# Patient Record
Sex: MALE | Race: WHITE | Employment: OTHER | URBAN - METROPOLITAN AREA
[De-identification: names, ages, dates, MRNs, and addresses within clinical notes are randomized per-mention and may not be internally consistent; named-entity substitution may affect disease eponyms.]

---

## 2017-01-03 ENCOUNTER — OFFICE VISIT (OUTPATIENT)
Dept: FAMILY MEDICINE CLINIC | Age: 37
End: 2017-01-03

## 2017-01-03 VITALS — RESPIRATION RATE: 12 BRPM | HEART RATE: 130 BPM | SYSTOLIC BLOOD PRESSURE: 130 MMHG | DIASTOLIC BLOOD PRESSURE: 80 MMHG

## 2017-01-03 DIAGNOSIS — S92.351A CLOSED DISPLACED FRACTURE OF FIFTH METATARSAL BONE OF RIGHT FOOT, INITIAL ENCOUNTER: Primary | ICD-10-CM

## 2017-01-03 PROCEDURE — 99213 OFFICE O/P EST LOW 20 MIN: CPT | Performed by: NURSE PRACTITIONER

## 2017-01-03 ASSESSMENT — ENCOUNTER SYMPTOMS
SHORTNESS OF BREATH: 0
DIARRHEA: 0
NAUSEA: 0
COUGH: 0
VOMITING: 0

## 2017-01-04 ENCOUNTER — TELEPHONE (OUTPATIENT)
Dept: ORTHOPEDIC SURGERY | Age: 37
End: 2017-01-04

## 2017-01-05 RX ORDER — AMITRIPTYLINE HYDROCHLORIDE 25 MG/1
TABLET, FILM COATED ORAL
Qty: 28 TABLET | Refills: 0 | Status: SHIPPED | OUTPATIENT
Start: 2017-01-05 | End: 2017-01-11 | Stop reason: SDUPTHER

## 2017-01-06 ENCOUNTER — OFFICE VISIT (OUTPATIENT)
Dept: ORTHOPEDIC SURGERY | Age: 37
End: 2017-01-06

## 2017-01-06 VITALS
BODY MASS INDEX: 39.17 KG/M2 | WEIGHT: 315 LBS | DIASTOLIC BLOOD PRESSURE: 94 MMHG | SYSTOLIC BLOOD PRESSURE: 139 MMHG | RESPIRATION RATE: 16 BRPM | HEIGHT: 75 IN | HEART RATE: 107 BPM

## 2017-01-06 DIAGNOSIS — S92.354A CLOSED NONDISPLACED FRACTURE OF FIFTH METATARSAL BONE OF RIGHT FOOT, INITIAL ENCOUNTER: Primary | ICD-10-CM

## 2017-01-06 PROCEDURE — 99203 OFFICE O/P NEW LOW 30 MIN: CPT | Performed by: ORTHOPAEDIC SURGERY

## 2017-01-06 PROCEDURE — 28470 CLTX METATARSAL FX WO MNP EA: CPT | Performed by: ORTHOPAEDIC SURGERY

## 2017-01-06 PROCEDURE — L4360 PNEUMAT WALKING BOOT PRE CST: HCPCS | Performed by: ORTHOPAEDIC SURGERY

## 2017-01-09 PROBLEM — S92.354A CLOSED NONDISPLACED FRACTURE OF FIFTH METATARSAL BONE OF RIGHT FOOT: Status: ACTIVE | Noted: 2017-01-09

## 2017-01-10 ENCOUNTER — TELEPHONE (OUTPATIENT)
Dept: FAMILY MEDICINE CLINIC | Age: 37
End: 2017-01-10

## 2017-01-10 RX ORDER — DULOXETIN HYDROCHLORIDE 30 MG/1
CAPSULE, DELAYED RELEASE ORAL
Qty: 60 CAPSULE | Refills: 0 | OUTPATIENT
Start: 2017-01-10

## 2017-01-11 ENCOUNTER — OFFICE VISIT (OUTPATIENT)
Dept: PAIN MANAGEMENT | Age: 37
End: 2017-01-11

## 2017-01-11 VITALS
DIASTOLIC BLOOD PRESSURE: 101 MMHG | WEIGHT: 315 LBS | BODY MASS INDEX: 41.32 KG/M2 | HEART RATE: 123 BPM | SYSTOLIC BLOOD PRESSURE: 127 MMHG

## 2017-01-11 DIAGNOSIS — S99.921A RIGHT FOOT INJURY, INITIAL ENCOUNTER: ICD-10-CM

## 2017-01-11 DIAGNOSIS — M96.1 FAILED BACK SURGICAL SYNDROME: ICD-10-CM

## 2017-01-11 DIAGNOSIS — M54.9 OTHER CHRONIC BACK PAIN: ICD-10-CM

## 2017-01-11 DIAGNOSIS — G89.29 OTHER CHRONIC BACK PAIN: ICD-10-CM

## 2017-01-11 DIAGNOSIS — G89.4 CHRONIC PAIN SYNDROME: Primary | ICD-10-CM

## 2017-01-11 PROCEDURE — 99213 OFFICE O/P EST LOW 20 MIN: CPT | Performed by: NURSE PRACTITIONER

## 2017-01-11 RX ORDER — DULOXETIN HYDROCHLORIDE 30 MG/1
30 CAPSULE, DELAYED RELEASE ORAL 2 TIMES DAILY
Qty: 60 CAPSULE | Refills: 1 | Status: SHIPPED | OUTPATIENT
Start: 2017-01-11 | End: 2017-02-08 | Stop reason: SDUPTHER

## 2017-01-11 RX ORDER — AMITRIPTYLINE HYDROCHLORIDE 25 MG/1
TABLET, FILM COATED ORAL
Qty: 28 TABLET | Refills: 1 | Status: SHIPPED | OUTPATIENT
Start: 2017-01-11 | End: 2017-02-08 | Stop reason: SDUPTHER

## 2017-01-11 RX ORDER — GABAPENTIN 300 MG/1
300 CAPSULE ORAL 3 TIMES DAILY
Qty: 90 CAPSULE | Refills: 1 | Status: SHIPPED | OUTPATIENT
Start: 2017-01-11 | End: 2017-02-08 | Stop reason: SDUPTHER

## 2017-01-11 RX ORDER — HYDROCODONE BITARTRATE AND ACETAMINOPHEN 5; 325 MG/1; MG/1
1 TABLET ORAL EVERY 8 HOURS PRN
Qty: 84 TABLET | Refills: 0 | Status: SHIPPED | OUTPATIENT
Start: 2017-01-11 | End: 2017-02-08 | Stop reason: SDUPTHER

## 2017-01-22 DIAGNOSIS — J45.20 MILD INTERMITTENT ASTHMA WITHOUT COMPLICATION: ICD-10-CM

## 2017-01-23 RX ORDER — DILTIAZEM HYDROCHLORIDE 60 MG/1
TABLET, FILM COATED ORAL
Qty: 1 INHALER | Refills: 2 | Status: SHIPPED | OUTPATIENT
Start: 2017-01-23 | End: 2017-04-11 | Stop reason: SDUPTHER

## 2017-02-08 ENCOUNTER — OFFICE VISIT (OUTPATIENT)
Dept: PAIN MANAGEMENT | Age: 37
End: 2017-02-08

## 2017-02-08 VITALS
WEIGHT: 315 LBS | SYSTOLIC BLOOD PRESSURE: 132 MMHG | BODY MASS INDEX: 42.57 KG/M2 | HEART RATE: 115 BPM | DIASTOLIC BLOOD PRESSURE: 97 MMHG

## 2017-02-08 DIAGNOSIS — G89.29 OTHER CHRONIC BACK PAIN: ICD-10-CM

## 2017-02-08 DIAGNOSIS — M96.1 FAILED BACK SURGICAL SYNDROME: ICD-10-CM

## 2017-02-08 DIAGNOSIS — G89.4 CHRONIC PAIN SYNDROME: Primary | ICD-10-CM

## 2017-02-08 DIAGNOSIS — F32.9 SINGLE CURRENT EPISODE OF MAJOR DEPRESSIVE DISORDER, UNSPECIFIED DEPRESSION EPISODE SEVERITY: ICD-10-CM

## 2017-02-08 DIAGNOSIS — S92.354S CLOSED NONDISPLACED FRACTURE OF FIFTH METATARSAL BONE OF RIGHT FOOT, SEQUELA: ICD-10-CM

## 2017-02-08 DIAGNOSIS — M54.9 OTHER CHRONIC BACK PAIN: ICD-10-CM

## 2017-02-08 DIAGNOSIS — M48.00 CENTRAL SPINAL STENOSIS: ICD-10-CM

## 2017-02-08 PROCEDURE — 99213 OFFICE O/P EST LOW 20 MIN: CPT | Performed by: NURSE PRACTITIONER

## 2017-02-08 RX ORDER — HYDROCODONE BITARTRATE AND ACETAMINOPHEN 5; 325 MG/1; MG/1
1 TABLET ORAL EVERY 8 HOURS PRN
Qty: 84 TABLET | Refills: 0 | Status: SHIPPED | OUTPATIENT
Start: 2017-02-08 | End: 2017-03-08 | Stop reason: SDUPTHER

## 2017-02-08 RX ORDER — AMITRIPTYLINE HYDROCHLORIDE 25 MG/1
TABLET, FILM COATED ORAL
Qty: 28 TABLET | Refills: 1 | Status: SHIPPED | OUTPATIENT
Start: 2017-02-08 | End: 2017-03-08 | Stop reason: SDUPTHER

## 2017-02-08 RX ORDER — GABAPENTIN 300 MG/1
300 CAPSULE ORAL 3 TIMES DAILY
Qty: 90 CAPSULE | Refills: 1 | Status: SHIPPED | OUTPATIENT
Start: 2017-02-08 | End: 2017-03-08 | Stop reason: SDUPTHER

## 2017-02-08 RX ORDER — DULOXETIN HYDROCHLORIDE 30 MG/1
30 CAPSULE, DELAYED RELEASE ORAL 2 TIMES DAILY
Qty: 60 CAPSULE | Refills: 1 | Status: SHIPPED | OUTPATIENT
Start: 2017-02-08 | End: 2017-03-08 | Stop reason: SDUPTHER

## 2017-02-09 ENCOUNTER — TELEPHONE (OUTPATIENT)
Dept: FAMILY MEDICINE CLINIC | Age: 37
End: 2017-02-09

## 2017-02-10 ENCOUNTER — TELEPHONE (OUTPATIENT)
Dept: FAMILY MEDICINE CLINIC | Age: 37
End: 2017-02-10

## 2017-02-13 RX ORDER — ALBUTEROL SULFATE 90 UG/1
2 AEROSOL, METERED RESPIRATORY (INHALATION) EVERY 6 HOURS PRN
Qty: 1 INHALER | Refills: 1 | Status: SHIPPED | OUTPATIENT
Start: 2017-02-13 | End: 2017-04-03 | Stop reason: SDUPTHER

## 2017-02-17 ENCOUNTER — OFFICE VISIT (OUTPATIENT)
Dept: ORTHOPEDIC SURGERY | Age: 37
End: 2017-02-17

## 2017-02-17 VITALS
DIASTOLIC BLOOD PRESSURE: 87 MMHG | BODY MASS INDEX: 37.3 KG/M2 | WEIGHT: 300 LBS | HEIGHT: 75 IN | SYSTOLIC BLOOD PRESSURE: 139 MMHG | HEART RATE: 107 BPM

## 2017-02-17 DIAGNOSIS — S92.354D CLOSED NONDISPLACED FRACTURE OF FIFTH METATARSAL BONE OF RIGHT FOOT WITH ROUTINE HEALING, SUBSEQUENT ENCOUNTER: Primary | ICD-10-CM

## 2017-02-17 PROCEDURE — 99024 POSTOP FOLLOW-UP VISIT: CPT | Performed by: NURSE PRACTITIONER

## 2017-02-17 PROCEDURE — 73630 X-RAY EXAM OF FOOT: CPT | Performed by: NURSE PRACTITIONER

## 2017-03-03 ENCOUNTER — OFFICE VISIT (OUTPATIENT)
Dept: FAMILY MEDICINE CLINIC | Age: 37
End: 2017-03-03

## 2017-03-03 VITALS
BODY MASS INDEX: 44.37 KG/M2 | WEIGHT: 315 LBS | DIASTOLIC BLOOD PRESSURE: 70 MMHG | HEART RATE: 105 BPM | SYSTOLIC BLOOD PRESSURE: 110 MMHG | RESPIRATION RATE: 12 BRPM

## 2017-03-03 DIAGNOSIS — E11.40 TYPE 2 DIABETES MELLITUS WITH DIABETIC NEUROPATHY, WITHOUT LONG-TERM CURRENT USE OF INSULIN (HCC): ICD-10-CM

## 2017-03-03 DIAGNOSIS — K21.9 GASTROESOPHAGEAL REFLUX DISEASE, ESOPHAGITIS PRESENCE NOT SPECIFIED: ICD-10-CM

## 2017-03-03 DIAGNOSIS — I10 ESSENTIAL HYPERTENSION: ICD-10-CM

## 2017-03-03 DIAGNOSIS — E66.01 MORBID OBESITY WITH BMI OF 40.0-44.9, ADULT (HCC): ICD-10-CM

## 2017-03-03 DIAGNOSIS — E78.2 MIXED HYPERLIPIDEMIA: ICD-10-CM

## 2017-03-03 PROCEDURE — 99214 OFFICE O/P EST MOD 30 MIN: CPT | Performed by: NURSE PRACTITIONER

## 2017-03-08 ENCOUNTER — OFFICE VISIT (OUTPATIENT)
Dept: PAIN MANAGEMENT | Age: 37
End: 2017-03-08

## 2017-03-08 VITALS
WEIGHT: 315 LBS | SYSTOLIC BLOOD PRESSURE: 142 MMHG | DIASTOLIC BLOOD PRESSURE: 95 MMHG | BODY MASS INDEX: 44.37 KG/M2 | HEART RATE: 116 BPM

## 2017-03-08 DIAGNOSIS — G89.29 OTHER CHRONIC BACK PAIN: ICD-10-CM

## 2017-03-08 DIAGNOSIS — M96.1 FAILED BACK SURGICAL SYNDROME: ICD-10-CM

## 2017-03-08 DIAGNOSIS — M54.9 OTHER CHRONIC BACK PAIN: ICD-10-CM

## 2017-03-08 DIAGNOSIS — B35.4 TINEA CORPORIS: ICD-10-CM

## 2017-03-08 DIAGNOSIS — E13.40 NEUROPATHY DUE TO SECONDARY DIABETES MELLITUS (HCC): ICD-10-CM

## 2017-03-08 DIAGNOSIS — F32.9 SINGLE CURRENT EPISODE OF MAJOR DEPRESSIVE DISORDER, UNSPECIFIED DEPRESSION EPISODE SEVERITY: ICD-10-CM

## 2017-03-08 DIAGNOSIS — E66.01 MORBID OBESITY WITH BMI OF 40.0-44.9, ADULT (HCC): ICD-10-CM

## 2017-03-08 DIAGNOSIS — F51.01 PRIMARY INSOMNIA: ICD-10-CM

## 2017-03-08 DIAGNOSIS — G89.4 CHRONIC PAIN SYNDROME: Primary | ICD-10-CM

## 2017-03-08 DIAGNOSIS — M48.00 CENTRAL SPINAL STENOSIS: ICD-10-CM

## 2017-03-08 PROCEDURE — 99213 OFFICE O/P EST LOW 20 MIN: CPT | Performed by: NURSE PRACTITIONER

## 2017-03-08 RX ORDER — TERBINAFINE HCL 100 %
1 POWDER (GRAM) MISCELLANEOUS 2 TIMES DAILY
Qty: 1 BOTTLE | Refills: 0 | Status: SHIPPED | OUTPATIENT
Start: 2017-03-08 | End: 2017-08-16

## 2017-03-08 RX ORDER — AMITRIPTYLINE HYDROCHLORIDE 25 MG/1
TABLET, FILM COATED ORAL
Qty: 28 TABLET | Refills: 1 | Status: SHIPPED | OUTPATIENT
Start: 2017-03-08 | End: 2017-04-05 | Stop reason: SDUPTHER

## 2017-03-08 RX ORDER — HYDROCODONE BITARTRATE AND ACETAMINOPHEN 5; 325 MG/1; MG/1
1 TABLET ORAL EVERY 8 HOURS PRN
Qty: 84 TABLET | Refills: 0 | Status: SHIPPED | OUTPATIENT
Start: 2017-03-08 | End: 2017-04-05 | Stop reason: SDUPTHER

## 2017-03-08 RX ORDER — DULOXETIN HYDROCHLORIDE 30 MG/1
30 CAPSULE, DELAYED RELEASE ORAL 2 TIMES DAILY
Qty: 60 CAPSULE | Refills: 1 | Status: SHIPPED | OUTPATIENT
Start: 2017-03-08 | End: 2017-04-05 | Stop reason: SDUPTHER

## 2017-03-08 RX ORDER — GABAPENTIN 300 MG/1
300 CAPSULE ORAL 3 TIMES DAILY
Qty: 90 CAPSULE | Refills: 1 | Status: SHIPPED | OUTPATIENT
Start: 2017-03-08 | End: 2017-04-05 | Stop reason: SDUPTHER

## 2017-04-03 RX ORDER — ALBUTEROL SULFATE 90 UG/1
2 AEROSOL, METERED RESPIRATORY (INHALATION) EVERY 6 HOURS PRN
Qty: 1 INHALER | Refills: 1 | Status: SHIPPED | OUTPATIENT
Start: 2017-04-03 | End: 2017-07-06 | Stop reason: SDUPTHER

## 2017-04-05 ENCOUNTER — OFFICE VISIT (OUTPATIENT)
Dept: PAIN MANAGEMENT | Age: 37
End: 2017-04-05

## 2017-04-05 ENCOUNTER — TELEPHONE (OUTPATIENT)
Dept: PAIN MANAGEMENT | Age: 37
End: 2017-04-05

## 2017-04-05 VITALS
BODY MASS INDEX: 44.37 KG/M2 | WEIGHT: 315 LBS | DIASTOLIC BLOOD PRESSURE: 101 MMHG | HEART RATE: 118 BPM | SYSTOLIC BLOOD PRESSURE: 147 MMHG

## 2017-04-05 DIAGNOSIS — M54.9 OTHER CHRONIC BACK PAIN: ICD-10-CM

## 2017-04-05 DIAGNOSIS — M96.1 FAILED BACK SURGICAL SYNDROME: ICD-10-CM

## 2017-04-05 DIAGNOSIS — G89.29 OTHER CHRONIC BACK PAIN: ICD-10-CM

## 2017-04-05 DIAGNOSIS — E66.01 MORBID OBESITY WITH BMI OF 40.0-44.9, ADULT (HCC): Primary | ICD-10-CM

## 2017-04-05 DIAGNOSIS — E13.40 NEUROPATHY DUE TO SECONDARY DIABETES MELLITUS (HCC): ICD-10-CM

## 2017-04-05 DIAGNOSIS — G89.4 CHRONIC PAIN SYNDROME: ICD-10-CM

## 2017-04-05 DIAGNOSIS — M48.00 CENTRAL SPINAL STENOSIS: ICD-10-CM

## 2017-04-05 DIAGNOSIS — F51.01 PRIMARY INSOMNIA: ICD-10-CM

## 2017-04-05 DIAGNOSIS — F32.9 SINGLE CURRENT EPISODE OF MAJOR DEPRESSIVE DISORDER, UNSPECIFIED DEPRESSION EPISODE SEVERITY: ICD-10-CM

## 2017-04-05 PROCEDURE — 99213 OFFICE O/P EST LOW 20 MIN: CPT | Performed by: NURSE PRACTITIONER

## 2017-04-05 RX ORDER — AMITRIPTYLINE HYDROCHLORIDE 25 MG/1
TABLET, FILM COATED ORAL
Qty: 28 TABLET | Refills: 1 | Status: SHIPPED | OUTPATIENT
Start: 2017-04-05 | End: 2017-05-26 | Stop reason: SDUPTHER

## 2017-04-05 RX ORDER — HYDROCODONE BITARTRATE AND ACETAMINOPHEN 5; 325 MG/1; MG/1
1 TABLET ORAL EVERY 8 HOURS PRN
Qty: 84 TABLET | Refills: 0 | Status: SHIPPED | OUTPATIENT
Start: 2017-04-05 | End: 2017-05-03 | Stop reason: SDUPTHER

## 2017-04-05 RX ORDER — DULOXETIN HYDROCHLORIDE 30 MG/1
30 CAPSULE, DELAYED RELEASE ORAL 2 TIMES DAILY
Qty: 60 CAPSULE | Refills: 1 | Status: SHIPPED | OUTPATIENT
Start: 2017-04-05 | End: 2017-05-26 | Stop reason: SDUPTHER

## 2017-04-05 RX ORDER — GABAPENTIN 300 MG/1
300 CAPSULE ORAL 3 TIMES DAILY
Qty: 90 CAPSULE | Refills: 1 | Status: SHIPPED | OUTPATIENT
Start: 2017-04-05 | End: 2017-05-26 | Stop reason: SDUPTHER

## 2017-04-11 ENCOUNTER — TELEPHONE (OUTPATIENT)
Dept: FAMILY MEDICINE CLINIC | Age: 37
End: 2017-04-11

## 2017-04-11 ENCOUNTER — HOSPITAL ENCOUNTER (OUTPATIENT)
Dept: OTHER | Age: 37
Discharge: HOME | End: 2017-04-30
Attending: NURSE PRACTITIONER | Admitting: NURSE PRACTITIONER

## 2017-04-11 DIAGNOSIS — J45.20 MILD INTERMITTENT ASTHMA WITHOUT COMPLICATION: ICD-10-CM

## 2017-04-11 RX ORDER — BUDESONIDE AND FORMOTEROL FUMARATE DIHYDRATE 80; 4.5 UG/1; UG/1
AEROSOL RESPIRATORY (INHALATION)
Qty: 3 INHALER | Refills: 0 | Status: SHIPPED | OUTPATIENT
Start: 2017-04-11 | End: 2017-07-27 | Stop reason: SDUPTHER

## 2017-04-13 ENCOUNTER — HOSPITAL ENCOUNTER (OUTPATIENT)
Dept: PHYSICAL THERAPY | Age: 37
Discharge: HOME | End: 2017-04-14
Admitting: NURSE PRACTITIONER

## 2017-04-18 ENCOUNTER — HOSPITAL ENCOUNTER (OUTPATIENT)
Dept: PHYSICAL THERAPY | Age: 37
Discharge: HOME | End: 2017-04-19
Admitting: NURSE PRACTITIONER

## 2017-04-20 ENCOUNTER — OFFICE VISIT (OUTPATIENT)
Dept: FAMILY MEDICINE CLINIC | Age: 37
End: 2017-04-20

## 2017-04-20 ENCOUNTER — TELEPHONE (OUTPATIENT)
Dept: FAMILY MEDICINE CLINIC | Age: 37
End: 2017-04-20

## 2017-04-20 ENCOUNTER — HOSPITAL ENCOUNTER (OUTPATIENT)
Dept: VASCULAR LAB | Age: 37
Discharge: HOME | End: 2017-04-20
Attending: NURSE PRACTITIONER | Admitting: NURSE PRACTITIONER

## 2017-04-20 VITALS
SYSTOLIC BLOOD PRESSURE: 138 MMHG | RESPIRATION RATE: 12 BRPM | WEIGHT: 315 LBS | BODY MASS INDEX: 46.62 KG/M2 | HEART RATE: 115 BPM | DIASTOLIC BLOOD PRESSURE: 100 MMHG | OXYGEN SATURATION: 98 %

## 2017-04-20 DIAGNOSIS — R60.0 LOCALIZED EDEMA: ICD-10-CM

## 2017-04-20 DIAGNOSIS — I10 ESSENTIAL HYPERTENSION: ICD-10-CM

## 2017-04-20 DIAGNOSIS — R60.0 BILATERAL LEG EDEMA: ICD-10-CM

## 2017-04-20 DIAGNOSIS — R60.0 BILATERAL LEG EDEMA: Primary | ICD-10-CM

## 2017-04-20 LAB
A/G RATIO: 1.7 (ref 1.1–2.2)
ALBUMIN SERPL-MCNC: 5 G/DL (ref 3.4–5)
ALP BLD-CCNC: 85 U/L (ref 40–129)
ALT SERPL-CCNC: 73 U/L (ref 10–40)
ANION GAP SERPL CALCULATED.3IONS-SCNC: 19 MMOL/L (ref 3–16)
AST SERPL-CCNC: 39 U/L (ref 15–37)
BILIRUB SERPL-MCNC: 0.7 MG/DL (ref 0–1)
BUN BLDV-MCNC: 13 MG/DL (ref 7–20)
CALCIUM SERPL-MCNC: 9.6 MG/DL (ref 8.3–10.6)
CHLORIDE BLD-SCNC: 92 MMOL/L (ref 99–110)
CO2: 25 MMOL/L (ref 21–32)
CREAT SERPL-MCNC: 0.9 MG/DL (ref 0.9–1.3)
GFR AFRICAN AMERICAN: >60
GFR NON-AFRICAN AMERICAN: >60
GLOBULIN: 2.9 G/DL
GLUCOSE BLD-MCNC: 193 MG/DL (ref 70–99)
POTASSIUM SERPL-SCNC: 4.4 MMOL/L (ref 3.5–5.1)
PRO-BNP: <5 PG/ML (ref 0–124)
SODIUM BLD-SCNC: 136 MMOL/L (ref 136–145)
TOTAL PROTEIN: 7.9 G/DL (ref 6.4–8.2)
TSH REFLEX: 1.1 UIU/ML (ref 0.27–4.2)

## 2017-04-20 PROCEDURE — 99214 OFFICE O/P EST MOD 30 MIN: CPT | Performed by: NURSE PRACTITIONER

## 2017-04-20 PROCEDURE — 93000 ELECTROCARDIOGRAM COMPLETE: CPT | Performed by: NURSE PRACTITIONER

## 2017-04-20 RX ORDER — METOPROLOL SUCCINATE 25 MG/1
25 TABLET, EXTENDED RELEASE ORAL DAILY
Qty: 30 TABLET | Refills: 0 | Status: SHIPPED | OUTPATIENT
Start: 2017-04-20 | End: 2017-05-24 | Stop reason: SDUPTHER

## 2017-04-20 ASSESSMENT — ENCOUNTER SYMPTOMS
NAUSEA: 0
SHORTNESS OF BREATH: 0
DIARRHEA: 0
VOMITING: 0
COUGH: 0

## 2017-04-21 ENCOUNTER — TELEPHONE (OUTPATIENT)
Dept: FAMILY MEDICINE CLINIC | Age: 37
End: 2017-04-21

## 2017-04-21 ENCOUNTER — OFFICE VISIT (OUTPATIENT)
Dept: ORTHOPEDIC SURGERY | Age: 37
End: 2017-04-21

## 2017-04-21 VITALS
WEIGHT: 315 LBS | BODY MASS INDEX: 39.17 KG/M2 | RESPIRATION RATE: 16 BRPM | SYSTOLIC BLOOD PRESSURE: 150 MMHG | HEIGHT: 75 IN | DIASTOLIC BLOOD PRESSURE: 99 MMHG

## 2017-04-21 DIAGNOSIS — M25.471 RIGHT ANKLE SWELLING: ICD-10-CM

## 2017-04-21 DIAGNOSIS — S92.354A CLOSED NONDISPLACED FRACTURE OF FIFTH METATARSAL BONE OF RIGHT FOOT, INITIAL ENCOUNTER: Primary | ICD-10-CM

## 2017-04-21 DIAGNOSIS — M25.571 ACUTE RIGHT ANKLE PAIN: ICD-10-CM

## 2017-04-21 PROBLEM — R74.8 ELEVATED LIVER ENZYMES: Status: ACTIVE | Noted: 2017-04-21

## 2017-04-21 PROCEDURE — 99214 OFFICE O/P EST MOD 30 MIN: CPT | Performed by: ORTHOPAEDIC SURGERY

## 2017-04-21 PROCEDURE — 73630 X-RAY EXAM OF FOOT: CPT | Performed by: ORTHOPAEDIC SURGERY

## 2017-04-21 PROCEDURE — 73610 X-RAY EXAM OF ANKLE: CPT | Performed by: ORTHOPAEDIC SURGERY

## 2017-04-22 DIAGNOSIS — E11.9 TYPE 2 DIABETES MELLITUS WITHOUT COMPLICATION, WITHOUT LONG-TERM CURRENT USE OF INSULIN (HCC): ICD-10-CM

## 2017-04-22 DIAGNOSIS — I10 ESSENTIAL HYPERTENSION: ICD-10-CM

## 2017-04-24 ENCOUNTER — TELEPHONE (OUTPATIENT)
Dept: FAMILY MEDICINE CLINIC | Age: 37
End: 2017-04-24

## 2017-04-24 DIAGNOSIS — I10 ESSENTIAL HYPERTENSION: ICD-10-CM

## 2017-04-24 DIAGNOSIS — E11.40 TYPE 2 DIABETES MELLITUS WITH DIABETIC NEUROPATHY, WITHOUT LONG-TERM CURRENT USE OF INSULIN (HCC): ICD-10-CM

## 2017-04-24 DIAGNOSIS — E78.2 MIXED HYPERLIPIDEMIA: ICD-10-CM

## 2017-04-24 LAB
A/G RATIO: 1.7 (ref 1.1–2.2)
ALBUMIN SERPL-MCNC: 4.7 G/DL (ref 3.4–5)
ALP BLD-CCNC: 81 U/L (ref 40–129)
ALT SERPL-CCNC: 68 U/L (ref 10–40)
ANION GAP SERPL CALCULATED.3IONS-SCNC: 17 MMOL/L (ref 3–16)
AST SERPL-CCNC: 39 U/L (ref 15–37)
BILIRUB SERPL-MCNC: 0.4 MG/DL (ref 0–1)
BUN BLDV-MCNC: 16 MG/DL (ref 7–20)
CALCIUM SERPL-MCNC: 9.3 MG/DL (ref 8.3–10.6)
CHLORIDE BLD-SCNC: 96 MMOL/L (ref 99–110)
CHOLESTEROL, TOTAL: 194 MG/DL (ref 0–199)
CO2: 24 MMOL/L (ref 21–32)
CREAT SERPL-MCNC: 0.9 MG/DL (ref 0.9–1.3)
GFR AFRICAN AMERICAN: >60
GFR NON-AFRICAN AMERICAN: >60
GLOBULIN: 2.7 G/DL
GLUCOSE BLD-MCNC: 237 MG/DL (ref 70–99)
HDLC SERPL-MCNC: 39 MG/DL (ref 40–60)
LDL CHOLESTEROL CALCULATED: 102 MG/DL
POTASSIUM SERPL-SCNC: 4.4 MMOL/L (ref 3.5–5.1)
SODIUM BLD-SCNC: 137 MMOL/L (ref 136–145)
TOTAL PROTEIN: 7.4 G/DL (ref 6.4–8.2)
TRIGL SERPL-MCNC: 264 MG/DL (ref 0–150)
VLDLC SERPL CALC-MCNC: 53 MG/DL

## 2017-04-24 RX ORDER — METFORMIN HYDROCHLORIDE 500 MG/1
TABLET, EXTENDED RELEASE ORAL
Qty: 60 TABLET | Refills: 0 | Status: SHIPPED | OUTPATIENT
Start: 2017-04-24 | End: 2017-05-30 | Stop reason: SDUPTHER

## 2017-04-24 RX ORDER — LISINOPRIL 10 MG/1
TABLET ORAL
Qty: 30 TABLET | Refills: 0 | Status: SHIPPED | OUTPATIENT
Start: 2017-04-24 | End: 2017-05-30 | Stop reason: SDUPTHER

## 2017-04-25 ENCOUNTER — HOSPITAL ENCOUNTER (OUTPATIENT)
Dept: PHYSICAL THERAPY | Age: 37
Discharge: HOME | End: 2017-04-26
Admitting: NURSE PRACTITIONER

## 2017-04-25 DIAGNOSIS — E11.40 TYPE 2 DIABETES MELLITUS WITH DIABETIC NEUROPATHY, WITHOUT LONG-TERM CURRENT USE OF INSULIN (HCC): Primary | ICD-10-CM

## 2017-04-25 LAB
ESTIMATED AVERAGE GLUCOSE: 171.4 MG/DL
HBA1C MFR BLD: 7.6 %

## 2017-04-27 ENCOUNTER — HOSPITAL ENCOUNTER (OUTPATIENT)
Dept: PHYSICAL THERAPY | Age: 37
Discharge: HOME | End: 2017-04-28
Admitting: NURSE PRACTITIONER

## 2017-04-28 ENCOUNTER — OFFICE VISIT (OUTPATIENT)
Dept: FAMILY MEDICINE CLINIC | Age: 37
End: 2017-04-28

## 2017-04-28 VITALS
DIASTOLIC BLOOD PRESSURE: 90 MMHG | HEART RATE: 104 BPM | WEIGHT: 315 LBS | BODY MASS INDEX: 47.37 KG/M2 | SYSTOLIC BLOOD PRESSURE: 130 MMHG | RESPIRATION RATE: 12 BRPM

## 2017-04-28 DIAGNOSIS — E11.40 TYPE 2 DIABETES MELLITUS WITH DIABETIC NEUROPATHY, WITHOUT LONG-TERM CURRENT USE OF INSULIN (HCC): Primary | ICD-10-CM

## 2017-04-28 DIAGNOSIS — R74.8 ELEVATED LIVER ENZYMES: ICD-10-CM

## 2017-04-28 DIAGNOSIS — I10 ESSENTIAL HYPERTENSION: ICD-10-CM

## 2017-04-28 LAB
ALBUMIN SERPL-MCNC: 4.6 G/DL (ref 3.4–5)
ALP BLD-CCNC: 83 U/L (ref 40–129)
ALT SERPL-CCNC: 71 U/L (ref 10–40)
AST SERPL-CCNC: 33 U/L (ref 15–37)
BILIRUB SERPL-MCNC: 0.4 MG/DL (ref 0–1)
BILIRUBIN DIRECT: <0.2 MG/DL (ref 0–0.3)
BILIRUBIN, INDIRECT: ABNORMAL MG/DL (ref 0–1)
HBV SURFACE AB TITR SER: <3.5 MUL/ML
HEPATITIS B SURFACE ANTIGEN INTERPRETATION: NORMAL
HEPATITIS C ANTIBODY INTERPRETATION: NORMAL
TOTAL PROTEIN: 7.2 G/DL (ref 6.4–8.2)

## 2017-04-28 PROCEDURE — 99214 OFFICE O/P EST MOD 30 MIN: CPT | Performed by: NURSE PRACTITIONER

## 2017-04-28 RX ORDER — ASPIRIN 81 MG/1
81 TABLET ORAL DAILY
COMMUNITY
End: 2017-08-16

## 2017-04-28 RX ORDER — BLOOD-GLUCOSE METER
1 KIT MISCELLANEOUS DAILY
Qty: 1 KIT | Refills: 0 | Status: SHIPPED | OUTPATIENT
Start: 2017-04-28 | End: 2017-06-06 | Stop reason: SDUPTHER

## 2017-04-28 ASSESSMENT — ENCOUNTER SYMPTOMS
VOMITING: 0
CONSTIPATION: 0
NAUSEA: 0
DIARRHEA: 0
SHORTNESS OF BREATH: 0
CHEST TIGHTNESS: 0

## 2017-05-02 ENCOUNTER — HOSPITAL ENCOUNTER (OUTPATIENT)
Dept: PHYSICAL THERAPY | Age: 37
Discharge: HOME | End: 2017-05-03
Admitting: NURSE PRACTITIONER

## 2017-05-03 ENCOUNTER — TELEPHONE (OUTPATIENT)
Dept: FAMILY MEDICINE CLINIC | Age: 37
End: 2017-05-03

## 2017-05-03 ENCOUNTER — OFFICE VISIT (OUTPATIENT)
Dept: PAIN MANAGEMENT | Age: 37
End: 2017-05-03

## 2017-05-03 VITALS
SYSTOLIC BLOOD PRESSURE: 122 MMHG | DIASTOLIC BLOOD PRESSURE: 85 MMHG | BODY MASS INDEX: 46.87 KG/M2 | WEIGHT: 315 LBS | HEART RATE: 127 BPM

## 2017-05-03 DIAGNOSIS — M48.00 CENTRAL SPINAL STENOSIS: ICD-10-CM

## 2017-05-03 DIAGNOSIS — M96.1 FAILED BACK SURGICAL SYNDROME: ICD-10-CM

## 2017-05-03 DIAGNOSIS — M54.9 OTHER CHRONIC BACK PAIN: ICD-10-CM

## 2017-05-03 DIAGNOSIS — F32.9 SINGLE CURRENT EPISODE OF MAJOR DEPRESSIVE DISORDER, UNSPECIFIED DEPRESSION EPISODE SEVERITY: ICD-10-CM

## 2017-05-03 DIAGNOSIS — F51.01 PRIMARY INSOMNIA: ICD-10-CM

## 2017-05-03 DIAGNOSIS — E78.2 MIXED HYPERLIPIDEMIA: ICD-10-CM

## 2017-05-03 DIAGNOSIS — G89.4 CHRONIC PAIN SYNDROME: Primary | ICD-10-CM

## 2017-05-03 DIAGNOSIS — E66.01 MORBID OBESITY WITH BMI OF 40.0-44.9, ADULT (HCC): ICD-10-CM

## 2017-05-03 DIAGNOSIS — E13.40 NEUROPATHY DUE TO SECONDARY DIABETES MELLITUS (HCC): ICD-10-CM

## 2017-05-03 DIAGNOSIS — G89.29 OTHER CHRONIC BACK PAIN: ICD-10-CM

## 2017-05-03 PROCEDURE — 99213 OFFICE O/P EST LOW 20 MIN: CPT | Performed by: NURSE PRACTITIONER

## 2017-05-03 RX ORDER — ATORVASTATIN CALCIUM 20 MG/1
TABLET, FILM COATED ORAL
Qty: 90 TABLET | Refills: 0 | Status: SHIPPED | OUTPATIENT
Start: 2017-05-03 | End: 2017-09-07 | Stop reason: SDUPTHER

## 2017-05-03 RX ORDER — HYDROCODONE BITARTRATE AND ACETAMINOPHEN 5; 325 MG/1; MG/1
1 TABLET ORAL EVERY 8 HOURS PRN
Qty: 84 TABLET | Refills: 0 | Status: SHIPPED | OUTPATIENT
Start: 2017-05-03 | End: 2017-05-26 | Stop reason: SDUPTHER

## 2017-05-05 DIAGNOSIS — J45.20 MILD INTERMITTENT ASTHMA WITHOUT COMPLICATION: ICD-10-CM

## 2017-05-05 RX ORDER — MONTELUKAST SODIUM 10 MG/1
TABLET ORAL
Qty: 30 TABLET | Refills: 3 | Status: SHIPPED | OUTPATIENT
Start: 2017-05-05 | End: 2017-12-01 | Stop reason: SDUPTHER

## 2017-05-09 ENCOUNTER — HOSPITAL ENCOUNTER (OUTPATIENT)
Dept: PHYSICAL THERAPY | Age: 37
Discharge: HOME | End: 2017-05-10
Admitting: NURSE PRACTITIONER

## 2017-05-11 ENCOUNTER — HOSPITAL ENCOUNTER (OUTPATIENT)
Dept: PHYSICAL THERAPY | Age: 37
Discharge: HOME | End: 2017-05-12
Admitting: NURSE PRACTITIONER

## 2017-05-16 ENCOUNTER — HOSPITAL ENCOUNTER (OUTPATIENT)
Dept: PHYSICAL THERAPY | Age: 37
Discharge: HOME | End: 2017-05-17
Admitting: NURSE PRACTITIONER

## 2017-05-18 ENCOUNTER — HOSPITAL ENCOUNTER (OUTPATIENT)
Dept: PHYSICAL THERAPY | Age: 37
Discharge: HOME | End: 2017-05-19
Admitting: NURSE PRACTITIONER

## 2017-05-23 ENCOUNTER — HOSPITAL ENCOUNTER (OUTPATIENT)
Dept: PHYSICAL THERAPY | Age: 37
Discharge: HOME | End: 2017-05-24
Admitting: NURSE PRACTITIONER

## 2017-05-24 DIAGNOSIS — I10 ESSENTIAL HYPERTENSION: ICD-10-CM

## 2017-05-25 ENCOUNTER — HOSPITAL ENCOUNTER (OUTPATIENT)
Dept: PHYSICAL THERAPY | Age: 37
Discharge: HOME | End: 2017-05-26
Admitting: NURSE PRACTITIONER

## 2017-05-25 DIAGNOSIS — K21.9 GASTROESOPHAGEAL REFLUX DISEASE, ESOPHAGITIS PRESENCE NOT SPECIFIED: ICD-10-CM

## 2017-05-25 RX ORDER — METOPROLOL SUCCINATE 25 MG/1
25 TABLET, EXTENDED RELEASE ORAL DAILY
Qty: 30 TABLET | Refills: 0 | Status: SHIPPED | OUTPATIENT
Start: 2017-05-25 | End: 2017-07-10 | Stop reason: SDUPTHER

## 2017-05-25 RX ORDER — OMEPRAZOLE 20 MG/1
CAPSULE, DELAYED RELEASE ORAL
Qty: 30 CAPSULE | Refills: 0 | Status: SHIPPED | OUTPATIENT
Start: 2017-05-25 | End: 2017-05-30 | Stop reason: SDUPTHER

## 2017-05-26 ENCOUNTER — OFFICE VISIT (OUTPATIENT)
Dept: PAIN MANAGEMENT | Age: 37
End: 2017-05-26

## 2017-05-26 VITALS
DIASTOLIC BLOOD PRESSURE: 68 MMHG | SYSTOLIC BLOOD PRESSURE: 102 MMHG | HEIGHT: 75 IN | WEIGHT: 315 LBS | BODY MASS INDEX: 39.17 KG/M2

## 2017-05-26 DIAGNOSIS — G89.29 OTHER CHRONIC BACK PAIN: ICD-10-CM

## 2017-05-26 DIAGNOSIS — M48.00 CENTRAL SPINAL STENOSIS: ICD-10-CM

## 2017-05-26 DIAGNOSIS — E66.01 MORBID OBESITY WITH BMI OF 40.0-44.9, ADULT (HCC): ICD-10-CM

## 2017-05-26 DIAGNOSIS — M96.1 FAILED BACK SURGICAL SYNDROME: ICD-10-CM

## 2017-05-26 DIAGNOSIS — K21.9 GASTROESOPHAGEAL REFLUX DISEASE, ESOPHAGITIS PRESENCE NOT SPECIFIED: ICD-10-CM

## 2017-05-26 DIAGNOSIS — E13.40 NEUROPATHY DUE TO SECONDARY DIABETES MELLITUS (HCC): ICD-10-CM

## 2017-05-26 DIAGNOSIS — F32.9 SINGLE CURRENT EPISODE OF MAJOR DEPRESSIVE DISORDER, UNSPECIFIED DEPRESSION EPISODE SEVERITY: ICD-10-CM

## 2017-05-26 DIAGNOSIS — M54.9 OTHER CHRONIC BACK PAIN: ICD-10-CM

## 2017-05-26 DIAGNOSIS — F51.01 PRIMARY INSOMNIA: ICD-10-CM

## 2017-05-26 DIAGNOSIS — G89.4 CHRONIC PAIN SYNDROME: Primary | ICD-10-CM

## 2017-05-26 PROCEDURE — 99213 OFFICE O/P EST LOW 20 MIN: CPT | Performed by: NURSE PRACTITIONER

## 2017-05-26 RX ORDER — DULOXETIN HYDROCHLORIDE 30 MG/1
30 CAPSULE, DELAYED RELEASE ORAL 2 TIMES DAILY
Qty: 60 CAPSULE | Refills: 1 | Status: SHIPPED | OUTPATIENT
Start: 2017-05-26 | End: 2017-08-02 | Stop reason: SDUPTHER

## 2017-05-26 RX ORDER — AMITRIPTYLINE HYDROCHLORIDE 25 MG/1
TABLET, FILM COATED ORAL
Qty: 28 TABLET | Refills: 1 | Status: SHIPPED | OUTPATIENT
Start: 2017-05-26 | End: 2017-08-02 | Stop reason: SDUPTHER

## 2017-05-26 RX ORDER — GABAPENTIN 300 MG/1
300 CAPSULE ORAL 3 TIMES DAILY
Qty: 90 CAPSULE | Refills: 1 | Status: SHIPPED | OUTPATIENT
Start: 2017-05-26 | End: 2017-08-02 | Stop reason: SDUPTHER

## 2017-05-26 RX ORDER — HYDROCODONE BITARTRATE AND ACETAMINOPHEN 5; 325 MG/1; MG/1
1 TABLET ORAL EVERY 8 HOURS PRN
Qty: 105 TABLET | Refills: 0 | Status: SHIPPED | OUTPATIENT
Start: 2017-05-26 | End: 2017-06-28 | Stop reason: SDUPTHER

## 2017-05-30 ENCOUNTER — HOSPITAL ENCOUNTER (OUTPATIENT)
Dept: PHYSICAL THERAPY | Age: 37
Discharge: HOME | End: 2017-05-31
Admitting: NURSE PRACTITIONER

## 2017-05-30 DIAGNOSIS — E11.9 TYPE 2 DIABETES MELLITUS WITHOUT COMPLICATION, WITHOUT LONG-TERM CURRENT USE OF INSULIN (HCC): ICD-10-CM

## 2017-05-30 DIAGNOSIS — I10 ESSENTIAL HYPERTENSION: ICD-10-CM

## 2017-05-30 DIAGNOSIS — K21.9 GASTROESOPHAGEAL REFLUX DISEASE, ESOPHAGITIS PRESENCE NOT SPECIFIED: ICD-10-CM

## 2017-05-30 RX ORDER — METFORMIN HYDROCHLORIDE 500 MG/1
TABLET, EXTENDED RELEASE ORAL
Qty: 60 TABLET | Refills: 0 | Status: SHIPPED | OUTPATIENT
Start: 2017-05-30 | End: 2017-07-20 | Stop reason: SDUPTHER

## 2017-05-30 RX ORDER — OMEPRAZOLE 20 MG/1
CAPSULE, DELAYED RELEASE ORAL
Qty: 30 CAPSULE | Refills: 0 | Status: SHIPPED | OUTPATIENT
Start: 2017-05-30 | End: 2017-09-02 | Stop reason: SDUPTHER

## 2017-05-30 RX ORDER — LISINOPRIL 10 MG/1
TABLET ORAL
Qty: 30 TABLET | Refills: 0 | Status: SHIPPED | OUTPATIENT
Start: 2017-05-30 | End: 2017-08-01 | Stop reason: SDUPTHER

## 2017-06-01 ENCOUNTER — HOSPITAL ENCOUNTER (OUTPATIENT)
Dept: PHYSICAL THERAPY | Age: 37
Discharge: HOME | End: 2017-06-02
Admitting: NURSE PRACTITIONER

## 2017-06-05 ENCOUNTER — OFFICE VISIT (OUTPATIENT)
Dept: FAMILY MEDICINE CLINIC | Age: 37
End: 2017-06-05

## 2017-06-05 VITALS
DIASTOLIC BLOOD PRESSURE: 86 MMHG | BODY MASS INDEX: 47 KG/M2 | HEART RATE: 118 BPM | WEIGHT: 315 LBS | RESPIRATION RATE: 14 BRPM | OXYGEN SATURATION: 98 % | SYSTOLIC BLOOD PRESSURE: 130 MMHG

## 2017-06-05 DIAGNOSIS — E11.40 TYPE 2 DIABETES MELLITUS WITH DIABETIC NEUROPATHY, WITHOUT LONG-TERM CURRENT USE OF INSULIN (HCC): ICD-10-CM

## 2017-06-05 DIAGNOSIS — E66.01 MORBID OBESITY WITH BMI OF 40.0-44.9, ADULT (HCC): ICD-10-CM

## 2017-06-05 DIAGNOSIS — I10 ESSENTIAL HYPERTENSION: ICD-10-CM

## 2017-06-05 DIAGNOSIS — R74.8 ELEVATED LIVER ENZYMES: ICD-10-CM

## 2017-06-05 DIAGNOSIS — E78.2 MIXED HYPERLIPIDEMIA: ICD-10-CM

## 2017-06-05 LAB
A/G RATIO: 1.5 (ref 1.1–2.2)
ALBUMIN SERPL-MCNC: 4.8 G/DL (ref 3.4–5)
ALP BLD-CCNC: 85 U/L (ref 40–129)
ALT SERPL-CCNC: 91 U/L (ref 10–40)
ANION GAP SERPL CALCULATED.3IONS-SCNC: 25 MMOL/L (ref 3–16)
AST SERPL-CCNC: 49 U/L (ref 15–37)
BILIRUB SERPL-MCNC: 0.4 MG/DL (ref 0–1)
BUN BLDV-MCNC: 14 MG/DL (ref 7–20)
CALCIUM SERPL-MCNC: 8.9 MG/DL (ref 8.3–10.6)
CHLORIDE BLD-SCNC: 95 MMOL/L (ref 99–110)
CO2: 20 MMOL/L (ref 21–32)
CREAT SERPL-MCNC: 1 MG/DL (ref 0.9–1.3)
ESTIMATED AVERAGE GLUCOSE: 177.2 MG/DL
GFR AFRICAN AMERICAN: >60
GFR NON-AFRICAN AMERICAN: >60
GLOBULIN: 3.1 G/DL
GLUCOSE BLD-MCNC: 183 MG/DL (ref 70–99)
HBA1C MFR BLD: 7.8 %
POTASSIUM SERPL-SCNC: 4.7 MMOL/L (ref 3.5–5.1)
SODIUM BLD-SCNC: 140 MMOL/L (ref 136–145)
TOTAL PROTEIN: 7.9 G/DL (ref 6.4–8.2)

## 2017-06-05 PROCEDURE — 99214 OFFICE O/P EST MOD 30 MIN: CPT | Performed by: NURSE PRACTITIONER

## 2017-06-05 ASSESSMENT — PATIENT HEALTH QUESTIONNAIRE - PHQ9
SUM OF ALL RESPONSES TO PHQ QUESTIONS 1-9: 0
1. LITTLE INTEREST OR PLEASURE IN DOING THINGS: 0
SUM OF ALL RESPONSES TO PHQ9 QUESTIONS 1 & 2: 0
2. FEELING DOWN, DEPRESSED OR HOPELESS: 0

## 2017-06-06 ENCOUNTER — HOSPITAL ENCOUNTER (OUTPATIENT)
Dept: PHYSICAL THERAPY | Age: 37
Discharge: HOME | End: 2017-06-07
Admitting: NURSE PRACTITIONER

## 2017-06-06 RX ORDER — LANCETS 30 GAUGE
1 EACH MISCELLANEOUS DAILY
Qty: 100 EACH | Refills: 2 | Status: SHIPPED | OUTPATIENT
Start: 2017-06-06 | End: 2018-01-15 | Stop reason: SDUPTHER

## 2017-06-06 RX ORDER — LANCETS 30 GAUGE
1 EACH MISCELLANEOUS DAILY
COMMUNITY
End: 2017-06-06 | Stop reason: SDUPTHER

## 2017-06-07 ENCOUNTER — TELEPHONE (OUTPATIENT)
Dept: BARIATRICS/WEIGHT MGMT | Age: 37
End: 2017-06-07

## 2017-06-08 ENCOUNTER — HOSPITAL ENCOUNTER (OUTPATIENT)
Dept: PHYSICAL THERAPY | Age: 37
Discharge: HOME | End: 2017-06-09
Admitting: NURSE PRACTITIONER

## 2017-06-12 DIAGNOSIS — E11.40 TYPE 2 DIABETES MELLITUS WITH DIABETIC NEUROPATHY, WITHOUT LONG-TERM CURRENT USE OF INSULIN (HCC): ICD-10-CM

## 2017-06-13 ENCOUNTER — HOSPITAL ENCOUNTER (OUTPATIENT)
Dept: PHYSICAL THERAPY | Age: 37
Discharge: HOME | End: 2017-06-14
Admitting: NURSE PRACTITIONER

## 2017-06-14 ENCOUNTER — TELEPHONE (OUTPATIENT)
Dept: FAMILY MEDICINE CLINIC | Age: 37
End: 2017-06-14

## 2017-06-15 ENCOUNTER — HOSPITAL ENCOUNTER (OUTPATIENT)
Dept: PHYSICAL THERAPY | Age: 37
Discharge: HOME | End: 2017-06-16
Admitting: NURSE PRACTITIONER

## 2017-06-16 ENCOUNTER — TELEPHONE (OUTPATIENT)
Dept: FAMILY MEDICINE CLINIC | Age: 37
End: 2017-06-16

## 2017-06-20 ENCOUNTER — HOSPITAL ENCOUNTER (OUTPATIENT)
Dept: PHYSICAL THERAPY | Age: 37
Discharge: HOME | End: 2017-06-21
Admitting: NURSE PRACTITIONER

## 2017-06-22 ENCOUNTER — HOSPITAL ENCOUNTER (OUTPATIENT)
Dept: PHYSICAL THERAPY | Age: 37
Discharge: HOME | End: 2017-06-23
Admitting: NURSE PRACTITIONER

## 2017-06-27 ENCOUNTER — HOSPITAL ENCOUNTER (OUTPATIENT)
Dept: PHYSICAL THERAPY | Age: 37
Discharge: HOME | End: 2017-06-28
Admitting: NURSE PRACTITIONER

## 2017-06-28 ENCOUNTER — OFFICE VISIT (OUTPATIENT)
Dept: PAIN MANAGEMENT | Age: 37
End: 2017-06-28

## 2017-06-28 VITALS
HEART RATE: 137 BPM | DIASTOLIC BLOOD PRESSURE: 84 MMHG | BODY MASS INDEX: 47.05 KG/M2 | WEIGHT: 315 LBS | SYSTOLIC BLOOD PRESSURE: 133 MMHG

## 2017-06-28 DIAGNOSIS — F32.9 SINGLE CURRENT EPISODE OF MAJOR DEPRESSIVE DISORDER, UNSPECIFIED DEPRESSION EPISODE SEVERITY: ICD-10-CM

## 2017-06-28 DIAGNOSIS — G89.4 CHRONIC PAIN SYNDROME: Primary | ICD-10-CM

## 2017-06-28 DIAGNOSIS — E66.01 MORBID OBESITY WITH BMI OF 40.0-44.9, ADULT (HCC): ICD-10-CM

## 2017-06-28 DIAGNOSIS — E13.40 NEUROPATHY DUE TO SECONDARY DIABETES MELLITUS (HCC): ICD-10-CM

## 2017-06-28 DIAGNOSIS — F51.01 PRIMARY INSOMNIA: ICD-10-CM

## 2017-06-28 DIAGNOSIS — K21.9 GASTROESOPHAGEAL REFLUX DISEASE, ESOPHAGITIS PRESENCE NOT SPECIFIED: ICD-10-CM

## 2017-06-28 DIAGNOSIS — M96.1 FAILED BACK SURGICAL SYNDROME: ICD-10-CM

## 2017-06-28 DIAGNOSIS — M48.00 CENTRAL SPINAL STENOSIS: ICD-10-CM

## 2017-06-28 PROCEDURE — 99213 OFFICE O/P EST LOW 20 MIN: CPT | Performed by: NURSE PRACTITIONER

## 2017-06-28 RX ORDER — HYDROCODONE BITARTRATE AND ACETAMINOPHEN 5; 325 MG/1; MG/1
1 TABLET ORAL EVERY 8 HOURS PRN
Qty: 105 TABLET | Refills: 0 | Status: SHIPPED | OUTPATIENT
Start: 2017-06-28 | End: 2017-08-02 | Stop reason: SDUPTHER

## 2017-06-29 ENCOUNTER — HOSPITAL ENCOUNTER (OUTPATIENT)
Dept: PHYSICAL THERAPY | Age: 37
Discharge: HOME | End: 2017-06-30
Admitting: NURSE PRACTITIONER

## 2017-07-06 ENCOUNTER — HOSPITAL ENCOUNTER (OUTPATIENT)
Dept: PHYSICAL THERAPY | Age: 37
Discharge: HOME | End: 2017-07-07
Admitting: NURSE PRACTITIONER

## 2017-07-06 RX ORDER — ALBUTEROL SULFATE 90 UG/1
2 AEROSOL, METERED RESPIRATORY (INHALATION) EVERY 6 HOURS PRN
Qty: 1 INHALER | Refills: 1 | Status: SHIPPED | OUTPATIENT
Start: 2017-07-06 | End: 2017-09-08 | Stop reason: SDUPTHER

## 2017-07-07 ENCOUNTER — HOSPITAL ENCOUNTER (OUTPATIENT)
Dept: ULTRASOUND IMAGING | Age: 37
Discharge: HOME | End: 2017-07-07
Attending: NURSE PRACTITIONER | Admitting: NURSE PRACTITIONER

## 2017-07-07 DIAGNOSIS — R74.8 ABNORMAL LEVELS OF OTHER SERUM ENZYMES: ICD-10-CM

## 2017-07-10 DIAGNOSIS — I10 ESSENTIAL HYPERTENSION: ICD-10-CM

## 2017-07-10 RX ORDER — METOPROLOL SUCCINATE 25 MG/1
25 TABLET, EXTENDED RELEASE ORAL DAILY
Qty: 90 TABLET | Refills: 0 | Status: SHIPPED | OUTPATIENT
Start: 2017-07-10 | End: 2017-07-12 | Stop reason: SDUPTHER

## 2017-07-11 ENCOUNTER — HOSPITAL ENCOUNTER (OUTPATIENT)
Dept: PHYSICAL THERAPY | Age: 37
Discharge: HOME | End: 2017-07-12
Admitting: NURSE PRACTITIONER

## 2017-07-12 DIAGNOSIS — I10 ESSENTIAL HYPERTENSION: ICD-10-CM

## 2017-07-12 RX ORDER — METOPROLOL SUCCINATE 25 MG/1
25 TABLET, EXTENDED RELEASE ORAL DAILY
Qty: 90 TABLET | Refills: 0 | Status: SHIPPED | OUTPATIENT
Start: 2017-07-12 | End: 2018-01-09 | Stop reason: SDUPTHER

## 2017-07-13 ENCOUNTER — HOSPITAL ENCOUNTER (OUTPATIENT)
Dept: PHYSICAL THERAPY | Age: 37
Discharge: HOME | End: 2017-07-14
Admitting: NURSE PRACTITIONER

## 2017-07-18 ENCOUNTER — HOSPITAL ENCOUNTER (OUTPATIENT)
Dept: PHYSICAL THERAPY | Age: 37
Discharge: HOME | End: 2017-07-19
Admitting: NURSE PRACTITIONER

## 2017-07-20 DIAGNOSIS — E11.9 TYPE 2 DIABETES MELLITUS WITHOUT COMPLICATION, WITHOUT LONG-TERM CURRENT USE OF INSULIN (HCC): ICD-10-CM

## 2017-07-20 RX ORDER — METFORMIN HYDROCHLORIDE 500 MG/1
TABLET, EXTENDED RELEASE ORAL
Qty: 60 TABLET | Refills: 2 | Status: SHIPPED | OUTPATIENT
Start: 2017-07-20 | End: 2017-11-01 | Stop reason: SDUPTHER

## 2017-07-25 ENCOUNTER — HOSPITAL ENCOUNTER (OUTPATIENT)
Dept: PHYSICAL THERAPY | Age: 37
Discharge: HOME | End: 2017-07-26
Admitting: NURSE PRACTITIONER

## 2017-07-27 DIAGNOSIS — J45.20 MILD INTERMITTENT ASTHMA WITHOUT COMPLICATION: ICD-10-CM

## 2017-07-27 RX ORDER — DILTIAZEM HYDROCHLORIDE 60 MG/1
TABLET, FILM COATED ORAL
Qty: 3 INHALER | Refills: 0 | Status: SHIPPED | OUTPATIENT
Start: 2017-07-27 | End: 2017-10-13 | Stop reason: SDUPTHER

## 2017-08-01 DIAGNOSIS — I10 ESSENTIAL HYPERTENSION: ICD-10-CM

## 2017-08-01 RX ORDER — LISINOPRIL 10 MG/1
TABLET ORAL
Qty: 90 TABLET | Refills: 0 | Status: SHIPPED | OUTPATIENT
Start: 2017-08-01 | End: 2017-10-27 | Stop reason: SDUPTHER

## 2017-08-02 ENCOUNTER — OFFICE VISIT (OUTPATIENT)
Dept: PAIN MANAGEMENT | Age: 37
End: 2017-08-02

## 2017-08-02 ENCOUNTER — TELEPHONE (OUTPATIENT)
Dept: BARIATRICS/WEIGHT MGMT | Age: 37
End: 2017-08-02

## 2017-08-02 VITALS
BODY MASS INDEX: 45.6 KG/M2 | DIASTOLIC BLOOD PRESSURE: 80 MMHG | WEIGHT: 315 LBS | SYSTOLIC BLOOD PRESSURE: 171 MMHG | HEART RATE: 111 BPM

## 2017-08-02 DIAGNOSIS — K21.9 GASTROESOPHAGEAL REFLUX DISEASE, ESOPHAGITIS PRESENCE NOT SPECIFIED: ICD-10-CM

## 2017-08-02 DIAGNOSIS — F51.01 PRIMARY INSOMNIA: ICD-10-CM

## 2017-08-02 DIAGNOSIS — E11.40 TYPE 2 DIABETES MELLITUS WITH DIABETIC NEUROPATHY, WITHOUT LONG-TERM CURRENT USE OF INSULIN (HCC): ICD-10-CM

## 2017-08-02 DIAGNOSIS — F32.9 SINGLE CURRENT EPISODE OF MAJOR DEPRESSIVE DISORDER, UNSPECIFIED DEPRESSION EPISODE SEVERITY: ICD-10-CM

## 2017-08-02 DIAGNOSIS — M48.00 CENTRAL SPINAL STENOSIS: ICD-10-CM

## 2017-08-02 DIAGNOSIS — L89.303 DECUBITUS ULCER OF BUTTOCK, STAGE 3, UNSPECIFIED LATERALITY: ICD-10-CM

## 2017-08-02 DIAGNOSIS — M96.1 FAILED BACK SURGICAL SYNDROME: ICD-10-CM

## 2017-08-02 DIAGNOSIS — E66.01 MORBID OBESITY WITH BMI OF 40.0-44.9, ADULT (HCC): ICD-10-CM

## 2017-08-02 DIAGNOSIS — E13.40 NEUROPATHY DUE TO SECONDARY DIABETES MELLITUS (HCC): ICD-10-CM

## 2017-08-02 DIAGNOSIS — G89.4 CHRONIC PAIN SYNDROME: Primary | ICD-10-CM

## 2017-08-02 PROCEDURE — 99213 OFFICE O/P EST LOW 20 MIN: CPT | Performed by: NURSE PRACTITIONER

## 2017-08-02 RX ORDER — AMITRIPTYLINE HYDROCHLORIDE 25 MG/1
TABLET, FILM COATED ORAL
Qty: 28 TABLET | Refills: 1 | Status: SHIPPED | OUTPATIENT
Start: 2017-08-02 | End: 2017-10-04 | Stop reason: SDUPTHER

## 2017-08-02 RX ORDER — DIAPER,BRIEF,INFANT-TODD,DISP
EACH MISCELLANEOUS
Qty: 30 G | Refills: 1 | Status: SHIPPED | OUTPATIENT
Start: 2017-08-02 | End: 2017-08-12

## 2017-08-02 RX ORDER — GABAPENTIN 300 MG/1
300 CAPSULE ORAL 3 TIMES DAILY
Qty: 90 CAPSULE | Refills: 1 | Status: SHIPPED | OUTPATIENT
Start: 2017-08-02 | End: 2017-10-04 | Stop reason: SDUPTHER

## 2017-08-02 RX ORDER — HYDROCODONE BITARTRATE AND ACETAMINOPHEN 5; 325 MG/1; MG/1
1 TABLET ORAL EVERY 8 HOURS PRN
Qty: 30 TABLET | Refills: 0 | Status: SHIPPED | OUTPATIENT
Start: 2017-08-02 | End: 2017-08-15 | Stop reason: SDUPTHER

## 2017-08-02 RX ORDER — DULOXETIN HYDROCHLORIDE 30 MG/1
30 CAPSULE, DELAYED RELEASE ORAL 2 TIMES DAILY
Qty: 60 CAPSULE | Refills: 1 | Status: SHIPPED | OUTPATIENT
Start: 2017-08-02 | End: 2017-10-04 | Stop reason: SDUPTHER

## 2017-08-14 ENCOUNTER — TELEPHONE (OUTPATIENT)
Dept: PAIN MANAGEMENT | Age: 37
End: 2017-08-14

## 2017-08-14 DIAGNOSIS — G89.4 CHRONIC PAIN SYNDROME: ICD-10-CM

## 2017-08-14 DIAGNOSIS — M48.00 CENTRAL SPINAL STENOSIS: ICD-10-CM

## 2017-08-14 DIAGNOSIS — E13.40 NEUROPATHY DUE TO SECONDARY DIABETES MELLITUS (HCC): ICD-10-CM

## 2017-08-14 DIAGNOSIS — M96.1 FAILED BACK SURGICAL SYNDROME: ICD-10-CM

## 2017-08-15 ENCOUNTER — TELEPHONE (OUTPATIENT)
Dept: PAIN MANAGEMENT | Age: 37
End: 2017-08-15

## 2017-08-15 RX ORDER — HYDROCODONE BITARTRATE AND ACETAMINOPHEN 5; 325 MG/1; MG/1
1 TABLET ORAL EVERY 8 HOURS PRN
Qty: 78 TABLET | Refills: 0 | Status: SHIPPED | OUTPATIENT
Start: 2017-08-15 | End: 2017-08-16

## 2017-08-16 ENCOUNTER — OFFICE VISIT (OUTPATIENT)
Dept: BARIATRICS/WEIGHT MGMT | Age: 37
End: 2017-08-16

## 2017-08-16 VITALS — BODY MASS INDEX: 39.17 KG/M2 | WEIGHT: 315 LBS | HEIGHT: 75 IN

## 2017-08-16 DIAGNOSIS — E11.69 DIABETES MELLITUS TYPE 2 IN OBESE (HCC): ICD-10-CM

## 2017-08-16 DIAGNOSIS — E66.9 DIABETES MELLITUS TYPE 2 IN OBESE (HCC): ICD-10-CM

## 2017-08-16 DIAGNOSIS — I10 ESSENTIAL HYPERTENSION: ICD-10-CM

## 2017-08-16 DIAGNOSIS — K21.9 GASTROESOPHAGEAL REFLUX DISEASE, ESOPHAGITIS PRESENCE NOT SPECIFIED: ICD-10-CM

## 2017-08-16 DIAGNOSIS — E78.2 MIXED HYPERLIPIDEMIA: ICD-10-CM

## 2017-08-16 DIAGNOSIS — E66.01 MORBID OBESITY WITH BMI OF 45.0-49.9, ADULT (HCC): Primary | ICD-10-CM

## 2017-08-16 PROCEDURE — 99204 OFFICE O/P NEW MOD 45 MIN: CPT | Performed by: SURGERY

## 2017-08-16 RX ORDER — HYDROCODONE BITARTRATE AND ACETAMINOPHEN 5; 325 MG/1; MG/1
1 TABLET ORAL 3 TIMES DAILY
COMMUNITY
End: 2017-09-06 | Stop reason: SDUPTHER

## 2017-08-21 ENCOUNTER — OFFICE VISIT (OUTPATIENT)
Dept: SLEEP MEDICINE | Age: 37
End: 2017-08-21

## 2017-08-21 VITALS
WEIGHT: 315 LBS | SYSTOLIC BLOOD PRESSURE: 146 MMHG | DIASTOLIC BLOOD PRESSURE: 90 MMHG | BODY MASS INDEX: 39.17 KG/M2 | HEART RATE: 104 BPM | HEIGHT: 75 IN | RESPIRATION RATE: 20 BRPM | OXYGEN SATURATION: 97 %

## 2017-08-21 DIAGNOSIS — G47.33 OSA (OBSTRUCTIVE SLEEP APNEA): Primary | ICD-10-CM

## 2017-08-21 PROCEDURE — 99204 OFFICE O/P NEW MOD 45 MIN: CPT | Performed by: PSYCHIATRY & NEUROLOGY

## 2017-08-21 ASSESSMENT — ENCOUNTER SYMPTOMS
GASTROINTESTINAL NEGATIVE: 1
ALLERGIC/IMMUNOLOGIC NEGATIVE: 1
WHEEZING: 1
BACK PAIN: 1
EYES NEGATIVE: 1

## 2017-08-21 ASSESSMENT — SLEEP AND FATIGUE QUESTIONNAIRES
HOW LIKELY ARE YOU TO NOD OFF OR FALL ASLEEP WHILE SITTING QUIETLY AFTER LUNCH WITHOUT ALCOHOL: 1
HOW LIKELY ARE YOU TO NOD OFF OR FALL ASLEEP WHEN YOU ARE A PASSENGER IN A CAR FOR AN HOUR WITHOUT A BREAK: 1
HOW LIKELY ARE YOU TO NOD OFF OR FALL ASLEEP WHILE WATCHING TV: 1
HOW LIKELY ARE YOU TO NOD OFF OR FALL ASLEEP IN A CAR, WHILE STOPPED FOR A FEW MINUTES IN TRAFFIC: 0
NECK CIRCUMFERENCE (INCHES): 20.25
HOW LIKELY ARE YOU TO NOD OFF OR FALL ASLEEP WHILE SITTING AND READING: 1
HOW LIKELY ARE YOU TO NOD OFF OR FALL ASLEEP WHILE SITTING INACTIVE IN A PUBLIC PLACE: 0
ESS TOTAL SCORE: 5
HOW LIKELY ARE YOU TO NOD OFF OR FALL ASLEEP WHILE LYING DOWN TO REST IN THE AFTERNOON WHEN CIRCUMSTANCES PERMIT: 1
HOW LIKELY ARE YOU TO NOD OFF OR FALL ASLEEP WHILE SITTING AND TALKING TO SOMEONE: 0

## 2017-09-02 DIAGNOSIS — K21.9 GASTROESOPHAGEAL REFLUX DISEASE, ESOPHAGITIS PRESENCE NOT SPECIFIED: ICD-10-CM

## 2017-09-05 ENCOUNTER — OFFICE VISIT (OUTPATIENT)
Dept: FAMILY MEDICINE CLINIC | Age: 37
End: 2017-09-05

## 2017-09-05 VITALS
SYSTOLIC BLOOD PRESSURE: 124 MMHG | RESPIRATION RATE: 12 BRPM | BODY MASS INDEX: 46.25 KG/M2 | DIASTOLIC BLOOD PRESSURE: 80 MMHG | WEIGHT: 315 LBS | HEART RATE: 107 BPM

## 2017-09-05 DIAGNOSIS — R74.8 ELEVATED LIVER ENZYMES: ICD-10-CM

## 2017-09-05 DIAGNOSIS — E11.40 TYPE 2 DIABETES MELLITUS WITH DIABETIC NEUROPATHY, WITHOUT LONG-TERM CURRENT USE OF INSULIN (HCC): ICD-10-CM

## 2017-09-05 DIAGNOSIS — I10 ESSENTIAL HYPERTENSION: ICD-10-CM

## 2017-09-05 DIAGNOSIS — E11.40 TYPE 2 DIABETES MELLITUS WITH DIABETIC NEUROPATHY, WITHOUT LONG-TERM CURRENT USE OF INSULIN (HCC): Primary | ICD-10-CM

## 2017-09-05 DIAGNOSIS — E66.01 MORBID OBESITY WITH BMI OF 40.0-44.9, ADULT (HCC): ICD-10-CM

## 2017-09-05 DIAGNOSIS — E78.2 MIXED HYPERLIPIDEMIA: ICD-10-CM

## 2017-09-05 LAB
A/G RATIO: 1.5 (ref 1.1–2.2)
ALBUMIN SERPL-MCNC: 4.3 G/DL (ref 3.4–5)
ALP BLD-CCNC: 64 U/L (ref 40–129)
ALT SERPL-CCNC: 83 U/L (ref 10–40)
ANION GAP SERPL CALCULATED.3IONS-SCNC: 18 MMOL/L (ref 3–16)
AST SERPL-CCNC: 35 U/L (ref 15–37)
BILIRUB SERPL-MCNC: 0.3 MG/DL (ref 0–1)
BUN BLDV-MCNC: 10 MG/DL (ref 7–20)
CALCIUM SERPL-MCNC: 9.4 MG/DL (ref 8.3–10.6)
CHLORIDE BLD-SCNC: 96 MMOL/L (ref 99–110)
CHOLESTEROL, TOTAL: 213 MG/DL (ref 0–199)
CO2: 25 MMOL/L (ref 21–32)
CREAT SERPL-MCNC: 0.9 MG/DL (ref 0.9–1.3)
GFR AFRICAN AMERICAN: >60
GFR NON-AFRICAN AMERICAN: >60
GLOBULIN: 2.9 G/DL
GLUCOSE BLD-MCNC: 176 MG/DL (ref 70–99)
HDLC SERPL-MCNC: 32 MG/DL (ref 40–60)
LDL CHOLESTEROL CALCULATED: ABNORMAL MG/DL
LDL CHOLESTEROL DIRECT: 140 MG/DL
POTASSIUM SERPL-SCNC: 4.5 MMOL/L (ref 3.5–5.1)
SODIUM BLD-SCNC: 139 MMOL/L (ref 136–145)
TOTAL PROTEIN: 7.2 G/DL (ref 6.4–8.2)
TRIGL SERPL-MCNC: 309 MG/DL (ref 0–150)
VLDLC SERPL CALC-MCNC: ABNORMAL MG/DL

## 2017-09-05 PROCEDURE — 99214 OFFICE O/P EST MOD 30 MIN: CPT | Performed by: NURSE PRACTITIONER

## 2017-09-05 RX ORDER — OMEPRAZOLE 20 MG/1
CAPSULE, DELAYED RELEASE ORAL
Qty: 30 CAPSULE | Refills: 0 | Status: SHIPPED | OUTPATIENT
Start: 2017-09-05 | End: 2017-10-16 | Stop reason: SDUPTHER

## 2017-09-06 ENCOUNTER — OFFICE VISIT (OUTPATIENT)
Dept: PAIN MANAGEMENT | Age: 37
End: 2017-09-06

## 2017-09-06 VITALS
WEIGHT: 315 LBS | SYSTOLIC BLOOD PRESSURE: 125 MMHG | BODY MASS INDEX: 46 KG/M2 | DIASTOLIC BLOOD PRESSURE: 90 MMHG | HEART RATE: 111 BPM

## 2017-09-06 DIAGNOSIS — M48.00 CENTRAL SPINAL STENOSIS: ICD-10-CM

## 2017-09-06 DIAGNOSIS — E13.40 NEUROPATHY DUE TO SECONDARY DIABETES MELLITUS (HCC): ICD-10-CM

## 2017-09-06 DIAGNOSIS — M96.1 FAILED BACK SURGICAL SYNDROME: ICD-10-CM

## 2017-09-06 DIAGNOSIS — M54.9 CHRONIC BACK PAIN, UNSPECIFIED BACK LOCATION, UNSPECIFIED BACK PAIN LATERALITY: ICD-10-CM

## 2017-09-06 DIAGNOSIS — G89.4 CHRONIC PAIN SYNDROME: Primary | ICD-10-CM

## 2017-09-06 DIAGNOSIS — R19.7 DIARRHEA, UNSPECIFIED TYPE: ICD-10-CM

## 2017-09-06 DIAGNOSIS — F51.01 PRIMARY INSOMNIA: ICD-10-CM

## 2017-09-06 DIAGNOSIS — M51.36 DDD (DEGENERATIVE DISC DISEASE), LUMBAR: ICD-10-CM

## 2017-09-06 DIAGNOSIS — F32.9 SINGLE CURRENT EPISODE OF MAJOR DEPRESSIVE DISORDER, UNSPECIFIED DEPRESSION EPISODE SEVERITY: ICD-10-CM

## 2017-09-06 DIAGNOSIS — G89.29 CHRONIC BACK PAIN, UNSPECIFIED BACK LOCATION, UNSPECIFIED BACK PAIN LATERALITY: ICD-10-CM

## 2017-09-06 DIAGNOSIS — E66.01 MORBID OBESITY WITH BMI OF 40.0-44.9, ADULT (HCC): ICD-10-CM

## 2017-09-06 LAB
ESTIMATED AVERAGE GLUCOSE: 174.3 MG/DL
HBA1C MFR BLD: 7.7 %

## 2017-09-06 PROCEDURE — 99213 OFFICE O/P EST LOW 20 MIN: CPT | Performed by: NURSE PRACTITIONER

## 2017-09-06 RX ORDER — HYDROCODONE BITARTRATE AND ACETAMINOPHEN 5; 325 MG/1; MG/1
1 TABLET ORAL 3 TIMES DAILY
Qty: 84 TABLET | Refills: 0 | Status: SHIPPED | OUTPATIENT
Start: 2017-09-06 | End: 2017-10-04 | Stop reason: SDUPTHER

## 2017-09-07 DIAGNOSIS — R74.8 ELEVATED LIVER ENZYMES: Primary | ICD-10-CM

## 2017-09-07 DIAGNOSIS — E78.2 MIXED HYPERLIPIDEMIA: ICD-10-CM

## 2017-09-07 DIAGNOSIS — E11.40 TYPE 2 DIABETES MELLITUS WITH DIABETIC NEUROPATHY, WITHOUT LONG-TERM CURRENT USE OF INSULIN (HCC): ICD-10-CM

## 2017-09-07 DIAGNOSIS — K76.0 FATTY LIVER: ICD-10-CM

## 2017-09-07 RX ORDER — ATORVASTATIN CALCIUM 40 MG/1
TABLET, FILM COATED ORAL
Qty: 90 TABLET | Refills: 0 | Status: SHIPPED | OUTPATIENT
Start: 2017-09-07 | End: 2017-12-03 | Stop reason: SDUPTHER

## 2017-09-26 ENCOUNTER — HOSPITAL ENCOUNTER (OUTPATIENT)
Dept: OTHER | Age: 37
Discharge: HOME | End: 2017-09-28
Attending: PSYCHIATRY & NEUROLOGY | Admitting: PSYCHIATRY & NEUROLOGY

## 2017-09-26 DIAGNOSIS — R74.8 ABNORMAL LEVELS OF OTHER SERUM ENZYMES: ICD-10-CM

## 2017-09-26 DIAGNOSIS — G47.33 OSA (OBSTRUCTIVE SLEEP APNEA): ICD-10-CM

## 2017-09-28 RX ORDER — CANAGLIFLOZIN 100 MG/1
TABLET, FILM COATED ORAL
Qty: 30 TABLET | Refills: 0 | Status: SHIPPED | OUTPATIENT
Start: 2017-09-28 | End: 2017-11-01 | Stop reason: SDUPTHER

## 2017-10-02 DIAGNOSIS — E11.40 TYPE 2 DIABETES MELLITUS WITH DIABETIC NEUROPATHY, WITHOUT LONG-TERM CURRENT USE OF INSULIN (HCC): ICD-10-CM

## 2017-10-02 RX ORDER — LIRAGLUTIDE 6 MG/ML
INJECTION SUBCUTANEOUS
Qty: 9 PEN | Refills: 0 | Status: SHIPPED | OUTPATIENT
Start: 2017-10-02 | End: 2017-11-01 | Stop reason: SDUPTHER

## 2017-10-04 ENCOUNTER — OFFICE VISIT (OUTPATIENT)
Dept: PAIN MANAGEMENT | Age: 37
End: 2017-10-04

## 2017-10-04 ENCOUNTER — TELEPHONE (OUTPATIENT)
Dept: PULMONOLOGY | Age: 37
End: 2017-10-04

## 2017-10-04 VITALS
HEART RATE: 108 BPM | DIASTOLIC BLOOD PRESSURE: 83 MMHG | WEIGHT: 315 LBS | SYSTOLIC BLOOD PRESSURE: 120 MMHG | BODY MASS INDEX: 44.62 KG/M2

## 2017-10-04 DIAGNOSIS — E13.40 NEUROPATHY DUE TO SECONDARY DIABETES MELLITUS (HCC): ICD-10-CM

## 2017-10-04 DIAGNOSIS — E66.01 MORBID OBESITY WITH BMI OF 40.0-44.9, ADULT (HCC): ICD-10-CM

## 2017-10-04 DIAGNOSIS — M54.9 CHRONIC BACK PAIN, UNSPECIFIED BACK LOCATION, UNSPECIFIED BACK PAIN LATERALITY: ICD-10-CM

## 2017-10-04 DIAGNOSIS — F51.01 PRIMARY INSOMNIA: ICD-10-CM

## 2017-10-04 DIAGNOSIS — F32.9 SINGLE CURRENT EPISODE OF MAJOR DEPRESSIVE DISORDER, UNSPECIFIED DEPRESSION EPISODE SEVERITY: ICD-10-CM

## 2017-10-04 DIAGNOSIS — M48.00 CENTRAL SPINAL STENOSIS: ICD-10-CM

## 2017-10-04 DIAGNOSIS — K21.9 GASTROESOPHAGEAL REFLUX DISEASE, ESOPHAGITIS PRESENCE NOT SPECIFIED: ICD-10-CM

## 2017-10-04 DIAGNOSIS — G89.4 CHRONIC PAIN SYNDROME: Primary | ICD-10-CM

## 2017-10-04 DIAGNOSIS — M96.1 FAILED BACK SURGICAL SYNDROME: ICD-10-CM

## 2017-10-04 DIAGNOSIS — G89.29 CHRONIC BACK PAIN, UNSPECIFIED BACK LOCATION, UNSPECIFIED BACK PAIN LATERALITY: ICD-10-CM

## 2017-10-04 PROCEDURE — 99213 OFFICE O/P EST LOW 20 MIN: CPT | Performed by: NURSE PRACTITIONER

## 2017-10-04 RX ORDER — AMITRIPTYLINE HYDROCHLORIDE 25 MG/1
TABLET, FILM COATED ORAL
Qty: 28 TABLET | Refills: 1 | Status: SHIPPED | OUTPATIENT
Start: 2017-10-04 | End: 2017-11-01 | Stop reason: SDUPTHER

## 2017-10-04 RX ORDER — DULOXETIN HYDROCHLORIDE 30 MG/1
30 CAPSULE, DELAYED RELEASE ORAL 2 TIMES DAILY
Qty: 60 CAPSULE | Refills: 1 | Status: SHIPPED | OUTPATIENT
Start: 2017-10-04 | End: 2017-11-01 | Stop reason: SDUPTHER

## 2017-10-04 RX ORDER — GABAPENTIN 300 MG/1
300 CAPSULE ORAL 3 TIMES DAILY
Qty: 90 CAPSULE | Refills: 1 | Status: SHIPPED | OUTPATIENT
Start: 2017-10-04 | End: 2017-11-01 | Stop reason: SDUPTHER

## 2017-10-04 RX ORDER — HYDROCODONE BITARTRATE AND ACETAMINOPHEN 5; 325 MG/1; MG/1
1 TABLET ORAL 3 TIMES DAILY
Qty: 84 TABLET | Refills: 0 | Status: SHIPPED | OUTPATIENT
Start: 2017-10-04 | End: 2017-11-01 | Stop reason: SDUPTHER

## 2017-10-04 NOTE — MR AVS SNAPSHOT
using your back muscles. As your press up, do not let your hips or pelvis come off the floor. 3. Hold for 15 to 30 seconds, then relax. 4. Repeat 2 to 4 times. Relax and rest    1. Lie on your back with a rolled towel under your neck and a pillow under your knees. Extend your arms comfortably to your sides. 2. Relax and breathe normally. 3. Remain in this position for about 10 minutes. 4. If you can, do this 2 or 3 times each day. Follow-up care is a key part of your treatment and safety. Be sure to make and go to all appointments, and call your doctor if you are having problems. It's also a good idea to know your test results and keep a list of the medicines you take. Where can you learn more? Go to [email or web address] org and sign in to your [employer] account. Enter F317 in the [employer] box to learn more about \"Back Stretches: Exercises. \"     If you do not have an account, please click on the \"Sign Up Now\" link. Current as of: March 21, 2017  Content Version: 11.3  © 6203-8843 [employer]. Care instructions adapted under license by [facility] ([facility]). If you have questions about a medical condition or this instruction, always ask your healthcare professional. Elizabeth Henry any warranty or liability for your use of this information.               Where to Get Your Medications      These medications were sent to [address] [place] [address] [address], [facility] [address]  - P [phone] - F [phone]  [facility] , [facility] [address]     Phone:  [phone]     amitriptyline 25 MG tablet    DULoxetine 30 MG extended release capsule    gabapentin 300 MG capsule         You can get these medications from any pharmacy     Bring a paper prescription for each of these medications     HYDROcodone-acetaminophen 5-325 MG per tablet         Your Current Medications Are              HYDROcodone-acetaminophen (NORCO) 5-325 MG per tablet Take 1 tablet by mouth 3 times daily .     gabapentin (NEURONTIN) 300 MG capsule Take 1 capsule by mouth 3 times daily    DULoxetine (CYMBALTA) 30 MG extended release capsule Take 1 capsule by mouth 2 times daily    amitriptyline (ELAVIL) 25 MG tablet TAKE ONE TABLET BY MOUTH NIGHTLY    VICTOZA 18 MG/3ML SOPN SC injection DIAL AND INJECT SUBCUTANEOUSLY 1.8MG DAILY FOR DIABETES    INVOKANA 100 MG TABS tablet TAKE ONE TABLET BY MOUTH EVERY MORNING BEFORE BREAKFAST FOR DIABETES    VENTOLIN  (90 Base) MCG/ACT inhaler INHALE TWO PUFFS BY MOUTH EVERY 6 HOURS AS NEEDED FOR WHEEZING OR SHORTNESS OF BREATH    atorvastatin (LIPITOR) 40 MG tablet TAKE ONE TABLET BY MOUTH DAILY FOR CHOLESTEROL    Insulin Degludec (TRESIBA FLEXTOUCH) 100 UNIT/ML SOPN Inject 10 Units into the skin every evening For diabetes    omeprazole (PRILOSEC) 20 MG delayed release capsule TAKE ONE CAPSULE BY MOUTH DAILY FOR STOMACH    lisinopril (PRINIVIL;ZESTRIL) 10 MG tablet TAKE ONE TABLET BY MOUTH DAILY FOR HIGH BLOOD PRESSURE    SYMBICORT 80-4.5 MCG/ACT AERO INHALE TWO PUFFS BY MOUTH TWICE A DAY    metFORMIN (GLUCOPHAGE-XR) 500 MG extended release tablet TAKE ONE TABLET BY MOUTH TWICE A DAY FOR DIABETES    metoprolol succinate (TOPROL XL) 25 MG extended release tablet Take 1 tablet by mouth daily For blood pressure and heart rate    glucose blood VI test strips (TRUE METRIX BLOOD GLUCOSE TEST) strip 1 each by In Vitro route daily    Lancets MISC 1 each by Does not apply route daily TRUEMETRIX    montelukast (SINGULAIR) 10 MG tablet TAKE ONE TABLET BY MOUTH DAILY FOR BREATHING    Insulin Pen Needle 31G X 5 MM MISC 1 each by Does not apply route daily      Allergies              Penicillins Swelling    Swelling of tongue and throat    Fentanyl     Had hives at patch site and nausea    Food Swelling    Swelling of throat from Mushrooms         Additional Information        Basic Information     Date Of Birth Sex Race Ethnicity Preferred Language

## 2017-10-04 NOTE — TELEPHONE ENCOUNTER
Sleep study shows moderate sleep apnea with 21 events per hour and episodes of oxygen desaturation to 82%.   Dr Prieto Pratt titration    Left message to return call

## 2017-10-04 NOTE — PROGRESS NOTES
injection DIAL AND INJECT SUBCUTANEOUSLY 1.8MG DAILY FOR DIABETES 9 Pen 0    INVOKANA 100 MG TABS tablet TAKE ONE TABLET BY MOUTH EVERY MORNING BEFORE BREAKFAST FOR DIABETES 30 tablet 0    VENTOLIN  (90 Base) MCG/ACT inhaler INHALE TWO PUFFS BY MOUTH EVERY 6 HOURS AS NEEDED FOR WHEEZING OR SHORTNESS OF BREATH 1 Inhaler 0    atorvastatin (LIPITOR) 40 MG tablet TAKE ONE TABLET BY MOUTH DAILY FOR CHOLESTEROL 90 tablet 0    Insulin Degludec (TRESIBA FLEXTOUCH) 100 UNIT/ML SOPN Inject 10 Units into the skin every evening For diabetes 5 Pen 2    omeprazole (PRILOSEC) 20 MG delayed release capsule TAKE ONE CAPSULE BY MOUTH DAILY FOR STOMACH 30 capsule 0    lisinopril (PRINIVIL;ZESTRIL) 10 MG tablet TAKE ONE TABLET BY MOUTH DAILY FOR HIGH BLOOD PRESSURE 90 tablet 0    SYMBICORT 80-4.5 MCG/ACT AERO INHALE TWO PUFFS BY MOUTH TWICE A DAY 3 Inhaler 0    metFORMIN (GLUCOPHAGE-XR) 500 MG extended release tablet TAKE ONE TABLET BY MOUTH TWICE A DAY FOR DIABETES 60 tablet 2    metoprolol succinate (TOPROL XL) 25 MG extended release tablet Take 1 tablet by mouth daily For blood pressure and heart rate 90 tablet 0    glucose blood VI test strips (TRUE METRIX BLOOD GLUCOSE TEST) strip 1 each by In Vitro route daily 100 each 2    Lancets MISC 1 each by Does not apply route daily TRUEMETRIX 100 each 2    montelukast (SINGULAIR) 10 MG tablet TAKE ONE TABLET BY MOUTH DAILY FOR BREATHING 30 tablet 3    Insulin Pen Needle 31G X 5 MM MISC 1 each by Does not apply route daily 100 each 3    HYDROcodone-acetaminophen (NORCO) 5-325 MG per tablet Take 1 tablet by mouth 3 times daily .  84 tablet 0    gabapentin (NEURONTIN) 300 MG capsule Take 1 capsule by mouth 3 times daily 90 capsule 1    DULoxetine (CYMBALTA) 30 MG extended release capsule Take 1 capsule by mouth 2 times daily 60 capsule 1    amitriptyline (ELAVIL) 25 MG tablet TAKE ONE TABLET BY MOUTH NIGHTLY 28 tablet 1     No facility-administered medications prior to nutritional  consult increased physical activity as tolerated  -CBT techniques- relaxation therapies such as biofeedback, mindfulness based stress reduction, imagery, cognitive restructuring, problem solving discussed with patient  -Last UDS passed  -Return in about 4 weeks (around 11/1/2017). -[place] was obtained and reviewed  for the last one year and no indicators of drug misuse  were found. Any other controlled substance prescriptions  seen on the record have been accounted for, I am aware of the patient receiving these medications. Aster Quintanilla [place] record will be rechecked as part of office protocol. Current Outpatient Prescriptions   Medication Sig Dispense Refill    HYDROcodone-acetaminophen (NORCO) 5-325 MG per tablet Take 1 tablet by mouth 3 times daily .  84 tablet 0    gabapentin (NEURONTIN) 300 MG capsule Take 1 capsule by mouth 3 times daily 90 capsule 1    DULoxetine (CYMBALTA) 30 MG extended release capsule Take 1 capsule by mouth 2 times daily 60 capsule 1    amitriptyline (ELAVIL) 25 MG tablet TAKE ONE TABLET BY MOUTH NIGHTLY 28 tablet 1    VICTOZA 18 MG/3ML SOPN SC injection DIAL AND INJECT SUBCUTANEOUSLY 1.8MG DAILY FOR DIABETES 9 Pen 0    INVOKANA 100 MG TABS tablet TAKE ONE TABLET BY MOUTH EVERY MORNING BEFORE BREAKFAST FOR DIABETES 30 tablet 0    VENTOLIN  (90 Base) MCG/ACT inhaler INHALE TWO PUFFS BY MOUTH EVERY 6 HOURS AS NEEDED FOR WHEEZING OR SHORTNESS OF BREATH 1 Inhaler 0    atorvastatin (LIPITOR) 40 MG tablet TAKE ONE TABLET BY MOUTH DAILY FOR CHOLESTEROL 90 tablet 0    Insulin Degludec (TRESIBA FLEXTOUCH) 100 UNIT/ML SOPN Inject 10 Units into the skin every evening For diabetes 5 Pen 2    omeprazole (PRILOSEC) 20 MG delayed release capsule TAKE ONE CAPSULE BY MOUTH DAILY FOR STOMACH 30 capsule 0    lisinopril (PRINIVIL;ZESTRIL) 10 MG tablet TAKE ONE TABLET BY MOUTH DAILY FOR HIGH BLOOD PRESSURE 90 tablet 0    SYMBICORT 80-4.5 MCG/ACT AERO INHALE TWO PUFFS BY MOUTH TWICE A DAY 3 Inhaler 0    metFORMIN (GLUCOPHAGE-XR) 500 MG extended release tablet TAKE ONE TABLET BY MOUTH TWICE A DAY FOR DIABETES 60 tablet 2    metoprolol succinate (TOPROL XL) 25 MG extended release tablet Take 1 tablet by mouth daily For blood pressure and heart rate 90 tablet 0    glucose blood VI test strips (TRUE METRIX BLOOD GLUCOSE TEST) strip 1 each by In Vitro route daily 100 each 2    Lancets MISC 1 each by Does not apply route daily TRUEMETRIX 100 each 2    montelukast (SINGULAIR) 10 MG tablet TAKE ONE TABLET BY MOUTH DAILY FOR BREATHING 30 tablet 3    Insulin Pen Needle 31G X 5 MM MISC 1 each by Does not apply route daily 100 each 3     No current facility-administered medications for this visit. I will continue his current medication regimen  which is part of the above treatment schedule. It has been helping with Mr. Yocasta Alcazar chronic  medical problems which for this visit include: The primary encounter diagnosis was Chronic pain syndrome. 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 Risks and benefits of the medications and other alternative treatments  including no treatment were discussed with the patient. The common side effects of these medications were also explained to the patient. Informed verbal consent was obtained.    Goals of current treatment regimen include improvement in pain, restoration of functioning- with focus on improvement in physical performance, general activity, work or disability,emotional distress, health care utilization and  decreased medication consumption. Will continue to monitor progress towards achieving/maintaining therapeutic goals with special emphasis on  1. Improvement in perceived interfernce  of pain with ADL's. Ability to do home exercises independently. Ability to do household chores indoor and/or outdoor work and social and leisure activities. Improve psychosocial and physical functioning. - he is showing progression towards this treatment goal with the current regimen. He was advised against drinking alcohol with the narcotic pain medicines, advised against driving or handling machinery while adjusting the dose of medicines or if having cognitive  issues related to the current medications. Risk of overdose and death, if medicines not taken as prescribed, were also discussed. If the patient develops new symptoms or if the symptoms worsen, the patient should call the office. While transcribing every attempt was made to maintain the accuracy of the note in terms of it's contents,there may have been some errors made inadvertently. Thank you for allowing me to participate in the care of this patient. Ricky Benitez CNP.     Cc: Paul Gonzalez CNP

## 2017-10-06 NOTE — TELEPHONE ENCOUNTER
Pt has an appt on 12/12 for his DM follow up  Do you want to see him sooner  (there is another refill request for this pt with the same message)

## 2017-10-16 ENCOUNTER — OFFICE VISIT (OUTPATIENT)
Dept: FAMILY MEDICINE CLINIC | Age: 37
End: 2017-10-16

## 2017-10-16 VITALS
HEART RATE: 108 BPM | BODY MASS INDEX: 47.25 KG/M2 | DIASTOLIC BLOOD PRESSURE: 88 MMHG | SYSTOLIC BLOOD PRESSURE: 130 MMHG | OXYGEN SATURATION: 95 % | WEIGHT: 315 LBS | RESPIRATION RATE: 22 BRPM

## 2017-10-16 DIAGNOSIS — Z79.4 TYPE 2 DIABETES MELLITUS WITH DIABETIC NEUROPATHY, WITH LONG-TERM CURRENT USE OF INSULIN (HCC): Primary | ICD-10-CM

## 2017-10-16 DIAGNOSIS — E66.01 MORBID OBESITY WITH BMI OF 40.0-44.9, ADULT (HCC): ICD-10-CM

## 2017-10-16 DIAGNOSIS — E11.40 TYPE 2 DIABETES MELLITUS WITH DIABETIC NEUROPATHY, WITH LONG-TERM CURRENT USE OF INSULIN (HCC): Primary | ICD-10-CM

## 2017-10-16 DIAGNOSIS — E78.2 MIXED HYPERLIPIDEMIA: ICD-10-CM

## 2017-10-16 DIAGNOSIS — I10 ESSENTIAL HYPERTENSION: ICD-10-CM

## 2017-10-16 DIAGNOSIS — K21.9 GASTROESOPHAGEAL REFLUX DISEASE, ESOPHAGITIS PRESENCE NOT SPECIFIED: ICD-10-CM

## 2017-10-16 LAB — HBA1C MFR BLD: 8.8 %

## 2017-10-16 PROCEDURE — 83036 HEMOGLOBIN GLYCOSYLATED A1C: CPT | Performed by: NURSE PRACTITIONER

## 2017-10-16 PROCEDURE — 99214 OFFICE O/P EST MOD 30 MIN: CPT | Performed by: NURSE PRACTITIONER

## 2017-10-16 RX ORDER — OMEPRAZOLE 20 MG/1
CAPSULE, DELAYED RELEASE ORAL
Qty: 30 CAPSULE | Refills: 3 | Status: SHIPPED | OUTPATIENT
Start: 2017-10-16 | End: 2018-03-28 | Stop reason: SDUPTHER

## 2017-10-16 NOTE — PROGRESS NOTES
strips (TRUE METRIX BLOOD GLUCOSE TEST) strip 1 each by In Vitro route daily Yes Chevy Gross CNP   Lancets MISC 1 each by Does not apply route daily TRUEMETRIX Yes Chevy Gross CNP   montelukast (SINGULAIR) 10 MG tablet TAKE ONE TABLET BY MOUTH DAILY FOR BREATHING Yes Shelia Cespedes MD   Insulin Pen Needle 31G X 5 MM MISC 1 each by Does not apply route daily Yes Chevy Gross CNP        Objective:   PHYSICAL EXAM  /88 (Site: Right Arm, Position: Sitting, Cuff Size: Large Adult)   Pulse 108   Resp 22   Wt (!) 378 lb (171.5 kg)   SpO2 95%   BMI 47.25 kg/m²   Blood pressure is Good. BP Readings from Last 5 Encounters:   10/16/17 130/88   10/04/17 120/83   09/06/17 (!) 125/90   09/05/17 124/80   08/21/17 (!) 146/90     Weight is increased . Wt Readings from Last 5 Encounters:   10/16/17 (!) 378 lb (171.5 kg)   10/04/17 (!) 357 lb (161.9 kg)   09/06/17 (!) 368 lb (166.9 kg)   09/05/17 (!) 370 lb (167.8 kg)   08/21/17 (!) 367 lb (166.5 kg)      GENERAL: well-developed, well-nourished, alert, no distress, calm, assistive device: cane    EYES:  negative findings: lids and lashes normal  LUNGS:  Breathing unlabored  · clear to auscultation bilaterally and good air movement  CARDIOVASC: regular rate and rhythm, S1, S2 normal, no murmur, click, rub or gallop  · Apical impulse normal  LEGS:  Lower extremity edema: none  · Pedal pulses palpable    Assessment and Plan:     1. Type 2 diabetes mellitus with diabetic neuropathy, with long-term current use of insulin ([facility])  Hemoglobin A1C- 8.8%. Worsening diabetes control. Metformin  mg BID  victoza 1.8 mg SQ daily  Currently on tresiba 10 units qhs. Advised to increase tresiba dose by 2 units every 2 days till fasting BS is less than 120. Patient is to call if he needs help adjusting his insulin.    Low carb diet, aerobic exercise, and weight loss discussed and needed  On gabapentin 300 mg TID for neuropathy from pain specialist.

## 2017-10-26 ENCOUNTER — OFFICE VISIT (OUTPATIENT)
Dept: BARIATRICS/WEIGHT MGMT | Age: 37
End: 2017-10-26

## 2017-10-26 VITALS
WEIGHT: 315 LBS | BODY MASS INDEX: 39.17 KG/M2 | HEART RATE: 106 BPM | SYSTOLIC BLOOD PRESSURE: 126 MMHG | HEIGHT: 75 IN | DIASTOLIC BLOOD PRESSURE: 84 MMHG

## 2017-10-26 DIAGNOSIS — E78.2 MIXED HYPERLIPIDEMIA: ICD-10-CM

## 2017-10-26 DIAGNOSIS — E66.01 MORBID OBESITY WITH BMI OF 40.0-44.9, ADULT (HCC): Primary | ICD-10-CM

## 2017-10-26 DIAGNOSIS — E11.40 TYPE 2 DIABETES MELLITUS WITH DIABETIC NEUROPATHY, WITH LONG-TERM CURRENT USE OF INSULIN (HCC): ICD-10-CM

## 2017-10-26 DIAGNOSIS — K21.9 GASTROESOPHAGEAL REFLUX DISEASE, ESOPHAGITIS PRESENCE NOT SPECIFIED: ICD-10-CM

## 2017-10-26 DIAGNOSIS — Z79.4 TYPE 2 DIABETES MELLITUS WITH DIABETIC NEUROPATHY, WITH LONG-TERM CURRENT USE OF INSULIN (HCC): ICD-10-CM

## 2017-10-26 DIAGNOSIS — I10 ESSENTIAL HYPERTENSION: ICD-10-CM

## 2017-10-26 PROCEDURE — G8484 FLU IMMUNIZE NO ADMIN: HCPCS | Performed by: SURGERY

## 2017-10-26 PROCEDURE — 99213 OFFICE O/P EST LOW 20 MIN: CPT | Performed by: SURGERY

## 2017-10-26 PROCEDURE — G8427 DOCREV CUR MEDS BY ELIG CLIN: HCPCS | Performed by: SURGERY

## 2017-10-26 PROCEDURE — 3045F PR MOST RECENT HEMOGLOBIN A1C LEVEL 7.0-9.0%: CPT | Performed by: SURGERY

## 2017-10-26 PROCEDURE — G8417 CALC BMI ABV UP PARAM F/U: HCPCS | Performed by: SURGERY

## 2017-10-26 PROCEDURE — 1036F TOBACCO NON-USER: CPT | Performed by: SURGERY

## 2017-10-26 NOTE — PROGRESS NOTES
(1.905 m)       Body mass index is 45.52 kg/m². Current Outpatient Prescriptions:     omeprazole (PRILOSEC) 20 MG delayed release capsule, TAKE ONE CAPSULE BY MOUTH DAILY FOR STOMACH, Disp: 30 capsule, Rfl: 3    SYMBICORT 80-4.5 MCG/ACT AERO, INHALE TWO PUFFS BY MOUTH TWICE A DAY, Disp: 1 Inhaler, Rfl: 0    HYDROcodone-acetaminophen (NORCO) 5-325 MG per tablet, Take 1 tablet by mouth 3 times daily . , Disp: 84 tablet, Rfl: 0    gabapentin (NEURONTIN) 300 MG capsule, Take 1 capsule by mouth 3 times daily, Disp: 90 capsule, Rfl: 1    DULoxetine (CYMBALTA) 30 MG extended release capsule, Take 1 capsule by mouth 2 times daily, Disp: 60 capsule, Rfl: 1    amitriptyline (ELAVIL) 25 MG tablet, TAKE ONE TABLET BY MOUTH NIGHTLY, Disp: 28 tablet, Rfl: 1    VICTOZA 18 MG/3ML SOPN SC injection, DIAL AND INJECT SUBCUTANEOUSLY 1.8MG DAILY FOR DIABETES, Disp: 9 Pen, Rfl: 0    INVOKANA 100 MG TABS tablet, TAKE ONE TABLET BY MOUTH EVERY MORNING BEFORE BREAKFAST FOR DIABETES, Disp: 30 tablet, Rfl: 0    VENTOLIN  (90 Base) MCG/ACT inhaler, INHALE TWO PUFFS BY MOUTH EVERY 6 HOURS AS NEEDED FOR WHEEZING OR SHORTNESS OF BREATH, Disp: 1 Inhaler, Rfl: 0    atorvastatin (LIPITOR) 40 MG tablet, TAKE ONE TABLET BY MOUTH DAILY FOR CHOLESTEROL, Disp: 90 tablet, Rfl: 0    Insulin Degludec (TRESIBA FLEXTOUCH) 100 UNIT/ML SOPN, Inject 10 Units into the skin every evening For diabetes, Disp: 5 Pen, Rfl: 2    lisinopril (PRINIVIL;ZESTRIL) 10 MG tablet, TAKE ONE TABLET BY MOUTH DAILY FOR HIGH BLOOD PRESSURE, Disp: 90 tablet, Rfl: 0    metFORMIN (GLUCOPHAGE-XR) 500 MG extended release tablet, TAKE ONE TABLET BY MOUTH TWICE A DAY FOR DIABETES, Disp: 60 tablet, Rfl: 2    metoprolol succinate (TOPROL XL) 25 MG extended release tablet, Take 1 tablet by mouth daily For blood pressure and heart rate, Disp: 90 tablet, Rfl: 0    glucose blood VI test strips (TRUE METRIX BLOOD GLUCOSE TEST) strip, 1 each by In Vitro route daily, Disp: alert and oriented to person, place, and time. Patient has normal strength. GCS eye subscore is 4. GCS verbal subscore is 5. GCS motor subscore is 6. Skin: Skin is warm and dry. No abrasion and no rash noted. Patient is not diaphoretic. No cyanosis or erythema. Psychiatric: Patient has a normal mood and affect. Speech is normal and behavior is normal. Cognition and memory are normal.       A/P    Elly Lawler is 40 y.o. male, Body mass index is 45.52 kg/m². pre surgery, has lost 4# since last visit. The patient underwent dietary counseling with registered dietician. I have reviewed, discussed and agree with the dietary plan. Patient is trying hard to keep good dietary and behavior modifications. Patient is monitoring portion sizes, food choices and liquid calories. Patient is trying to exercise regularly as much as possible. 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 I encouraged the patient to continue exercise and keeping healthy eating habits. Discussed pre-op labs and work up till now. Also counseled the patient extensively on Surgery.      15 minutes spent counseling the patient, answering questions and addressing concerns as well reviewing labs and/or other studies as well coordinating care. More than 50% was face to face time counseling the patient. RTC in 4 weeks  Obtain rest of pre-op work up / clearances  Diet and Exercise   Needs to work on tighter blood glucose control  Needs to give up caffeine and sweet,sugary drinks     Patient advised that its their responsibility to follow up for studies and/or labs ordered today.      Sonja Alatorre, DO

## 2017-10-26 NOTE — PROGRESS NOTES
Gwendolyn Mcmahon lost 4.2 lbs over 2 months. Eating 3x/day more often. Pt states finances can be an issue with healthy foods. Reviewed that pt will need to purchase products package- shake mix, bottle and MVIs during pre-op teaching. Breakfast: 2 eggs and 2 slices whole wheat bread    Snack: None    Lunch: lunch meat sandwich and chips    Snack: None    Dinner: turkey and gravy dinner/meat loaf with mashed potatoes, corn and peas    Snack: None    Is pt consuming smaller portions? Pt is working to reduce portions. Is pt consuming at least 64 oz of fluids per day? yes , water and sweet tea    Is pt consuming carbonated, caffeinated, or sugary beverages? yes , brisk tea. 3-4 bottles daily. Gold peak unsweetened. Has pt sampled Unjury and/or Nectar protein? No but pt tolerates artificial sweeteners well, has used protein powder in the past. Does not have money to get grab n' gos today. Exercise: Limited but PT off and on, some walking when weather is nice. Plan/Recommendations:    Add in 1 snack daily  Swap chips for piece of fruit or side salad  Reduce intake of sweetened/caffineated tea      Handouts: protein bars and shakes, 9 inch plate, pre-surg eating guide, budget friendly foods    Porter Jesus

## 2017-10-27 DIAGNOSIS — I10 ESSENTIAL HYPERTENSION: ICD-10-CM

## 2017-10-27 RX ORDER — LISINOPRIL 10 MG/1
TABLET ORAL
Qty: 90 TABLET | Refills: 0 | Status: SHIPPED | OUTPATIENT
Start: 2017-10-27 | End: 2018-01-26 | Stop reason: SDUPTHER

## 2017-11-01 ENCOUNTER — OFFICE VISIT (OUTPATIENT)
Dept: PAIN MANAGEMENT | Age: 37
End: 2017-11-01

## 2017-11-01 VITALS — DIASTOLIC BLOOD PRESSURE: 79 MMHG | SYSTOLIC BLOOD PRESSURE: 113 MMHG | WEIGHT: 315 LBS | BODY MASS INDEX: 45.5 KG/M2

## 2017-11-01 DIAGNOSIS — G89.4 CHRONIC PAIN SYNDROME: Primary | ICD-10-CM

## 2017-11-01 DIAGNOSIS — E11.9 TYPE 2 DIABETES MELLITUS WITHOUT COMPLICATION, WITHOUT LONG-TERM CURRENT USE OF INSULIN (HCC): ICD-10-CM

## 2017-11-01 DIAGNOSIS — E13.40 NEUROPATHY DUE TO SECONDARY DIABETES MELLITUS (HCC): ICD-10-CM

## 2017-11-01 DIAGNOSIS — M54.9 CHRONIC BACK PAIN, UNSPECIFIED BACK LOCATION, UNSPECIFIED BACK PAIN LATERALITY: ICD-10-CM

## 2017-11-01 DIAGNOSIS — M48.00 CENTRAL SPINAL STENOSIS: ICD-10-CM

## 2017-11-01 DIAGNOSIS — E66.01 MORBID OBESITY WITH BMI OF 40.0-44.9, ADULT (HCC): ICD-10-CM

## 2017-11-01 DIAGNOSIS — E11.40 TYPE 2 DIABETES MELLITUS WITH DIABETIC NEUROPATHY, WITHOUT LONG-TERM CURRENT USE OF INSULIN (HCC): ICD-10-CM

## 2017-11-01 DIAGNOSIS — G89.29 CHRONIC BACK PAIN, UNSPECIFIED BACK LOCATION, UNSPECIFIED BACK PAIN LATERALITY: ICD-10-CM

## 2017-11-01 DIAGNOSIS — F32.9 SINGLE CURRENT EPISODE OF MAJOR DEPRESSIVE DISORDER, UNSPECIFIED DEPRESSION EPISODE SEVERITY: ICD-10-CM

## 2017-11-01 DIAGNOSIS — F51.01 PRIMARY INSOMNIA: ICD-10-CM

## 2017-11-01 DIAGNOSIS — M96.1 FAILED BACK SURGICAL SYNDROME: ICD-10-CM

## 2017-11-01 PROCEDURE — G8484 FLU IMMUNIZE NO ADMIN: HCPCS | Performed by: NURSE PRACTITIONER

## 2017-11-01 PROCEDURE — 1036F TOBACCO NON-USER: CPT | Performed by: NURSE PRACTITIONER

## 2017-11-01 PROCEDURE — G8427 DOCREV CUR MEDS BY ELIG CLIN: HCPCS | Performed by: NURSE PRACTITIONER

## 2017-11-01 PROCEDURE — 99213 OFFICE O/P EST LOW 20 MIN: CPT | Performed by: NURSE PRACTITIONER

## 2017-11-01 PROCEDURE — G8417 CALC BMI ABV UP PARAM F/U: HCPCS | Performed by: NURSE PRACTITIONER

## 2017-11-01 PROCEDURE — 3045F PR MOST RECENT HEMOGLOBIN A1C LEVEL 7.0-9.0%: CPT | Performed by: NURSE PRACTITIONER

## 2017-11-01 RX ORDER — GABAPENTIN 300 MG/1
300 CAPSULE ORAL 3 TIMES DAILY
Qty: 90 CAPSULE | Refills: 1 | Status: SHIPPED | OUTPATIENT
Start: 2017-11-01 | End: 2017-12-27 | Stop reason: SDUPTHER

## 2017-11-01 RX ORDER — DULOXETIN HYDROCHLORIDE 30 MG/1
30 CAPSULE, DELAYED RELEASE ORAL 2 TIMES DAILY
Qty: 60 CAPSULE | Refills: 1 | Status: SHIPPED | OUTPATIENT
Start: 2017-11-01 | End: 2017-12-27 | Stop reason: SDUPTHER

## 2017-11-01 RX ORDER — LIRAGLUTIDE 6 MG/ML
INJECTION SUBCUTANEOUS
Qty: 3 PEN | Refills: 3 | Status: SHIPPED | OUTPATIENT
Start: 2017-11-01 | End: 2018-03-05 | Stop reason: SDUPTHER

## 2017-11-01 RX ORDER — CANAGLIFLOZIN 100 MG/1
TABLET, FILM COATED ORAL
Qty: 30 TABLET | Refills: 3 | Status: SHIPPED | OUTPATIENT
Start: 2017-11-01 | End: 2017-12-01 | Stop reason: SDUPTHER

## 2017-11-01 RX ORDER — AMITRIPTYLINE HYDROCHLORIDE 25 MG/1
TABLET, FILM COATED ORAL
Qty: 28 TABLET | Refills: 1 | Status: SHIPPED | OUTPATIENT
Start: 2017-11-01 | End: 2017-12-27 | Stop reason: SDUPTHER

## 2017-11-01 RX ORDER — METFORMIN HYDROCHLORIDE 500 MG/1
TABLET, EXTENDED RELEASE ORAL
Qty: 60 TABLET | Refills: 3 | Status: SHIPPED | OUTPATIENT
Start: 2017-11-01 | End: 2018-03-05 | Stop reason: SDUPTHER

## 2017-11-01 RX ORDER — HYDROCODONE BITARTRATE AND ACETAMINOPHEN 5; 325 MG/1; MG/1
1 TABLET ORAL 3 TIMES DAILY
Qty: 84 TABLET | Refills: 0 | Status: SHIPPED | OUTPATIENT
Start: 2017-11-01 | End: 2017-11-29 | Stop reason: SDUPTHER

## 2017-11-01 NOTE — PATIENT INSTRUCTIONS
[email or web address] and sign in to your [email or web address] account. Enter L915 in the [employer] box to learn more about \"Back Stretches: Exercises. \"     If you do not have an account, please click on the \"Sign Up Now\" link. Current as of: March 21, 2017  Content Version: 11.3  © 7425-9772 [employer]. Care instructions adapted under license by [facility] ([facility]). If you have questions about a medical condition or this instruction, always ask your healthcare professional. [address] any warranty or liability for your use of this information.

## 2017-11-01 NOTE — PROGRESS NOTES
Liliana Bassett  1980  [id number]      HISTORY OF PRESENT ILLNESS:  Mr. Skye Gray is a 40 y.o. male returns for a follow up visit for pain management  He has a diagnosis of   1. Chronic pain syndrome    2. Central spinal stenosis    3. Failed back surgical syndrome    4. Chronic back pain, unspecified back location, unspecified back pain laterality    5. 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 ([facility]    6. Single current episode of major depressive disorder, unspecified depression episode severity    7. Primary insomnia    8. Morbid obesity with BMI of 40.0-44.9, adult ([facility]    . He complains of pain in the mid-lower back, hands, knees, and feet. He rates the pain 8/10 and describes it as aching, burning with standing, bending, sitting, walking, increased physical activities, and lifting. Current treatment regimen has helped relieve about 20% of the pain. He denies any side effects from the current pain regimen. Patient reports that since the last follow up visit the physical functioning is worse, family/social relationships are unchanged, mood is unchanged sleep patterns are worse, and that the overall functioning is worse. Patient denies misusing/abusing his narcotic pain medications or using any illegal drugs. She says that pain is manageable on current pain therapy. He reports having had sleep studies done, and is scheduled for a second study this year in preparation for weight loss surgery. He has so far lost 10 pounds due to home exercises. His mood is stable and hopeful for weight loss surgery. Sleep is fair with an average of 5-6 hours. Denies having issues with constipation. Tolerating activities/exercises with moderate tenderness to the lower back. ALLERGIES: Patients list of allergies were reviewed     MEDICATIONS: Mr. Skye Gray list of medications were reviewed. His current medications are   Outpatient Medications Prior to Visit   Medication Sig Dispense Refill    metFORMIN (GLUCOPHAGE-XR) 500 MG extended release tablet TAKE ONE TABLET BY MOUTH TWICE A DAY FOR DIABETES 60 tablet 3    INVOKANA 100 MG TABS tablet TAKE ONE TABLET BY MOUTH EVERY MORNING BEFORE BREAKFAST FOR DIABETES 30 tablet 3    VICTOZA 18 MG/3ML SOPN SC injection DIAL AND INJECT SUBCUTANEOUSLY 1.8MG DAILY 3 Pen 3    lisinopril (PRINIVIL;ZESTRIL) 10 MG tablet TAKE ONE TABLET BY MOUTH DAILY FOR HIGH BLOOD PRESSURE 90 tablet 0    omeprazole (PRILOSEC) 20 MG delayed release capsule TAKE ONE CAPSULE BY MOUTH DAILY FOR STOMACH 30 capsule 3    SYMBICORT 80-4.5 MCG/ACT AERO INHALE TWO PUFFS BY MOUTH TWICE A DAY 1 Inhaler 0    VENTOLIN  (90 Base) MCG/ACT inhaler INHALE TWO PUFFS BY MOUTH EVERY 6 HOURS AS NEEDED FOR WHEEZING OR SHORTNESS OF BREATH 1 Inhaler 0    atorvastatin (LIPITOR) 40 MG tablet TAKE ONE TABLET BY MOUTH DAILY FOR CHOLESTEROL 90 tablet 0    Insulin Degludec (TRESIBA FLEXTOUCH) 100 UNIT/ML SOPN Inject 10 Units into the skin every evening For diabetes 5 Pen 2    metoprolol succinate (TOPROL XL) 25 MG extended release tablet Take 1 tablet by mouth daily For blood pressure and heart rate 90 tablet 0    glucose blood VI test strips (TRUE METRIX BLOOD GLUCOSE TEST) strip 1 each by In Vitro route daily 100 each 2    Lancets MISC 1 each by Does not apply route daily TRUEMETRIX 100 each 2    montelukast (SINGULAIR) 10 MG tablet TAKE ONE TABLET BY MOUTH DAILY FOR BREATHING 30 tablet 3    Insulin Pen Needle 31G X 5 MM MISC 1 each by Does not apply route daily 100 each 3    HYDROcodone-acetaminophen (NORCO) 5-325 MG per tablet Take 1 tablet by mouth 3 times daily .  84 tablet 0    gabapentin (NEURONTIN) 300 MG capsule Take 1 capsule by mouth 3 times daily 90 capsule 1    DULoxetine (CYMBALTA) 30 MG extended release capsule Take 1 capsule by mouth 2 times daily 60 capsule 1    amitriptyline (ELAVIL) 25 MG tablet TAKE ONE TABLET BY MOUTH NIGHTLY 28 tablet 1     No facility-administered medications prior cognitive restructuring, problem solving discussed with patient  -Last UDS consistent  -Return in about 4 weeks (around 11/29/2017). Current Outpatient Prescriptions   Medication Sig Dispense Refill    metFORMIN (GLUCOPHAGE-XR) 500 MG extended release tablet TAKE ONE TABLET BY MOUTH TWICE A DAY FOR DIABETES 60 tablet 3    INVOKANA 100 MG TABS tablet TAKE ONE TABLET BY MOUTH EVERY MORNING BEFORE BREAKFAST FOR DIABETES 30 tablet 3    VICTOZA 18 MG/3ML SOPN SC injection DIAL AND INJECT SUBCUTANEOUSLY 1.8MG DAILY 3 Pen 3    HYDROcodone-acetaminophen (NORCO) 5-325 MG per tablet Take 1 tablet by mouth 3 times daily .  84 tablet 0    DULoxetine (CYMBALTA) 30 MG extended release capsule Take 1 capsule by mouth 2 times daily 60 capsule 1    gabapentin (NEURONTIN) 300 MG capsule Take 1 capsule by mouth 3 times daily 90 capsule 1    amitriptyline (ELAVIL) 25 MG tablet TAKE ONE TABLET BY MOUTH NIGHTLY 28 tablet 1    lisinopril (PRINIVIL;ZESTRIL) 10 MG tablet TAKE ONE TABLET BY MOUTH DAILY FOR HIGH BLOOD PRESSURE 90 tablet 0    omeprazole (PRILOSEC) 20 MG delayed release capsule TAKE ONE CAPSULE BY MOUTH DAILY FOR STOMACH 30 capsule 3    SYMBICORT 80-4.5 MCG/ACT AERO INHALE TWO PUFFS BY MOUTH TWICE A DAY 1 Inhaler 0    VENTOLIN  (90 Base) MCG/ACT inhaler INHALE TWO PUFFS BY MOUTH EVERY 6 HOURS AS NEEDED FOR WHEEZING OR SHORTNESS OF BREATH 1 Inhaler 0    atorvastatin (LIPITOR) 40 MG tablet TAKE ONE TABLET BY MOUTH DAILY FOR CHOLESTEROL 90 tablet 0    Insulin Degludec (TRESIBA FLEXTOUCH) 100 UNIT/ML SOPN Inject 10 Units into the skin every evening For diabetes 5 Pen 2    metoprolol succinate (TOPROL XL) 25 MG extended release tablet Take 1 tablet by mouth daily For blood pressure and heart rate 90 tablet 0    glucose blood VI test strips (TRUE METRIX BLOOD GLUCOSE TEST) strip 1 each by In Vitro route daily 100 each 2    Lancets MISC 1 each by Does not apply route daily TRUEMETRIX 100 each 2   

## 2017-11-13 ENCOUNTER — HOSPITAL ENCOUNTER (OUTPATIENT)
Dept: OTHER | Age: 37
Discharge: HOME | End: 2017-11-15
Attending: PSYCHIATRY & NEUROLOGY | Admitting: PSYCHIATRY & NEUROLOGY

## 2017-11-13 DIAGNOSIS — G47.33 OSA (OBSTRUCTIVE SLEEP APNEA): ICD-10-CM

## 2017-11-13 DIAGNOSIS — R74.8 ABNORMAL LEVELS OF OTHER SERUM ENZYMES: ICD-10-CM

## 2017-11-14 PROCEDURE — 95811 POLYSOM 6/>YRS CPAP 4/> PARM: CPT | Performed by: PSYCHIATRY & NEUROLOGY

## 2017-11-16 ENCOUNTER — TELEPHONE (OUTPATIENT)
Dept: PULMONOLOGY | Age: 37
End: 2017-11-16

## 2017-11-17 NOTE — TELEPHONE ENCOUNTER
Called again spoke with the patient's mother.    I just need to know what DME he wants his order sent to   F/U needs to be scheduled

## 2017-11-21 DIAGNOSIS — J45.20 MILD INTERMITTENT ASTHMA WITHOUT COMPLICATION: ICD-10-CM

## 2017-11-21 RX ORDER — DILTIAZEM HYDROCHLORIDE 60 MG/1
TABLET, FILM COATED ORAL
Qty: 1 INHALER | Refills: 2 | Status: SHIPPED | OUTPATIENT
Start: 2017-11-21 | End: 2018-01-15 | Stop reason: SDUPTHER

## 2017-11-22 ENCOUNTER — OFFICE VISIT (OUTPATIENT)
Dept: BARIATRICS/WEIGHT MGMT | Age: 37
End: 2017-11-22

## 2017-11-22 VITALS
DIASTOLIC BLOOD PRESSURE: 71 MMHG | WEIGHT: 315 LBS | BODY MASS INDEX: 39.17 KG/M2 | HEIGHT: 75 IN | SYSTOLIC BLOOD PRESSURE: 124 MMHG | HEART RATE: 109 BPM

## 2017-11-22 DIAGNOSIS — E66.01 MORBID OBESITY WITH BMI OF 40.0-44.9, ADULT (HCC): Primary | ICD-10-CM

## 2017-11-22 DIAGNOSIS — E78.2 MIXED HYPERLIPIDEMIA: ICD-10-CM

## 2017-11-22 DIAGNOSIS — Z79.4 TYPE 2 DIABETES MELLITUS WITH DIABETIC NEUROPATHY, WITH LONG-TERM CURRENT USE OF INSULIN (HCC): ICD-10-CM

## 2017-11-22 DIAGNOSIS — I10 ESSENTIAL HYPERTENSION: ICD-10-CM

## 2017-11-22 DIAGNOSIS — E11.40 TYPE 2 DIABETES MELLITUS WITH DIABETIC NEUROPATHY, WITH LONG-TERM CURRENT USE OF INSULIN (HCC): ICD-10-CM

## 2017-11-22 DIAGNOSIS — K21.9 GASTROESOPHAGEAL REFLUX DISEASE, ESOPHAGITIS PRESENCE NOT SPECIFIED: ICD-10-CM

## 2017-11-22 PROCEDURE — G8484 FLU IMMUNIZE NO ADMIN: HCPCS | Performed by: SURGERY

## 2017-11-22 PROCEDURE — 1036F TOBACCO NON-USER: CPT | Performed by: SURGERY

## 2017-11-22 PROCEDURE — G8427 DOCREV CUR MEDS BY ELIG CLIN: HCPCS | Performed by: SURGERY

## 2017-11-22 PROCEDURE — 3045F PR MOST RECENT HEMOGLOBIN A1C LEVEL 7.0-9.0%: CPT | Performed by: SURGERY

## 2017-11-22 PROCEDURE — 99214 OFFICE O/P EST MOD 30 MIN: CPT | Performed by: SURGERY

## 2017-11-22 PROCEDURE — G8417 CALC BMI ABV UP PARAM F/U: HCPCS | Performed by: SURGERY

## 2017-11-22 NOTE — PROGRESS NOTES
mass index is 45.75 kg/m². Current Outpatient Prescriptions:     SYMBICORT 80-4.5 MCG/ACT AERO, INHALE TWO PUFFS BY MOUTH TWICE A DAY, Disp: 1 Inhaler, Rfl: 2    VENTOLIN  (90 Base) MCG/ACT inhaler, INHALE TWO PUFFS BY MOUTH EVERY 6 HOURS AS NEEDED FOR WHEEZING OR SHORTNESS OF BREATH, Disp: 1 Inhaler, Rfl: 1    metFORMIN (GLUCOPHAGE-XR) 500 MG extended release tablet, TAKE ONE TABLET BY MOUTH TWICE A DAY FOR DIABETES, Disp: 60 tablet, Rfl: 3    INVOKANA 100 MG TABS tablet, TAKE ONE TABLET BY MOUTH EVERY MORNING BEFORE BREAKFAST FOR DIABETES, Disp: 30 tablet, Rfl: 3    VICTOZA 18 MG/3ML SOPN SC injection, DIAL AND INJECT SUBCUTANEOUSLY 1.8MG DAILY, Disp: 3 Pen, Rfl: 3    HYDROcodone-acetaminophen (NORCO) 5-325 MG per tablet, Take 1 tablet by mouth 3 times daily . , Disp: 84 tablet, Rfl: 0    DULoxetine (CYMBALTA) 30 MG extended release capsule, Take 1 capsule by mouth 2 times daily, Disp: 60 capsule, Rfl: 1    gabapentin (NEURONTIN) 300 MG capsule, Take 1 capsule by mouth 3 times daily, Disp: 90 capsule, Rfl: 1    amitriptyline (ELAVIL) 25 MG tablet, TAKE ONE TABLET BY MOUTH NIGHTLY, Disp: 28 tablet, Rfl: 1    lisinopril (PRINIVIL;ZESTRIL) 10 MG tablet, TAKE ONE TABLET BY MOUTH DAILY FOR HIGH BLOOD PRESSURE, Disp: 90 tablet, Rfl: 0    omeprazole (PRILOSEC) 20 MG delayed release capsule, TAKE ONE CAPSULE BY MOUTH DAILY FOR STOMACH, Disp: 30 capsule, Rfl: 3    atorvastatin (LIPITOR) 40 MG tablet, TAKE ONE TABLET BY MOUTH DAILY FOR CHOLESTEROL, Disp: 90 tablet, Rfl: 0    Insulin Degludec (TRESIBA FLEXTOUCH) 100 UNIT/ML SOPN, Inject 10 Units into the skin every evening For diabetes, Disp: 5 Pen, Rfl: 2    metoprolol succinate (TOPROL XL) 25 MG extended release tablet, Take 1 tablet by mouth daily For blood pressure and heart rate, Disp: 90 tablet, Rfl: 0    glucose blood VI test strips (TRUE METRIX BLOOD GLUCOSE TEST) strip, 1 each by In Vitro route daily, Disp: 100 each, Rfl: 2    Lancets MISC, 1 each by Does not apply route daily TRUEMETRIX, Disp: 100 each, Rfl: 2    montelukast (SINGULAIR) 10 MG tablet, TAKE ONE TABLET BY MOUTH DAILY FOR BREATHING, Disp: 30 tablet, Rfl: 3    Insulin Pen Needle 31G X 5 MM MISC, 1 each by Does not apply route daily, Disp: 100 each, Rfl: 3      Review of Systems - History obtained from the patient  General ROS: negative  Psychological ROS: negative  Ophthalmic ROS: negative  Neurological ROS: negative  ENT ROS: negative  Allergy and Immunology ROS: negative  Hematological and Lymphatic ROS: negative  Endocrine ROS: negative  Respiratory ROS: negative  Cardiovascular ROS: negative  Gastrointestinal ROS:negative  Genito-Urinary ROS: negative  Musculoskeletal ROS: negative   Skin ROS: negative    Physical Exam   Vitals Reviewed   Constitutional: Patient is oriented to person, place, and time. Patient appears well-developed and well-nourished. Patient is active and cooperative. Non-toxic appearance. No distress. HENT:   Head: Normocephalic and atraumatic. Head is without abrasion and without laceration. Hair is normal.   Right Ear: External ear normal. No lacerations. No drainage, swelling . Left Ear: External ear normal. No lacerations. No drainage, swelling. Nose: Nose normal. No nose lacerations or nasal deformity. Eyes: Conjunctivae, EOM and lids are normal. Right eye exhibits no discharge. No foreign body present in the right eye. Left eye exhibits no discharge. No foreign body present in the left eye. No scleral icterus. Neck: Trachea normal and normal range of motion. No JVD present. Pulmonary/Chest: Effort normal. No accessory muscle usage or stridor. No apnea. No respiratory distress. Cardiovascular: Normal rate and no JVD. Abdominal: Normal appearance. Patient exhibits no distension. Abdomen is soft, obese, non tender. Musculoskeletal: Normal range of motion. Patient exhibits no edema.    Neurological: Patient is alert and oriented to person, concerns as well reviewing labs and/or other studies as well coordinating care. More than 50% was face to face time counseling the patient.           Patient is still skipping meals and having sugary drinks/teas  Long discussion about making changes otherwise he may not be a good surgical candidate    Will f/u in January to see progress    Gale Garcia, DO

## 2017-11-29 ENCOUNTER — OFFICE VISIT (OUTPATIENT)
Dept: PAIN MANAGEMENT | Age: 37
End: 2017-11-29

## 2017-11-29 VITALS
BODY MASS INDEX: 45.75 KG/M2 | HEART RATE: 116 BPM | OXYGEN SATURATION: 96 % | SYSTOLIC BLOOD PRESSURE: 139 MMHG | WEIGHT: 315 LBS | DIASTOLIC BLOOD PRESSURE: 87 MMHG

## 2017-11-29 DIAGNOSIS — M48.00 CENTRAL SPINAL STENOSIS: ICD-10-CM

## 2017-11-29 DIAGNOSIS — F51.01 PRIMARY INSOMNIA: ICD-10-CM

## 2017-11-29 DIAGNOSIS — G89.29 CHRONIC BACK PAIN, UNSPECIFIED BACK LOCATION, UNSPECIFIED BACK PAIN LATERALITY: ICD-10-CM

## 2017-11-29 DIAGNOSIS — E66.01 MORBID OBESITY WITH BMI OF 40.0-44.9, ADULT (HCC): ICD-10-CM

## 2017-11-29 DIAGNOSIS — F32.9 SINGLE CURRENT EPISODE OF MAJOR DEPRESSIVE DISORDER, UNSPECIFIED DEPRESSION EPISODE SEVERITY: ICD-10-CM

## 2017-11-29 DIAGNOSIS — E13.40 NEUROPATHY DUE TO SECONDARY DIABETES MELLITUS (HCC): ICD-10-CM

## 2017-11-29 DIAGNOSIS — G89.4 CHRONIC PAIN SYNDROME: Primary | ICD-10-CM

## 2017-11-29 DIAGNOSIS — S14.109S INJURY OF CERVICAL SPINAL CORD, SEQUELA (HCC): ICD-10-CM

## 2017-11-29 DIAGNOSIS — M96.1 FAILED BACK SURGICAL SYNDROME: ICD-10-CM

## 2017-11-29 DIAGNOSIS — M54.9 CHRONIC BACK PAIN, UNSPECIFIED BACK LOCATION, UNSPECIFIED BACK PAIN LATERALITY: ICD-10-CM

## 2017-11-29 PROCEDURE — G8484 FLU IMMUNIZE NO ADMIN: HCPCS | Performed by: NURSE PRACTITIONER

## 2017-11-29 PROCEDURE — G8417 CALC BMI ABV UP PARAM F/U: HCPCS | Performed by: NURSE PRACTITIONER

## 2017-11-29 PROCEDURE — G8427 DOCREV CUR MEDS BY ELIG CLIN: HCPCS | Performed by: NURSE PRACTITIONER

## 2017-11-29 PROCEDURE — 1036F TOBACCO NON-USER: CPT | Performed by: NURSE PRACTITIONER

## 2017-11-29 PROCEDURE — 3046F HEMOGLOBIN A1C LEVEL >9.0%: CPT | Performed by: NURSE PRACTITIONER

## 2017-11-29 PROCEDURE — 99213 OFFICE O/P EST LOW 20 MIN: CPT | Performed by: NURSE PRACTITIONER

## 2017-11-29 RX ORDER — HYDROCODONE BITARTRATE AND ACETAMINOPHEN 5; 325 MG/1; MG/1
1 TABLET ORAL 3 TIMES DAILY
Qty: 84 TABLET | Refills: 0 | Status: SHIPPED | OUTPATIENT
Start: 2017-11-29 | End: 2017-12-27 | Stop reason: SDUPTHER

## 2017-11-29 NOTE — PATIENT INSTRUCTIONS
Patient Education        Back Stretches: Exercises  Your Care Instructions  Here are some examples of exercises for stretching your back. Start each exercise slowly. Ease off the exercise if you start to have pain. Your doctor or physical therapist will tell you when you can start these exercises and which ones will work best for you. How to do the exercises  Overhead stretch    1. Stand comfortably with your feet shoulder-width apart. 2. Looking straight ahead, raise both arms over your head and reach toward the ceiling. Do not allow your head to tilt back. 3. Hold for 15 to 30 seconds, then lower your arms to your sides. 4. Repeat 2 to 4 times. Side stretch    1. Stand comfortably with your feet shoulder-width apart. 2. Raise one arm over your head, and then lean to the other side. 3. Slide your hand down your leg as you let the weight of your arm gently stretch your side muscles. Hold for 15 to 30 seconds. 4. Repeat 2 to 4 times on each side. Press-up    1. Lie on your stomach, supporting your body with your forearms. 2. Press your elbows down into the floor to raise your upper back. As you do this, relax your stomach muscles and allow your back to arch without using your back muscles. As your press up, do not let your hips or pelvis come off the floor. 3. Hold for 15 to 30 seconds, then relax. 4. Repeat 2 to 4 times. Relax and rest    1. Lie on your back with a rolled towel under your neck and a pillow under your knees. Extend your arms comfortably to your sides. 2. Relax and breathe normally. 3. Remain in this position for about 10 minutes. 4. If you can, do this 2 or 3 times each day. Follow-up care is a key part of your treatment and safety. Be sure to make and go to all appointments, and call your doctor if you are having problems. It's also a good idea to know your test results and keep a list of the medicines you take. Where can you learn more?   Go to [email or web address] and sign in to your [employer] account. Enter V343 in the Doist box to learn more about \"Back Stretches: Exercises. \"     If you do not have an account, please click on the \"Sign Up Now\" link. Current as of: March 21, 2017  Content Version: 11.3  © 9897-5619 [employer]. Care instructions adapted under license by [facility] ([facility]). If you have questions about a medical condition or this instruction, always ask your healthcare professional. [address] any warranty or liability for your use of this information.

## 2017-11-30 NOTE — PROGRESS NOTES
activities. Improve psychosocial and physical functioning. - he is showing progression towards this treatment goal with the current regimen. He was advised against drinking alcohol with the narcotic pain medicines, advised against driving or handling machinery while adjusting the dose of medicines or if having cognitive  issues related to the current medications. Risk of overdose and death, if medicines not taken as prescribed, were also discussed. If the patient develops new symptoms or if the symptoms worsen, the patient should call the office. While transcribing every attempt was made to maintain the accuracy of the note in terms of it's contents,there may have been some errors made inadvertently. Thank you for allowing me to participate in the care of this patient. Camila Robles CNP.     Cc: Kaylyn Tovar CNP

## 2017-12-01 ENCOUNTER — OFFICE VISIT (OUTPATIENT)
Dept: FAMILY MEDICINE CLINIC | Age: 37
End: 2017-12-01

## 2017-12-01 VITALS
SYSTOLIC BLOOD PRESSURE: 132 MMHG | DIASTOLIC BLOOD PRESSURE: 80 MMHG | RESPIRATION RATE: 12 BRPM | WEIGHT: 315 LBS | HEART RATE: 120 BPM | BODY MASS INDEX: 46.75 KG/M2

## 2017-12-01 DIAGNOSIS — E66.01 MORBID OBESITY WITH BMI OF 45.0-49.9, ADULT (HCC): ICD-10-CM

## 2017-12-01 DIAGNOSIS — J45.20 MILD INTERMITTENT ASTHMA WITHOUT COMPLICATION: ICD-10-CM

## 2017-12-01 DIAGNOSIS — L89.309 DECUBITUS ULCER OF ISCHIAL AREA, UNSPECIFIED LATERALITY, UNSPECIFIED ULCER STAGE: ICD-10-CM

## 2017-12-01 DIAGNOSIS — I10 ESSENTIAL HYPERTENSION: ICD-10-CM

## 2017-12-01 DIAGNOSIS — E11.40 TYPE 2 DIABETES MELLITUS WITH DIABETIC NEUROPATHY, WITH LONG-TERM CURRENT USE OF INSULIN (HCC): Primary | ICD-10-CM

## 2017-12-01 DIAGNOSIS — E78.2 MIXED HYPERLIPIDEMIA: ICD-10-CM

## 2017-12-01 DIAGNOSIS — Z79.4 TYPE 2 DIABETES MELLITUS WITH DIABETIC NEUROPATHY, WITH LONG-TERM CURRENT USE OF INSULIN (HCC): Primary | ICD-10-CM

## 2017-12-01 LAB — HBA1C MFR BLD: 9.6 %

## 2017-12-01 PROCEDURE — 99214 OFFICE O/P EST MOD 30 MIN: CPT | Performed by: NURSE PRACTITIONER

## 2017-12-01 PROCEDURE — G8484 FLU IMMUNIZE NO ADMIN: HCPCS | Performed by: NURSE PRACTITIONER

## 2017-12-01 PROCEDURE — G8427 DOCREV CUR MEDS BY ELIG CLIN: HCPCS | Performed by: NURSE PRACTITIONER

## 2017-12-01 PROCEDURE — G8417 CALC BMI ABV UP PARAM F/U: HCPCS | Performed by: NURSE PRACTITIONER

## 2017-12-01 PROCEDURE — 1036F TOBACCO NON-USER: CPT | Performed by: NURSE PRACTITIONER

## 2017-12-01 PROCEDURE — 83036 HEMOGLOBIN GLYCOSYLATED A1C: CPT | Performed by: NURSE PRACTITIONER

## 2017-12-01 PROCEDURE — 3046F HEMOGLOBIN A1C LEVEL >9.0%: CPT | Performed by: NURSE PRACTITIONER

## 2017-12-01 RX ORDER — MONTELUKAST SODIUM 10 MG/1
TABLET ORAL
Qty: 30 TABLET | Refills: 3 | Status: SHIPPED | OUTPATIENT
Start: 2017-12-01 | End: 2018-04-27 | Stop reason: SDUPTHER

## 2017-12-01 NOTE — PROGRESS NOTES
Diabetes Mellitus Follow-up  Chief Complaint   Patient presents with    Check-Up     Type 2 DM      Subjective:     Darvin Kocher is an 40 y.o. male who presents for follow up of diabetes, HTN and hyperlipidemia. Patient was started on tresiba after last visit. He did not bring in his BS log. Reports that he is checking his fasting BS daily. He increased his tresiba to 12 units, but has not increased since. Dr. Brendan García reported that he is not a surgical weight loss candidate till his DM is controlled. He is not working on diet or aerobic exercise. Has been gaining weight. Drinking tea with sugar. Has pressure ulcer on buttocks. Has been there a couple of week. Has been putting neosporin on area and dressing. Reports that he does not have feeling in that area. Has been trying to change positions. · Patient checks sugars 1  time daily. Average: 189. Range: 160-198. · Patent follows diabetic diet? Sometimes  · Exercise: none  · Taking medicines daily as directed? Yes  · Is the patient reporting any side effects of antihypertensive medications? No  · Any shortness of breath? Yes-- not worse than baseline  · Any chest pain? No  · Any leg swelling? Yes-- if wearing compression stockings no edema  · Any leg numbness or tingling? Yes-- denies increase from baseline   · Any vision changes? No  · Patient checks feet daily? Yes  · Tobacco history updated:  reports that he has never smoked. He has never used smokeless tobacco.  · Blood pressure taken correctly and will be repeated if > 130/80  · See Health maintenance list below for last retinal exam and microalbumin.   · See med list below for diabetes, blood pressure or OTC meds and whether taking aspirin  ·   Patient Active Problem List   Diagnosis    Asthma    Hypertension    GERD (gastroesophageal reflux disease)    Tinea cruris    Chronic back pain    Type 2 diabetes mellitus with diabetic neuropathy, above    Medical History:  Patient's medications, allergies, past medical, surgical, social and family histories were reviewed and updated as appropriate. Social History     Social History    Marital status:      Spouse name: Tasneem Toledo Number of children: 3    Years of education: N/A     Occupational History    disabled      Social History Main Topics    Smoking status: Never Smoker    Smokeless tobacco: Never Used    Alcohol use No    Drug use: No    Sexual activity: Yes     Partners: Female     Other Topics Concern    None     Social History Narrative    Caffeine:        SODA - 0          TEA - 0        COFFEE - 0         Prior to Visit Medications    Medication Sig Taking? Authorizing Provider   HYDROcodone-acetaminophen (NORCO) 5-325 MG per tablet Take 1 tablet by mouth 3 times daily .  Yes Cain Moncada NP   SYMBICORT 80-4.5 MCG/ACT AERO INHALE TWO PUFFS BY MOUTH TWICE A DAY Yes Thiago Saunders CNP   VENTOLIN  (90 Base) MCG/ACT inhaler INHALE TWO PUFFS BY MOUTH EVERY 6 HOURS AS NEEDED FOR WHEEZING OR SHORTNESS OF BREATH Yes Thiago Saunders CNP   metFORMIN (GLUCOPHAGE-XR) 500 MG extended release tablet TAKE ONE TABLET BY MOUTH TWICE A DAY FOR DIABETES Yes Maribel Marin MD   INVOKANA 100 MG TABS tablet TAKE ONE TABLET BY MOUTH EVERY MORNING BEFORE BREAKFAST FOR DIABETES Yes Maribel Marin MD   VICTOZA 18 MG/3ML SOPN SC injection DIAL AND INJECT SUBCUTANEOUSLY 1.8MG DAILY Yes Maribel Marin MD   DULoxetine (CYMBALTA) 30 MG extended release capsule Take 1 capsule by mouth 2 times daily Yes Cain Moncada NP   gabapentin (NEURONTIN) 300 MG capsule Take 1 capsule by mouth 3 times daily Yes Cain Moncada NP   amitriptyline (ELAVIL) 25 MG tablet TAKE ONE TABLET BY MOUTH NIGHTLY Yes Cain Moncada NP   lisinopril (PRINIVIL;ZESTRIL) 10 MG tablet TAKE ONE TABLET BY MOUTH DAILY FOR HIGH BLOOD PRESSURE Yes Thiago Saunders CNP   omeprazole (PRILOSEC) 20 buttocks. Assessment and Plan:     1. Type 2 diabetes mellitus with diabetic neuropathy, with long-term current use of insulin ([facility])  Hemoglobin A1C- 9.6%. Worsening diabetes control due to non compliance with diet, aerobic exercise, weight loss, and medications. Metformin  mg BID  victoza 1.8 mg SQ daily  invokana 100 mg daily   Currently on tresiba 12 units qhs. Advised to increase tresiba dose by 2 units every 2 days till fasting BS is less than 120. Patient is to call if he needs help adjusting his insulin. Low carb diet, aerobic exercise, and weight loss discussed and needed  On gabapentin 300 mg TID for neuropathy from pain specialist.    2. Decubitus ulcer Refer to [facility] for evaluation. 3. Morbid obesity with BMI of 45.0-49.9, adult [facility])  Seen Dr. Trice Craig and is planning on doing gastric sleeve in the future. Need to get DM controlled before surgery. Diet, aerobic exercise, and weight loss discussed and needed    4. Mixed hyperlipidemia  On atorvastatin 40 mg daily   Low fat/cholesterol diet and weight loss discussed and needed. 5. Essential hypertension  Controlled on lisinopril 10 mg daily and metoprolol succinate 25 mg daily. Low salt diet, aerobic exercise, and weight loss discussed and needed. 6. Asthma  Stable. Continue current medications.       FOLLOW UP: 6 weeks

## 2017-12-03 DIAGNOSIS — E78.2 MIXED HYPERLIPIDEMIA: ICD-10-CM

## 2017-12-04 RX ORDER — ATORVASTATIN CALCIUM 40 MG/1
TABLET, FILM COATED ORAL
Qty: 90 TABLET | Refills: 0 | Status: SHIPPED | OUTPATIENT
Start: 2017-12-04 | End: 2018-03-01 | Stop reason: SDUPTHER

## 2017-12-27 ENCOUNTER — OFFICE VISIT (OUTPATIENT)
Dept: PAIN MANAGEMENT | Age: 37
End: 2017-12-27

## 2017-12-27 VITALS
SYSTOLIC BLOOD PRESSURE: 127 MMHG | WEIGHT: 315 LBS | BODY MASS INDEX: 46 KG/M2 | DIASTOLIC BLOOD PRESSURE: 84 MMHG | HEART RATE: 104 BPM | OXYGEN SATURATION: 96 %

## 2017-12-27 DIAGNOSIS — E66.01 MORBID OBESITY WITH BMI OF 45.0-49.9, ADULT (HCC): ICD-10-CM

## 2017-12-27 DIAGNOSIS — F51.01 PRIMARY INSOMNIA: ICD-10-CM

## 2017-12-27 DIAGNOSIS — M48.00 CENTRAL SPINAL STENOSIS: ICD-10-CM

## 2017-12-27 DIAGNOSIS — G89.4 CHRONIC PAIN SYNDROME: Primary | ICD-10-CM

## 2017-12-27 DIAGNOSIS — G89.29 CHRONIC BACK PAIN, UNSPECIFIED BACK LOCATION, UNSPECIFIED BACK PAIN LATERALITY: ICD-10-CM

## 2017-12-27 DIAGNOSIS — S14.109S INJURY OF CERVICAL SPINAL CORD, SEQUELA (HCC): ICD-10-CM

## 2017-12-27 DIAGNOSIS — E13.40 NEUROPATHY DUE TO SECONDARY DIABETES MELLITUS (HCC): ICD-10-CM

## 2017-12-27 DIAGNOSIS — L89.302 DECUBITUS ULCER OF BUTTOCK, STAGE 2, UNSPECIFIED LATERALITY: ICD-10-CM

## 2017-12-27 DIAGNOSIS — F32.9 SINGLE CURRENT EPISODE OF MAJOR DEPRESSIVE DISORDER, UNSPECIFIED DEPRESSION EPISODE SEVERITY: ICD-10-CM

## 2017-12-27 DIAGNOSIS — M54.9 CHRONIC BACK PAIN, UNSPECIFIED BACK LOCATION, UNSPECIFIED BACK PAIN LATERALITY: ICD-10-CM

## 2017-12-27 DIAGNOSIS — M96.1 FAILED BACK SURGICAL SYNDROME: ICD-10-CM

## 2017-12-27 PROCEDURE — G8484 FLU IMMUNIZE NO ADMIN: HCPCS | Performed by: NURSE PRACTITIONER

## 2017-12-27 PROCEDURE — 3046F HEMOGLOBIN A1C LEVEL >9.0%: CPT | Performed by: NURSE PRACTITIONER

## 2017-12-27 PROCEDURE — G8427 DOCREV CUR MEDS BY ELIG CLIN: HCPCS | Performed by: NURSE PRACTITIONER

## 2017-12-27 PROCEDURE — 1036F TOBACCO NON-USER: CPT | Performed by: NURSE PRACTITIONER

## 2017-12-27 PROCEDURE — 99213 OFFICE O/P EST LOW 20 MIN: CPT | Performed by: NURSE PRACTITIONER

## 2017-12-27 PROCEDURE — G8417 CALC BMI ABV UP PARAM F/U: HCPCS | Performed by: NURSE PRACTITIONER

## 2017-12-27 RX ORDER — AMITRIPTYLINE HYDROCHLORIDE 25 MG/1
TABLET, FILM COATED ORAL
Qty: 28 TABLET | Refills: 1 | Status: SHIPPED | OUTPATIENT
Start: 2017-12-27 | End: 2018-01-24 | Stop reason: SDUPTHER

## 2017-12-27 RX ORDER — GABAPENTIN 300 MG/1
300 CAPSULE ORAL 3 TIMES DAILY
Qty: 90 CAPSULE | Refills: 1 | Status: SHIPPED | OUTPATIENT
Start: 2017-12-27 | End: 2018-01-24 | Stop reason: SDUPTHER

## 2017-12-27 RX ORDER — DULOXETIN HYDROCHLORIDE 30 MG/1
30 CAPSULE, DELAYED RELEASE ORAL 2 TIMES DAILY
Qty: 60 CAPSULE | Refills: 1 | Status: SHIPPED | OUTPATIENT
Start: 2017-12-27 | End: 2018-01-24 | Stop reason: SDUPTHER

## 2017-12-27 RX ORDER — HYDROCODONE BITARTRATE AND ACETAMINOPHEN 5; 325 MG/1; MG/1
1 TABLET ORAL 3 TIMES DAILY
Qty: 84 TABLET | Refills: 0 | Status: SHIPPED | OUTPATIENT
Start: 2017-12-27 | End: 2018-01-24 | Stop reason: SDUPTHER

## 2017-12-27 NOTE — PROGRESS NOTES
[facility]  1980  [id number]      HISTORY OF PRESENT ILLNESS: Mr. Lori Barrera is a 40 y.o. male returns for a follow up visit for pain management  He has a diagnosis of   1. Chronic pain syndrome    2. Central spinal stenosis    3. Failed back surgical syndrome    4. Injury of cervical spinal cord, sequela ([facility]    5. Chronic back pain, unspecified back location, unspecified back pain laterality    6. Single current episode of major depressive disorder, unspecified depression episode severity    7. 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 Primary insomnia    9. Morbid obesity with BMI of 45.0-49.9, adult ([facility]    10. Decubitus ulcer of buttock, stage 2, unspecified laterality    . As per Information Obtained from the PADT (Patient Assessment and Documentation Tool)    He complains of pain in the lower back, buttocks. He rates the pain 5/10 and describes it as aching, burning with increased physical activities. Current treatment regimen has helped relieve about 50% of the pain. He denies any side effects from the current pain regimen. Patient reports that since the last follow up visit the physical functioning is unchanged, family/social relationships are unchanged, mood is unchanged sleep patterns are unchanged, and that the overall functioning is unchanged. Patient denies misusing/abusing his narcotic pain medications or using any illegal drugs. Upon obtaining medical history from Mr. Lori Barrera he states that pain has been manageable on current pain therapy. He has been doing well with managing his diet on his quest to lose weight. Reports having an ulcer to his Buttocks that seemed to be healing, but has recently started worsening. His wife has been cleaning and dressing with gauze and tape. His PCP recommended that he consider following up with wound care. His mood is otherwise stable without anxiety. Sleep is fair with an average of 6 hours. Denies having issues of constipation. HYDROcodone-acetaminophen (NORCO) 5-325 MG per tablet Take 1 tablet by mouth 3 times daily . 84 tablet 0    DULoxetine (CYMBALTA) 30 MG extended release capsule Take 1 capsule by mouth 2 times daily 60 capsule 1    gabapentin (NEURONTIN) 300 MG capsule Take 1 capsule by mouth 3 times daily 90 capsule 1    amitriptyline (ELAVIL) 25 MG tablet TAKE ONE TABLET BY MOUTH NIGHTLY 28 tablet 1     No facility-administered medications prior to visit. SOCIAL/FAMILY/PAST MEDICAL HISTORY: Mr. Dioni Galeana, family and past medical history was reviewed. REVIEW OF SYSTEMS:    Respiratory: Negative for apnea, chest tightness and shortness of breath or change in baseline breathing. Gastrointestinal: Negative for nausea, vomiting, abdominal pain, diarrhea, constipation, blood in stool and abdominal distention. PHYSICAL EXAM:   Nursing note and vitals reviewed. /84   Pulse 104   Wt (!) 368 lb (166.9 kg)   SpO2 96%   BMI 46.00 kg/m²   Constitutional: He appears well-developed and well-nourished. No acute distress. Skin: Skin is warm and dry, good turgor. No rash noted. He is not diaphoretic. Ulcers noted to the both sides of the buttocks, with malodorous serosanguineous drainage. Gauze and tape intact  Cardiovascular: Normal rate, regular rhythm, normal heart sounds, and does not have murmur. Pulmonary/Chest: Effort normal. No respiratory distress. He does not have wheezes in the lung fields. He has no rales. Neurological/Psychiatric:He is alert and oriented to person, place, and time. Coordination is  normal. His mood isAppropriate and affect is Flat/blunted . IMPRESSION:   1. Chronic pain syndrome    2. Central spinal stenosis    3. Failed back surgical syndrome    4. Injury of cervical spinal cord, sequela ([facility]    5. Chronic back pain, unspecified back location, unspecified back pain laterality    6.  Single current episode of major depressive disorder, unspecified depression episode severity    7. 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 Primary insomnia    9. Morbid obesity with BMI of 45.0-49.9, adult ([facility]    10. Decubitus ulcer of buttock, stage 2, unspecified laterality        PLAN:  Informed verbal consent was obtained  -Continue with Norco, Cymbalta, Neurontin, Elavil  -Opted to continue with home exercises, he reports walking at least 15 minutes daily, and performs back stretches  -Information provided to the wound clinic, recommended that patient call the office was concerns or questions  -Continue to follow-up with the weight loss clinic  -Education provided on wound care while at home.  -He was advised weight reduction, diet changes- 800-1200 omid diet, diet diary, exercising, nutritional  consult increased physical activity as tolerated  -CBT techniques- relaxation therapies such as biofeedback, mindfulness based stress reduction, imagery, cognitive restructuring, problem solving discussed with patient  -Last UDS consistent  -Return in about 4 weeks (around 1/24/2018). Current Outpatient Prescriptions   Medication Sig Dispense Refill    HYDROcodone-acetaminophen (NORCO) 5-325 MG per tablet Take 1 tablet by mouth 3 times daily .  84 tablet 0    DULoxetine (CYMBALTA) 30 MG extended release capsule Take 1 capsule by mouth 2 times daily 60 capsule 1    gabapentin (NEURONTIN) 300 MG capsule Take 1 capsule by mouth 3 times daily 90 capsule 1    amitriptyline (ELAVIL) 25 MG tablet TAKE ONE TABLET BY MOUTH NIGHTLY 28 tablet 1    atorvastatin (LIPITOR) 40 MG tablet TAKE ONE TABLET BY MOUTH DAILY FOR CHOLESTEROL 90 tablet 0    canagliflozin (INVOKANA) 100 MG TABS tablet TAKE ONE TABLET BY MOUTH EVERY MORNING BEFORE BREAKFAST FOR DIABETES 30 tablet 3    montelukast (SINGULAIR) 10 MG tablet TAKE ONE TABLET BY MOUTH DAILY FOR BREATHING 30 tablet 3    SYMBICORT 80-4.5 MCG/ACT AERO INHALE TWO PUFFS BY MOUTH TWICE A DAY 1 Inhaler 2    VENTOLIN  (90 Base) MCG/ACT unspecified laterality were also pertinent to this visit. Risks and benefits of the medications and other alternative treatments  including no treatment were discussed with the patient. The common side effects of these medications were also explained to the patient. Informed verbal consent was obtained. Goals of current treatment regimen include improvement in pain, restoration of functioning- with focus on improvement in physical performance, general activity, work or disability,emotional distress, health care utilization and  decreased medication consumption. Will continue to monitor progress towards achieving/maintaining therapeutic goals with special emphasis on  1. Improvement in perceived interfernce  of pain with ADL's. Ability to do home exercises independently. Ability to do household chores indoor and/or outdoor work and social and leisure activities. Improve psychosocial and physical functioning. - he is showing progression towards this treatment goal with the current regimen. He was advised against drinking alcohol with the narcotic pain medicines, advised against driving or handling machinery while adjusting the dose of medicines or if having cognitive  issues related to the current medications. Risk of overdose and death, if medicines not taken as prescribed, were also discussed. If the patient develops new symptoms or if the symptoms worsen, the patient should call the office. While transcribing every attempt was made to maintain the accuracy of the note in terms of it's contents,there may have been some errors made inadvertently. Thank you for allowing me to participate in the care of this patient. Nixon Louis CNP.     Cc: Madison Aragon CNP

## 2017-12-27 NOTE — PATIENT INSTRUCTIONS
[email or web address] and sign in to your [employer] account. Enter R639 in the Script[employer] box to learn more about \"Back Stretches: Exercises. \"     If you do not have an account, please click on the \"Sign Up Now\" link. Current as of: March 21, 2017  Content Version: 11.4  © 1423-9858 Healthwise, [employer]. Care instructions adapted under license by [facility] ([facility]). If you have questions about a medical condition or this instruction, always ask your healthcare professional. [address] any warranty or liability for your use of this information.

## 2017-12-28 ENCOUNTER — TELEPHONE (OUTPATIENT)
Dept: FAMILY MEDICINE CLINIC | Age: 37
End: 2017-12-28

## 2017-12-28 DIAGNOSIS — E11.40 TYPE 2 DIABETES MELLITUS WITH DIABETIC NEUROPATHY, WITHOUT LONG-TERM CURRENT USE OF INSULIN (HCC): ICD-10-CM

## 2018-01-09 ENCOUNTER — OFFICE VISIT (OUTPATIENT)
Dept: CARDIOLOGY CLINIC | Age: 38
End: 2018-01-09

## 2018-01-09 VITALS
OXYGEN SATURATION: 96 % | HEIGHT: 75 IN | SYSTOLIC BLOOD PRESSURE: 126 MMHG | BODY MASS INDEX: 39.17 KG/M2 | WEIGHT: 315 LBS | HEART RATE: 107 BPM | DIASTOLIC BLOOD PRESSURE: 80 MMHG

## 2018-01-09 DIAGNOSIS — I10 ESSENTIAL HYPERTENSION: ICD-10-CM

## 2018-01-09 DIAGNOSIS — Z01.810 PREOP CARDIOVASCULAR EXAM: Primary | ICD-10-CM

## 2018-01-09 DIAGNOSIS — E11.65 TYPE 2 DIABETES MELLITUS WITH HYPERGLYCEMIA, WITH LONG-TERM CURRENT USE OF INSULIN (HCC): ICD-10-CM

## 2018-01-09 DIAGNOSIS — Z79.4 TYPE 2 DIABETES MELLITUS WITH HYPERGLYCEMIA, WITH LONG-TERM CURRENT USE OF INSULIN (HCC): ICD-10-CM

## 2018-01-09 DIAGNOSIS — E66.01 MORBID OBESITY WITH BMI OF 45.0-49.9, ADULT (HCC): ICD-10-CM

## 2018-01-09 PROCEDURE — 3046F HEMOGLOBIN A1C LEVEL >9.0%: CPT | Performed by: INTERNAL MEDICINE

## 2018-01-09 PROCEDURE — G8417 CALC BMI ABV UP PARAM F/U: HCPCS | Performed by: INTERNAL MEDICINE

## 2018-01-09 PROCEDURE — 93000 ELECTROCARDIOGRAM COMPLETE: CPT | Performed by: INTERNAL MEDICINE

## 2018-01-09 PROCEDURE — G8484 FLU IMMUNIZE NO ADMIN: HCPCS | Performed by: INTERNAL MEDICINE

## 2018-01-09 PROCEDURE — G8427 DOCREV CUR MEDS BY ELIG CLIN: HCPCS | Performed by: INTERNAL MEDICINE

## 2018-01-09 PROCEDURE — 1036F TOBACCO NON-USER: CPT | Performed by: INTERNAL MEDICINE

## 2018-01-09 PROCEDURE — 99204 OFFICE O/P NEW MOD 45 MIN: CPT | Performed by: INTERNAL MEDICINE

## 2018-01-09 RX ORDER — METOPROLOL SUCCINATE 25 MG/1
TABLET, EXTENDED RELEASE ORAL
Qty: 90 TABLET | Refills: 0 | Status: SHIPPED | OUTPATIENT
Start: 2018-01-09 | End: 2018-04-06 | Stop reason: SDUPTHER

## 2018-01-09 NOTE — PROGRESS NOTES
[address]      Cardiology Consult    Jayant Sharda  1980 January 9, 2018    Referring Physician: Austin Gayle CNP  Reason for Referral: Pre-operative cardiac risk assessment    CC: \"bariatric surgery\"    HPI:  The patient is 40 y.o. male with a past medical history significant for morbid obesity, HTN, DM II and JULITA presents for preoperative cardiac risk assessment for bariatric surgery. He has no specific cardiac complaints today. He feels his functional status is limited from orthopaedic issues related to prior trauma requiring prolonged hospitalization at [facility]. Patient denies exertional chest pain/pressure, dyspnea at rest, LAWRENCE, PND, orthopnea, palpitations, lightheadedness, weight changes, changes in LE edema, and syncope.       Past Medical History:   Diagnosis Date    Asthma     Chronic back pain     Depression     GERD (gastroesophageal reflux disease)     Hyperlipidemia     Hypertension     Neuropathy (HCC)     Obesity     Osteoarthritis     Peripheral vascular disease (HCC)     Restless legs syndrome     Sleep apnea     Type 2 diabetes mellitus without complication (HCC)     Type II or unspecified type diabetes mellitus without mention of complication, not stated as uncontrolled     Urinary incontinence      Past Surgical History:   Procedure Laterality Date    BACK SURGERY  2013    four surgeries; rods placed    FRACTURE SURGERY Right     arm- two    FRACTURE SURGERY Right     hand- three    HAND SURGERY  1999    KNEE ARTHROSCOPY Right     PELVIC FRACTURE SURGERY      SPINE SURGERY  2011    SPINE SURGERY  2010    UVULECTOMY       Family History   Problem Relation Age of Onset    Cancer Mother      stomach    Diabetes Mother     Asthma Mother     Diabetes Brother     Cancer Paternal Uncle      testicle    Cancer Paternal Grandmother      breast    Cancer Paternal Cousin      gum     Social History   Substance Use Topics    Smoking status: allowing me to participate in the care of your patient. Please do not hesitate to contact me if you have any questions. Sincerely,  Rod Mejía.  Jordan Quintero, [address] [place] [address], [address] [place] [address] Ari Herrera [facility]  Ph: [phone]  Fax: [phone]

## 2018-01-15 ENCOUNTER — OFFICE VISIT (OUTPATIENT)
Dept: FAMILY MEDICINE CLINIC | Age: 38
End: 2018-01-15

## 2018-01-15 VITALS
HEART RATE: 90 BPM | BODY MASS INDEX: 46.37 KG/M2 | DIASTOLIC BLOOD PRESSURE: 88 MMHG | WEIGHT: 315 LBS | SYSTOLIC BLOOD PRESSURE: 120 MMHG | TEMPERATURE: 98 F | RESPIRATION RATE: 12 BRPM | OXYGEN SATURATION: 95 %

## 2018-01-15 DIAGNOSIS — L89.159 DECUBITUS ULCER OF SACRAL REGION, UNSPECIFIED ULCER STAGE: ICD-10-CM

## 2018-01-15 DIAGNOSIS — Z79.4 TYPE 2 DIABETES MELLITUS WITH DIABETIC NEUROPATHY, WITH LONG-TERM CURRENT USE OF INSULIN (HCC): Primary | ICD-10-CM

## 2018-01-15 DIAGNOSIS — J01.90 ACUTE RHINOSINUSITIS: ICD-10-CM

## 2018-01-15 DIAGNOSIS — I10 ESSENTIAL HYPERTENSION: ICD-10-CM

## 2018-01-15 DIAGNOSIS — J45.20 MILD INTERMITTENT ASTHMA WITHOUT COMPLICATION: ICD-10-CM

## 2018-01-15 DIAGNOSIS — E78.2 MIXED HYPERLIPIDEMIA: ICD-10-CM

## 2018-01-15 DIAGNOSIS — E66.01 MORBID OBESITY WITH BMI OF 45.0-49.9, ADULT (HCC): ICD-10-CM

## 2018-01-15 DIAGNOSIS — E11.40 TYPE 2 DIABETES MELLITUS WITH DIABETIC NEUROPATHY, WITH LONG-TERM CURRENT USE OF INSULIN (HCC): Primary | ICD-10-CM

## 2018-01-15 LAB — HBA1C MFR BLD: 8.8 %

## 2018-01-15 PROCEDURE — 99214 OFFICE O/P EST MOD 30 MIN: CPT | Performed by: NURSE PRACTITIONER

## 2018-01-15 PROCEDURE — 83036 HEMOGLOBIN GLYCOSYLATED A1C: CPT | Performed by: NURSE PRACTITIONER

## 2018-01-15 PROCEDURE — G8484 FLU IMMUNIZE NO ADMIN: HCPCS | Performed by: NURSE PRACTITIONER

## 2018-01-15 PROCEDURE — 3045F PR MOST RECENT HEMOGLOBIN A1C LEVEL 7.0-9.0%: CPT | Performed by: NURSE PRACTITIONER

## 2018-01-15 PROCEDURE — G8417 CALC BMI ABV UP PARAM F/U: HCPCS | Performed by: NURSE PRACTITIONER

## 2018-01-15 PROCEDURE — G8427 DOCREV CUR MEDS BY ELIG CLIN: HCPCS | Performed by: NURSE PRACTITIONER

## 2018-01-15 PROCEDURE — 1036F TOBACCO NON-USER: CPT | Performed by: NURSE PRACTITIONER

## 2018-01-15 RX ORDER — FLUTICASONE PROPIONATE 50 MCG
2 SPRAY, SUSPENSION (ML) NASAL DAILY
Qty: 1 BOTTLE | Refills: 0 | Status: SHIPPED | OUTPATIENT
Start: 2018-01-15 | End: 2018-05-21 | Stop reason: ALTCHOICE

## 2018-01-15 RX ORDER — BUDESONIDE AND FORMOTEROL FUMARATE DIHYDRATE 80; 4.5 UG/1; UG/1
AEROSOL RESPIRATORY (INHALATION)
Qty: 1 INHALER | Refills: 2 | Status: SHIPPED | OUTPATIENT
Start: 2018-01-15 | End: 2018-04-09 | Stop reason: ALTCHOICE

## 2018-01-15 RX ORDER — LANCETS 30 GAUGE
1 EACH MISCELLANEOUS DAILY
Qty: 100 EACH | Refills: 2 | Status: SHIPPED
Start: 2018-01-15 | End: 2020-05-07 | Stop reason: SDUPTHER

## 2018-01-15 RX ORDER — SULFAMETHOXAZOLE AND TRIMETHOPRIM 800; 160 MG/1; MG/1
1 TABLET ORAL 2 TIMES DAILY
Qty: 20 TABLET | Refills: 0 | Status: SHIPPED | OUTPATIENT
Start: 2018-01-15 | End: 2018-01-25

## 2018-01-15 NOTE — PROGRESS NOTES
139/87     Weight is decreased. Wt Readings from Last 5 Encounters:   01/15/18 (!) 371 lb (168.3 kg)   01/09/18 (!) 374 lb (169.6 kg)   12/27/17 (!) 368 lb (166.9 kg)   12/01/17 (!) 374 lb (169.6 kg)   11/29/17 (!) 366 lb (166 kg)      GENERAL: well-developed, well-nourished, alert, no distress, calm, assistive device: cane    EYES:  negative findings: lids and lashes normal  LUNGS:  Breathing unlabored  · clear to auscultation bilaterally and good air movement  CARDIOVASC: regular rate and rhythm, S1, S2 normal, no murmur, click, rub or gallop  · Apical impulse normal  LEGS:  Lower extremity edema: none  · Pedal pulses palpable  Superficial ulcers noted jersey buttocks. Assessment and Plan:     1. Type 2 diabetes mellitus with diabetic neuropathy, with long-term current use of insulin ([facility])  Hemoglobin A1C- 8.8%. Some improvement in diabetes control, but still uncontrolled. Goal HgA1c is less than 7.0%. Has history of non compliance with diet, aerobic exercise, weight loss, and medications. Metformin  mg BID  victoza 1.8 mg SQ daily  invokana 100 mg daily   Currently on tresiba 14 units qhs. Advised again to increase tresiba dose by 2 units every 2 days till fasting BS is less than 120. Patient is to call if he needs help adjusting his insulin. Patient is to bring BS log in at visits. Low carb diet, aerobic exercise, and weight loss discussed and needed  On gabapentin 300 mg TID for neuropathy from pain specialist.    2. Decubitus ulcer Refer to [facility] for evaluation. Patient has not made his own appt. Appt made for him on 1/18/18 and he reports that he will go. 3. Morbid obesity with BMI of 45.0-49.9, adult [facility])  Seen Dr. Ghada Hilliard and is planning on doing gastric sleeve in the future. Need to get DM controlled before surgery. Diet, aerobic exercise, and weight loss discussed and needed    4.  Mixed hyperlipidemia  On atorvastatin 40 mg daily   Low fat/cholesterol diet and weight

## 2018-01-18 ENCOUNTER — HOSPITAL ENCOUNTER (OUTPATIENT)
Dept: WOUND CARE | Age: 38
Discharge: HOME | End: 2018-01-18
Attending: SURGERY | Admitting: SURGERY

## 2018-01-18 VITALS
DIASTOLIC BLOOD PRESSURE: 91 MMHG | TEMPERATURE: 97.3 F | HEIGHT: 73 IN | WEIGHT: 315 LBS | BODY MASS INDEX: 41.75 KG/M2 | RESPIRATION RATE: 18 BRPM | HEART RATE: 130 BPM | SYSTOLIC BLOOD PRESSURE: 147 MMHG

## 2018-01-18 DIAGNOSIS — L89.303 DECUBITUS ULCER OF BUTTOCK, STAGE 3, UNSPECIFIED LATERALITY: ICD-10-CM

## 2018-01-18 DIAGNOSIS — R74.8 ABNORMAL LEVELS OF OTHER SERUM ENZYMES: ICD-10-CM

## 2018-01-18 PROBLEM — L89.309 PRESSURE ULCER OF BUTTOCK: Status: ACTIVE | Noted: 2018-01-18

## 2018-01-18 PROCEDURE — 11042 DBRDMT SUBQ TIS 1ST 20SQCM/<: CPT | Performed by: SURGERY

## 2018-01-18 PROCEDURE — 99203 OFFICE O/P NEW LOW 30 MIN: CPT | Performed by: SURGERY

## 2018-01-18 RX ORDER — LIDOCAINE HYDROCHLORIDE 40 MG/ML
SOLUTION TOPICAL ONCE
Status: DISCONTINUED | OUTPATIENT
Start: 2018-01-18 | End: 2018-01-19 | Stop reason: HOSPADM

## 2018-01-18 ASSESSMENT — PAIN DESCRIPTION - PROGRESSION: CLINICAL_PROGRESSION: NOT CHANGED

## 2018-01-18 ASSESSMENT — PAIN DESCRIPTION - ORIENTATION: ORIENTATION: LEFT;RIGHT

## 2018-01-18 ASSESSMENT — PAIN DESCRIPTION - ONSET: ONSET: ON-GOING

## 2018-01-18 ASSESSMENT — PAIN SCALES - GENERAL: PAINLEVEL_OUTOF10: 7

## 2018-01-18 ASSESSMENT — PAIN DESCRIPTION - PAIN TYPE: TYPE: ACUTE PAIN

## 2018-01-18 ASSESSMENT — PAIN DESCRIPTION - LOCATION: LOCATION: BUTTOCKS

## 2018-01-18 ASSESSMENT — PAIN DESCRIPTION - DESCRIPTORS: DESCRIPTORS: ACHING;BURNING;DISCOMFORT

## 2018-01-18 ASSESSMENT — PAIN DESCRIPTION - FREQUENCY: FREQUENCY: INTERMITTENT

## 2018-01-18 NOTE — CONSULTS
[address]   Progress Note and Procedure Note      [address] [facility] RECORD NUMBER:  [id number]  AGE: 40 y.o. GENDER: male  : 1980  EPISODE DATE:  2018    Subjective:     Chief Complaint   Patient presents with    Wound Check     Initial Visit on Left and Right Buttock Wound-(11/15/17) Opened 2-3 weeks ago -Treating with antifungal cream         HISTORY of PRESENT ILLNESS HPI     Sterling Estrada is a 40 y.o. male who presents today for wound/ulcer evaluation.    History of Wound Context: BILAT BUTTOCKS WOUNDS  Wound/Ulcer Pain Timing/Severity: mild  Quality of pain: dull  Severity:  0 / 10   Modifying Factors: None  Associated Signs/Symptoms: edema, erythema and drainage    Ulcer Identification:  Ulcer Type: pressure  Contributing Factors: chronic pressure, shear force and obesity    Wound: N/A        PAST MEDICAL HISTORY        Diagnosis Date    Asthma     Chronic back pain     Depression     GERD (gastroesophageal reflux disease)     Hyperlipidemia     Hypertension     Neuropathy (HCC)     Obesity     Osteoarthritis     Peripheral vascular disease (HCC)     Restless legs syndrome     Sleep apnea     Type 2 diabetes mellitus without complication (HCC)     Type II or unspecified type diabetes mellitus without mention of complication, not stated as uncontrolled     Urinary incontinence        PAST SURGICAL HISTORY    Past Surgical History:   Procedure Laterality Date    BACK SURGERY      four surgeries; rods placed    FRACTURE SURGERY Right     arm- two    FRACTURE SURGERY Right     hand- three    HAND SURGERY      KNEE ARTHROSCOPY Right     PELVIC FRACTURE SURGERY      SPINE SURGERY  2011    SPINE SURGERY  2010    UVULECTOMY         FAMILY HISTORY    Family History   Problem Relation Age of Onset    Cancer Mother      stomach    Diabetes Mother     Asthma Mother     Diabetes Brother     Cancer Paternal Uncle      testicle    Cancer and S2, no murmurs, rubs, clicks, or gallops, distal pulses intact  Rt DP 0 Pt +1,  Lt +2 DP and PT no carotid bruits  Abdomen: soft, non-tender, non-distended, normal bowel sounds, no masses or organomegaly  Extremities: no cyanosis, clubbing or edema  Musculoskeletal: normal range of motion, no joint swelling, deformity or tenderness  Neurologic: reflexes normal and symmetric, no cranial nerve deficit, gait, coordination and speech normal      Assessment:      Patient Active Problem List   Diagnosis Code    Asthma J45.909    Hypertension I10    GERD (gastroesophageal reflux disease) K21.9    Tinea cruris B35.6    Chronic back pain M54.9, G89.29    Type 2 diabetes mellitus with diabetic neuropathy, with long-term current use of insulin ([facility]) E11.40, Z79.4    Mixed hyperlipidemia E78.2    Failed back surgical syndrome M96.1    Spinal cord injury, C1-C7 ([facility]) S14.109A    Chronic pain syndrome G89.4    Central spinal stenosis M48.00    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 ([facility]) E13.40    Major depressive disorder F32.9    Insomnia G47.00    Diabetic eye exam ([facility]- 12/16 wnl CEI E11.9, Z01.00    Closed nondisplaced fracture of fifth metatarsal bone of right foot S92.354A    Morbid obesity with BMI of 45.0-49.9, adult ([facility]) E66.01, Z68.42    Elevated liver enzymes R74.8    JULITA (obstructive sleep apnea) G47.33    Pressure ulcer of buttock L89.309        Procedure Note  Indications:  Based on my examination of this patient's wound(s)/ulcer(s) today, debridement is required to promote healing and evaluate the wound base. Performed by: Thad Calles MD    Consent obtained:  Yes    Time out taken:  Yes    Pain Control: Anesthetic  Anesthetic: 4% Lidocaine Liquid Topical (5 ml)       Debridement:Excisional Debridement    Using curette the wound(s)/ulcer(s) was/were sharply debrided down through and including the removal of epidermis, dermis and subcutaneous tissue.         Devitalized US. IT IS MOST IMPORTANT TO KEEP THE WOUND COVERED AT ALL TIMES. Physician Signature:_______________________    Date: ___________ Time:  ____________       [] Dr Dimas Sharma    [x] Dr Jess Dallas   [] Bradford Davis CNP     [] Dr Savage Cancer  [] Dr Unique Mares   [] Dr Anil Ortega  [] Dr Nadine Greer       [] Dr Maximilian Cornejo   [] Dori Baldwin NP    [] Dr. Madison Burgos      The information contained in the After Visit Summary has been reviewed with me, the patient and/or responsible adult, by my health care provider(s). I had the opportunity to ask questions regarding this information. I have elected to receive;       Patient Signature:_______________________    Date: ___________ Time:  ____________    [] Patient unable to sign Discharge Instructions given to ECF/Transportation/POA      [x]  After Visit Summary  []  Comprehensive Discharge Instruction                Electronically signed by Nery Ortiz MD on 1/18/2018 at 4:53 PM

## 2018-01-24 ENCOUNTER — OFFICE VISIT (OUTPATIENT)
Dept: PAIN MANAGEMENT | Age: 38
End: 2018-01-24

## 2018-01-24 VITALS
DIASTOLIC BLOOD PRESSURE: 89 MMHG | HEART RATE: 115 BPM | WEIGHT: 315 LBS | OXYGEN SATURATION: 94 % | BODY MASS INDEX: 48.95 KG/M2 | SYSTOLIC BLOOD PRESSURE: 127 MMHG

## 2018-01-24 DIAGNOSIS — M48.00 CENTRAL SPINAL STENOSIS: ICD-10-CM

## 2018-01-24 DIAGNOSIS — E66.01 MORBID OBESITY WITH BMI OF 45.0-49.9, ADULT (HCC): ICD-10-CM

## 2018-01-24 DIAGNOSIS — L89.303 DECUBITUS ULCER OF BUTTOCK, STAGE 3, UNSPECIFIED LATERALITY: ICD-10-CM

## 2018-01-24 DIAGNOSIS — S14.109S INJURY OF CERVICAL SPINAL CORD, SEQUELA (HCC): ICD-10-CM

## 2018-01-24 DIAGNOSIS — F32.9 SINGLE CURRENT EPISODE OF MAJOR DEPRESSIVE DISORDER, UNSPECIFIED DEPRESSION EPISODE SEVERITY: ICD-10-CM

## 2018-01-24 DIAGNOSIS — M54.9 CHRONIC BACK PAIN, UNSPECIFIED BACK LOCATION, UNSPECIFIED BACK PAIN LATERALITY: ICD-10-CM

## 2018-01-24 DIAGNOSIS — E13.40 NEUROPATHY DUE TO SECONDARY DIABETES MELLITUS (HCC): ICD-10-CM

## 2018-01-24 DIAGNOSIS — G89.29 CHRONIC BACK PAIN, UNSPECIFIED BACK LOCATION, UNSPECIFIED BACK PAIN LATERALITY: ICD-10-CM

## 2018-01-24 DIAGNOSIS — M96.1 FAILED BACK SURGICAL SYNDROME: ICD-10-CM

## 2018-01-24 DIAGNOSIS — F51.01 PRIMARY INSOMNIA: ICD-10-CM

## 2018-01-24 DIAGNOSIS — G89.4 CHRONIC PAIN SYNDROME: Primary | ICD-10-CM

## 2018-01-24 PROCEDURE — 1036F TOBACCO NON-USER: CPT | Performed by: NURSE PRACTITIONER

## 2018-01-24 PROCEDURE — 3045F PR MOST RECENT HEMOGLOBIN A1C LEVEL 7.0-9.0%: CPT | Performed by: NURSE PRACTITIONER

## 2018-01-24 PROCEDURE — 99213 OFFICE O/P EST LOW 20 MIN: CPT | Performed by: NURSE PRACTITIONER

## 2018-01-24 PROCEDURE — G8417 CALC BMI ABV UP PARAM F/U: HCPCS | Performed by: NURSE PRACTITIONER

## 2018-01-24 PROCEDURE — G8484 FLU IMMUNIZE NO ADMIN: HCPCS | Performed by: NURSE PRACTITIONER

## 2018-01-24 PROCEDURE — G8427 DOCREV CUR MEDS BY ELIG CLIN: HCPCS | Performed by: NURSE PRACTITIONER

## 2018-01-24 RX ORDER — DULOXETIN HYDROCHLORIDE 30 MG/1
30 CAPSULE, DELAYED RELEASE ORAL 2 TIMES DAILY
Qty: 60 CAPSULE | Refills: 1 | Status: SHIPPED | OUTPATIENT
Start: 2018-01-24 | End: 2018-03-21 | Stop reason: SDUPTHER

## 2018-01-24 RX ORDER — AMITRIPTYLINE HYDROCHLORIDE 25 MG/1
TABLET, FILM COATED ORAL
Qty: 28 TABLET | Refills: 1 | Status: SHIPPED | OUTPATIENT
Start: 2018-01-24 | End: 2018-03-21 | Stop reason: SDUPTHER

## 2018-01-24 RX ORDER — GABAPENTIN 300 MG/1
300 CAPSULE ORAL 3 TIMES DAILY
Qty: 90 CAPSULE | Refills: 1 | Status: SHIPPED | OUTPATIENT
Start: 2018-01-24 | End: 2018-03-21 | Stop reason: SDUPTHER

## 2018-01-24 RX ORDER — HYDROCODONE BITARTRATE AND ACETAMINOPHEN 5; 325 MG/1; MG/1
1 TABLET ORAL 3 TIMES DAILY
Qty: 84 TABLET | Refills: 0 | Status: SHIPPED | OUTPATIENT
Start: 2018-01-24 | End: 2018-02-21 | Stop reason: SDUPTHER

## 2018-01-24 NOTE — PROGRESS NOTES
Sania Corbett  1980  [id number]      HISTORY OF PRESENT ILLNESS: Mr. Berta Singh is a 40 y.o. male returns for a follow up visit for pain management  He has a diagnosis of   1. Chronic pain syndrome    2. Central spinal stenosis    3. Failed back surgical syndrome    4. Chronic back pain, unspecified back location, unspecified back pain laterality    5. Injury of cervical spinal cord, sequela ([facility]    6. 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 Single current episode of major depressive disorder, unspecified depression episode severity    8. Primary insomnia    9. Morbid obesity with BMI of 45.0-49.9, adult ([facility]    10. Decubitus ulcer of buttock, stage 3, unspecified laterality ([facility]    . As per Information Obtained from the PADT (Patient Assessment and Documentation Tool)    He complains of pain in the lower back, legs. He rates the pain 7/10 and describes it as aching, burning with increased physical activities, standing, bending, walking, lifting, standing. Current treatment regimen has helped relieve about 30% of the pain. He denies any side effects from the current pain regimen. Patient reports that since the last follow up visit the physical functioning is unchanged, family/social relationships are unchanged, mood is unchanged sleep patterns are unchanged, and that the overall functioning is worse. Patient denies misusing/abusing his narcotic pain medications or using any illegal drugs. Upon obtaining medical history from Mr. Berta Singh states that pain is manageable on current pain therapy. He continues to follow-up with [facility] for ulcer to the buttocks. He also follows up with weight loss clinic tomorrow for evaluation of weight loss surgery. Admits to weight loss of about 10 pounds since his last got evaluated. Mood is otherwise stable without anxiety. Sleep is fair with an average of 5-6 hours. Denies having issues of constipation.  Tolerating activities/house chores with Injury of cervical spinal cord, sequela ([facility]    6. 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 Single current episode of major depressive disorder, unspecified depression episode severity    8. Primary insomnia    9. Morbid obesity with BMI of 45.0-49.9, adult ([facility]    10. Decubitus ulcer of buttock, stage 3, unspecified laterality ([facility]        PLAN:  Informed verbal consent was obtained  -Continue with Cymbalta, Neurontin, Elavil, Norco  -Opted for home exercises, we will consider aquatic therapy once ulcer to the Buttocks is healed   -Continue to follow-up with weight loss clinic  -He was advised weight reduction, diet changes- 800-1200 omid diet, diet diary, exercising, nutritional  consult increased physical activity as tolerated  -CBT techniques- relaxation therapies such as biofeedback, mindfulness based stress reduction, imagery, cognitive restructuring, problem solving discussed with patient  -Last UDS consistent  -Return in about 4 weeks (around 2/21/2018). Current Outpatient Prescriptions   Medication Sig Dispense Refill    DULoxetine (CYMBALTA) 30 MG extended release capsule Take 1 capsule by mouth 2 times daily 60 capsule 1    gabapentin (NEURONTIN) 300 MG capsule Take 1 capsule by mouth 3 times daily for 30 days. 90 capsule 1    amitriptyline (ELAVIL) 25 MG tablet TAKE ONE TABLET BY MOUTH NIGHTLY 28 tablet 1    HYDROcodone-acetaminophen (NORCO) 5-325 MG per tablet Take 1 tablet by mouth 3 times daily for 28 days.  84 tablet 0    budesonide-formoterol (SYMBICORT) 80-4.5 MCG/ACT AERO INHALE TWO PUFFS BY MOUTH TWICE A DAY 1 Inhaler 2    Lancets MISC 1 each by Does not apply route daily TRUEMETRIX 100 each 2    sulfamethoxazole-trimethoprim (BACTRIM DS) 800-160 MG per tablet Take 1 tablet by mouth 2 times daily for 10 days 20 tablet 0    fluticasone (FLONASE) 50 MCG/ACT nasal spray 2 sprays by Nasal route daily 1 Bottle 0    metoprolol succinate (TOPROL XL) 25 MG extended release 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 ([facility] Single current episode of major depressive disorder, unspecified depression episode severity, Primary insomnia, Morbid obesity with BMI of 45.0-49.9, adult ([facility] and Decubitus ulcer of buttock, stage 3, unspecified laterality ([facility] were also pertinent to this visit. Risks and benefits of the medications and other alternative treatments  including no treatment were discussed with the patient. The common side effects of these medications were also explained to the patient. Informed verbal consent was obtained. Goals of current treatment regimen include improvement in pain, restoration of functioning- with focus on improvement in physical performance, general activity, work or disability,emotional distress, health care utilization and  decreased medication consumption. Will continue to monitor progress towards achieving/maintaining therapeutic goals with special emphasis on  1. Improvement in perceived interfernce  of pain with ADL's. Ability to do home exercises independently. Ability to do household chores indoor and/or outdoor work and social and leisure activities. Improve psychosocial and physical functioning. - he is showing progression towards this treatment goal with the current regimen. He was advised against drinking alcohol with the narcotic pain medicines, advised against driving or handling machinery while adjusting the dose of medicines or if having cognitive  issues related to the current medications. Risk of overdose and death, if medicines not taken as prescribed, were also discussed. If the patient develops new symptoms or if the symptoms worsen, the patient should call the office. While transcribing every attempt was made to maintain the accuracy of the note in terms of it's contents,there may have been some errors made inadvertently. Thank you for allowing me to participate in the care of this patient. Taylor Sandoval CNP.     Cc:

## 2018-01-24 NOTE — PATIENT INSTRUCTIONS
[email or web address] and sign in to your [employer] account. Enter V118 in the [employer] box to learn more about \"Back Stretches: Exercises. \"     If you do not have an account, please click on the \"Sign Up Now\" link. Current as of: March 21, 2017  Content Version: 11.5  © 2855-3575 Healthwise, [employer]. Care instructions adapted under license by [facility] ([facility]). If you have questions about a medical condition or this instruction, always ask your healthcare professional. [address] any warranty or liability for your use of this information.

## 2018-01-25 ENCOUNTER — OFFICE VISIT (OUTPATIENT)
Dept: BARIATRICS/WEIGHT MGMT | Age: 38
End: 2018-01-25

## 2018-01-25 VITALS
SYSTOLIC BLOOD PRESSURE: 133 MMHG | DIASTOLIC BLOOD PRESSURE: 95 MMHG | HEIGHT: 75 IN | BODY MASS INDEX: 39.17 KG/M2 | WEIGHT: 315 LBS | HEART RATE: 121 BPM

## 2018-01-25 DIAGNOSIS — I10 ESSENTIAL HYPERTENSION: ICD-10-CM

## 2018-01-25 DIAGNOSIS — E11.40 TYPE 2 DIABETES MELLITUS WITH DIABETIC NEUROPATHY, WITH LONG-TERM CURRENT USE OF INSULIN (HCC): ICD-10-CM

## 2018-01-25 DIAGNOSIS — Z79.4 TYPE 2 DIABETES MELLITUS WITH DIABETIC NEUROPATHY, WITH LONG-TERM CURRENT USE OF INSULIN (HCC): ICD-10-CM

## 2018-01-25 DIAGNOSIS — E66.01 MORBID OBESITY WITH BMI OF 45.0-49.9, ADULT (HCC): Primary | ICD-10-CM

## 2018-01-25 PROCEDURE — G8417 CALC BMI ABV UP PARAM F/U: HCPCS | Performed by: SURGERY

## 2018-01-25 PROCEDURE — 99213 OFFICE O/P EST LOW 20 MIN: CPT | Performed by: SURGERY

## 2018-01-25 PROCEDURE — 1036F TOBACCO NON-USER: CPT | Performed by: SURGERY

## 2018-01-25 PROCEDURE — G8484 FLU IMMUNIZE NO ADMIN: HCPCS | Performed by: SURGERY

## 2018-01-25 PROCEDURE — G8427 DOCREV CUR MEDS BY ELIG CLIN: HCPCS | Performed by: SURGERY

## 2018-01-25 PROCEDURE — 3045F PR MOST RECENT HEMOGLOBIN A1C LEVEL 7.0-9.0%: CPT | Performed by: SURGERY

## 2018-01-25 NOTE — PROGRESS NOTES
Jorge Alberto Monaco gained 2.6 lbs over past 2 months. Breakfast: egg whites with PB toast     Snack: [place] PB bars OR carrots with ranch dressing    Lunch: grilled chicken with salad or frozen veggies     Snack: [place] PB bars OR carrots with ranch dressing     Dinner: Potroast OR meatloaf, or spaghetti OR healthy choice chicken pot pie     Snack: [place] PB bars OR carrots with ranch dressing    Fluids: Drinking powerade zero, no longer drinking tea or soda, drinking skim milk and water      Is pt consuming smaller portions? yes    Is pt consuming at least 64 oz of fluids per day? yes    Is pt consuming carbonated, caffeinated, or sugary beverages? no    Has pt sampled Unjury and/or Nectar protein? Yes and tolerated fine     Exercise: Limited with walking, uses his walking stick.  He is interested in starting exercise (swimming) at local     Plan/Recommendations: switch protein drink alternative, include protein with all snacks     Handouts: protein bar and shake purchasing guide, frozen meals handout    Vanessa King
diabetes mellitus ([facility]    Major depressive disorder    Insomnia    Diabetic eye exam ([facility] [facility]- 12/16 wnl CEI    Closed nondisplaced fracture of fifth metatarsal bone of right foot    Morbid obesity with BMI of 45.0-49.9, adult (HCC)    Elevated liver enzymes    JULITA (obstructive sleep apnea)    Pressure ulcer of buttock    Abnormal levels of other serum enzymes    and importance of weight loss to alleviate those co morbid conditions. I encouraged the patient to continue exercise and keeping healthy eating habits. Discussed pre-op labs and work up till now. Also counseled the patient extensively on Surgery. 15 minutes spent counseling the patient, answering questions and addressing concerns as well reviewing labs and/or other studies as well coordinating care. More than 50% was face to face time counseling the patient. Obtain rest of pre-op work up / clearances  Diet and Exercise      Patient advised that its their responsibility to follow up for studies and/or labs ordered today. Patient has open sacral wound being managed by Dr. Waynard Alpers  A1C is still above 8. Needs to be below 7.0 to surgical candidate.   Also seems to have bedbugs-needs treatment  F/U in 2 months    Michael Smith DO

## 2018-01-26 DIAGNOSIS — I10 ESSENTIAL HYPERTENSION: ICD-10-CM

## 2018-01-26 RX ORDER — LISINOPRIL 10 MG/1
TABLET ORAL
Qty: 90 TABLET | Refills: 0 | Status: SHIPPED | OUTPATIENT
Start: 2018-01-26 | End: 2018-03-01 | Stop reason: SDUPTHER

## 2018-02-05 ENCOUNTER — OFFICE VISIT (OUTPATIENT)
Dept: SLEEP MEDICINE | Age: 38
End: 2018-02-05

## 2018-02-05 VITALS
DIASTOLIC BLOOD PRESSURE: 70 MMHG | HEART RATE: 111 BPM | BODY MASS INDEX: 39.17 KG/M2 | SYSTOLIC BLOOD PRESSURE: 110 MMHG | TEMPERATURE: 98 F | RESPIRATION RATE: 20 BRPM | WEIGHT: 315 LBS | OXYGEN SATURATION: 95 % | HEIGHT: 75 IN

## 2018-02-05 DIAGNOSIS — Z99.89 OSA ON CPAP: Primary | ICD-10-CM

## 2018-02-05 DIAGNOSIS — G47.33 OSA ON CPAP: Primary | ICD-10-CM

## 2018-02-05 PROCEDURE — G8417 CALC BMI ABV UP PARAM F/U: HCPCS | Performed by: PSYCHIATRY & NEUROLOGY

## 2018-02-05 PROCEDURE — 1036F TOBACCO NON-USER: CPT | Performed by: PSYCHIATRY & NEUROLOGY

## 2018-02-05 PROCEDURE — G8427 DOCREV CUR MEDS BY ELIG CLIN: HCPCS | Performed by: PSYCHIATRY & NEUROLOGY

## 2018-02-05 PROCEDURE — G8484 FLU IMMUNIZE NO ADMIN: HCPCS | Performed by: PSYCHIATRY & NEUROLOGY

## 2018-02-05 PROCEDURE — 99213 OFFICE O/P EST LOW 20 MIN: CPT | Performed by: PSYCHIATRY & NEUROLOGY

## 2018-02-05 ASSESSMENT — SLEEP AND FATIGUE QUESTIONNAIRES
HOW LIKELY ARE YOU TO NOD OFF OR FALL ASLEEP WHILE SITTING AND TALKING TO SOMEONE: 0
HOW LIKELY ARE YOU TO NOD OFF OR FALL ASLEEP WHILE LYING DOWN TO REST IN THE AFTERNOON WHEN CIRCUMSTANCES PERMIT: 2
HOW LIKELY ARE YOU TO NOD OFF OR FALL ASLEEP WHILE WATCHING TV: 2
HOW LIKELY ARE YOU TO NOD OFF OR FALL ASLEEP IN A CAR, WHILE STOPPED FOR A FEW MINUTES IN TRAFFIC: 0
HOW LIKELY ARE YOU TO NOD OFF OR FALL ASLEEP WHILE SITTING INACTIVE IN A PUBLIC PLACE: 0
HOW LIKELY ARE YOU TO NOD OFF OR FALL ASLEEP WHILE SITTING AND READING: 0
HOW LIKELY ARE YOU TO NOD OFF OR FALL ASLEEP WHEN YOU ARE A PASSENGER IN A CAR FOR AN HOUR WITHOUT A BREAK: 1
HOW LIKELY ARE YOU TO NOD OFF OR FALL ASLEEP WHILE SITTING QUIETLY AFTER LUNCH WITHOUT ALCOHOL: 2
ESS TOTAL SCORE: 7

## 2018-02-05 NOTE — PROGRESS NOTES
Female     Other Topics Concern    Not on file     Social History Narrative    Caffeine:        SODA - 0          TEA - 0        COFFEE - 0           Prior to Admission medications    Medication Sig Start Date End Date Taking? Authorizing Provider   lisinopril (PRINIVIL;ZESTRIL) 10 MG tablet TAKE ONE TABLET BY MOUTH DAILY FOR HIGH BLOOD PRESSURE 1/26/18  Yes Bj Diggs CNP   DULoxetine (CYMBALTA) 30 MG extended release capsule Take 1 capsule by mouth 2 times daily 1/24/18  Yes Jessica Clancy NP   gabapentin (NEURONTIN) 300 MG capsule Take 1 capsule by mouth 3 times daily for 30 days. 1/24/18 2/23/18 Yes Jessica Clancy NP   amitriptyline (ELAVIL) 25 MG tablet TAKE ONE TABLET BY MOUTH NIGHTLY 1/24/18  Yes Jessica Clancy NP   HYDROcodone-acetaminophen (NORCO) 5-325 MG per tablet Take 1 tablet by mouth 3 times daily for 28 days.  1/24/18 2/21/18 Yes Jessica Clancy NP   budesonide-formoterol (SYMBICORT) 80-4.5 MCG/ACT AERO INHALE TWO PUFFS BY MOUTH TWICE A DAY 1/15/18  Yes Bj Diggs CNP   Lancets MISC 1 each by Does not apply route daily TRUEMETRIX 1/15/18  Yes jB Diggs CNP   fluticasone (FLONASE) 50 MCG/ACT nasal spray 2 sprays by Nasal route daily 1/15/18  Yes Bj Diggs CNP   metoprolol succinate (TOPROL XL) 25 MG extended release tablet TAKE ONE TABLET BY MOUTH DAILY FOR FOR BLOOD PRESSURE AND HEART RATE 1/9/18  Yes Bj Diggs CNP   VENTOLIN  (90 Base) MCG/ACT inhaler INHALE TWO PUFFS BY MOUTH EVERY 6 HOURS AS NEEDED FOR WHEEZING OR FOR SHORTNESS OF BREATH 1/9/18  Yes Bj Diggs CNP   Insulin Pen Needle 31G X 5 MM MISC 1 each by Does not apply route 2 times daily 12/28/17  Yes Bj Diggs CNP   atorvastatin (LIPITOR) 40 MG tablet TAKE ONE TABLET BY MOUTH DAILY FOR CHOLESTEROL 12/4/17  Yes Bj Diggs CNP   canagliflozin (INVOKANA) 100 MG TABS tablet TAKE ONE TABLET BY MOUTH EVERY MORNING BEFORE BREAKFAST FOR DIABETES 12/1/17 Asthma     Chronic back pain     Depression     GERD (gastroesophageal reflux disease)     Hyperlipidemia     Hypertension     Neuropathy (HCC)     Obesity     Osteoarthritis     Peripheral vascular disease (HCC)     Restless legs syndrome     Sleep apnea     Type 2 diabetes mellitus without complication (HCC)     Type II or unspecified type diabetes mellitus without mention of complication, not stated as uncontrolled     Urinary incontinence        Past Surgical History:   Procedure Laterality Date    BACK SURGERY  2013    four surgeries; rods placed    FRACTURE SURGERY Right     arm- two    FRACTURE SURGERY Right     hand- three    HAND SURGERY  1999    KNEE ARTHROSCOPY Right     PELVIC FRACTURE SURGERY      SPINE SURGERY  2011    SPINE SURGERY  2010    UVULECTOMY         Family History   Problem Relation Age of Onset    Cancer Mother      stomach    Diabetes Mother     Asthma Mother     Diabetes Brother     Cancer Paternal Uncle      testicle    Cancer Paternal Grandmother      breast    Cancer Paternal Cousin      gum       Review of Systems   All other systems reviewed and are negative. otherwise, no changes from last visit    Objective:     Vitals:  Weight BMI Neck circumference    Wt Readings from Last 3 Encounters:   02/05/18 (!) 371 lb 9.6 oz (168.6 kg)   01/25/18 (!) 368 lb 9.6 oz (167.2 kg)   01/24/18 (!) 371 lb (168.3 kg)    Body mass index is 46.45 kg/m². BP HR SaO2   BP Readings from Last 3 Encounters:   02/05/18 110/70   01/25/18 (!) 133/95   01/24/18 127/89    Pulse Readings from Last 3 Encounters:   02/05/18 111   01/25/18 121   01/24/18 115    SpO2 Readings from Last 3 Encounters:   02/05/18 95%   01/24/18 94%   01/15/18 95%      The mandibular molar Class :   [x]1 []2 []3      Mallampati I Airway Classification:   []1 []2 []3 [x]4      Physical Exam   Constitutional: No distress. HENT:   Nose: Nose normal.   Eyes: EOM are normal.   Neck: Neck supple. Cardiovascular: Normal rate, normal heart sounds and intact distal pulses. Pulmonary/Chest: Effort normal and breath sounds normal. No respiratory distress. He has no wheezes. He has no rales. Musculoskeletal: He exhibits edema (Trace LE). Neurological: He is alert. Skin: Skin is warm. Psychiatric: He has a normal mood and affect. Nursing note and vitals reviewed. Assessment:   Moderate Obstructive Sleep Apnea/Hypopnea Syndrome under good control on PAP at 9 cmwp. 1. JULITA on CPAP       Plan: Will continue the PAP at 9 cmwp, I encouraged the patient to use the PAP on nightly basis. I educated the Patient about the PAP machine including how to change the heated humidifier. I will order PAP supplies, mask, filters. ... No orders of the defined types were placed in this encounter. Return in about 1 year (around 2/5/2019) for Reveiwing CPAP usage and compliance report and tro.     Carrie Herring MD  Medical Director - [facility]

## 2018-02-20 ENCOUNTER — OFFICE VISIT (OUTPATIENT)
Dept: FAMILY MEDICINE CLINIC | Age: 38
End: 2018-02-20

## 2018-02-20 VITALS
TEMPERATURE: 99 F | SYSTOLIC BLOOD PRESSURE: 130 MMHG | RESPIRATION RATE: 16 BRPM | HEART RATE: 120 BPM | OXYGEN SATURATION: 96 % | WEIGHT: 315 LBS | BODY MASS INDEX: 45.87 KG/M2 | DIASTOLIC BLOOD PRESSURE: 80 MMHG

## 2018-02-20 DIAGNOSIS — J40 BRONCHITIS: ICD-10-CM

## 2018-02-20 DIAGNOSIS — J45.41 MODERATE PERSISTENT ASTHMA WITH EXACERBATION: ICD-10-CM

## 2018-02-20 PROCEDURE — G8484 FLU IMMUNIZE NO ADMIN: HCPCS | Performed by: NURSE PRACTITIONER

## 2018-02-20 PROCEDURE — G8427 DOCREV CUR MEDS BY ELIG CLIN: HCPCS | Performed by: NURSE PRACTITIONER

## 2018-02-20 PROCEDURE — G8417 CALC BMI ABV UP PARAM F/U: HCPCS | Performed by: NURSE PRACTITIONER

## 2018-02-20 PROCEDURE — 1036F TOBACCO NON-USER: CPT | Performed by: NURSE PRACTITIONER

## 2018-02-20 PROCEDURE — 99213 OFFICE O/P EST LOW 20 MIN: CPT | Performed by: NURSE PRACTITIONER

## 2018-02-20 RX ORDER — SULFAMETHOXAZOLE AND TRIMETHOPRIM 800; 160 MG/1; MG/1
1 TABLET ORAL 2 TIMES DAILY
Qty: 20 TABLET | Refills: 0 | Status: SHIPPED | OUTPATIENT
Start: 2018-02-20 | End: 2018-03-02

## 2018-02-20 ASSESSMENT — ENCOUNTER SYMPTOMS
DIARRHEA: 0
WHEEZING: 1
VOMITING: 0
RHINORRHEA: 1
COUGH: 1
SHORTNESS OF BREATH: 1
SORE THROAT: 1
NAUSEA: 0

## 2018-02-20 NOTE — PROGRESS NOTES
2/20/2018    This is a 40 y.o. male   Chief Complaint   Patient presents with    Asthma     x1 day, tightness in chest, coughing up brown, green, blood pleghm, burning in chest    .    Cough   This is a new problem. The current episode started yesterday. The problem has been gradually worsening. The cough is productive of purulent sputum. Associated symptoms include postnasal drip, rhinorrhea, a sore throat, shortness of breath and wheezing. Pertinent negatives include no chest pain, fever or headaches. The symptoms are aggravated by lying down. Treatments tried: dayquil, albuterol inhaler  The treatment provided mild relief. His past medical history is significant for asthma. Brown sputum production.         Patient Active Problem List   Diagnosis    Asthma    Hypertension    GERD (gastroesophageal reflux disease)    Tinea cruris    Chronic back pain    Type 2 diabetes mellitus with diabetic neuropathy, with long-term current use of insulin ([facility])    Mixed hyperlipidemia    Failed back surgical syndrome    Spinal cord injury, C1-C7 ([facility]    Chronic pain syndrome    Central spinal stenosis    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 ([facility]    Major depressive disorder    Insomnia    Diabetic eye exam ([facility]- 12/16 wnl CEI    Closed nondisplaced fracture of fifth metatarsal bone of right foot    Morbid obesity with BMI of 45.0-49.9, adult ([facility])    Elevated liver enzymes    JULITA (obstructive sleep apnea)    Pressure ulcer of buttock    Abnormal levels of other serum enzymes       Current Outpatient Prescriptions   Medication Sig Dispense Refill    VENTOLIN  (90 Base) MCG/ACT inhaler INHALE TWO PUFFS BY MOUTH EVERY 6 HOURS AS NEEDED FOR WHEEZING OR FOR SHORTNESS OF BREATH 1 Inhaler 1    lisinopril (PRINIVIL;ZESTRIL) 10 MG tablet TAKE ONE TABLET BY MOUTH DAILY FOR HIGH BLOOD PRESSURE 90 tablet 0    DULoxetine (CYMBALTA) 30 MG extended release capsule Take 1 capsule by mouth 2 times rhythm and normal heart sounds. Exam reveals no gallop and no friction rub. No murmur heard. Pulmonary/Chest: Effort normal. No respiratory distress. He has wheezes in the right middle field, the right lower field, the left middle field and the left lower field. He has no rhonchi. Musculoskeletal: He exhibits no edema. Lymphadenopathy:        Head (right side): No submandibular adenopathy present. Head (left side): No submandibular adenopathy present. Right cervical: No superficial cervical adenopathy present. Left cervical: No superficial cervical adenopathy present. Neurological: He is alert and oriented to person, place, and time. Skin: Skin is warm and dry. Psychiatric: He has a normal mood and affect. His behavior is normal. Judgment and thought content normal.   Nursing note and vitals reviewed. Assessment and Plan  Sukhdev Vargas was seen today for asthma. Diagnoses and all orders for this visit:    Bronchitis  -     sulfamethoxazole-trimethoprim (BACTRIM DS) 800-160 MG per tablet; Take 1 tablet by mouth 2 times daily for 10 days  Should take an over-the-counter probiotic daily while on the antibiotic to help prevent antibiotic associated diarrhea. Increase fluids   Rest    Moderate persistent asthma with exacerbation  Continue albuterol inhaler 2 puffs every 4-6 hours PRN. Due to history of uncontrolled DM would rather not treat with prednisone. If breathing does not improve, patient is to let me know and then will reevaluate. Patient is to let me know if symptoms worsen or fail to improve. Return in about 6 days (around 2/26/2018), or if symptoms worsen or fail to improve, for diabetes.

## 2018-02-21 ENCOUNTER — OFFICE VISIT (OUTPATIENT)
Dept: PAIN MANAGEMENT | Age: 38
End: 2018-02-21

## 2018-02-21 VITALS
OXYGEN SATURATION: 95 % | SYSTOLIC BLOOD PRESSURE: 128 MMHG | HEART RATE: 129 BPM | DIASTOLIC BLOOD PRESSURE: 80 MMHG | WEIGHT: 315 LBS | BODY MASS INDEX: 46.5 KG/M2

## 2018-02-21 DIAGNOSIS — E66.01 MORBID OBESITY WITH BMI OF 45.0-49.9, ADULT (HCC): ICD-10-CM

## 2018-02-21 DIAGNOSIS — G89.4 CHRONIC PAIN SYNDROME: Primary | ICD-10-CM

## 2018-02-21 DIAGNOSIS — M96.1 FAILED BACK SURGICAL SYNDROME: ICD-10-CM

## 2018-02-21 DIAGNOSIS — S14.109S INJURY OF CERVICAL SPINAL CORD, SEQUELA (HCC): ICD-10-CM

## 2018-02-21 DIAGNOSIS — F32.9 SINGLE CURRENT EPISODE OF MAJOR DEPRESSIVE DISORDER, UNSPECIFIED DEPRESSION EPISODE SEVERITY: ICD-10-CM

## 2018-02-21 DIAGNOSIS — G89.29 CHRONIC BACK PAIN, UNSPECIFIED BACK LOCATION, UNSPECIFIED BACK PAIN LATERALITY: ICD-10-CM

## 2018-02-21 DIAGNOSIS — M54.9 CHRONIC BACK PAIN, UNSPECIFIED BACK LOCATION, UNSPECIFIED BACK PAIN LATERALITY: ICD-10-CM

## 2018-02-21 DIAGNOSIS — F51.01 PRIMARY INSOMNIA: ICD-10-CM

## 2018-02-21 DIAGNOSIS — E13.40 NEUROPATHY DUE TO SECONDARY DIABETES MELLITUS (HCC): ICD-10-CM

## 2018-02-21 DIAGNOSIS — M48.00 CENTRAL SPINAL STENOSIS: ICD-10-CM

## 2018-02-21 PROCEDURE — 3045F PR MOST RECENT HEMOGLOBIN A1C LEVEL 7.0-9.0%: CPT | Performed by: NURSE PRACTITIONER

## 2018-02-21 PROCEDURE — G8427 DOCREV CUR MEDS BY ELIG CLIN: HCPCS | Performed by: NURSE PRACTITIONER

## 2018-02-21 PROCEDURE — G8417 CALC BMI ABV UP PARAM F/U: HCPCS | Performed by: NURSE PRACTITIONER

## 2018-02-21 PROCEDURE — G8484 FLU IMMUNIZE NO ADMIN: HCPCS | Performed by: NURSE PRACTITIONER

## 2018-02-21 PROCEDURE — 1036F TOBACCO NON-USER: CPT | Performed by: NURSE PRACTITIONER

## 2018-02-21 PROCEDURE — 99213 OFFICE O/P EST LOW 20 MIN: CPT | Performed by: NURSE PRACTITIONER

## 2018-02-21 RX ORDER — HYDROCODONE BITARTRATE AND ACETAMINOPHEN 5; 325 MG/1; MG/1
1 TABLET ORAL EVERY 6 HOURS PRN
Qty: 112 TABLET | Refills: 0 | Status: SHIPPED | OUTPATIENT
Start: 2018-02-21 | End: 2018-03-21 | Stop reason: SDUPTHER

## 2018-02-21 NOTE — PROGRESS NOTES
to the lower back. Denies having issues of constipation. ALLERGIES: Patients list of allergies were reviewed     MEDICATIONS: Mr. Ibeth Hendricks list of medications were reviewed. His current medications are   Outpatient Medications Prior to Visit   Medication Sig Dispense Refill    sulfamethoxazole-trimethoprim (BACTRIM DS) 800-160 MG per tablet Take 1 tablet by mouth 2 times daily for 10 days 20 tablet 0    VENTOLIN  (90 Base) MCG/ACT inhaler INHALE TWO PUFFS BY MOUTH EVERY 6 HOURS AS NEEDED FOR WHEEZING OR FOR SHORTNESS OF BREATH 1 Inhaler 1    lisinopril (PRINIVIL;ZESTRIL) 10 MG tablet TAKE ONE TABLET BY MOUTH DAILY FOR HIGH BLOOD PRESSURE 90 tablet 0    DULoxetine (CYMBALTA) 30 MG extended release capsule Take 1 capsule by mouth 2 times daily 60 capsule 1    gabapentin (NEURONTIN) 300 MG capsule Take 1 capsule by mouth 3 times daily for 30 days.  90 capsule 1    amitriptyline (ELAVIL) 25 MG tablet TAKE ONE TABLET BY MOUTH NIGHTLY 28 tablet 1    budesonide-formoterol (SYMBICORT) 80-4.5 MCG/ACT AERO INHALE TWO PUFFS BY MOUTH TWICE A DAY 1 Inhaler 2    Lancets MISC 1 each by Does not apply route daily TRUEMETRIX 100 each 2    fluticasone (FLONASE) 50 MCG/ACT nasal spray 2 sprays by Nasal route daily 1 Bottle 0    metoprolol succinate (TOPROL XL) 25 MG extended release tablet TAKE ONE TABLET BY MOUTH DAILY FOR FOR BLOOD PRESSURE AND HEART RATE 90 tablet 0    Insulin Pen Needle 31G X 5 MM MISC 1 each by Does not apply route 2 times daily 100 each 3    atorvastatin (LIPITOR) 40 MG tablet TAKE ONE TABLET BY MOUTH DAILY FOR CHOLESTEROL 90 tablet 0    canagliflozin (INVOKANA) 100 MG TABS tablet TAKE ONE TABLET BY MOUTH EVERY MORNING BEFORE BREAKFAST FOR DIABETES 30 tablet 3    montelukast (SINGULAIR) 10 MG tablet TAKE ONE TABLET BY MOUTH DAILY FOR BREATHING 30 tablet 3    metFORMIN (GLUCOPHAGE-XR) 500 MG extended release tablet TAKE ONE TABLET BY MOUTH TWICE A DAY FOR DIABETES 60 tablet 3    VICTOZA 18 The primary encounter diagnosis was Chronic pain syndrome. Diagnoses of Central spinal stenosis, Failed back surgical syndrome, Injury of cervical spinal cord, sequela (HCC), Chronic back pain, unspecified back location, unspecified back pain laterality, 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 ([address] Single current episode of major depressive disorder, unspecified depression episode severity, Primary insomnia, and Morbid obesity with BMI of 45.0-49.9, adult [facility]) were also pertinent to this visit. Risks and benefits of the medications and other alternative treatments  including no treatment were discussed with the patient. The common side effects of these medications were also explained to the patient. Informed verbal consent was obtained. Goals of current treatment regimen include improvement in pain, restoration of functioning- with focus on improvement in physical performance, general activity, work or disability,emotional distress, health care utilization and  decreased medication consumption. Will continue to monitor progress towards achieving/maintaining therapeutic goals with special emphasis on  1. Improvement in perceived interfernce  of pain with ADL's. Ability to do home exercises independently. Ability to do household chores indoor and/or outdoor work and social and leisure activities. Improve psychosocial and physical functioning. - he is not showing any significant progress/or showing regression  towards this goal and reassessment and adjustment of goals/treatment have been made. He was advised against drinking alcohol with the narcotic pain medicines, advised against driving or handling machinery while adjusting the dose of medicines or if having cognitive  issues related to the current medications. Risk of overdose and death, if medicines not taken as prescribed, were also discussed. If the patient develops new symptoms or if the symptoms worsen, the patient should call the office. While transcribing every attempt was made to maintain the accuracy of the note in terms of it's contents,there may have been some errors made inadvertently. Thank you for allowing me to participate in the care of this patient. Jay Bumpers CNP.     Cc: Jaany Metcalf CNP

## 2018-02-26 ENCOUNTER — OFFICE VISIT (OUTPATIENT)
Dept: FAMILY MEDICINE CLINIC | Age: 38
End: 2018-02-26

## 2018-02-26 VITALS
RESPIRATION RATE: 12 BRPM | SYSTOLIC BLOOD PRESSURE: 136 MMHG | BODY MASS INDEX: 45.37 KG/M2 | DIASTOLIC BLOOD PRESSURE: 80 MMHG | HEART RATE: 115 BPM | WEIGHT: 315 LBS

## 2018-02-26 DIAGNOSIS — Z79.4 TYPE 2 DIABETES MELLITUS WITH DIABETIC NEUROPATHY, WITH LONG-TERM CURRENT USE OF INSULIN (HCC): Primary | ICD-10-CM

## 2018-02-26 DIAGNOSIS — E66.01 MORBID OBESITY WITH BMI OF 45.0-49.9, ADULT (HCC): ICD-10-CM

## 2018-02-26 DIAGNOSIS — Z79.4 TYPE 2 DIABETES MELLITUS WITH DIABETIC NEUROPATHY, WITH LONG-TERM CURRENT USE OF INSULIN (HCC): ICD-10-CM

## 2018-02-26 DIAGNOSIS — E11.40 TYPE 2 DIABETES MELLITUS WITH DIABETIC NEUROPATHY, WITH LONG-TERM CURRENT USE OF INSULIN (HCC): ICD-10-CM

## 2018-02-26 DIAGNOSIS — E11.40 TYPE 2 DIABETES MELLITUS WITH DIABETIC NEUROPATHY, WITH LONG-TERM CURRENT USE OF INSULIN (HCC): Primary | ICD-10-CM

## 2018-02-26 DIAGNOSIS — J45.20 MILD INTERMITTENT ASTHMA WITHOUT COMPLICATION: ICD-10-CM

## 2018-02-26 DIAGNOSIS — E78.2 MIXED HYPERLIPIDEMIA: ICD-10-CM

## 2018-02-26 DIAGNOSIS — I10 ESSENTIAL HYPERTENSION: ICD-10-CM

## 2018-02-26 LAB
A/G RATIO: 1.4 (ref 1.1–2.2)
ALBUMIN SERPL-MCNC: 4.7 G/DL (ref 3.4–5)
ALP BLD-CCNC: 148 U/L (ref 40–129)
ALT SERPL-CCNC: 91 U/L (ref 10–40)
ANION GAP SERPL CALCULATED.3IONS-SCNC: 21 MMOL/L (ref 3–16)
AST SERPL-CCNC: 65 U/L (ref 15–37)
BILIRUB SERPL-MCNC: 0.3 MG/DL (ref 0–1)
BUN BLDV-MCNC: 9 MG/DL (ref 7–20)
CALCIUM SERPL-MCNC: 8.9 MG/DL (ref 8.3–10.6)
CHLORIDE BLD-SCNC: 96 MMOL/L (ref 99–110)
CHOLESTEROL, TOTAL: 158 MG/DL (ref 0–199)
CO2: 24 MMOL/L (ref 21–32)
CREAT SERPL-MCNC: 1 MG/DL (ref 0.9–1.3)
CREATININE URINE: 67.5 MG/DL (ref 39–259)
GFR AFRICAN AMERICAN: >60
GFR NON-AFRICAN AMERICAN: >60
GLOBULIN: 3.3 G/DL
GLUCOSE BLD-MCNC: 283 MG/DL (ref 70–99)
HBA1C MFR BLD: 9.6 %
HDLC SERPL-MCNC: 19 MG/DL (ref 40–60)
LDL CHOLESTEROL CALCULATED: ABNORMAL MG/DL
LDL CHOLESTEROL DIRECT: 91 MG/DL
MICROALBUMIN UR-MCNC: <1.2 MG/DL
MICROALBUMIN/CREAT UR-RTO: NORMAL MG/G (ref 0–30)
POTASSIUM SERPL-SCNC: 4.1 MMOL/L (ref 3.5–5.1)
SODIUM BLD-SCNC: 141 MMOL/L (ref 136–145)
TOTAL PROTEIN: 8 G/DL (ref 6.4–8.2)
TRIGL SERPL-MCNC: 434 MG/DL (ref 0–150)
VLDLC SERPL CALC-MCNC: ABNORMAL MG/DL

## 2018-02-26 PROCEDURE — 1036F TOBACCO NON-USER: CPT | Performed by: NURSE PRACTITIONER

## 2018-02-26 PROCEDURE — 99214 OFFICE O/P EST MOD 30 MIN: CPT | Performed by: NURSE PRACTITIONER

## 2018-02-26 PROCEDURE — 3046F HEMOGLOBIN A1C LEVEL >9.0%: CPT | Performed by: NURSE PRACTITIONER

## 2018-02-26 PROCEDURE — 83036 HEMOGLOBIN GLYCOSYLATED A1C: CPT | Performed by: NURSE PRACTITIONER

## 2018-02-26 PROCEDURE — G8484 FLU IMMUNIZE NO ADMIN: HCPCS | Performed by: NURSE PRACTITIONER

## 2018-02-26 PROCEDURE — G8417 CALC BMI ABV UP PARAM F/U: HCPCS | Performed by: NURSE PRACTITIONER

## 2018-02-26 PROCEDURE — G8427 DOCREV CUR MEDS BY ELIG CLIN: HCPCS | Performed by: NURSE PRACTITIONER

## 2018-02-26 NOTE — PROGRESS NOTES
medical, surgical, social and family histories were reviewed and updated as appropriate. Social History     Social History    Marital status:      Spouse name: Rosa Tafoya Number of children: 3    Years of education: N/A     Occupational History    disabled      Social History Main Topics    Smoking status: Never Smoker    Smokeless tobacco: Never Used    Alcohol use No    Drug use: No    Sexual activity: Yes     Partners: Female     Other Topics Concern    None     Social History Narrative    Caffeine:        SODA - 0          TEA - 0        COFFEE - 0         Prior to Visit Medications    Medication Sig Taking? Authorizing Provider   HYDROcodone-acetaminophen (NORCO) 5-325 MG per tablet Take 1 tablet by mouth every 6 hours as needed for Pain for up to 28 days.  Yes Terese Rivera NP   sulfamethoxazole-trimethoprim (BACTRIM DS) 800-160 MG per tablet Take 1 tablet by mouth 2 times daily for 10 days Yes Beny Diana CNP   VENTOLIN  (90 Base) MCG/ACT inhaler INHALE TWO PUFFS BY MOUTH EVERY 6 HOURS AS NEEDED FOR WHEEZING OR FOR SHORTNESS OF BREATH Yes Beny Diana CNP   lisinopril (PRINIVIL;ZESTRIL) 10 MG tablet TAKE ONE TABLET BY MOUTH DAILY FOR HIGH BLOOD PRESSURE Yes Beny Diana CNP   DULoxetine (CYMBALTA) 30 MG extended release capsule Take 1 capsule by mouth 2 times daily Yes Terese Rivera NP   amitriptyline (ELAVIL) 25 MG tablet TAKE ONE TABLET BY MOUTH NIGHTLY Yes Terese Rivera NP   budesonide-formoterol (SYMBICORT) 80-4.5 MCG/ACT AERO INHALE TWO PUFFS BY MOUTH TWICE A DAY Yes Beny Diana CNP   Lancets MISC 1 each by Does not apply route daily TRUEMETRIX Yes Beny Diana CNP   fluticasone (FLONASE) 50 MCG/ACT nasal spray 2 sprays by Nasal route daily Yes Beny Diana CNP   metoprolol succinate (TOPROL XL) 25 MG extended release tablet TAKE ONE TABLET BY MOUTH DAILY FOR FOR BLOOD PRESSURE AND HEART RATE Yes Beny Diana CNP

## 2018-02-27 DIAGNOSIS — E78.2 MIXED HYPERLIPIDEMIA: Primary | ICD-10-CM

## 2018-02-27 RX ORDER — OMEGA-3-ACID ETHYL ESTERS 1 G/1
2 CAPSULE, LIQUID FILLED ORAL 2 TIMES DAILY
Qty: 120 CAPSULE | Refills: 2 | Status: SHIPPED | OUTPATIENT
Start: 2018-02-27 | End: 2018-05-22 | Stop reason: SDUPTHER

## 2018-03-01 DIAGNOSIS — I10 ESSENTIAL HYPERTENSION: ICD-10-CM

## 2018-03-01 DIAGNOSIS — E78.2 MIXED HYPERLIPIDEMIA: ICD-10-CM

## 2018-03-01 RX ORDER — ATORVASTATIN CALCIUM 40 MG/1
TABLET, FILM COATED ORAL
Qty: 90 TABLET | Refills: 0 | Status: SHIPPED | OUTPATIENT
Start: 2018-03-01 | End: 2018-06-01 | Stop reason: SDUPTHER

## 2018-03-01 RX ORDER — LISINOPRIL 10 MG/1
TABLET ORAL
Qty: 90 TABLET | Refills: 0 | Status: SHIPPED | OUTPATIENT
Start: 2018-03-01 | End: 2018-10-08

## 2018-03-21 ENCOUNTER — OFFICE VISIT (OUTPATIENT)
Dept: PAIN MANAGEMENT | Age: 38
End: 2018-03-21

## 2018-03-21 VITALS
WEIGHT: 315 LBS | OXYGEN SATURATION: 95 % | DIASTOLIC BLOOD PRESSURE: 90 MMHG | BODY MASS INDEX: 45.37 KG/M2 | SYSTOLIC BLOOD PRESSURE: 138 MMHG | HEART RATE: 117 BPM

## 2018-03-21 DIAGNOSIS — L89.303 DECUBITUS ULCER OF BUTTOCK, STAGE 3, UNSPECIFIED LATERALITY: ICD-10-CM

## 2018-03-21 DIAGNOSIS — M54.9 CHRONIC BACK PAIN, UNSPECIFIED BACK LOCATION, UNSPECIFIED BACK PAIN LATERALITY: ICD-10-CM

## 2018-03-21 DIAGNOSIS — E13.40 NEUROPATHY DUE TO SECONDARY DIABETES MELLITUS (HCC): ICD-10-CM

## 2018-03-21 DIAGNOSIS — M96.1 FAILED BACK SURGICAL SYNDROME: ICD-10-CM

## 2018-03-21 DIAGNOSIS — G89.29 CHRONIC BACK PAIN, UNSPECIFIED BACK LOCATION, UNSPECIFIED BACK PAIN LATERALITY: ICD-10-CM

## 2018-03-21 DIAGNOSIS — F32.9 SINGLE CURRENT EPISODE OF MAJOR DEPRESSIVE DISORDER, UNSPECIFIED DEPRESSION EPISODE SEVERITY: ICD-10-CM

## 2018-03-21 DIAGNOSIS — F51.01 PRIMARY INSOMNIA: ICD-10-CM

## 2018-03-21 DIAGNOSIS — G89.4 CHRONIC PAIN SYNDROME: Primary | ICD-10-CM

## 2018-03-21 DIAGNOSIS — M48.00 CENTRAL SPINAL STENOSIS: ICD-10-CM

## 2018-03-21 DIAGNOSIS — E66.01 MORBID OBESITY WITH BMI OF 45.0-49.9, ADULT (HCC): ICD-10-CM

## 2018-03-21 PROCEDURE — G8417 CALC BMI ABV UP PARAM F/U: HCPCS | Performed by: NURSE PRACTITIONER

## 2018-03-21 PROCEDURE — G8484 FLU IMMUNIZE NO ADMIN: HCPCS | Performed by: NURSE PRACTITIONER

## 2018-03-21 PROCEDURE — 3046F HEMOGLOBIN A1C LEVEL >9.0%: CPT | Performed by: NURSE PRACTITIONER

## 2018-03-21 PROCEDURE — 99213 OFFICE O/P EST LOW 20 MIN: CPT | Performed by: NURSE PRACTITIONER

## 2018-03-21 PROCEDURE — G8427 DOCREV CUR MEDS BY ELIG CLIN: HCPCS | Performed by: NURSE PRACTITIONER

## 2018-03-21 PROCEDURE — 1036F TOBACCO NON-USER: CPT | Performed by: NURSE PRACTITIONER

## 2018-03-21 RX ORDER — DULOXETIN HYDROCHLORIDE 30 MG/1
30 CAPSULE, DELAYED RELEASE ORAL 2 TIMES DAILY
Qty: 60 CAPSULE | Refills: 1 | Status: SHIPPED | OUTPATIENT
Start: 2018-03-21 | End: 2018-05-18 | Stop reason: SDUPTHER

## 2018-03-21 RX ORDER — HYDROCODONE BITARTRATE AND ACETAMINOPHEN 5; 325 MG/1; MG/1
1 TABLET ORAL EVERY 6 HOURS PRN
Qty: 112 TABLET | Refills: 0 | Status: SHIPPED | OUTPATIENT
Start: 2018-03-21 | End: 2018-04-18 | Stop reason: SDUPTHER

## 2018-03-21 RX ORDER — AMITRIPTYLINE HYDROCHLORIDE 25 MG/1
TABLET, FILM COATED ORAL
Qty: 28 TABLET | Refills: 1 | Status: SHIPPED | OUTPATIENT
Start: 2018-03-21 | End: 2018-05-18 | Stop reason: SDUPTHER

## 2018-03-21 RX ORDER — GABAPENTIN 300 MG/1
300 CAPSULE ORAL 3 TIMES DAILY
Qty: 90 CAPSULE | Refills: 1 | Status: SHIPPED | OUTPATIENT
Start: 2018-03-21 | End: 2018-05-18 | Stop reason: SDUPTHER

## 2018-03-21 NOTE — PATIENT INSTRUCTIONS
[email or web address] and sign in to your [employer] account. Enter H863 in the XI[employer]hire box to learn more about \"Back Stretches: Exercises. \"     If you do not have an account, please click on the \"Sign Up Now\" link. Current as of: March 21, 2017  Content Version: 11.5  © 6738-9381 Healthwise, [employer]. Care instructions adapted under license by [facility] ([facility]). If you have questions about a medical condition or this instruction, always ask your healthcare professional. [address] any warranty or liability for your use of this information.

## 2018-03-21 NOTE — PROGRESS NOTES
ALLERGIES: Patients list of allergies were reviewed     MEDICATIONS: Mr. Noe Ruiz list of medications were reviewed. His current medications are   Outpatient Medications Prior to Visit   Medication Sig Dispense Refill    metFORMIN (GLUCOPHAGE-XR) 500 MG extended release tablet TAKE ONE TABLET BY MOUTH TWICE A DAY FOR DIABETES 60 tablet 2    VICTOZA 18 MG/3ML SOPN SC injection DIAL AND INJECT SUBCUTANEOUSLY 1.8MG DAILY 9 pen 0    atorvastatin (LIPITOR) 40 MG tablet TAKE ONE TABLET BY MOUTH DAILY FOR CHOLESTEROL 90 tablet 0    lisinopril (PRINIVIL;ZESTRIL) 10 MG tablet TAKE ONE TABLET BY MOUTH DAILY FOR HIGH BLOOD PRESSURE 90 tablet 0    omega-3 acid ethyl esters (LOVAZA) 1 g capsule Take 2 capsules by mouth 2 times daily 120 capsule 2    insulin aspart (NOVOLOG FLEXPEN) 100 UNIT/ML injection pen Inject 5 Units into the skin 3 times daily (before meals) 5 pen 3    Insulin Degludec (TRESIBA FLEXTOUCH) 100 UNIT/ML SOPN Inject 60 Units into the skin every evening For diabetes 9 pen 2    VENTOLIN  (90 Base) MCG/ACT inhaler INHALE TWO PUFFS BY MOUTH EVERY 6 HOURS AS NEEDED FOR WHEEZING OR FOR SHORTNESS OF BREATH 1 Inhaler 1    budesonide-formoterol (SYMBICORT) 80-4.5 MCG/ACT AERO INHALE TWO PUFFS BY MOUTH TWICE A DAY 1 Inhaler 2    Lancets MISC 1 each by Does not apply route daily TRUEMETRIX 100 each 2    fluticasone (FLONASE) 50 MCG/ACT nasal spray 2 sprays by Nasal route daily 1 Bottle 0    metoprolol succinate (TOPROL XL) 25 MG extended release tablet TAKE ONE TABLET BY MOUTH DAILY FOR FOR BLOOD PRESSURE AND HEART RATE 90 tablet 0    Insulin Pen Needle 31G X 5 MM MISC 1 each by Does not apply route 2 times daily 100 each 3    canagliflozin (INVOKANA) 100 MG TABS tablet TAKE ONE TABLET BY MOUTH EVERY MORNING BEFORE BREAKFAST FOR DIABETES 30 tablet 3    montelukast (SINGULAIR) 10 MG tablet TAKE ONE TABLET BY MOUTH DAILY FOR BREATHING 30 tablet 3    omeprazole (PRILOSEC) 20 MG delayed release Take 2 capsules by mouth 2 times daily 120 capsule 2    insulin aspart (NOVOLOG FLEXPEN) 100 UNIT/ML injection pen Inject 5 Units into the skin 3 times daily (before meals) 5 pen 3    Insulin Degludec (TRESIBA FLEXTOUCH) 100 UNIT/ML SOPN Inject 60 Units into the skin every evening For diabetes 9 pen 2    VENTOLIN  (90 Base) MCG/ACT inhaler INHALE TWO PUFFS BY MOUTH EVERY 6 HOURS AS NEEDED FOR WHEEZING OR FOR SHORTNESS OF BREATH 1 Inhaler 1    budesonide-formoterol (SYMBICORT) 80-4.5 MCG/ACT AERO INHALE TWO PUFFS BY MOUTH TWICE A DAY 1 Inhaler 2    Lancets MISC 1 each by Does not apply route daily TRUEMETRIX 100 each 2    fluticasone (FLONASE) 50 MCG/ACT nasal spray 2 sprays by Nasal route daily 1 Bottle 0    metoprolol succinate (TOPROL XL) 25 MG extended release tablet TAKE ONE TABLET BY MOUTH DAILY FOR FOR BLOOD PRESSURE AND HEART RATE 90 tablet 0    Insulin Pen Needle 31G X 5 MM MISC 1 each by Does not apply route 2 times daily 100 each 3    canagliflozin (INVOKANA) 100 MG TABS tablet TAKE ONE TABLET BY MOUTH EVERY MORNING BEFORE BREAKFAST FOR DIABETES 30 tablet 3    montelukast (SINGULAIR) 10 MG tablet TAKE ONE TABLET BY MOUTH DAILY FOR BREATHING 30 tablet 3    omeprazole (PRILOSEC) 20 MG delayed release capsule TAKE ONE CAPSULE BY MOUTH DAILY FOR STOMACH 30 capsule 3    glucose blood VI test strips (TRUE METRIX BLOOD GLUCOSE TEST) strip 1 each by In Vitro route daily 100 each 2     No current facility-administered medications for this visit. I will continue his current medication regimen  which is part of the above treatment schedule. It has been helping with Mr. Brando Swain chronic  medical problems which for this visit include: The primary encounter diagnosis was Chronic pain syndrome.  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 Risks and benefits of the medications and other alternative treatments  including no treatment were discussed with the patient. The common side effects of these medications were also explained to the patient. Informed verbal consent was obtained. Goals of current treatment regimen include improvement in pain, restoration of functioning- with focus on improvement in physical performance, general activity, work or disability,emotional distress, health care utilization and  decreased medication consumption. Will continue to monitor progress towards achieving/maintaining therapeutic goals with special emphasis on  1. Improvement in perceived interfernce  of pain with ADL's. Ability to do home exercises independently. Ability to do household chores indoor and/or outdoor work and social and leisure activities. Improve psychosocial and physical functioning. - he is showing progression towards this treatment goal with the current regimen. He was advised against drinking alcohol with the narcotic pain medicines, advised against driving or handling machinery while adjusting the dose of medicines or if having cognitive  issues related to the current medications. Risk of overdose and death, if medicines not taken as prescribed, were also discussed. If the patient develops new symptoms or if the symptoms worsen, the patient should call the office. While transcribing every attempt was made to maintain the accuracy of the note in terms of it's contents,there may have been some errors made inadvertently. Thank you for allowing me to participate in the care of this patient.       Jackelin Zavala, am scribing for and in the presence of Liban Cummings, CNP.   90/60/77  2:10 PM  MALATHI Gamboa CNP.     Cc: Manuela Fields CNP

## 2018-03-28 DIAGNOSIS — K21.9 GASTROESOPHAGEAL REFLUX DISEASE, ESOPHAGITIS PRESENCE NOT SPECIFIED: ICD-10-CM

## 2018-03-28 RX ORDER — OMEPRAZOLE 20 MG/1
CAPSULE, DELAYED RELEASE ORAL
Qty: 30 CAPSULE | Refills: 2 | Status: SHIPPED | OUTPATIENT
Start: 2018-03-28 | End: 2018-05-31 | Stop reason: SDUPTHER

## 2018-04-02 DIAGNOSIS — E11.40 TYPE 2 DIABETES MELLITUS WITH DIABETIC NEUROPATHY, WITHOUT LONG-TERM CURRENT USE OF INSULIN (HCC): ICD-10-CM

## 2018-04-03 RX ORDER — LIRAGLUTIDE 6 MG/ML
INJECTION SUBCUTANEOUS
Qty: 9 PEN | Refills: 0 | Status: SHIPPED | OUTPATIENT
Start: 2018-04-03 | End: 2018-04-24 | Stop reason: SDUPTHER

## 2018-04-06 DIAGNOSIS — I10 ESSENTIAL HYPERTENSION: ICD-10-CM

## 2018-04-06 RX ORDER — METOPROLOL SUCCINATE 25 MG/1
TABLET, EXTENDED RELEASE ORAL
Qty: 90 TABLET | Refills: 0 | Status: SHIPPED | OUTPATIENT
Start: 2018-04-06 | End: 2018-07-04 | Stop reason: SDUPTHER

## 2018-04-09 ENCOUNTER — OFFICE VISIT (OUTPATIENT)
Dept: FAMILY MEDICINE CLINIC | Age: 38
End: 2018-04-09

## 2018-04-09 VITALS
SYSTOLIC BLOOD PRESSURE: 120 MMHG | WEIGHT: 315 LBS | RESPIRATION RATE: 12 BRPM | HEART RATE: 128 BPM | DIASTOLIC BLOOD PRESSURE: 80 MMHG | BODY MASS INDEX: 45.75 KG/M2

## 2018-04-09 DIAGNOSIS — E66.01 MORBID OBESITY WITH BMI OF 45.0-49.9, ADULT (HCC): ICD-10-CM

## 2018-04-09 DIAGNOSIS — I10 ESSENTIAL HYPERTENSION: ICD-10-CM

## 2018-04-09 DIAGNOSIS — J45.40 MODERATE PERSISTENT ASTHMA WITHOUT COMPLICATION: ICD-10-CM

## 2018-04-09 DIAGNOSIS — E78.2 MIXED HYPERLIPIDEMIA: ICD-10-CM

## 2018-04-09 DIAGNOSIS — R74.8 ELEVATED LIVER ENZYMES: ICD-10-CM

## 2018-04-09 DIAGNOSIS — E11.40 TYPE 2 DIABETES MELLITUS WITH DIABETIC NEUROPATHY, WITH LONG-TERM CURRENT USE OF INSULIN (HCC): Primary | ICD-10-CM

## 2018-04-09 DIAGNOSIS — Z79.4 TYPE 2 DIABETES MELLITUS WITH DIABETIC NEUROPATHY, WITH LONG-TERM CURRENT USE OF INSULIN (HCC): Primary | ICD-10-CM

## 2018-04-09 LAB — HBA1C MFR BLD: 9.3 %

## 2018-04-09 PROCEDURE — G8417 CALC BMI ABV UP PARAM F/U: HCPCS | Performed by: NURSE PRACTITIONER

## 2018-04-09 PROCEDURE — 3046F HEMOGLOBIN A1C LEVEL >9.0%: CPT | Performed by: NURSE PRACTITIONER

## 2018-04-09 PROCEDURE — 83036 HEMOGLOBIN GLYCOSYLATED A1C: CPT | Performed by: NURSE PRACTITIONER

## 2018-04-09 PROCEDURE — 1036F TOBACCO NON-USER: CPT | Performed by: NURSE PRACTITIONER

## 2018-04-09 PROCEDURE — G8427 DOCREV CUR MEDS BY ELIG CLIN: HCPCS | Performed by: NURSE PRACTITIONER

## 2018-04-09 PROCEDURE — 99214 OFFICE O/P EST MOD 30 MIN: CPT | Performed by: NURSE PRACTITIONER

## 2018-04-09 RX ORDER — ALBUTEROL SULFATE 90 UG/1
AEROSOL, METERED RESPIRATORY (INHALATION)
Qty: 1 INHALER | Refills: 2 | Status: SHIPPED | OUTPATIENT
Start: 2018-04-09 | End: 2018-07-13 | Stop reason: SDUPTHER

## 2018-04-09 RX ORDER — BUDESONIDE AND FORMOTEROL FUMARATE DIHYDRATE 80; 4.5 UG/1; UG/1
AEROSOL RESPIRATORY (INHALATION)
Qty: 1 INHALER | Refills: 2 | Status: CANCELLED | OUTPATIENT
Start: 2018-04-09

## 2018-04-09 RX ORDER — BUDESONIDE AND FORMOTEROL FUMARATE DIHYDRATE 160; 4.5 UG/1; UG/1
2 AEROSOL RESPIRATORY (INHALATION) 2 TIMES DAILY
Qty: 1 INHALER | Refills: 2 | Status: SHIPPED | OUTPATIENT
Start: 2018-04-09 | End: 2018-06-27 | Stop reason: SDUPTHER

## 2018-04-18 ENCOUNTER — OFFICE VISIT (OUTPATIENT)
Dept: PAIN MANAGEMENT | Age: 38
End: 2018-04-18

## 2018-04-18 VITALS
OXYGEN SATURATION: 94 % | WEIGHT: 315 LBS | SYSTOLIC BLOOD PRESSURE: 126 MMHG | DIASTOLIC BLOOD PRESSURE: 87 MMHG | BODY MASS INDEX: 45.37 KG/M2 | HEART RATE: 117 BPM

## 2018-04-18 DIAGNOSIS — M96.1 FAILED BACK SURGICAL SYNDROME: ICD-10-CM

## 2018-04-18 DIAGNOSIS — G89.29 CHRONIC BACK PAIN, UNSPECIFIED BACK LOCATION, UNSPECIFIED BACK PAIN LATERALITY: ICD-10-CM

## 2018-04-18 DIAGNOSIS — E13.40 NEUROPATHY DUE TO SECONDARY DIABETES MELLITUS (HCC): ICD-10-CM

## 2018-04-18 DIAGNOSIS — M54.9 CHRONIC BACK PAIN, UNSPECIFIED BACK LOCATION, UNSPECIFIED BACK PAIN LATERALITY: ICD-10-CM

## 2018-04-18 DIAGNOSIS — L89.303 DECUBITUS ULCER OF BUTTOCK, STAGE 3, UNSPECIFIED LATERALITY: ICD-10-CM

## 2018-04-18 DIAGNOSIS — E66.01 MORBID OBESITY WITH BMI OF 45.0-49.9, ADULT (HCC): ICD-10-CM

## 2018-04-18 DIAGNOSIS — G89.4 CHRONIC PAIN SYNDROME: Primary | ICD-10-CM

## 2018-04-18 DIAGNOSIS — F51.01 PRIMARY INSOMNIA: ICD-10-CM

## 2018-04-18 DIAGNOSIS — M48.00 CENTRAL SPINAL STENOSIS: ICD-10-CM

## 2018-04-18 DIAGNOSIS — F32.9 SINGLE CURRENT EPISODE OF MAJOR DEPRESSIVE DISORDER, UNSPECIFIED DEPRESSION EPISODE SEVERITY: ICD-10-CM

## 2018-04-18 PROCEDURE — 3046F HEMOGLOBIN A1C LEVEL >9.0%: CPT | Performed by: NURSE PRACTITIONER

## 2018-04-18 PROCEDURE — G8427 DOCREV CUR MEDS BY ELIG CLIN: HCPCS | Performed by: NURSE PRACTITIONER

## 2018-04-18 PROCEDURE — G8417 CALC BMI ABV UP PARAM F/U: HCPCS | Performed by: NURSE PRACTITIONER

## 2018-04-18 PROCEDURE — 1036F TOBACCO NON-USER: CPT | Performed by: NURSE PRACTITIONER

## 2018-04-18 PROCEDURE — 2022F DILAT RTA XM EVC RTNOPTHY: CPT | Performed by: NURSE PRACTITIONER

## 2018-04-18 PROCEDURE — 99213 OFFICE O/P EST LOW 20 MIN: CPT | Performed by: NURSE PRACTITIONER

## 2018-04-18 RX ORDER — HYDROCODONE BITARTRATE AND ACETAMINOPHEN 5; 325 MG/1; MG/1
1 TABLET ORAL EVERY 6 HOURS PRN
Qty: 112 TABLET | Refills: 0 | Status: SHIPPED | OUTPATIENT
Start: 2018-04-18 | End: 2018-05-16

## 2018-04-24 ENCOUNTER — TELEPHONE (OUTPATIENT)
Dept: FAMILY MEDICINE CLINIC | Age: 38
End: 2018-04-24

## 2018-04-24 DIAGNOSIS — E11.40 TYPE 2 DIABETES MELLITUS WITH DIABETIC NEUROPATHY, WITH LONG-TERM CURRENT USE OF INSULIN (HCC): ICD-10-CM

## 2018-04-24 DIAGNOSIS — Z79.4 TYPE 2 DIABETES MELLITUS WITH DIABETIC NEUROPATHY, WITH LONG-TERM CURRENT USE OF INSULIN (HCC): ICD-10-CM

## 2018-04-25 ENCOUNTER — TELEPHONE (OUTPATIENT)
Dept: BARIATRICS/WEIGHT MGMT | Age: 38
End: 2018-04-25

## 2018-04-27 ENCOUNTER — TELEPHONE (OUTPATIENT)
Dept: FAMILY MEDICINE CLINIC | Age: 38
End: 2018-04-27

## 2018-04-27 DIAGNOSIS — J45.20 MILD INTERMITTENT ASTHMA WITHOUT COMPLICATION: ICD-10-CM

## 2018-04-27 RX ORDER — MONTELUKAST SODIUM 10 MG/1
TABLET ORAL
Qty: 90 TABLET | Refills: 1 | Status: SHIPPED | OUTPATIENT
Start: 2018-04-27 | End: 2018-05-25 | Stop reason: SDUPTHER

## 2018-05-01 ENCOUNTER — TELEPHONE (OUTPATIENT)
Dept: FAMILY MEDICINE CLINIC | Age: 38
End: 2018-05-01

## 2018-05-01 DIAGNOSIS — Z79.4 TYPE 2 DIABETES MELLITUS WITH DIABETIC NEUROPATHY, WITH LONG-TERM CURRENT USE OF INSULIN (HCC): ICD-10-CM

## 2018-05-01 DIAGNOSIS — E11.40 TYPE 2 DIABETES MELLITUS WITH DIABETIC NEUROPATHY, WITH LONG-TERM CURRENT USE OF INSULIN (HCC): ICD-10-CM

## 2018-05-03 DIAGNOSIS — Z79.4 TYPE 2 DIABETES MELLITUS WITH DIABETIC NEUROPATHY, WITH LONG-TERM CURRENT USE OF INSULIN (HCC): ICD-10-CM

## 2018-05-03 DIAGNOSIS — E11.40 TYPE 2 DIABETES MELLITUS WITH DIABETIC NEUROPATHY, WITH LONG-TERM CURRENT USE OF INSULIN (HCC): ICD-10-CM

## 2018-05-03 RX ORDER — LIRAGLUTIDE 6 MG/ML
INJECTION SUBCUTANEOUS
Qty: 9 PEN | Refills: 0 | Status: SHIPPED | OUTPATIENT
Start: 2018-05-03 | End: 2018-06-01 | Stop reason: SDUPTHER

## 2018-05-18 ENCOUNTER — OFFICE VISIT (OUTPATIENT)
Dept: PAIN MANAGEMENT | Age: 38
End: 2018-05-18

## 2018-05-18 VITALS
BODY MASS INDEX: 44.75 KG/M2 | WEIGHT: 315 LBS | HEART RATE: 99 BPM | DIASTOLIC BLOOD PRESSURE: 85 MMHG | SYSTOLIC BLOOD PRESSURE: 147 MMHG | OXYGEN SATURATION: 98 %

## 2018-05-18 DIAGNOSIS — F32.9 SINGLE CURRENT EPISODE OF MAJOR DEPRESSIVE DISORDER, UNSPECIFIED DEPRESSION EPISODE SEVERITY: ICD-10-CM

## 2018-05-18 DIAGNOSIS — G89.4 CHRONIC PAIN SYNDROME: Primary | ICD-10-CM

## 2018-05-18 DIAGNOSIS — M96.1 FAILED BACK SURGICAL SYNDROME: ICD-10-CM

## 2018-05-18 DIAGNOSIS — E13.40 NEUROPATHY DUE TO SECONDARY DIABETES MELLITUS (HCC): ICD-10-CM

## 2018-05-18 DIAGNOSIS — L89.303 DECUBITUS ULCER OF BUTTOCK, STAGE 3, UNSPECIFIED LATERALITY: ICD-10-CM

## 2018-05-18 DIAGNOSIS — S14.109S INJURY OF CERVICAL SPINAL CORD, SEQUELA (HCC): ICD-10-CM

## 2018-05-18 DIAGNOSIS — F51.01 PRIMARY INSOMNIA: ICD-10-CM

## 2018-05-18 DIAGNOSIS — G89.29 CHRONIC BACK PAIN, UNSPECIFIED BACK LOCATION, UNSPECIFIED BACK PAIN LATERALITY: ICD-10-CM

## 2018-05-18 DIAGNOSIS — J45.40 MODERATE PERSISTENT ASTHMA WITHOUT COMPLICATION: ICD-10-CM

## 2018-05-18 DIAGNOSIS — E66.01 MORBID OBESITY WITH BMI OF 45.0-49.9, ADULT (HCC): ICD-10-CM

## 2018-05-18 DIAGNOSIS — M54.9 CHRONIC BACK PAIN, UNSPECIFIED BACK LOCATION, UNSPECIFIED BACK PAIN LATERALITY: ICD-10-CM

## 2018-05-18 DIAGNOSIS — M48.00 CENTRAL SPINAL STENOSIS: ICD-10-CM

## 2018-05-18 PROCEDURE — 1036F TOBACCO NON-USER: CPT | Performed by: NURSE PRACTITIONER

## 2018-05-18 PROCEDURE — G8417 CALC BMI ABV UP PARAM F/U: HCPCS | Performed by: NURSE PRACTITIONER

## 2018-05-18 PROCEDURE — 2022F DILAT RTA XM EVC RTNOPTHY: CPT | Performed by: NURSE PRACTITIONER

## 2018-05-18 PROCEDURE — G8427 DOCREV CUR MEDS BY ELIG CLIN: HCPCS | Performed by: NURSE PRACTITIONER

## 2018-05-18 PROCEDURE — 3046F HEMOGLOBIN A1C LEVEL >9.0%: CPT | Performed by: NURSE PRACTITIONER

## 2018-05-18 PROCEDURE — 99213 OFFICE O/P EST LOW 20 MIN: CPT | Performed by: NURSE PRACTITIONER

## 2018-05-18 RX ORDER — DULOXETIN HYDROCHLORIDE 30 MG/1
30 CAPSULE, DELAYED RELEASE ORAL 2 TIMES DAILY
Qty: 60 CAPSULE | Refills: 1 | Status: SHIPPED | OUTPATIENT
Start: 2018-05-18 | End: 2018-07-18 | Stop reason: SDUPTHER

## 2018-05-18 RX ORDER — HYDROCODONE BITARTRATE AND ACETAMINOPHEN 5; 325 MG/1; MG/1
1 TABLET ORAL EVERY 6 HOURS PRN
Qty: 120 TABLET | Refills: 0 | Status: SHIPPED | OUTPATIENT
Start: 2018-05-18 | End: 2018-06-13 | Stop reason: SDUPTHER

## 2018-05-18 RX ORDER — GABAPENTIN 300 MG/1
300 CAPSULE ORAL 3 TIMES DAILY
Qty: 90 CAPSULE | Refills: 1 | Status: SHIPPED | OUTPATIENT
Start: 2018-05-18 | End: 2018-07-18 | Stop reason: SDUPTHER

## 2018-05-18 RX ORDER — AMITRIPTYLINE HYDROCHLORIDE 25 MG/1
TABLET, FILM COATED ORAL
Qty: 28 TABLET | Refills: 1 | Status: SHIPPED | OUTPATIENT
Start: 2018-05-18 | End: 2018-07-18 | Stop reason: SDUPTHER

## 2018-05-21 ENCOUNTER — OFFICE VISIT (OUTPATIENT)
Dept: FAMILY MEDICINE CLINIC | Age: 38
End: 2018-05-21

## 2018-05-21 ENCOUNTER — TELEPHONE (OUTPATIENT)
Dept: FAMILY MEDICINE CLINIC | Age: 38
End: 2018-05-21

## 2018-05-21 VITALS
WEIGHT: 315 LBS | DIASTOLIC BLOOD PRESSURE: 80 MMHG | RESPIRATION RATE: 16 BRPM | BODY MASS INDEX: 43.37 KG/M2 | SYSTOLIC BLOOD PRESSURE: 110 MMHG | OXYGEN SATURATION: 99 % | HEART RATE: 108 BPM

## 2018-05-21 DIAGNOSIS — J45.40 MODERATE PERSISTENT ASTHMA WITHOUT COMPLICATION: ICD-10-CM

## 2018-05-21 DIAGNOSIS — G89.29 CHRONIC BACK PAIN, UNSPECIFIED BACK LOCATION, UNSPECIFIED BACK PAIN LATERALITY: Primary | ICD-10-CM

## 2018-05-21 DIAGNOSIS — M54.9 CHRONIC BACK PAIN, UNSPECIFIED BACK LOCATION, UNSPECIFIED BACK PAIN LATERALITY: Primary | ICD-10-CM

## 2018-05-21 DIAGNOSIS — E78.2 MIXED HYPERLIPIDEMIA: ICD-10-CM

## 2018-05-21 DIAGNOSIS — E11.40 TYPE 2 DIABETES MELLITUS WITH DIABETIC NEUROPATHY, WITH LONG-TERM CURRENT USE OF INSULIN (HCC): Primary | ICD-10-CM

## 2018-05-21 DIAGNOSIS — R74.8 ELEVATED LIVER ENZYMES: ICD-10-CM

## 2018-05-21 DIAGNOSIS — Z79.4 TYPE 2 DIABETES MELLITUS WITH DIABETIC NEUROPATHY, WITH LONG-TERM CURRENT USE OF INSULIN (HCC): Primary | ICD-10-CM

## 2018-05-21 DIAGNOSIS — I10 ESSENTIAL HYPERTENSION: ICD-10-CM

## 2018-05-21 DIAGNOSIS — L89.303 DECUBITUS ULCER OF BUTTOCK, STAGE 3, UNSPECIFIED LATERALITY: ICD-10-CM

## 2018-05-21 DIAGNOSIS — E66.01 MORBID OBESITY WITH BMI OF 40.0-44.9, ADULT (HCC): ICD-10-CM

## 2018-05-21 LAB — HBA1C MFR BLD: 10.1 %

## 2018-05-21 PROCEDURE — 1036F TOBACCO NON-USER: CPT | Performed by: NURSE PRACTITIONER

## 2018-05-21 PROCEDURE — 3046F HEMOGLOBIN A1C LEVEL >9.0%: CPT | Performed by: NURSE PRACTITIONER

## 2018-05-21 PROCEDURE — G8427 DOCREV CUR MEDS BY ELIG CLIN: HCPCS | Performed by: NURSE PRACTITIONER

## 2018-05-21 PROCEDURE — 99214 OFFICE O/P EST MOD 30 MIN: CPT | Performed by: NURSE PRACTITIONER

## 2018-05-21 PROCEDURE — 83036 HEMOGLOBIN GLYCOSYLATED A1C: CPT | Performed by: NURSE PRACTITIONER

## 2018-05-21 PROCEDURE — 2022F DILAT RTA XM EVC RTNOPTHY: CPT | Performed by: NURSE PRACTITIONER

## 2018-05-21 PROCEDURE — G8417 CALC BMI ABV UP PARAM F/U: HCPCS | Performed by: NURSE PRACTITIONER

## 2018-05-21 RX ORDER — NITROFURANTOIN 25; 75 MG/1; MG/1
100 CAPSULE ORAL
COMMUNITY
Start: 2018-05-17 | End: 2018-05-22

## 2018-05-22 DIAGNOSIS — E78.2 MIXED HYPERLIPIDEMIA: ICD-10-CM

## 2018-05-22 RX ORDER — OMEGA-3-ACID ETHYL ESTERS 1 G/1
2 CAPSULE, LIQUID FILLED ORAL 2 TIMES DAILY
Qty: 360 CAPSULE | Refills: 1 | Status: SHIPPED | OUTPATIENT
Start: 2018-05-22 | End: 2018-10-15 | Stop reason: SDUPTHER

## 2018-05-25 DIAGNOSIS — J45.20 MILD INTERMITTENT ASTHMA WITHOUT COMPLICATION: ICD-10-CM

## 2018-05-25 RX ORDER — MONTELUKAST SODIUM 10 MG/1
TABLET ORAL
Qty: 90 TABLET | Refills: 0 | Status: SHIPPED | OUTPATIENT
Start: 2018-05-25 | End: 2018-10-15 | Stop reason: SDUPTHER

## 2018-05-31 ENCOUNTER — TELEPHONE (OUTPATIENT)
Dept: FAMILY MEDICINE CLINIC | Age: 38
End: 2018-05-31

## 2018-05-31 DIAGNOSIS — K21.9 GASTROESOPHAGEAL REFLUX DISEASE, ESOPHAGITIS PRESENCE NOT SPECIFIED: ICD-10-CM

## 2018-05-31 RX ORDER — OMEPRAZOLE 20 MG/1
CAPSULE, DELAYED RELEASE ORAL
Qty: 90 CAPSULE | Refills: 0 | Status: SHIPPED | OUTPATIENT
Start: 2018-05-31 | End: 2018-10-15 | Stop reason: SDUPTHER

## 2018-06-08 ENCOUNTER — TELEPHONE (OUTPATIENT)
Dept: FAMILY MEDICINE CLINIC | Age: 38
End: 2018-06-08

## 2018-06-12 ENCOUNTER — INITIAL CONSULT (OUTPATIENT)
Dept: BARIATRICS/WEIGHT MGMT | Age: 38
End: 2018-06-12

## 2018-06-12 DIAGNOSIS — E66.01 MORBID OBESITY WITH BMI OF 45.0-49.9, ADULT (HCC): Primary | ICD-10-CM

## 2018-06-12 PROCEDURE — 99999 PR OFFICE/OUTPT VISIT,PROCEDURE ONLY: CPT | Performed by: PSYCHOLOGIST

## 2018-06-13 ENCOUNTER — OFFICE VISIT (OUTPATIENT)
Dept: PAIN MANAGEMENT | Age: 38
End: 2018-06-13

## 2018-06-13 VITALS
WEIGHT: 315 LBS | OXYGEN SATURATION: 95 % | HEART RATE: 86 BPM | BODY MASS INDEX: 43.37 KG/M2 | SYSTOLIC BLOOD PRESSURE: 130 MMHG | DIASTOLIC BLOOD PRESSURE: 83 MMHG

## 2018-06-13 DIAGNOSIS — G89.29 CHRONIC BACK PAIN, UNSPECIFIED BACK LOCATION, UNSPECIFIED BACK PAIN LATERALITY: ICD-10-CM

## 2018-06-13 DIAGNOSIS — E13.40 NEUROPATHY DUE TO SECONDARY DIABETES MELLITUS (HCC): ICD-10-CM

## 2018-06-13 DIAGNOSIS — M54.9 CHRONIC BACK PAIN, UNSPECIFIED BACK LOCATION, UNSPECIFIED BACK PAIN LATERALITY: ICD-10-CM

## 2018-06-13 DIAGNOSIS — G89.4 CHRONIC PAIN SYNDROME: Primary | ICD-10-CM

## 2018-06-13 DIAGNOSIS — F32.9 SINGLE CURRENT EPISODE OF MAJOR DEPRESSIVE DISORDER, UNSPECIFIED DEPRESSION EPISODE SEVERITY: ICD-10-CM

## 2018-06-13 DIAGNOSIS — L89.303 DECUBITUS ULCER OF BUTTOCK, STAGE 3, UNSPECIFIED LATERALITY: ICD-10-CM

## 2018-06-13 DIAGNOSIS — M48.00 CENTRAL SPINAL STENOSIS: ICD-10-CM

## 2018-06-13 DIAGNOSIS — S14.109S INJURY OF CERVICAL SPINAL CORD, SEQUELA (HCC): ICD-10-CM

## 2018-06-13 DIAGNOSIS — F51.01 PRIMARY INSOMNIA: ICD-10-CM

## 2018-06-13 DIAGNOSIS — E66.01 MORBID OBESITY WITH BMI OF 45.0-49.9, ADULT (HCC): ICD-10-CM

## 2018-06-13 DIAGNOSIS — M96.1 FAILED BACK SURGICAL SYNDROME: ICD-10-CM

## 2018-06-13 PROCEDURE — 3046F HEMOGLOBIN A1C LEVEL >9.0%: CPT | Performed by: NURSE PRACTITIONER

## 2018-06-13 PROCEDURE — 99213 OFFICE O/P EST LOW 20 MIN: CPT | Performed by: NURSE PRACTITIONER

## 2018-06-13 PROCEDURE — 1036F TOBACCO NON-USER: CPT | Performed by: NURSE PRACTITIONER

## 2018-06-13 PROCEDURE — G8417 CALC BMI ABV UP PARAM F/U: HCPCS | Performed by: NURSE PRACTITIONER

## 2018-06-13 PROCEDURE — G8427 DOCREV CUR MEDS BY ELIG CLIN: HCPCS | Performed by: NURSE PRACTITIONER

## 2018-06-13 PROCEDURE — 2022F DILAT RTA XM EVC RTNOPTHY: CPT | Performed by: NURSE PRACTITIONER

## 2018-06-13 RX ORDER — HYDROCODONE BITARTRATE AND ACETAMINOPHEN 5; 325 MG/1; MG/1
1 TABLET ORAL EVERY 6 HOURS PRN
Qty: 120 TABLET | Refills: 0 | Status: SHIPPED | OUTPATIENT
Start: 2018-06-13 | End: 2018-07-02 | Stop reason: SDUPTHER

## 2018-06-13 RX ORDER — HYDROCODONE BITARTRATE AND ACETAMINOPHEN 5; 325 MG/1; MG/1
1 TABLET ORAL EVERY 6 HOURS PRN
Qty: 28 TABLET | Refills: 0 | Status: SHIPPED | OUTPATIENT
Start: 2018-06-13 | End: 2018-07-18 | Stop reason: SDUPTHER

## 2018-06-27 DIAGNOSIS — J45.40 MODERATE PERSISTENT ASTHMA WITHOUT COMPLICATION: ICD-10-CM

## 2018-06-28 RX ORDER — BUDESONIDE AND FORMOTEROL FUMARATE DIHYDRATE 160; 4.5 UG/1; UG/1
2 AEROSOL RESPIRATORY (INHALATION) 2 TIMES DAILY
Qty: 3 INHALER | Refills: 0 | Status: SHIPPED | OUTPATIENT
Start: 2018-06-28 | End: 2018-10-07 | Stop reason: SDUPTHER

## 2018-07-02 ENCOUNTER — OFFICE VISIT (OUTPATIENT)
Dept: FAMILY MEDICINE CLINIC | Age: 38
End: 2018-07-02

## 2018-07-02 VITALS
SYSTOLIC BLOOD PRESSURE: 120 MMHG | DIASTOLIC BLOOD PRESSURE: 86 MMHG | RESPIRATION RATE: 16 BRPM | BODY MASS INDEX: 41.62 KG/M2 | HEART RATE: 86 BPM | WEIGHT: 315 LBS

## 2018-07-02 DIAGNOSIS — E11.40 TYPE 2 DIABETES MELLITUS WITH DIABETIC NEUROPATHY, WITH LONG-TERM CURRENT USE OF INSULIN (HCC): Primary | ICD-10-CM

## 2018-07-02 DIAGNOSIS — E78.2 MIXED HYPERLIPIDEMIA: ICD-10-CM

## 2018-07-02 DIAGNOSIS — I10 ESSENTIAL HYPERTENSION: ICD-10-CM

## 2018-07-02 DIAGNOSIS — B37.2 SKIN CANDIDIASIS: ICD-10-CM

## 2018-07-02 DIAGNOSIS — Z79.4 TYPE 2 DIABETES MELLITUS WITH DIABETIC NEUROPATHY, WITH LONG-TERM CURRENT USE OF INSULIN (HCC): ICD-10-CM

## 2018-07-02 DIAGNOSIS — Z79.4 TYPE 2 DIABETES MELLITUS WITH DIABETIC NEUROPATHY, WITH LONG-TERM CURRENT USE OF INSULIN (HCC): Primary | ICD-10-CM

## 2018-07-02 DIAGNOSIS — J45.40 MODERATE PERSISTENT ASTHMA WITHOUT COMPLICATION: ICD-10-CM

## 2018-07-02 DIAGNOSIS — E66.01 MORBID OBESITY WITH BMI OF 45.0-49.9, ADULT (HCC): ICD-10-CM

## 2018-07-02 DIAGNOSIS — E11.40 TYPE 2 DIABETES MELLITUS WITH DIABETIC NEUROPATHY, WITH LONG-TERM CURRENT USE OF INSULIN (HCC): ICD-10-CM

## 2018-07-02 LAB
A/G RATIO: 1.8 (ref 1.1–2.2)
ALBUMIN SERPL-MCNC: 5.3 G/DL (ref 3.4–5)
ALP BLD-CCNC: 102 U/L (ref 40–129)
ALT SERPL-CCNC: 60 U/L (ref 10–40)
ANION GAP SERPL CALCULATED.3IONS-SCNC: 17 MMOL/L (ref 3–16)
AST SERPL-CCNC: 34 U/L (ref 15–37)
BILIRUB SERPL-MCNC: 0.8 MG/DL (ref 0–1)
BUN BLDV-MCNC: 16 MG/DL (ref 7–20)
CALCIUM SERPL-MCNC: 10.1 MG/DL (ref 8.3–10.6)
CHLORIDE BLD-SCNC: 96 MMOL/L (ref 99–110)
CHOLESTEROL, TOTAL: 111 MG/DL (ref 0–199)
CO2: 27 MMOL/L (ref 21–32)
CREAT SERPL-MCNC: 1 MG/DL (ref 0.9–1.3)
GFR AFRICAN AMERICAN: >60
GFR NON-AFRICAN AMERICAN: >60
GLOBULIN: 3 G/DL
GLUCOSE BLD-MCNC: 128 MG/DL (ref 70–99)
HBA1C MFR BLD: 8 %
HDLC SERPL-MCNC: 38 MG/DL (ref 40–60)
LDL CHOLESTEROL CALCULATED: 46 MG/DL
POTASSIUM SERPL-SCNC: 4.7 MMOL/L (ref 3.5–5.1)
SODIUM BLD-SCNC: 140 MMOL/L (ref 136–145)
TOTAL PROTEIN: 8.3 G/DL (ref 6.4–8.2)
TRIGL SERPL-MCNC: 137 MG/DL (ref 0–150)
VLDLC SERPL CALC-MCNC: 27 MG/DL

## 2018-07-02 PROCEDURE — 2022F DILAT RTA XM EVC RTNOPTHY: CPT | Performed by: NURSE PRACTITIONER

## 2018-07-02 PROCEDURE — 3045F PR MOST RECENT HEMOGLOBIN A1C LEVEL 7.0-9.0%: CPT | Performed by: NURSE PRACTITIONER

## 2018-07-02 PROCEDURE — G8417 CALC BMI ABV UP PARAM F/U: HCPCS | Performed by: NURSE PRACTITIONER

## 2018-07-02 PROCEDURE — G8427 DOCREV CUR MEDS BY ELIG CLIN: HCPCS | Performed by: NURSE PRACTITIONER

## 2018-07-02 PROCEDURE — 99214 OFFICE O/P EST MOD 30 MIN: CPT | Performed by: NURSE PRACTITIONER

## 2018-07-02 PROCEDURE — 83036 HEMOGLOBIN GLYCOSYLATED A1C: CPT | Performed by: NURSE PRACTITIONER

## 2018-07-02 PROCEDURE — 1036F TOBACCO NON-USER: CPT | Performed by: NURSE PRACTITIONER

## 2018-07-02 RX ORDER — NYSTATIN 100000 [USP'U]/G
POWDER TOPICAL
Qty: 1 BOTTLE | Refills: 0 | Status: SHIPPED | OUTPATIENT
Start: 2018-07-02 | End: 2018-12-17 | Stop reason: ALTCHOICE

## 2018-07-02 RX ORDER — LANCETS 30 GAUGE
EACH MISCELLANEOUS
Qty: 100 EACH | Refills: 5 | Status: SHIPPED | OUTPATIENT
Start: 2018-07-02 | End: 2020-05-07 | Stop reason: SDUPTHER

## 2018-07-09 ENCOUNTER — TELEPHONE (OUTPATIENT)
Dept: FAMILY MEDICINE CLINIC | Age: 38
End: 2018-07-09

## 2018-07-09 NOTE — TELEPHONE ENCOUNTER
Will be seeing other patients at that time. What is the reason for the call? They can let me know if there are any recommendations to his care after the call.

## 2018-07-09 NOTE — TELEPHONE ENCOUNTER
Senia Avila returned [place] call. I informed her that Ivonne Dallas would be with patients all day and would not be able to participate.

## 2018-07-13 DIAGNOSIS — J45.40 MODERATE PERSISTENT ASTHMA WITHOUT COMPLICATION: ICD-10-CM

## 2018-07-18 ENCOUNTER — OFFICE VISIT (OUTPATIENT)
Dept: PAIN MANAGEMENT | Age: 38
End: 2018-07-18

## 2018-07-18 VITALS
HEART RATE: 103 BPM | BODY MASS INDEX: 42.25 KG/M2 | SYSTOLIC BLOOD PRESSURE: 148 MMHG | WEIGHT: 315 LBS | DIASTOLIC BLOOD PRESSURE: 100 MMHG | OXYGEN SATURATION: 96 %

## 2018-07-18 DIAGNOSIS — E66.01 MORBID OBESITY WITH BMI OF 45.0-49.9, ADULT (HCC): ICD-10-CM

## 2018-07-18 DIAGNOSIS — S14.109S INJURY OF CERVICAL SPINAL CORD, SEQUELA (HCC): ICD-10-CM

## 2018-07-18 DIAGNOSIS — G89.29 CHRONIC BACK PAIN, UNSPECIFIED BACK LOCATION, UNSPECIFIED BACK PAIN LATERALITY: ICD-10-CM

## 2018-07-18 DIAGNOSIS — G89.4 CHRONIC PAIN SYNDROME: Primary | ICD-10-CM

## 2018-07-18 DIAGNOSIS — M96.1 FAILED BACK SURGICAL SYNDROME: ICD-10-CM

## 2018-07-18 DIAGNOSIS — F32.9 SINGLE CURRENT EPISODE OF MAJOR DEPRESSIVE DISORDER, UNSPECIFIED DEPRESSION EPISODE SEVERITY: ICD-10-CM

## 2018-07-18 DIAGNOSIS — M48.00 CENTRAL SPINAL STENOSIS: ICD-10-CM

## 2018-07-18 DIAGNOSIS — M54.9 CHRONIC BACK PAIN, UNSPECIFIED BACK LOCATION, UNSPECIFIED BACK PAIN LATERALITY: ICD-10-CM

## 2018-07-18 DIAGNOSIS — F51.01 PRIMARY INSOMNIA: ICD-10-CM

## 2018-07-18 DIAGNOSIS — E13.40 NEUROPATHY DUE TO SECONDARY DIABETES MELLITUS (HCC): ICD-10-CM

## 2018-07-18 DIAGNOSIS — L89.303 DECUBITUS ULCER OF BUTTOCK, STAGE 3, UNSPECIFIED LATERALITY: ICD-10-CM

## 2018-07-18 PROCEDURE — 99213 OFFICE O/P EST LOW 20 MIN: CPT | Performed by: NURSE PRACTITIONER

## 2018-07-18 PROCEDURE — G8417 CALC BMI ABV UP PARAM F/U: HCPCS | Performed by: NURSE PRACTITIONER

## 2018-07-18 PROCEDURE — 2022F DILAT RTA XM EVC RTNOPTHY: CPT | Performed by: NURSE PRACTITIONER

## 2018-07-18 PROCEDURE — G8427 DOCREV CUR MEDS BY ELIG CLIN: HCPCS | Performed by: NURSE PRACTITIONER

## 2018-07-18 PROCEDURE — 1036F TOBACCO NON-USER: CPT | Performed by: NURSE PRACTITIONER

## 2018-07-18 PROCEDURE — 3045F PR MOST RECENT HEMOGLOBIN A1C LEVEL 7.0-9.0%: CPT | Performed by: NURSE PRACTITIONER

## 2018-07-18 RX ORDER — AMITRIPTYLINE HYDROCHLORIDE 25 MG/1
TABLET, FILM COATED ORAL
Qty: 28 TABLET | Refills: 1 | Status: SHIPPED | OUTPATIENT
Start: 2018-07-18 | End: 2018-09-12 | Stop reason: SDUPTHER

## 2018-07-18 RX ORDER — HYDROCODONE BITARTRATE AND ACETAMINOPHEN 5; 325 MG/1; MG/1
1 TABLET ORAL EVERY 6 HOURS PRN
Qty: 112 TABLET | Refills: 0 | Status: SHIPPED | OUTPATIENT
Start: 2018-07-18 | End: 2018-08-15 | Stop reason: SDUPTHER

## 2018-07-18 RX ORDER — GABAPENTIN 300 MG/1
300 CAPSULE ORAL 3 TIMES DAILY
Qty: 90 CAPSULE | Refills: 1 | Status: SHIPPED | OUTPATIENT
Start: 2018-07-18 | End: 2018-09-12 | Stop reason: SDUPTHER

## 2018-07-18 RX ORDER — DULOXETIN HYDROCHLORIDE 30 MG/1
30 CAPSULE, DELAYED RELEASE ORAL 2 TIMES DAILY
Qty: 60 CAPSULE | Refills: 1 | Status: SHIPPED | OUTPATIENT
Start: 2018-07-18 | End: 2018-09-12 | Stop reason: SDUPTHER

## 2018-07-18 NOTE — PROGRESS NOTES
TABS tablet TAKE ONE TABLET BY MOUTH EVERY MORNING BEFORE BREAKFAST FOR DIABETES 90 tablet 0    Blood Glucose Monitoring Suppl (TRUE METRIX METER) w/Device KIT Use to check fasting BS and 2 hours after meal BS 1 kit 0    lisinopril (PRINIVIL;ZESTRIL) 10 MG tablet TAKE ONE TABLET BY MOUTH DAILY FOR HIGH BLOOD PRESSURE 90 tablet 0    Lancets MISC 1 each by Does not apply route daily TRUEMETRIX 100 each 2    Insulin Pen Needle 31G X 5 MM MISC 1 each by Does not apply route 2 times daily 100 each 3    gabapentin (NEURONTIN) 300 MG capsule Take 1 capsule by mouth 3 times daily for 30 days. . 90 capsule 1    DULoxetine (CYMBALTA) 30 MG extended release capsule Take 1 capsule by mouth 2 times daily 60 capsule 1    amitriptyline (ELAVIL) 25 MG tablet TAKE ONE TABLET BY MOUTH NIGHTLY 28 tablet 1     No facility-administered medications prior to visit. SOCIAL/FAMILY/PAST MEDICAL HISTORY: Mr. Lola White, family and past medical history was reviewed. REVIEW OF SYSTEMS:    Respiratory: Negative for apnea, chest tightness and shortness of breath or change in baseline breathing. Gastrointestinal: Negative for nausea, vomiting, abdominal pain, diarrhea, constipation, blood in stool and abdominal distention. PHYSICAL EXAM:   Nursing note and vitals reviewed. BP (!) 148/100   Pulse 103   Wt (!) 338 lb (153.3 kg)   SpO2 96%   BMI 42.25 kg/m²   Constitutional: He appears well-developed and well-nourished. No acute distress. Skin: Skin is warm and dry, good turgor. No rash noted. He is not diaphoretic. Cardiovascular: Normal rate, regular rhythm, normal heart sounds, and does not have murmur. Pulmonary/Chest: Effort normal. No respiratory distress. He does not have wheezes in the lung fields. He has no rales. Neurological/Psychiatric:He is alert and oriented to person, place, and time. Ambulates with the aid of a cane.  Coordination is  normal. His mood isAppropriate and affect is extended release tablet TAKE ONE TABLET BY MOUTH DAILY FOR FOR BLOOD PRESSURE AND HEART RATE 90 tablet 1    VICTOZA 18 MG/3ML SOPN SC injection DIAL AND INJECT SUBCUTANEOUSLY 1.8MG DAILY 9 pen 1    blood glucose test strips (TRUE METRIX BLOOD GLUCOSE TEST) strip Use to check BS  each 5    Lancets MISC Use to check BS  each 5    nystatin (MYCOSTATIN) 528908 UNIT/GM powder Apply 3 times daily for 7 days 1 Bottle 0    budesonide-formoterol (SYMBICORT) 160-4.5 MCG/ACT AERO Inhale 2 puffs into the lungs 2 times daily For long-term control of asthma. Rinse mouth out after use.  3 Inhaler 0    atorvastatin (LIPITOR) 40 MG tablet TAKE ONE TABLET BY MOUTH DAILY FOR CHOLESTEROL 90 tablet 0    metFORMIN (GLUCOPHAGE) 1000 MG tablet Take 1 tablet by mouth 2 times daily (with meals) For diabetes 180 tablet 0    omeprazole (PRILOSEC) 20 MG delayed release capsule TAKE ONE CAPSULE BY MOUTH DAILY FOR STOMACH 90 capsule 0    montelukast (SINGULAIR) 10 MG tablet TAKE ONE TABLET BY MOUTH DAILY FOR BREATHING 90 tablet 0    omega-3 acid ethyl esters (LOVAZA) 1 g capsule Take 2 capsules by mouth 2 times daily 360 capsule 1    insulin aspart (NOVOLOG FLEXPEN) 100 UNIT/ML injection pen Inject 10 Units into the skin 3 times daily (before meals) 5 pen 3    Insulin Degludec (TRESIBA FLEXTOUCH) 100 UNIT/ML SOPN Inject 60 Units into the skin every evening For diabetes 9 pen 2    canagliflozin (INVOKANA) 100 MG TABS tablet TAKE ONE TABLET BY MOUTH EVERY MORNING BEFORE BREAKFAST FOR DIABETES 90 tablet 0    Blood Glucose Monitoring Suppl (TRUE METRIX METER) w/Device KIT Use to check fasting BS and 2 hours after meal BS 1 kit 0    lisinopril (PRINIVIL;ZESTRIL) 10 MG tablet TAKE ONE TABLET BY MOUTH DAILY FOR HIGH BLOOD PRESSURE 90 tablet 0    Lancets MISC 1 each by Does not apply route daily TRUEMETRIX 100 each 2    Insulin Pen Needle 31G X 5 MM MISC 1 each by Does not apply route 2 times daily 100 each 3     No current facility-administered medications for this visit. I will continue his current medication regimen  which is part of the above treatment schedule. It has been helping with Mr. Cruz Brooks chronic  medical problems which for this visit include: The primary encounter diagnosis was Chronic pain syndrome. Diagnoses of Central spinal stenosis, Chronic back pain, unspecified back location, unspecified back pain laterality, Failed back surgical syndrome, Injury of cervical spinal cord, sequela (HCC), 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 ([facility] [address] Single current episode of major depressive disorder, unspecified depression episode severity, Primary insomnia, Morbid obesity with BMI of 45.0-49.9, adult ([address] and Decubitus ulcer of buttock, stage 3, unspecified laterality ([facility] [address] were also pertinent to this visit. Risks and benefits of the medications and other alternative treatments  including no treatment were discussed with the patient. The common side effects of these medications were also explained to the patient. Informed verbal consent was obtained. Goals of current treatment regimen include improvement in pain, restoration of functioning- with focus on improvement in physical performance, general activity, work or disability,emotional distress, health care utilization and  decreased medication consumption. Will continue to monitor progress towards achieving/maintaining therapeutic goals with special emphasis on  1. Improvement in perceived interfernce  of pain with ADL's. Ability to do home exercises independently. Ability to do household chores indoor and/or outdoor work and social and leisure activities. Improve psychosocial and physical functioning. - he is showing progression towards this treatment goal with the current regimen.     He was advised against drinking alcohol with the narcotic pain medicines, advised against driving or handling machinery while adjusting the dose of medicines or if having

## 2018-07-24 ENCOUNTER — TELEPHONE (OUTPATIENT)
Dept: PULMONOLOGY | Age: 38
End: 2018-07-24

## 2018-07-24 NOTE — TELEPHONE ENCOUNTER
LVM to call back.   Has appt with Dr Penelope Hernandez 8/7/18 at 9:20am   Needs to schedule PFT - give number to schedule this

## 2018-08-01 NOTE — TELEPHONE ENCOUNTER
Spoke to Scotty (mother) only working phone number.  Said she will contact Geoffrey Cecily or his wife to call us back to give number to schedule PFT

## 2018-08-06 NOTE — PROGRESS NOTES
REASON FOR CONSULTATION/CC:    Chief Complaint   Patient presents with    Asthma     NP referred by Herber Albert for Asthma        Consult at request of   AYLA Serra CNP     PCP: AYLA Serra CNP    HISTORY OF PRESENT ILLNESS: Jack Acosta is a 41y.o. year old male with a history of asthma and JULITA who presents :       Asthma  ACT 22. He is doing well now for it . A few months ago, he was placed int  hospital but not doing better. Rare hospitalization. Has needed to call 911 mutliple times but did not have the regiment he has now and doing great with Symbicort and singulair, and weight loss about 50#. JULITA  Seeing Dr. Josephine Blanton HISTORY:  Past Medical History:   Diagnosis Date    Asthma     Chronic back pain     Depression     GERD (gastroesophageal reflux disease)     Hyperlipidemia     Hypertension     Neuropathy     Obesity     Osteoarthritis     Peripheral vascular disease (HCC)     Restless legs syndrome     Sleep apnea     Type 2 diabetes mellitus without complication (HCC)     Type II or unspecified type diabetes mellitus without mention of complication, not stated as uncontrolled     Urinary incontinence        PAST SURGICAL HISTORY:  Past Surgical History:   Procedure Laterality Date    BACK SURGERY  2013    four surgeries; rods placed    FRACTURE SURGERY Right     arm- two    FRACTURE SURGERY Right     hand- three    HAND SURGERY  1999    KNEE ARTHROSCOPY Right     PELVIC FRACTURE SURGERY      SPINE SURGERY  2011    SPINE SURGERY  2010    UVULECTOMY         FAMILY HISTORY:  family history includes Asthma in his mother; Cancer in his mother, paternal cousin, paternal grandmother, and paternal uncle; Diabetes in his brother and mother. SOCIAL HISTORY:   reports that he has never smoked. He has never used smokeless tobacco.      ALLERGIES:  Patient is allergic to penicillins; fentanyl; and food.     REVIEW OF SYSTEMS:  Constitutional: Negative for fever    HENT: Negative for sore throat  Eyes: Negative for redness   Respiratory: Negative for dyspnea, cough  Cardiovascular: Negative for chest pain  Gastrointestinal: Negative for vomiting, diarrhea   Genitourinary: Negative for hematuria   Musculoskeletal: Negative for arthralgias   Skin: Negative for rash  Neurological: Negative for syncope  Hematological: Negative for adenopathy  Psychiatric/Behavorial: Negative for anxiety    Objective:   PHYSICAL EXAM:  Blood pressure 100/78, pulse 100, temperature 98.2 °F (36.8 °C), temperature source Oral, resp. rate 16, height 6' 3\" (1.905 m), weight (!) 332 lb (150.6 kg), SpO2 97 %.'  Gen: No distress. obese  Eyes: PERRL. No sclera icterus. No conjunctival injection. ENT: No discharge. Pharynx clear. External appearance of ears and nose normal.  Neck: Trachea midline. No obvious mass. Resp: No accessory muscle use. No crackles. No wheezes. No rhonchi. CV: Regular rate. Regular rhythm. No murmur or rub. No edema. GI: Non-tender. Non-distended. No hernia. Skin: Warm, dry, normal texture and turgor. No nodule on exposed extremities. Lymph: No cervical LAD. No supraclavicular LAD. M/S: No cyanosis. No clubbing. No joint deformity. Neuro: Moves all four extremities. Psych: Oriented x 3. No anxiety. Awake. Alert. Intact judgement and insight. Current Outpatient Prescriptions   Medication Sig Dispense Refill    albuterol (PROVENTIL) (2.5 MG/3ML) 0.083% nebulizer solution Take 2.5 mg by nebulization every 6 hours as needed for Wheezing      gabapentin (NEURONTIN) 300 MG capsule Take 1 capsule by mouth 3 times daily for 30 days. . 90 capsule 1    DULoxetine (CYMBALTA) 30 MG extended release capsule Take 1 capsule by mouth 2 times daily 60 capsule 1    amitriptyline (ELAVIL) 25 MG tablet TAKE ONE TABLET BY MOUTH NIGHTLY 28 tablet 1    HYDROcodone-acetaminophen (NORCO) 5-325 MG per tablet Take 1 tablet by mouth every 6 hours as needed for Pain for up to 28 days. No more than 4 tabs a day. 112 tablet 0    VENTOLIN  (90 Base) MCG/ACT inhaler INHALE TWO PUFFS BY MOUTH EVERY 6 HOURS AS NEEDED FOR WHEEZING OR SHORTNESS OF BREATH 1 Inhaler 2    metoprolol succinate (TOPROL XL) 25 MG extended release tablet TAKE ONE TABLET BY MOUTH DAILY FOR FOR BLOOD PRESSURE AND HEART RATE 90 tablet 1    VICTOZA 18 MG/3ML SOPN SC injection DIAL AND INJECT SUBCUTANEOUSLY 1.8MG DAILY 9 pen 1    blood glucose test strips (TRUE METRIX BLOOD GLUCOSE TEST) strip Use to check BS  each 5    Lancets MISC Use to check BS  each 5    nystatin (MYCOSTATIN) 714954 UNIT/GM powder Apply 3 times daily for 7 days 1 Bottle 0    budesonide-formoterol (SYMBICORT) 160-4.5 MCG/ACT AERO Inhale 2 puffs into the lungs 2 times daily For long-term control of asthma. Rinse mouth out after use.  3 Inhaler 0    atorvastatin (LIPITOR) 40 MG tablet TAKE ONE TABLET BY MOUTH DAILY FOR CHOLESTEROL 90 tablet 0    metFORMIN (GLUCOPHAGE) 1000 MG tablet Take 1 tablet by mouth 2 times daily (with meals) For diabetes 180 tablet 0    omeprazole (PRILOSEC) 20 MG delayed release capsule TAKE ONE CAPSULE BY MOUTH DAILY FOR STOMACH 90 capsule 0    montelukast (SINGULAIR) 10 MG tablet TAKE ONE TABLET BY MOUTH DAILY FOR BREATHING 90 tablet 0    omega-3 acid ethyl esters (LOVAZA) 1 g capsule Take 2 capsules by mouth 2 times daily 360 capsule 1    insulin aspart (NOVOLOG FLEXPEN) 100 UNIT/ML injection pen Inject 10 Units into the skin 3 times daily (before meals) 5 pen 3    Insulin Degludec (TRESIBA FLEXTOUCH) 100 UNIT/ML SOPN Inject 60 Units into the skin every evening For diabetes 9 pen 2    canagliflozin (INVOKANA) 100 MG TABS tablet TAKE ONE TABLET BY MOUTH EVERY MORNING BEFORE BREAKFAST FOR DIABETES 90 tablet 0    Blood Glucose Monitoring Suppl (TRUE METRIX METER) w/Device KIT Use to check fasting BS and 2 hours after meal BS 1 kit 0    lisinopril [phone]. Impression/Conclusion below    FRONTAL AND LATERAL CHEST X-RAY:    CLINICAL INDICATION:     fever    COMPARISON:  December 2, 2015 and August 4, 2011. FINDINGS:    LUNGS/PLEURA:  There is a minimal area of opacification medial left lung base. Differential is   atelectasis or early pneumonia. HEART:  Normal size. MEDIASTINUM/ROXANA: Negative  SKELETAL:  Bilateral paraspinal thoracolumbar pedicle screws and dynamic rods. OTHER:  None    COMMENT:  Please note that this record was generated using voice recognition software. If there are any   questions about the content of this document, please contact the radiologist at [phone] as   some errors in transcription may have occurred. IMPRESSION:  Minimal opacification left base. Differential includes atelectasis and/or early pneumonia. SIGNED BY: Jaye Saldivar MD on 5/14/2018  4:12 PM       [facility] Imaging Report - [facility],[address] [phone] -  [facility] Call Center: [phone]            CXR portable: No results found for this or any previous visit. Assessment:      Asthma  Sleep apnea - Dr. Cordelia Nieto  Obesity  Depression  GERD      Plan:      Problem List Items Addressed This Visit     Moderate persistent asthma without complication     He is doing very with Symbicort and Singulair. No change in therapy. Albuterol as needed. ACT 22. Since he is stabilized with regimen, no further workup to be completed at this time. If the has an additional hospitalization or asthma exacerbation, respiratory allergen profile to assess for allergens and IgE will be assessed at that time.          Relevant Medications    albuterol (PROVENTIL) (2.5 MG/3ML) 0.083% nebulizer solution

## 2018-08-07 ENCOUNTER — OFFICE VISIT (OUTPATIENT)
Dept: PULMONOLOGY | Age: 38
End: 2018-08-07

## 2018-08-07 VITALS
HEIGHT: 75 IN | WEIGHT: 315 LBS | SYSTOLIC BLOOD PRESSURE: 100 MMHG | DIASTOLIC BLOOD PRESSURE: 78 MMHG | RESPIRATION RATE: 16 BRPM | BODY MASS INDEX: 39.17 KG/M2 | HEART RATE: 100 BPM | OXYGEN SATURATION: 97 % | TEMPERATURE: 98.2 F

## 2018-08-07 DIAGNOSIS — J45.40 MODERATE PERSISTENT ASTHMA WITHOUT COMPLICATION: ICD-10-CM

## 2018-08-07 PROCEDURE — G8417 CALC BMI ABV UP PARAM F/U: HCPCS | Performed by: INTERNAL MEDICINE

## 2018-08-07 PROCEDURE — G8427 DOCREV CUR MEDS BY ELIG CLIN: HCPCS | Performed by: INTERNAL MEDICINE

## 2018-08-07 PROCEDURE — 1036F TOBACCO NON-USER: CPT | Performed by: INTERNAL MEDICINE

## 2018-08-07 PROCEDURE — 99203 OFFICE O/P NEW LOW 30 MIN: CPT | Performed by: INTERNAL MEDICINE

## 2018-08-07 RX ORDER — ALBUTEROL SULFATE 2.5 MG/3ML
2.5 SOLUTION RESPIRATORY (INHALATION) EVERY 6 HOURS PRN
COMMUNITY
End: 2018-12-03 | Stop reason: SDUPTHER

## 2018-08-07 ASSESSMENT — ASTHMA QUESTIONNAIRES
QUESTION_5 LAST FOUR WEEKS HOW WOULD YOU RATE YOUR ASTHMA CONTROL: 4
QUESTION_4 LAST FOUR WEEKS HOW OFTEN HAVE YOU USED YOUR RESCUE INHALER OR NEBULIZER MEDICATION (SUCH AS ALBUTEROL): 4
QUESTION_2 LAST FOUR WEEKS HOW OFTEN HAVE YOU HAD SHORTNESS OF BREATH: 4
ACT_TOTALSCORE: 22
QUESTION_1 LAST FOUR WEEKS HOW MUCH OF THE TIME DID YOUR ASTHMA KEEP YOU FROM GETTING AS MUCH DONE AT WORK, SCHOOL OR AT HOME: 5
QUESTION_3 LAST FOUR WEEKS HOW OFTEN DID YOUR ASTHMA SYMPTOMS (WHEEZING, COUGHING, SHORTNESS OF BREATH, CHEST TIGHTNESS OR PAIN) WAKE YOU UP AT NIGHT OR EARLIER THAN USUAL IN THE MORNING: 5

## 2018-08-07 NOTE — Clinical Note
He is very well controlled on Symbicort and Singulair. See full note attached.   Thanks, Keenan Brumfield

## 2018-08-15 ENCOUNTER — OFFICE VISIT (OUTPATIENT)
Dept: PAIN MANAGEMENT | Age: 38
End: 2018-08-15

## 2018-08-15 VITALS — OXYGEN SATURATION: 98 % | DIASTOLIC BLOOD PRESSURE: 86 MMHG | SYSTOLIC BLOOD PRESSURE: 139 MMHG | HEART RATE: 112 BPM

## 2018-08-15 DIAGNOSIS — G89.29 CHRONIC BACK PAIN, UNSPECIFIED BACK LOCATION, UNSPECIFIED BACK PAIN LATERALITY: ICD-10-CM

## 2018-08-15 DIAGNOSIS — E13.40 NEUROPATHY DUE TO SECONDARY DIABETES MELLITUS (HCC): ICD-10-CM

## 2018-08-15 DIAGNOSIS — M96.1 FAILED BACK SURGICAL SYNDROME: ICD-10-CM

## 2018-08-15 DIAGNOSIS — F32.9 CURRENT EPISODE OF MAJOR DEPRESSIVE DISORDER WITHOUT PRIOR EPISODE, UNSPECIFIED DEPRESSION EPISODE SEVERITY: ICD-10-CM

## 2018-08-15 DIAGNOSIS — L89.303 DECUBITUS ULCER OF BUTTOCK, STAGE 3, UNSPECIFIED LATERALITY: ICD-10-CM

## 2018-08-15 DIAGNOSIS — G89.4 CHRONIC PAIN SYNDROME: Primary | ICD-10-CM

## 2018-08-15 DIAGNOSIS — S14.109S INJURY OF CERVICAL SPINAL CORD, SEQUELA (HCC): ICD-10-CM

## 2018-08-15 DIAGNOSIS — E66.01 MORBID OBESITY WITH BMI OF 45.0-49.9, ADULT (HCC): ICD-10-CM

## 2018-08-15 DIAGNOSIS — M48.00 CENTRAL SPINAL STENOSIS: ICD-10-CM

## 2018-08-15 DIAGNOSIS — M54.9 CHRONIC BACK PAIN, UNSPECIFIED BACK LOCATION, UNSPECIFIED BACK PAIN LATERALITY: ICD-10-CM

## 2018-08-15 DIAGNOSIS — F51.01 PRIMARY INSOMNIA: ICD-10-CM

## 2018-08-15 PROCEDURE — 2022F DILAT RTA XM EVC RTNOPTHY: CPT | Performed by: NURSE PRACTITIONER

## 2018-08-15 PROCEDURE — 3045F PR MOST RECENT HEMOGLOBIN A1C LEVEL 7.0-9.0%: CPT | Performed by: NURSE PRACTITIONER

## 2018-08-15 PROCEDURE — 1036F TOBACCO NON-USER: CPT | Performed by: NURSE PRACTITIONER

## 2018-08-15 PROCEDURE — G8427 DOCREV CUR MEDS BY ELIG CLIN: HCPCS | Performed by: NURSE PRACTITIONER

## 2018-08-15 PROCEDURE — 99213 OFFICE O/P EST LOW 20 MIN: CPT | Performed by: NURSE PRACTITIONER

## 2018-08-15 PROCEDURE — G8417 CALC BMI ABV UP PARAM F/U: HCPCS | Performed by: NURSE PRACTITIONER

## 2018-08-15 RX ORDER — HYDROCODONE BITARTRATE AND ACETAMINOPHEN 5; 325 MG/1; MG/1
1 TABLET ORAL EVERY 6 HOURS PRN
Qty: 112 TABLET | Refills: 0 | Status: SHIPPED | OUTPATIENT
Start: 2018-08-15 | End: 2018-09-12 | Stop reason: SDUPTHER

## 2018-08-15 NOTE — PROGRESS NOTES
MEDICATIONS: Mr. Nori Giles list of medications were reviewed. His current medications are   Outpatient Medications Prior to Visit   Medication Sig Dispense Refill    albuterol (PROVENTIL) (2.5 MG/3ML) 0.083% nebulizer solution Take 2.5 mg by nebulization every 6 hours as needed for Wheezing      gabapentin (NEURONTIN) 300 MG capsule Take 1 capsule by mouth 3 times daily for 30 days. . 90 capsule 1    DULoxetine (CYMBALTA) 30 MG extended release capsule Take 1 capsule by mouth 2 times daily 60 capsule 1    amitriptyline (ELAVIL) 25 MG tablet TAKE ONE TABLET BY MOUTH NIGHTLY 28 tablet 1    VENTOLIN  (90 Base) MCG/ACT inhaler INHALE TWO PUFFS BY MOUTH EVERY 6 HOURS AS NEEDED FOR WHEEZING OR SHORTNESS OF BREATH 1 Inhaler 2    metoprolol succinate (TOPROL XL) 25 MG extended release tablet TAKE ONE TABLET BY MOUTH DAILY FOR FOR BLOOD PRESSURE AND HEART RATE 90 tablet 1    VICTOZA 18 MG/3ML SOPN SC injection DIAL AND INJECT SUBCUTANEOUSLY 1.8MG DAILY 9 pen 1    blood glucose test strips (TRUE METRIX BLOOD GLUCOSE TEST) strip Use to check BS  each 5    Lancets MISC Use to check BS  each 5    nystatin (MYCOSTATIN) 838429 UNIT/GM powder Apply 3 times daily for 7 days 1 Bottle 0    budesonide-formoterol (SYMBICORT) 160-4.5 MCG/ACT AERO Inhale 2 puffs into the lungs 2 times daily For long-term control of asthma. Rinse mouth out after use.  3 Inhaler 0    atorvastatin (LIPITOR) 40 MG tablet TAKE ONE TABLET BY MOUTH DAILY FOR CHOLESTEROL 90 tablet 0    metFORMIN (GLUCOPHAGE) 1000 MG tablet Take 1 tablet by mouth 2 times daily (with meals) For diabetes 180 tablet 0    omeprazole (PRILOSEC) 20 MG delayed release capsule TAKE ONE CAPSULE BY MOUTH DAILY FOR STOMACH 90 capsule 0    montelukast (SINGULAIR) 10 MG tablet TAKE ONE TABLET BY MOUTH DAILY FOR BREATHING 90 tablet 0    omega-3 acid ethyl esters (LOVAZA) 1 g capsule Take 2 capsules by mouth 2 times daily 360 capsule 1    insulin aspart normal. His mood isAppropriate and affect is Neutral/Euthymic(normal) . IMPRESSION:   1. Chronic pain syndrome    2. Central spinal stenosis    3. Chronic back pain, unspecified back location, unspecified back pain laterality    4. Injury of cervical spinal cord, sequela ([facility] [address]    5. Failed back surgical syndrome    6. Decubitus ulcer of buttock, stage 3, unspecified laterality ([facility] [address]    7. 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 ([facility] [address]    8. Current episode of major depressive disorder without prior episode, unspecified depression episode severity    9. Primary insomnia    10. Morbid obesity with BMI of 45.0-49.9, adult ([facility] [address]        PLAN:  Informed verbal consent was obtained  -Continue with Norco, Neurontin, Elavil, Cymbalta  -Jose Cruz exercises  -Commended patient on current weight loss  -He was advised weight reduction, diet changes- 800-1200 omid diet, diet diary, exercising, nutritional  consult increased physical activity as tolerated   -CBT techniques- relaxation therapies such as biofeedback, mindfulness based stress reduction, imagery, cognitive restructuring, problem solving discussed with patient  -Last UDS consistent  -Return in about 4 weeks (around 9/12/2018). -[place] record was obtained and reviewed  for the last one year and no indicators of drug misuse  were found. Any other controlled substance prescriptions  seen on the record have been accounted for, I am aware of the patient receiving these medications. Libia Crow OAR[place] record will be rechecked as part of office protocol. Current Outpatient Prescriptions   Medication Sig Dispense Refill    HYDROcodone-acetaminophen (NORCO) 5-325 MG per tablet Take 1 tablet by mouth every 6 hours as needed for Pain for up to 28 days. No more than 4 tabs a day.  112 tablet 0    albuterol (PROVENTIL) (2.5 MG/3ML) 0.083% nebulizer solution Take 2.5 mg by nebulization every 6 hours as needed for Wheezing      gabapentin (NEURONTIN) 300 MG capsule Take 1 capsule

## 2018-08-15 NOTE — PATIENT INSTRUCTIONS
Patient Education        Back Stretches: Exercises  Your Care Instructions  Here are some examples of exercises for stretching your back. Start each exercise slowly. Ease off the exercise if you start to have pain. Your doctor or physical therapist will tell you when you can start these exercises and which ones will work best for you. How to do the exercises  Overhead stretch    1. Stand comfortably with your feet shoulder-width apart. 2. Looking straight ahead, raise both arms over your head and reach toward the ceiling. Do not allow your head to tilt back. 3. Hold for 15 to 30 seconds, then lower your arms to your sides. 4. Repeat 2 to 4 times. Side stretch    1. Stand comfortably with your feet shoulder-width apart. 2. Raise one arm over your head, and then lean to the other side. 3. Slide your hand down your leg as you let the weight of your arm gently stretch your side muscles. Hold for 15 to 30 seconds. 4. Repeat 2 to 4 times on each side. Press-up    1. Lie on your stomach, supporting your body with your forearms. 2. Press your elbows down into the floor to raise your upper back. As you do this, relax your stomach muscles and allow your back to arch without using your back muscles. As your press up, do not let your hips or pelvis come off the floor. 3. Hold for 15 to 30 seconds, then relax. 4. Repeat 2 to 4 times. Relax and rest    1. Lie on your back with a rolled towel under your neck and a pillow under your knees. Extend your arms comfortably to your sides. 2. Relax and breathe normally. 3. Remain in this position for about 10 minutes. 4. If you can, do this 2 or 3 times each day. Follow-up care is a key part of your treatment and safety. Be sure to make and go to all appointments, and call your doctor if you are having problems. It's also a good idea to know your test results and keep a list of the medicines you take. Where can you learn more?   Go to [email or web address] and sign in to your vIPtela account. Enter U042 in the Direct [place] box to learn more about \"Back Stretches: Exercises. \"     If you do not have an account, please click on the \"Sign Up Now\" link. Current as of: November 29, 2017  Content Version: 11.7  © 0195-4894 [employer], [employer]. Care instructions adapted under license by [facility] ([facility]). If you have questions about a medical condition or this instruction, always ask your healthcare professional. [address] any warranty or liability for your use of this information.

## 2018-08-17 ENCOUNTER — OFFICE VISIT (OUTPATIENT)
Dept: FAMILY MEDICINE CLINIC | Age: 38
End: 2018-08-17

## 2018-08-17 VITALS
WEIGHT: 315 LBS | HEART RATE: 103 BPM | SYSTOLIC BLOOD PRESSURE: 140 MMHG | DIASTOLIC BLOOD PRESSURE: 80 MMHG | RESPIRATION RATE: 18 BRPM | BODY MASS INDEX: 42 KG/M2

## 2018-08-17 DIAGNOSIS — K59.03 DRUG-INDUCED CONSTIPATION: ICD-10-CM

## 2018-08-17 DIAGNOSIS — E66.01 MORBID OBESITY WITH BODY MASS INDEX (BMI) OF 40.0 TO 44.9 IN ADULT (HCC): ICD-10-CM

## 2018-08-17 DIAGNOSIS — E78.2 MIXED HYPERLIPIDEMIA: ICD-10-CM

## 2018-08-17 DIAGNOSIS — Z79.4 TYPE 2 DIABETES MELLITUS WITH DIABETIC NEUROPATHY, WITH LONG-TERM CURRENT USE OF INSULIN (HCC): Primary | ICD-10-CM

## 2018-08-17 DIAGNOSIS — E11.40 TYPE 2 DIABETES MELLITUS WITH DIABETIC NEUROPATHY, WITH LONG-TERM CURRENT USE OF INSULIN (HCC): Primary | ICD-10-CM

## 2018-08-17 DIAGNOSIS — I10 ESSENTIAL HYPERTENSION: ICD-10-CM

## 2018-08-17 LAB — HBA1C MFR BLD: 6.9 %

## 2018-08-17 PROCEDURE — 3044F HG A1C LEVEL LT 7.0%: CPT | Performed by: NURSE PRACTITIONER

## 2018-08-17 PROCEDURE — 83036 HEMOGLOBIN GLYCOSYLATED A1C: CPT | Performed by: NURSE PRACTITIONER

## 2018-08-17 PROCEDURE — 99214 OFFICE O/P EST MOD 30 MIN: CPT | Performed by: NURSE PRACTITIONER

## 2018-08-17 PROCEDURE — G8417 CALC BMI ABV UP PARAM F/U: HCPCS | Performed by: NURSE PRACTITIONER

## 2018-08-17 PROCEDURE — 1036F TOBACCO NON-USER: CPT | Performed by: NURSE PRACTITIONER

## 2018-08-17 PROCEDURE — G8427 DOCREV CUR MEDS BY ELIG CLIN: HCPCS | Performed by: NURSE PRACTITIONER

## 2018-08-17 PROCEDURE — 2022F DILAT RTA XM EVC RTNOPTHY: CPT | Performed by: NURSE PRACTITIONER

## 2018-08-17 RX ORDER — DOCUSATE SODIUM 100 MG/1
100 CAPSULE, LIQUID FILLED ORAL DAILY PRN
Qty: 60 CAPSULE | Refills: 3 | Status: SHIPPED | OUTPATIENT
Start: 2018-08-17 | End: 2018-08-17 | Stop reason: ALTCHOICE

## 2018-08-17 RX ORDER — DOCUSATE SODIUM 100 MG/1
100 CAPSULE, LIQUID FILLED ORAL 2 TIMES DAILY
Qty: 60 CAPSULE | Refills: 3 | Status: SHIPPED | OUTPATIENT
Start: 2018-08-17 | End: 2018-12-17 | Stop reason: ALTCHOICE

## 2018-08-17 NOTE — PROGRESS NOTES
AND HEART RATE Yes AYLA Flanagan CNP   VICTOZA 18 MG/3ML SOPN SC injection DIAL AND INJECT SUBCUTANEOUSLY 1.8MG DAILY Yes AYLA Flanagan CNP   blood glucose test strips (TRUE METRIX BLOOD GLUCOSE TEST) strip Use to check BS QID Yes AYLA Flanagan CNP   Lancets MISC Use to check BS QID Yes AYLA Flanagan CNP   nystatin (MYCOSTATIN) 981641 UNIT/GM powder Apply 3 times daily for 7 days Yes AYLA Flanagan CNP   budesonide-formoterol (SYMBICORT) 160-4.5 MCG/ACT AERO Inhale 2 puffs into the lungs 2 times daily For long-term control of asthma. Rinse mouth out after use.  Yes AYLA Flanagan CNP   atorvastatin (LIPITOR) 40 MG tablet TAKE ONE TABLET BY MOUTH DAILY FOR CHOLESTEROL Yes AYLA Flanagan CNP   metFORMIN (GLUCOPHAGE) 1000 MG tablet Take 1 tablet by mouth 2 times daily (with meals) For diabetes Yes AYLA Flanagan CNP   omeprazole (PRILOSEC) 20 MG delayed release capsule TAKE ONE CAPSULE BY MOUTH DAILY FOR STOMACH Yes AYLA Flanagan CNP   montelukast (SINGULAIR) 10 MG tablet TAKE ONE TABLET BY MOUTH DAILY FOR BREATHING Yes AYLA Flanagan CNP   omega-3 acid ethyl esters (LOVAZA) 1 g capsule Take 2 capsules by mouth 2 times daily Yes AYLA Flanagan CNP   insulin aspart (NOVOLOG FLEXPEN) 100 UNIT/ML injection pen Inject 10 Units into the skin 3 times daily (before meals) Yes AYLA Flanagan CNP   Insulin Degludec (TRESIBA FLEXTOUCH) 100 UNIT/ML SOPN Inject 60 Units into the skin every evening For diabetes Yes AYLA Flanagan CNP   canagliflozin (INVOKANA) 100 MG TABS tablet TAKE ONE TABLET BY MOUTH EVERY MORNING BEFORE BREAKFAST FOR DIABETES Yes AYLA Flanagan CNP   Blood Glucose Monitoring Suppl (TRUE METRIX METER) w/Device KIT Use to check fasting BS and 2 hours after meal BS Yes AYLA Flanagan CNP   lisinopril (PRINIVIL;ZESTRIL) 10 MG tablet

## 2018-09-02 DIAGNOSIS — E78.2 MIXED HYPERLIPIDEMIA: ICD-10-CM

## 2018-09-04 RX ORDER — ATORVASTATIN CALCIUM 40 MG/1
TABLET, FILM COATED ORAL
Qty: 90 TABLET | Refills: 1 | Status: SHIPPED | OUTPATIENT
Start: 2018-09-04 | End: 2018-10-15 | Stop reason: SDUPTHER

## 2018-09-12 ENCOUNTER — OFFICE VISIT (OUTPATIENT)
Dept: PAIN MANAGEMENT | Age: 38
End: 2018-09-12

## 2018-09-12 VITALS
BODY MASS INDEX: 42.2 KG/M2 | SYSTOLIC BLOOD PRESSURE: 160 MMHG | WEIGHT: 315 LBS | OXYGEN SATURATION: 96 % | DIASTOLIC BLOOD PRESSURE: 103 MMHG | HEART RATE: 105 BPM

## 2018-09-12 DIAGNOSIS — F32.9 CURRENT EPISODE OF MAJOR DEPRESSIVE DISORDER WITHOUT PRIOR EPISODE, UNSPECIFIED DEPRESSION EPISODE SEVERITY: ICD-10-CM

## 2018-09-12 DIAGNOSIS — S14.109S INJURY OF CERVICAL SPINAL CORD, SEQUELA (HCC): ICD-10-CM

## 2018-09-12 DIAGNOSIS — E13.40 NEUROPATHY DUE TO SECONDARY DIABETES MELLITUS (HCC): ICD-10-CM

## 2018-09-12 DIAGNOSIS — G89.29 CHRONIC BACK PAIN, UNSPECIFIED BACK LOCATION, UNSPECIFIED BACK PAIN LATERALITY: ICD-10-CM

## 2018-09-12 DIAGNOSIS — E66.01 MORBID OBESITY WITH BODY MASS INDEX (BMI) OF 40.0 TO 44.9 IN ADULT (HCC): ICD-10-CM

## 2018-09-12 DIAGNOSIS — M48.00 CENTRAL SPINAL STENOSIS: ICD-10-CM

## 2018-09-12 DIAGNOSIS — G89.4 CHRONIC PAIN SYNDROME: ICD-10-CM

## 2018-09-12 DIAGNOSIS — M96.1 FAILED BACK SURGICAL SYNDROME: ICD-10-CM

## 2018-09-12 DIAGNOSIS — F51.01 PRIMARY INSOMNIA: ICD-10-CM

## 2018-09-12 DIAGNOSIS — M54.9 CHRONIC BACK PAIN, UNSPECIFIED BACK LOCATION, UNSPECIFIED BACK PAIN LATERALITY: ICD-10-CM

## 2018-09-12 PROBLEM — L89.309 DECUBITUS ULCER OF BUTTOCK: Status: ACTIVE | Noted: 2018-09-12

## 2018-09-12 PROCEDURE — G8427 DOCREV CUR MEDS BY ELIG CLIN: HCPCS | Performed by: NURSE PRACTITIONER

## 2018-09-12 PROCEDURE — 3044F HG A1C LEVEL LT 7.0%: CPT | Performed by: NURSE PRACTITIONER

## 2018-09-12 PROCEDURE — G8417 CALC BMI ABV UP PARAM F/U: HCPCS | Performed by: NURSE PRACTITIONER

## 2018-09-12 PROCEDURE — 1036F TOBACCO NON-USER: CPT | Performed by: NURSE PRACTITIONER

## 2018-09-12 PROCEDURE — 99213 OFFICE O/P EST LOW 20 MIN: CPT | Performed by: NURSE PRACTITIONER

## 2018-09-12 PROCEDURE — 2022F DILAT RTA XM EVC RTNOPTHY: CPT | Performed by: NURSE PRACTITIONER

## 2018-09-12 RX ORDER — GABAPENTIN 300 MG/1
300 CAPSULE ORAL 3 TIMES DAILY
Qty: 90 CAPSULE | Refills: 1 | Status: SHIPPED | OUTPATIENT
Start: 2018-09-12 | End: 2018-11-07 | Stop reason: SDUPTHER

## 2018-09-12 RX ORDER — DULOXETIN HYDROCHLORIDE 30 MG/1
30 CAPSULE, DELAYED RELEASE ORAL 2 TIMES DAILY
Qty: 60 CAPSULE | Refills: 1 | Status: SHIPPED | OUTPATIENT
Start: 2018-09-12 | End: 2018-10-15 | Stop reason: SDUPTHER

## 2018-09-12 RX ORDER — HYDROCODONE BITARTRATE AND ACETAMINOPHEN 5; 325 MG/1; MG/1
1 TABLET ORAL EVERY 6 HOURS PRN
Qty: 112 TABLET | Refills: 0 | Status: SHIPPED | OUTPATIENT
Start: 2018-09-12 | End: 2018-10-10 | Stop reason: SDUPTHER

## 2018-09-12 RX ORDER — AMITRIPTYLINE HYDROCHLORIDE 25 MG/1
TABLET, FILM COATED ORAL
Qty: 28 TABLET | Refills: 1 | Status: SHIPPED | OUTPATIENT
Start: 2018-09-12 | End: 2018-10-15 | Stop reason: SDUPTHER

## 2018-09-12 NOTE — PROGRESS NOTES
back.    ALLERGIES: Patients list of allergies were reviewed     MEDICATIONS: Mr. Adrienne Matos list of medications were reviewed. His current medications are   Outpatient Medications Prior to Visit   Medication Sig Dispense Refill    VICTOZA 18 MG/3ML SOPN SC injection DIAL AND INJECT SUBCUTANEOUSLY 1.8MG DAILY 9 pen 1    atorvastatin (LIPITOR) 40 MG tablet TAKE ONE TABLET BY MOUTH DAILY FOR CHOLESTEROL 90 tablet 1    Insulin Pen Needle 31G X 5 MM MISC 1 each by Does not apply route 2 times daily 100 each 3    docusate sodium (COLACE) 100 MG capsule Take 1 capsule by mouth 2 times daily 60 capsule 3    albuterol (PROVENTIL) (2.5 MG/3ML) 0.083% nebulizer solution Take 2.5 mg by nebulization every 6 hours as needed for Wheezing      VENTOLIN  (90 Base) MCG/ACT inhaler INHALE TWO PUFFS BY MOUTH EVERY 6 HOURS AS NEEDED FOR WHEEZING OR SHORTNESS OF BREATH 1 Inhaler 2    metoprolol succinate (TOPROL XL) 25 MG extended release tablet TAKE ONE TABLET BY MOUTH DAILY FOR FOR BLOOD PRESSURE AND HEART RATE 90 tablet 1    blood glucose test strips (TRUE METRIX BLOOD GLUCOSE TEST) strip Use to check BS  each 5    Lancets MISC Use to check BS  each 5    nystatin (MYCOSTATIN) 399726 UNIT/GM powder Apply 3 times daily for 7 days 1 Bottle 0    budesonide-formoterol (SYMBICORT) 160-4.5 MCG/ACT AERO Inhale 2 puffs into the lungs 2 times daily For long-term control of asthma. Rinse mouth out after use.  3 Inhaler 0    metFORMIN (GLUCOPHAGE) 1000 MG tablet Take 1 tablet by mouth 2 times daily (with meals) For diabetes 180 tablet 0    omeprazole (PRILOSEC) 20 MG delayed release capsule TAKE ONE CAPSULE BY MOUTH DAILY FOR STOMACH 90 capsule 0    montelukast (SINGULAIR) 10 MG tablet TAKE ONE TABLET BY MOUTH DAILY FOR BREATHING 90 tablet 0    omega-3 acid ethyl esters (LOVAZA) 1 g capsule Take 2 capsules by mouth 2 times daily 360 capsule 1    insulin aspart (NOVOLOG FLEXPEN) 100 UNIT/ML injection pen Inject 10

## 2018-09-12 NOTE — PATIENT INSTRUCTIONS
[email or web address] and sign in to your Snowshoefoodt account. Enter Y502 in the [employer] box to learn more about \"Back Stretches: Exercises. \"     If you do not have an account, please click on the \"Sign Up Now\" link. Current as of: November 29, 2017  Content Version: 11.7  © 0412-2474 [employer], [employer]. Care instructions adapted under license by [facility] ([facility]). If you have questions about a medical condition or this instruction, always ask your healthcare professional. [address] any warranty or liability for your use of this information.

## 2018-10-07 DIAGNOSIS — I10 ESSENTIAL HYPERTENSION: ICD-10-CM

## 2018-10-07 DIAGNOSIS — J45.40 MODERATE PERSISTENT ASTHMA WITHOUT COMPLICATION: ICD-10-CM

## 2018-10-08 RX ORDER — LISINOPRIL 10 MG/1
TABLET ORAL
Qty: 90 TABLET | Refills: 0 | Status: SHIPPED | OUTPATIENT
Start: 2018-10-08 | End: 2019-01-03 | Stop reason: SDUPTHER

## 2018-10-08 RX ORDER — BUDESONIDE AND FORMOTEROL FUMARATE DIHYDRATE 160; 4.5 UG/1; UG/1
AEROSOL RESPIRATORY (INHALATION)
Qty: 1 INHALER | Refills: 2 | Status: SHIPPED | OUTPATIENT
Start: 2018-10-08 | End: 2018-12-17 | Stop reason: SDUPTHER

## 2018-10-10 ENCOUNTER — OFFICE VISIT (OUTPATIENT)
Dept: PAIN MANAGEMENT | Age: 38
End: 2018-10-10
Payer: MEDICARE

## 2018-10-10 VITALS
BODY MASS INDEX: 41.4 KG/M2 | HEART RATE: 111 BPM | SYSTOLIC BLOOD PRESSURE: 137 MMHG | OXYGEN SATURATION: 94 % | WEIGHT: 315 LBS | DIASTOLIC BLOOD PRESSURE: 89 MMHG

## 2018-10-10 DIAGNOSIS — M48.00 CENTRAL SPINAL STENOSIS: ICD-10-CM

## 2018-10-10 DIAGNOSIS — E66.01 MORBID OBESITY WITH BODY MASS INDEX (BMI) OF 40.0 TO 44.9 IN ADULT (HCC): ICD-10-CM

## 2018-10-10 DIAGNOSIS — S14.109S INJURY OF CERVICAL SPINAL CORD, SEQUELA (HCC): ICD-10-CM

## 2018-10-10 DIAGNOSIS — F51.01 PRIMARY INSOMNIA: ICD-10-CM

## 2018-10-10 DIAGNOSIS — G89.4 CHRONIC PAIN SYNDROME: ICD-10-CM

## 2018-10-10 DIAGNOSIS — E13.40 NEUROPATHY DUE TO SECONDARY DIABETES MELLITUS (HCC): ICD-10-CM

## 2018-10-10 DIAGNOSIS — G89.29 CHRONIC BACK PAIN, UNSPECIFIED BACK LOCATION, UNSPECIFIED BACK PAIN LATERALITY: ICD-10-CM

## 2018-10-10 DIAGNOSIS — M96.1 FAILED BACK SURGICAL SYNDROME: ICD-10-CM

## 2018-10-10 DIAGNOSIS — F32.9 CURRENT EPISODE OF MAJOR DEPRESSIVE DISORDER WITHOUT PRIOR EPISODE, UNSPECIFIED DEPRESSION EPISODE SEVERITY: ICD-10-CM

## 2018-10-10 DIAGNOSIS — M54.9 CHRONIC BACK PAIN, UNSPECIFIED BACK LOCATION, UNSPECIFIED BACK PAIN LATERALITY: ICD-10-CM

## 2018-10-10 PROCEDURE — 3044F HG A1C LEVEL LT 7.0%: CPT | Performed by: NURSE PRACTITIONER

## 2018-10-10 PROCEDURE — 1036F TOBACCO NON-USER: CPT | Performed by: NURSE PRACTITIONER

## 2018-10-10 PROCEDURE — 2022F DILAT RTA XM EVC RTNOPTHY: CPT | Performed by: NURSE PRACTITIONER

## 2018-10-10 PROCEDURE — G8484 FLU IMMUNIZE NO ADMIN: HCPCS | Performed by: NURSE PRACTITIONER

## 2018-10-10 PROCEDURE — G8417 CALC BMI ABV UP PARAM F/U: HCPCS | Performed by: NURSE PRACTITIONER

## 2018-10-10 PROCEDURE — 99213 OFFICE O/P EST LOW 20 MIN: CPT | Performed by: NURSE PRACTITIONER

## 2018-10-10 PROCEDURE — G8427 DOCREV CUR MEDS BY ELIG CLIN: HCPCS | Performed by: NURSE PRACTITIONER

## 2018-10-10 RX ORDER — HYDROCODONE BITARTRATE AND ACETAMINOPHEN 5; 325 MG/1; MG/1
1 TABLET ORAL EVERY 6 HOURS PRN
Qty: 112 TABLET | Refills: 0 | Status: SHIPPED | OUTPATIENT
Start: 2018-10-10 | End: 2018-11-07 | Stop reason: SDUPTHER

## 2018-10-10 NOTE — PROGRESS NOTES
Yvan Jung  1980  [id number]      HISTORY OF PRESENT ILLNESS:  Mr. Delbert Mckoy is a 41 y.o. male returns for a follow up visit for pain management  He has a diagnosis of   1. Chronic pain syndrome    2. Central spinal stenosis    3. Chronic back pain, unspecified back location, unspecified back pain laterality    4. Injury of cervical spinal cord, sequela ([address]    5. Failed back surgical syndrome    6. 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 Current episode of major depressive disorder without prior episode, unspecified depression episode severity    8. Primary insomnia    9. Morbid obesity with body mass index (BMI) of 40.0 to 44.9 in adult [facility])      On the Patients Pain Assessment form:  He complains of pain in the abdomen, right arm(s): mid, both buttocks, both foot/feet: entire area, right hand(s): entire area, both knee(s), both leg(s): entire limb, bilateral lower back, bilateral mid-back, neck and bilateral upper back He rates the pain 5/10 and describes it as sharp, aching. Current treatment regimen has helped relieve about 50% of the pain. He denies any side effects from the current pain regimen. Patient reports that since the last follow up visit the physical functioning is unchanged, family/social relationships are unchanged, mood is unchanged sleep patterns are unchanged, and that the overall functioning is unchanged. Patient denies misusing/abusing his narcotic pain medications or using any illegal drugs. There are No indicators for possible drug abuse, addiction or diversion problems. Upon obtaining medical history from Mr. Delbert Mckoy states that pain is manageable on current pain therapy. Takes medications as prescribed. Still working weight loss with diet and exercise, and has lost an average of 30 pounds in the last 10 months. He also has a cough with yellow sputum. Denies SOB/CP. Mood is stable without anxiety. Sleep is fair with an average of 5-6 hours.  Denies to ONE TABLET BY MOUTH DAILY FOR HIGH BLOOD PRESSURE 90 tablet 0    gabapentin (NEURONTIN) 300 MG capsule Take 1 capsule by mouth 3 times daily for 30 days. . 90 capsule 1    DULoxetine (CYMBALTA) 30 MG extended release capsule Take 1 capsule by mouth 2 times daily 60 capsule 1    amitriptyline (ELAVIL) 25 MG tablet TAKE ONE TABLET BY MOUTH NIGHTLY 28 tablet 1    VICTOZA 18 MG/3ML SOPN SC injection DIAL AND INJECT SUBCUTANEOUSLY 1.8MG DAILY 9 pen 1    atorvastatin (LIPITOR) 40 MG tablet TAKE ONE TABLET BY MOUTH DAILY FOR CHOLESTEROL 90 tablet 1    Insulin Pen Needle 31G X 5 MM MISC 1 each by Does not apply route 2 times daily 100 each 3    docusate sodium (COLACE) 100 MG capsule Take 1 capsule by mouth 2 times daily 60 capsule 3    albuterol (PROVENTIL) (2.5 MG/3ML) 0.083% nebulizer solution Take 2.5 mg by nebulization every 6 hours as needed for Wheezing      VENTOLIN  (90 Base) MCG/ACT inhaler INHALE TWO PUFFS BY MOUTH EVERY 6 HOURS AS NEEDED FOR WHEEZING OR SHORTNESS OF BREATH 1 Inhaler 2    metoprolol succinate (TOPROL XL) 25 MG extended release tablet TAKE ONE TABLET BY MOUTH DAILY FOR FOR BLOOD PRESSURE AND HEART RATE 90 tablet 1    blood glucose test strips (TRUE METRIX BLOOD GLUCOSE TEST) strip Use to check BS  each 5    Lancets MISC Use to check BS  each 5    nystatin (MYCOSTATIN) 007316 UNIT/GM powder Apply 3 times daily for 7 days 1 Bottle 0    metFORMIN (GLUCOPHAGE) 1000 MG tablet Take 1 tablet by mouth 2 times daily (with meals) For diabetes 180 tablet 0    omeprazole (PRILOSEC) 20 MG delayed release capsule TAKE ONE CAPSULE BY MOUTH DAILY FOR STOMACH 90 capsule 0    montelukast (SINGULAIR) 10 MG tablet TAKE ONE TABLET BY MOUTH DAILY FOR BREATHING 90 tablet 0    omega-3 acid ethyl esters (LOVAZA) 1 g capsule Take 2 capsules by mouth 2 times daily 360 capsule 1    insulin aspart (NOVOLOG FLEXPEN) 100 UNIT/ML injection pen Inject 10 Units into the skin 3 times daily (before meals) 5 pen 3    Insulin Degludec (TRESIBA FLEXTOUCH) 100 UNIT/ML SOPN Inject 60 Units into the skin every evening For diabetes 9 pen 2    canagliflozin (INVOKANA) 100 MG TABS tablet TAKE ONE TABLET BY MOUTH EVERY MORNING BEFORE BREAKFAST FOR DIABETES 90 tablet 0    Blood Glucose Monitoring Suppl (TRUE METRIX METER) w/Device KIT Use to check fasting BS and 2 hours after meal BS 1 kit 0    Lancets MISC 1 each by Does not apply route daily TRUEMETRIX 100 each 2     No current facility-administered medications for this visit. I will continue his current medication regimen  which is part of the above treatment schedule. It has been helping with Mr. Campbell Reach chronic  medical problems which for this visit include:   Diagnoses of Chronic pain syndrome, Central spinal stenosis, Chronic back pain, unspecified back location, unspecified back pain laterality, Injury of cervical spinal cord, sequela ([facility] [address] Failed back surgical syndrome, 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 ([facility] [address] Current episode of major depressive disorder without prior episode, unspecified depression episode severity, Primary insomnia, and Morbid obesity with body mass index (BMI) of 40.0 to 44.9 in [facility]) were pertinent to this visit. Risks and benefits of the medications and other alternative treatments  including no treatment were discussed with the patient. The common side effects of these medications were also explained to the patient. Informed verbal consent was obtained. Goals of current treatment regimen include improvement in pain, restoration of functioning- with focus on improvement in physical performance, general activity, work or disability,emotional distress, health care utilization and  decreased medication consumption. Will continue to monitor progress towards achieving/maintaining therapeutic goals with special emphasis on  1. Improvement in perceived interfernce  of pain with ADL's.  Ability to do home exercises independently. Ability to do household chores indoor and/or outdoor work and social and leisure activities. Improve psychosocial and physical functioning. - he is showing progression towards this treatment goal with the current regimen. He was advised against drinking alcohol with the narcotic pain medicines, advised against driving or handling machinery while adjusting the dose of medicines or if having cognitive  issues related to the current medications. Risk of overdose and death, if medicines not taken as prescribed, were also discussed. If the patient develops new symptoms or if the symptoms worsen, the patient should call the office. While transcribing every attempt was made to maintain the accuracy of the note in terms of it's contents,there may have been some errors made inadvertently. Thank you for allowing me to participate in the care of this patient. Sung Gonzalez. Eyal MACEDO.     Cc: AYLA Logan - [place]

## 2018-10-15 DIAGNOSIS — G89.4 CHRONIC PAIN SYNDROME: ICD-10-CM

## 2018-10-15 DIAGNOSIS — E11.40 TYPE 2 DIABETES MELLITUS WITH DIABETIC NEUROPATHY, WITH LONG-TERM CURRENT USE OF INSULIN (HCC): ICD-10-CM

## 2018-10-15 DIAGNOSIS — I10 ESSENTIAL HYPERTENSION: ICD-10-CM

## 2018-10-15 DIAGNOSIS — E13.40 NEUROPATHY DUE TO SECONDARY DIABETES MELLITUS (HCC): ICD-10-CM

## 2018-10-15 DIAGNOSIS — J45.20 MILD INTERMITTENT ASTHMA WITHOUT COMPLICATION: ICD-10-CM

## 2018-10-15 DIAGNOSIS — E78.2 MIXED HYPERLIPIDEMIA: ICD-10-CM

## 2018-10-15 DIAGNOSIS — K21.9 GASTROESOPHAGEAL REFLUX DISEASE, ESOPHAGITIS PRESENCE NOT SPECIFIED: ICD-10-CM

## 2018-10-15 DIAGNOSIS — F51.01 PRIMARY INSOMNIA: ICD-10-CM

## 2018-10-15 DIAGNOSIS — Z79.4 TYPE 2 DIABETES MELLITUS WITH DIABETIC NEUROPATHY, WITH LONG-TERM CURRENT USE OF INSULIN (HCC): ICD-10-CM

## 2018-10-16 RX ORDER — OMEGA-3-ACID ETHYL ESTERS 1 G/1
2 CAPSULE, LIQUID FILLED ORAL 2 TIMES DAILY
Qty: 360 CAPSULE | Refills: 1 | Status: SHIPPED | OUTPATIENT
Start: 2018-10-16 | End: 2018-10-19 | Stop reason: SDUPTHER

## 2018-10-16 RX ORDER — METOPROLOL SUCCINATE 25 MG/1
TABLET, EXTENDED RELEASE ORAL
Qty: 90 TABLET | Refills: 0 | Status: SHIPPED | OUTPATIENT
Start: 2018-10-16 | End: 2018-10-19 | Stop reason: SDUPTHER

## 2018-10-16 RX ORDER — AMITRIPTYLINE HYDROCHLORIDE 25 MG/1
TABLET, FILM COATED ORAL
Qty: 28 TABLET | Refills: 1 | Status: SHIPPED | OUTPATIENT
Start: 2018-10-16 | End: 2018-10-19 | Stop reason: SDUPTHER

## 2018-10-16 RX ORDER — MONTELUKAST SODIUM 10 MG/1
TABLET ORAL
Qty: 90 TABLET | Refills: 0 | Status: SHIPPED | OUTPATIENT
Start: 2018-10-16 | End: 2018-10-19 | Stop reason: SDUPTHER

## 2018-10-16 RX ORDER — ATORVASTATIN CALCIUM 40 MG/1
40 TABLET, FILM COATED ORAL DAILY
Qty: 90 TABLET | Refills: 0 | Status: SHIPPED | OUTPATIENT
Start: 2018-10-16 | End: 2018-10-19 | Stop reason: SDUPTHER

## 2018-10-16 RX ORDER — DULOXETIN HYDROCHLORIDE 30 MG/1
30 CAPSULE, DELAYED RELEASE ORAL 2 TIMES DAILY
Qty: 60 CAPSULE | Refills: 1 | Status: SHIPPED | OUTPATIENT
Start: 2018-10-16 | End: 2018-10-19 | Stop reason: SDUPTHER

## 2018-10-16 RX ORDER — OMEPRAZOLE 20 MG/1
CAPSULE, DELAYED RELEASE ORAL
Qty: 90 CAPSULE | Refills: 0 | Status: SHIPPED | OUTPATIENT
Start: 2018-10-16 | End: 2018-10-19 | Stop reason: SDUPTHER

## 2018-10-17 ENCOUNTER — TELEPHONE (OUTPATIENT)
Dept: PULMONOLOGY | Age: 38
End: 2018-10-17

## 2018-10-19 DIAGNOSIS — J45.20 MILD INTERMITTENT ASTHMA WITHOUT COMPLICATION: ICD-10-CM

## 2018-10-19 DIAGNOSIS — E78.2 MIXED HYPERLIPIDEMIA: ICD-10-CM

## 2018-10-19 DIAGNOSIS — K21.9 GASTROESOPHAGEAL REFLUX DISEASE, ESOPHAGITIS PRESENCE NOT SPECIFIED: ICD-10-CM

## 2018-10-19 DIAGNOSIS — F51.01 PRIMARY INSOMNIA: ICD-10-CM

## 2018-10-19 DIAGNOSIS — E11.40 TYPE 2 DIABETES MELLITUS WITH DIABETIC NEUROPATHY, WITH LONG-TERM CURRENT USE OF INSULIN (HCC): ICD-10-CM

## 2018-10-19 DIAGNOSIS — I10 ESSENTIAL HYPERTENSION: ICD-10-CM

## 2018-10-19 DIAGNOSIS — Z79.4 TYPE 2 DIABETES MELLITUS WITH DIABETIC NEUROPATHY, WITH LONG-TERM CURRENT USE OF INSULIN (HCC): ICD-10-CM

## 2018-10-19 DIAGNOSIS — E13.40 NEUROPATHY DUE TO SECONDARY DIABETES MELLITUS (HCC): ICD-10-CM

## 2018-10-19 DIAGNOSIS — G89.4 CHRONIC PAIN SYNDROME: ICD-10-CM

## 2018-10-19 RX ORDER — DULOXETIN HYDROCHLORIDE 30 MG/1
30 CAPSULE, DELAYED RELEASE ORAL 2 TIMES DAILY
Qty: 60 CAPSULE | Refills: 1 | Status: SHIPPED | OUTPATIENT
Start: 2018-10-19 | End: 2019-04-04 | Stop reason: SDUPTHER

## 2018-10-19 RX ORDER — OMEPRAZOLE 20 MG/1
CAPSULE, DELAYED RELEASE ORAL
Qty: 90 CAPSULE | Refills: 0 | Status: SHIPPED | OUTPATIENT
Start: 2018-10-19 | End: 2019-03-14 | Stop reason: SDUPTHER

## 2018-10-19 RX ORDER — MONTELUKAST SODIUM 10 MG/1
TABLET ORAL
Qty: 90 TABLET | Refills: 0 | Status: SHIPPED | OUTPATIENT
Start: 2018-10-19 | End: 2019-03-14 | Stop reason: SDUPTHER

## 2018-10-19 RX ORDER — METOPROLOL SUCCINATE 25 MG/1
TABLET, EXTENDED RELEASE ORAL
Qty: 90 TABLET | Refills: 0 | Status: SHIPPED | OUTPATIENT
Start: 2018-10-19 | End: 2018-12-17 | Stop reason: SDUPTHER

## 2018-10-19 RX ORDER — OMEGA-3-ACID ETHYL ESTERS 1 G/1
2 CAPSULE, LIQUID FILLED ORAL 2 TIMES DAILY
Qty: 360 CAPSULE | Refills: 1 | Status: SHIPPED | OUTPATIENT
Start: 2018-10-19 | End: 2019-03-14 | Stop reason: SDUPTHER

## 2018-10-19 RX ORDER — ATORVASTATIN CALCIUM 40 MG/1
40 TABLET, FILM COATED ORAL DAILY
Qty: 90 TABLET | Refills: 0 | Status: SHIPPED | OUTPATIENT
Start: 2018-10-19 | End: 2019-03-21

## 2018-10-19 RX ORDER — AMITRIPTYLINE HYDROCHLORIDE 25 MG/1
TABLET, FILM COATED ORAL
Qty: 28 TABLET | Refills: 1 | Status: SHIPPED | OUTPATIENT
Start: 2018-10-19 | End: 2019-04-04 | Stop reason: SDUPTHER

## 2018-11-05 RX ORDER — LIRAGLUTIDE 6 MG/ML
INJECTION SUBCUTANEOUS
Qty: 9 PEN | Refills: 0 | Status: SHIPPED | OUTPATIENT
Start: 2018-11-05 | End: 2018-12-07 | Stop reason: SDUPTHER

## 2018-11-07 ENCOUNTER — OFFICE VISIT (OUTPATIENT)
Dept: PAIN MANAGEMENT | Age: 38
End: 2018-11-07
Payer: MEDICARE

## 2018-11-07 VITALS
DIASTOLIC BLOOD PRESSURE: 98 MMHG | HEART RATE: 127 BPM | WEIGHT: 315 LBS | OXYGEN SATURATION: 96 % | BODY MASS INDEX: 42.27 KG/M2 | SYSTOLIC BLOOD PRESSURE: 182 MMHG

## 2018-11-07 DIAGNOSIS — E13.40 NEUROPATHY DUE TO SECONDARY DIABETES MELLITUS (HCC): ICD-10-CM

## 2018-11-07 DIAGNOSIS — F32.9 CURRENT EPISODE OF MAJOR DEPRESSIVE DISORDER WITHOUT PRIOR EPISODE, UNSPECIFIED DEPRESSION EPISODE SEVERITY: ICD-10-CM

## 2018-11-07 DIAGNOSIS — M96.1 FAILED BACK SURGICAL SYNDROME: ICD-10-CM

## 2018-11-07 DIAGNOSIS — M54.9 CHRONIC BACK PAIN, UNSPECIFIED BACK LOCATION, UNSPECIFIED BACK PAIN LATERALITY: ICD-10-CM

## 2018-11-07 DIAGNOSIS — E66.01 MORBID OBESITY WITH BODY MASS INDEX (BMI) OF 40.0 TO 44.9 IN ADULT (HCC): ICD-10-CM

## 2018-11-07 DIAGNOSIS — G89.29 CHRONIC BACK PAIN, UNSPECIFIED BACK LOCATION, UNSPECIFIED BACK PAIN LATERALITY: ICD-10-CM

## 2018-11-07 DIAGNOSIS — M48.00 CENTRAL SPINAL STENOSIS: ICD-10-CM

## 2018-11-07 DIAGNOSIS — S14.109S INJURY OF CERVICAL SPINAL CORD, SEQUELA (HCC): ICD-10-CM

## 2018-11-07 DIAGNOSIS — F51.01 PRIMARY INSOMNIA: ICD-10-CM

## 2018-11-07 DIAGNOSIS — G89.4 CHRONIC PAIN SYNDROME: ICD-10-CM

## 2018-11-07 PROCEDURE — 3044F HG A1C LEVEL LT 7.0%: CPT | Performed by: NURSE PRACTITIONER

## 2018-11-07 PROCEDURE — G8484 FLU IMMUNIZE NO ADMIN: HCPCS | Performed by: NURSE PRACTITIONER

## 2018-11-07 PROCEDURE — 99213 OFFICE O/P EST LOW 20 MIN: CPT | Performed by: NURSE PRACTITIONER

## 2018-11-07 PROCEDURE — G8427 DOCREV CUR MEDS BY ELIG CLIN: HCPCS | Performed by: NURSE PRACTITIONER

## 2018-11-07 PROCEDURE — 1036F TOBACCO NON-USER: CPT | Performed by: NURSE PRACTITIONER

## 2018-11-07 PROCEDURE — 2022F DILAT RTA XM EVC RTNOPTHY: CPT | Performed by: NURSE PRACTITIONER

## 2018-11-07 PROCEDURE — G8417 CALC BMI ABV UP PARAM F/U: HCPCS | Performed by: NURSE PRACTITIONER

## 2018-11-07 RX ORDER — HYDROCODONE BITARTRATE AND ACETAMINOPHEN 5; 325 MG/1; MG/1
1 TABLET ORAL EVERY 6 HOURS PRN
Qty: 112 TABLET | Refills: 0 | Status: SHIPPED | OUTPATIENT
Start: 2018-11-07 | End: 2018-12-05 | Stop reason: SDUPTHER

## 2018-11-07 RX ORDER — GABAPENTIN 300 MG/1
300 CAPSULE ORAL 3 TIMES DAILY
Qty: 90 CAPSULE | Refills: 1 | Status: SHIPPED | OUTPATIENT
Start: 2018-11-07 | End: 2018-12-05 | Stop reason: SDUPTHER

## 2018-11-07 NOTE — PROGRESS NOTES
hours. Denies to having issues of constipation. Tolerating activities/house chores with moderate tenderness to the lower back. ALLERGIES: Patients list of allergies were reviewed     MEDICATIONS: Mr. Camelia Still list of medications were reviewed. His current medications are   Outpatient Medications Prior to Visit   Medication Sig Dispense Refill    VICTOZA 18 MG/3ML SOPN SC injection DIAL AND INJECT SUBCUTANEOUSLY 1.8MG DAILY 9 pen 0    metoprolol succinate (TOPROL XL) 25 MG extended release tablet TAKE ONE TABLET BY MOUTH DAILY FOR FOR BLOOD PRESSURE AND HEART RATE 90 tablet 0    omega-3 acid ethyl esters (LOVAZA) 1 g capsule Take 2 capsules by mouth 2 times daily 360 capsule 1    atorvastatin (LIPITOR) 40 MG tablet Take 1 tablet by mouth daily 90 tablet 0    omeprazole (PRILOSEC) 20 MG delayed release capsule TAKE ONE CAPSULE BY MOUTH DAILY FOR STOMACH 90 capsule 0    metFORMIN (GLUCOPHAGE) 1000 MG tablet Take 1 tablet by mouth 2 times daily (with meals) For diabetes 180 tablet 0    montelukast (SINGULAIR) 10 MG tablet TAKE ONE TABLET BY MOUTH DAILY FOR BREATHING 90 tablet 0    amitriptyline (ELAVIL) 25 MG tablet TAKE ONE TABLET BY MOUTH NIGHTLY 28 tablet 1    canagliflozin (INVOKANA) 100 MG TABS tablet TAKE ONE TABLET BY MOUTH EVERY MORNING BEFORE BREAKFAST FOR DIABETES 90 tablet 0    DULoxetine (CYMBALTA) 30 MG extended release capsule Take 1 capsule by mouth 2 times daily 60 capsule 1    insulin aspart (NOVOLOG FLEXPEN) 100 UNIT/ML injection pen Inject 10 Units into the skin 3 times daily (before meals) 5 pen 3    Insulin Degludec (TRESIBA FLEXTOUCH) 100 UNIT/ML SOPN Inject 60 Units into the skin every evening For diabetes 9 pen 2    SYMBICORT 160-4.5 MCG/ACT AERO INHALE TWO PUFFS BY MOUTH TWICE A DAY FOR LONG TERM CONTROL OF ASTHMA, RINSE MOUTH OUT AFTER USE 1 Inhaler 2    lisinopril (PRINIVIL;ZESTRIL) 10 MG tablet TAKE ONE TABLET BY MOUTH DAILY FOR HIGH BLOOD PRESSURE 90 tablet 0    Insulin Pen regular rhythm, normal heart sounds, and does not have murmur. Pulmonary/Chest: Effort normal. No respiratory distress. He does not have wheezes in the lung fields. He has no rales. Neurological/Psychiatric:He is alert and oriented to person, place, and time. Coordination is  normal. His mood isAppropriate and affect is Neutral/Euthymic(normal) . IMPRESSION:   1. Chronic pain syndrome    2. Central spinal stenosis    3. Chronic back pain, unspecified back location, unspecified back pain laterality    4. Injury of cervical spinal cord, sequela ([address]    5. Failed back surgical syndrome    6. 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 Current episode of major depressive disorder without prior episode, unspecified depression episode severity    8. Primary insomnia    9. Morbid obesity with body mass index (BMI) of 40.0 to 44.9 in adult [facility])        PLAN:  Informed verbal consent was obtained  -Continue with Neurontin, Norco  -Jose Cruz exercises  -Commended patient on the weight loss, encouraged patient to continue with weight loss/exercises. He goes to the gym  -Monitor BP, f/u with PCP if it continues to stay elevated or he becomes symptomatic with SOB, CP, syncopy. he is currently asymptomatic  -CBT techniques- relaxation therapies such as biofeedback, mindfulness based stress reduction, imagery, cognitive restructuring, problem solving discussed with patient  -Last UDS consistent  -Return in about 4 weeks (around 12/5/2018). -[place]AR[place] record was obtained and reviewed  for the last one year and no indicators of drug misuse  were found. Any other controlled substance prescriptions  seen on the record have been accounted for, I am aware of the patient receiving these medications. Deidre Weems [place] record will be rechecked as part of office protocol.      Current Outpatient Prescriptions   Medication Sig Dispense Refill    HYDROcodone-acetaminophen (NORCO) 5-325 MG per tablet Take 1 tablet by mouth every

## 2018-11-12 RX ORDER — FLUTICASONE FUROATE AND VILANTEROL 100; 25 UG/1; UG/1
1 POWDER RESPIRATORY (INHALATION) DAILY
Qty: 1 EACH | Refills: 2 | Status: SHIPPED | OUTPATIENT
Start: 2018-11-12 | End: 2018-12-17 | Stop reason: ALTCHOICE

## 2018-11-19 ENCOUNTER — TELEPHONE (OUTPATIENT)
Dept: FAMILY MEDICINE CLINIC | Age: 38
End: 2018-11-19

## 2018-11-21 NOTE — TELEPHONE ENCOUNTER
PA RESPONSE: Symbicort 160-4. 5MCG/ACT IN [facility]  PA Case: [id number], Status: Approved, Coverage Starts on: 11/20/2018 12:00:00 AM, Coverage Ends on: 12/31/2019 12:00:00 AM.

## 2018-12-05 ENCOUNTER — OFFICE VISIT (OUTPATIENT)
Dept: PAIN MANAGEMENT | Age: 38
End: 2018-12-05
Payer: MEDICARE

## 2018-12-05 VITALS
OXYGEN SATURATION: 98 % | SYSTOLIC BLOOD PRESSURE: 139 MMHG | WEIGHT: 315 LBS | DIASTOLIC BLOOD PRESSURE: 99 MMHG | HEART RATE: 126 BPM | BODY MASS INDEX: 41.9 KG/M2

## 2018-12-05 DIAGNOSIS — F51.01 PRIMARY INSOMNIA: ICD-10-CM

## 2018-12-05 DIAGNOSIS — G89.29 CHRONIC BACK PAIN, UNSPECIFIED BACK LOCATION, UNSPECIFIED BACK PAIN LATERALITY: ICD-10-CM

## 2018-12-05 DIAGNOSIS — E13.40 NEUROPATHY DUE TO SECONDARY DIABETES MELLITUS (HCC): ICD-10-CM

## 2018-12-05 DIAGNOSIS — M48.00 CENTRAL SPINAL STENOSIS: ICD-10-CM

## 2018-12-05 DIAGNOSIS — M96.1 FAILED BACK SURGICAL SYNDROME: ICD-10-CM

## 2018-12-05 DIAGNOSIS — E66.01 MORBID OBESITY WITH BODY MASS INDEX (BMI) OF 40.0 TO 44.9 IN ADULT (HCC): ICD-10-CM

## 2018-12-05 DIAGNOSIS — F32.9 CURRENT EPISODE OF MAJOR DEPRESSIVE DISORDER WITHOUT PRIOR EPISODE, UNSPECIFIED DEPRESSION EPISODE SEVERITY: ICD-10-CM

## 2018-12-05 DIAGNOSIS — S14.109S INJURY OF CERVICAL SPINAL CORD, SEQUELA (HCC): ICD-10-CM

## 2018-12-05 DIAGNOSIS — G89.4 CHRONIC PAIN SYNDROME: ICD-10-CM

## 2018-12-05 DIAGNOSIS — M54.9 CHRONIC BACK PAIN, UNSPECIFIED BACK LOCATION, UNSPECIFIED BACK PAIN LATERALITY: ICD-10-CM

## 2018-12-05 PROCEDURE — G8417 CALC BMI ABV UP PARAM F/U: HCPCS | Performed by: NURSE PRACTITIONER

## 2018-12-05 PROCEDURE — 1036F TOBACCO NON-USER: CPT | Performed by: NURSE PRACTITIONER

## 2018-12-05 PROCEDURE — G8484 FLU IMMUNIZE NO ADMIN: HCPCS | Performed by: NURSE PRACTITIONER

## 2018-12-05 PROCEDURE — 3044F HG A1C LEVEL LT 7.0%: CPT | Performed by: NURSE PRACTITIONER

## 2018-12-05 PROCEDURE — G8427 DOCREV CUR MEDS BY ELIG CLIN: HCPCS | Performed by: NURSE PRACTITIONER

## 2018-12-05 PROCEDURE — 2022F DILAT RTA XM EVC RTNOPTHY: CPT | Performed by: NURSE PRACTITIONER

## 2018-12-05 PROCEDURE — 99213 OFFICE O/P EST LOW 20 MIN: CPT | Performed by: NURSE PRACTITIONER

## 2018-12-05 RX ORDER — GABAPENTIN 300 MG/1
300 CAPSULE ORAL 3 TIMES DAILY
Qty: 90 CAPSULE | Refills: 1 | Status: SHIPPED | OUTPATIENT
Start: 2018-12-05 | End: 2019-01-29 | Stop reason: SDUPTHER

## 2018-12-05 RX ORDER — HYDROCODONE BITARTRATE AND ACETAMINOPHEN 5; 325 MG/1; MG/1
1 TABLET ORAL EVERY 6 HOURS PRN
Qty: 112 TABLET | Refills: 0 | Status: SHIPPED | OUTPATIENT
Start: 2018-12-05 | End: 2019-01-03 | Stop reason: SDUPTHER

## 2018-12-05 NOTE — PROGRESS NOTES
MG tablet TAKE ONE TABLET BY MOUTH DAILY FOR HIGH BLOOD PRESSURE 90 tablet 0    Insulin Pen Needle 31G X 5 MM MISC 1 each by Does not apply route 2 times daily 100 each 3    docusate sodium (COLACE) 100 MG capsule Take 1 capsule by mouth 2 times daily 60 capsule 3    blood glucose test strips (TRUE METRIX BLOOD GLUCOSE TEST) strip Use to check BS  each 5    Lancets MISC Use to check BS  each 5    nystatin (MYCOSTATIN) 446584 UNIT/GM powder Apply 3 times daily for 7 days 1 Bottle 0    Blood Glucose Monitoring Suppl (TRUE METRIX METER) w/Device KIT Use to check fasting BS and 2 hours after meal BS 1 kit 0    Lancets MISC 1 each by Does not apply route daily TRUEMETRIX 100 each 2    HYDROcodone-acetaminophen (NORCO) 5-325 MG per tablet Take 1 tablet by mouth every 6 hours as needed for Pain for up to 28 days. No more than 4 tabs a day. 112 tablet 0    gabapentin (NEURONTIN) 300 MG capsule Take 1 capsule by mouth 3 times daily for 30 days. . 90 capsule 1     No facility-administered medications prior to visit. SOCIAL/FAMILY/PAST MEDICAL HISTORY: Mr. Kamila Gaston, family and past medical history was reviewed. REVIEW OF SYSTEMS:    Respiratory: Negative for apnea, chest tightness and shortness of breath or change in baseline breathing. Gastrointestinal: Negative for nausea, vomiting, abdominal pain, diarrhea, constipation, blood in stool and abdominal distention. PHYSICAL EXAM:   Nursing note and vitals reviewed. BP (!) 139/99   Pulse 126   Wt (!) 335 lb 3.2 oz (152 kg)   SpO2 98%   BMI 41.90 kg/m²   Constitutional: He appears well-developed and well-nourished. No acute distress. Skin: Skin is warm and dry, good turgor. No rash noted. He is not diaphoretic. Cardiovascular: Normal rate, regular rhythm, normal heart sounds, and does not have murmur. Pulmonary/Chest: Effort normal. No respiratory distress. He does not have wheezes in the lung fields. He has no rales.

## 2018-12-07 RX ORDER — LIRAGLUTIDE 6 MG/ML
INJECTION SUBCUTANEOUS
Qty: 3 PEN | Refills: 0 | Status: SHIPPED | OUTPATIENT
Start: 2018-12-07 | End: 2020-06-19

## 2018-12-17 ENCOUNTER — OFFICE VISIT (OUTPATIENT)
Dept: FAMILY MEDICINE CLINIC | Age: 38
End: 2018-12-17
Payer: MEDICARE

## 2018-12-17 VITALS
HEART RATE: 120 BPM | BODY MASS INDEX: 41.37 KG/M2 | WEIGHT: 315 LBS | DIASTOLIC BLOOD PRESSURE: 80 MMHG | SYSTOLIC BLOOD PRESSURE: 142 MMHG | RESPIRATION RATE: 18 BRPM

## 2018-12-17 DIAGNOSIS — I10 ESSENTIAL HYPERTENSION: ICD-10-CM

## 2018-12-17 DIAGNOSIS — Z79.4 TYPE 2 DIABETES MELLITUS WITH DIABETIC NEUROPATHY, WITH LONG-TERM CURRENT USE OF INSULIN (HCC): Primary | ICD-10-CM

## 2018-12-17 DIAGNOSIS — Z79.4 TYPE 2 DIABETES MELLITUS WITH DIABETIC NEUROPATHY, WITH LONG-TERM CURRENT USE OF INSULIN (HCC): ICD-10-CM

## 2018-12-17 DIAGNOSIS — E78.2 MIXED HYPERLIPIDEMIA: ICD-10-CM

## 2018-12-17 DIAGNOSIS — E11.40 TYPE 2 DIABETES MELLITUS WITH DIABETIC NEUROPATHY, WITH LONG-TERM CURRENT USE OF INSULIN (HCC): ICD-10-CM

## 2018-12-17 DIAGNOSIS — E66.01 MORBID OBESITY WITH BODY MASS INDEX (BMI) OF 40.0 TO 44.9 IN ADULT (HCC): ICD-10-CM

## 2018-12-17 DIAGNOSIS — E11.40 TYPE 2 DIABETES MELLITUS WITH DIABETIC NEUROPATHY, WITH LONG-TERM CURRENT USE OF INSULIN (HCC): Primary | ICD-10-CM

## 2018-12-17 DIAGNOSIS — J45.40 MODERATE PERSISTENT ASTHMA WITHOUT COMPLICATION: ICD-10-CM

## 2018-12-17 LAB
A/G RATIO: 1.7 (ref 1.1–2.2)
ALBUMIN SERPL-MCNC: 5.5 G/DL (ref 3.4–5)
ALP BLD-CCNC: 108 U/L (ref 40–129)
ALT SERPL-CCNC: 62 U/L (ref 10–40)
ANION GAP SERPL CALCULATED.3IONS-SCNC: 23 MMOL/L (ref 3–16)
AST SERPL-CCNC: 27 U/L (ref 15–37)
BILIRUB SERPL-MCNC: 0.7 MG/DL (ref 0–1)
BUN BLDV-MCNC: 13 MG/DL (ref 7–20)
CALCIUM SERPL-MCNC: 10.7 MG/DL (ref 8.3–10.6)
CHLORIDE BLD-SCNC: 97 MMOL/L (ref 99–110)
CO2: 24 MMOL/L (ref 21–32)
CREAT SERPL-MCNC: 1 MG/DL (ref 0.9–1.3)
GFR AFRICAN AMERICAN: >60
GFR NON-AFRICAN AMERICAN: >60
GLOBULIN: 3.2 G/DL
GLUCOSE BLD-MCNC: 144 MG/DL (ref 70–99)
HBA1C MFR BLD: 6.7 %
POTASSIUM SERPL-SCNC: 4.4 MMOL/L (ref 3.5–5.1)
SODIUM BLD-SCNC: 144 MMOL/L (ref 136–145)
TOTAL PROTEIN: 8.7 G/DL (ref 6.4–8.2)

## 2018-12-17 PROCEDURE — G8484 FLU IMMUNIZE NO ADMIN: HCPCS | Performed by: NURSE PRACTITIONER

## 2018-12-17 PROCEDURE — 2022F DILAT RTA XM EVC RTNOPTHY: CPT | Performed by: NURSE PRACTITIONER

## 2018-12-17 PROCEDURE — G8427 DOCREV CUR MEDS BY ELIG CLIN: HCPCS | Performed by: NURSE PRACTITIONER

## 2018-12-17 PROCEDURE — 3044F HG A1C LEVEL LT 7.0%: CPT | Performed by: NURSE PRACTITIONER

## 2018-12-17 PROCEDURE — 99214 OFFICE O/P EST MOD 30 MIN: CPT | Performed by: NURSE PRACTITIONER

## 2018-12-17 PROCEDURE — 83036 HEMOGLOBIN GLYCOSYLATED A1C: CPT | Performed by: NURSE PRACTITIONER

## 2018-12-17 PROCEDURE — 1036F TOBACCO NON-USER: CPT | Performed by: NURSE PRACTITIONER

## 2018-12-17 PROCEDURE — G8417 CALC BMI ABV UP PARAM F/U: HCPCS | Performed by: NURSE PRACTITIONER

## 2018-12-17 RX ORDER — ALBUTEROL SULFATE 90 UG/1
AEROSOL, METERED RESPIRATORY (INHALATION)
Qty: 2 INHALER | Refills: 1 | Status: SHIPPED | OUTPATIENT
Start: 2018-12-17 | End: 2019-02-11 | Stop reason: SDUPTHER

## 2018-12-17 RX ORDER — BUDESONIDE AND FORMOTEROL FUMARATE DIHYDRATE 160; 4.5 UG/1; UG/1
AEROSOL RESPIRATORY (INHALATION)
Qty: 3 INHALER | Refills: 1 | Status: SHIPPED | OUTPATIENT
Start: 2018-12-17 | End: 2019-03-19 | Stop reason: SDUPTHER

## 2018-12-17 RX ORDER — METOPROLOL SUCCINATE 50 MG/1
TABLET, EXTENDED RELEASE ORAL
Qty: 90 TABLET | Refills: 1 | Status: SHIPPED | OUTPATIENT
Start: 2018-12-17 | End: 2019-03-14 | Stop reason: SDUPTHER

## 2018-12-17 NOTE — PROGRESS NOTES
Jc Hinkle ) 142/80   12/05/18 (!) 139/99   11/07/18 (!) 182/98   10/10/18 137/89   09/12/18 (!) 160/103     Weight is decreased  Wt Readings from Last 5 Encounters:   12/17/18 (!) 331 lb (150.1 kg)   12/05/18 (!) 335 lb 3.2 oz (152 kg)   11/07/18 (!) 338 lb 3.2 oz (153.4 kg)   10/10/18 (!) 331 lb 3.2 oz (150.2 kg)   09/12/18 (!) 337 lb 9.6 oz (153.1 kg)      GENERAL: well-developed, well-nourished, alert, no distress, calm, assistive device: cane    EYES:  negative findings: lids and lashes normal  LUNGS:  Breathing unlabored  · clear to auscultation bilaterally and good air movement  CARDIOVASC: regular rate and rhythm, S1, S2 normal, no murmur, click, rub or gallop  · Apical impulse normal  LEGS:  Lower extremity edema: trace jersey ankles   · Pedal pulses palpable     Assessment and Plan:     1. Type 2 diabetes mellitus with diabetic neuropathy, with long-term current use of insulin ([facility])  Hemoglobin A1C- 6.7%. Improving diabetes control. Goal HgA1c is less than 7.0%. metformin to 1000 mg BID. victoza 1.8 mg SQ daily  invokana 100 mg daily   Tresiba 60 units qhs. Novolog to 10 units before meals. He did not bring his BS log. Advised patient to check fasting BS, before meal BS and 2 hour after meal BS. He is to call me in 2 weeks with BS readings. Patient is to bring BS log in at visits. Low carb diet, aerobic exercise, and weight loss discussed and needed. On gabapentin 300 mg TID for neuropathy from pain specialist.    2. Morbid obesity with BMI of 40.0-45.9, adult ([facility])  Seen Dr. Jv Carias and has discussed gastric sleeve surgery. Patient reports that this is currently on hold. Weight has decreased with increasing exercise. Diet, aerobic exercise, and weight loss discussed and needed    3. Mixed hyperlipidemia  On atorvastatin 40 mg daily and lovaza 2 grams BID  Cholesterol controlled 7/18. Low fat/cholesterol diet and weight loss discussed and needed. 4. Essential hypertension  Uncontrolled.  Continue

## 2018-12-18 DIAGNOSIS — E83.52 HYPERCALCEMIA: Primary | ICD-10-CM

## 2018-12-19 DIAGNOSIS — E83.52 HYPERCALCEMIA: ICD-10-CM

## 2018-12-19 LAB — PARATHYROID HORMONE INTACT: 20.6 PG/ML (ref 14–72)

## 2019-01-03 ENCOUNTER — OFFICE VISIT (OUTPATIENT)
Dept: PAIN MANAGEMENT | Age: 39
End: 2019-01-03
Payer: MEDICARE

## 2019-01-03 VITALS
SYSTOLIC BLOOD PRESSURE: 148 MMHG | OXYGEN SATURATION: 96 % | HEART RATE: 92 BPM | BODY MASS INDEX: 41.62 KG/M2 | DIASTOLIC BLOOD PRESSURE: 95 MMHG | WEIGHT: 315 LBS

## 2019-01-03 DIAGNOSIS — E13.40 NEUROPATHY DUE TO SECONDARY DIABETES MELLITUS (HCC): ICD-10-CM

## 2019-01-03 DIAGNOSIS — F51.01 PRIMARY INSOMNIA: ICD-10-CM

## 2019-01-03 DIAGNOSIS — E66.01 MORBID OBESITY WITH BODY MASS INDEX (BMI) OF 40.0 TO 44.9 IN ADULT (HCC): ICD-10-CM

## 2019-01-03 DIAGNOSIS — M54.9 CHRONIC BACK PAIN, UNSPECIFIED BACK LOCATION, UNSPECIFIED BACK PAIN LATERALITY: ICD-10-CM

## 2019-01-03 DIAGNOSIS — M96.1 FAILED BACK SURGICAL SYNDROME: ICD-10-CM

## 2019-01-03 DIAGNOSIS — G89.4 CHRONIC PAIN SYNDROME: ICD-10-CM

## 2019-01-03 DIAGNOSIS — I10 ESSENTIAL HYPERTENSION: ICD-10-CM

## 2019-01-03 DIAGNOSIS — M48.00 CENTRAL SPINAL STENOSIS: ICD-10-CM

## 2019-01-03 DIAGNOSIS — S14.109S INJURY OF CERVICAL SPINAL CORD, SEQUELA (HCC): ICD-10-CM

## 2019-01-03 DIAGNOSIS — G89.29 CHRONIC BACK PAIN, UNSPECIFIED BACK LOCATION, UNSPECIFIED BACK PAIN LATERALITY: ICD-10-CM

## 2019-01-03 DIAGNOSIS — F32.9 CURRENT EPISODE OF MAJOR DEPRESSIVE DISORDER WITHOUT PRIOR EPISODE, UNSPECIFIED DEPRESSION EPISODE SEVERITY: ICD-10-CM

## 2019-01-03 PROCEDURE — 2022F DILAT RTA XM EVC RTNOPTHY: CPT | Performed by: NURSE PRACTITIONER

## 2019-01-03 PROCEDURE — G8427 DOCREV CUR MEDS BY ELIG CLIN: HCPCS | Performed by: NURSE PRACTITIONER

## 2019-01-03 PROCEDURE — 99213 OFFICE O/P EST LOW 20 MIN: CPT | Performed by: NURSE PRACTITIONER

## 2019-01-03 PROCEDURE — G8484 FLU IMMUNIZE NO ADMIN: HCPCS | Performed by: NURSE PRACTITIONER

## 2019-01-03 PROCEDURE — G8417 CALC BMI ABV UP PARAM F/U: HCPCS | Performed by: NURSE PRACTITIONER

## 2019-01-03 PROCEDURE — 3046F HEMOGLOBIN A1C LEVEL >9.0%: CPT | Performed by: NURSE PRACTITIONER

## 2019-01-03 PROCEDURE — 1036F TOBACCO NON-USER: CPT | Performed by: NURSE PRACTITIONER

## 2019-01-03 RX ORDER — LISINOPRIL 10 MG/1
TABLET ORAL
Qty: 90 TABLET | Refills: 0 | Status: SHIPPED | OUTPATIENT
Start: 2019-01-03 | End: 2019-03-19

## 2019-01-03 RX ORDER — HYDROCODONE BITARTRATE AND ACETAMINOPHEN 5; 325 MG/1; MG/1
1 TABLET ORAL EVERY 6 HOURS PRN
Qty: 112 TABLET | Refills: 0 | Status: SHIPPED | OUTPATIENT
Start: 2019-01-03 | End: 2019-01-29 | Stop reason: SDUPTHER

## 2019-01-29 ENCOUNTER — OFFICE VISIT (OUTPATIENT)
Dept: PAIN MANAGEMENT | Age: 39
End: 2019-01-29
Payer: MEDICARE

## 2019-01-29 VITALS
HEART RATE: 100 BPM | DIASTOLIC BLOOD PRESSURE: 97 MMHG | BODY MASS INDEX: 41.47 KG/M2 | WEIGHT: 315 LBS | OXYGEN SATURATION: 93 % | SYSTOLIC BLOOD PRESSURE: 147 MMHG

## 2019-01-29 DIAGNOSIS — G89.29 CHRONIC BACK PAIN, UNSPECIFIED BACK LOCATION, UNSPECIFIED BACK PAIN LATERALITY: ICD-10-CM

## 2019-01-29 DIAGNOSIS — E13.40 NEUROPATHY DUE TO SECONDARY DIABETES MELLITUS (HCC): ICD-10-CM

## 2019-01-29 DIAGNOSIS — F51.01 PRIMARY INSOMNIA: ICD-10-CM

## 2019-01-29 DIAGNOSIS — M79.641 RIGHT HAND PAIN: ICD-10-CM

## 2019-01-29 DIAGNOSIS — M54.9 CHRONIC BACK PAIN, UNSPECIFIED BACK LOCATION, UNSPECIFIED BACK PAIN LATERALITY: ICD-10-CM

## 2019-01-29 DIAGNOSIS — F32.9 CURRENT EPISODE OF MAJOR DEPRESSIVE DISORDER WITHOUT PRIOR EPISODE, UNSPECIFIED DEPRESSION EPISODE SEVERITY: ICD-10-CM

## 2019-01-29 DIAGNOSIS — S14.109S INJURY OF CERVICAL SPINAL CORD, SEQUELA (HCC): ICD-10-CM

## 2019-01-29 DIAGNOSIS — M96.1 FAILED BACK SURGICAL SYNDROME: ICD-10-CM

## 2019-01-29 DIAGNOSIS — M48.00 CENTRAL SPINAL STENOSIS: ICD-10-CM

## 2019-01-29 DIAGNOSIS — E66.01 MORBID OBESITY WITH BODY MASS INDEX (BMI) OF 40.0 TO 44.9 IN ADULT (HCC): ICD-10-CM

## 2019-01-29 DIAGNOSIS — G89.4 CHRONIC PAIN SYNDROME: Primary | ICD-10-CM

## 2019-01-29 PROCEDURE — 3046F HEMOGLOBIN A1C LEVEL >9.0%: CPT | Performed by: NURSE PRACTITIONER

## 2019-01-29 PROCEDURE — G8484 FLU IMMUNIZE NO ADMIN: HCPCS | Performed by: NURSE PRACTITIONER

## 2019-01-29 PROCEDURE — G8417 CALC BMI ABV UP PARAM F/U: HCPCS | Performed by: NURSE PRACTITIONER

## 2019-01-29 PROCEDURE — 2022F DILAT RTA XM EVC RTNOPTHY: CPT | Performed by: NURSE PRACTITIONER

## 2019-01-29 PROCEDURE — G8427 DOCREV CUR MEDS BY ELIG CLIN: HCPCS | Performed by: NURSE PRACTITIONER

## 2019-01-29 PROCEDURE — 1036F TOBACCO NON-USER: CPT | Performed by: NURSE PRACTITIONER

## 2019-01-29 PROCEDURE — 99213 OFFICE O/P EST LOW 20 MIN: CPT | Performed by: NURSE PRACTITIONER

## 2019-01-29 RX ORDER — HYDROCODONE BITARTRATE AND ACETAMINOPHEN 5; 325 MG/1; MG/1
1 TABLET ORAL EVERY 6 HOURS PRN
Qty: 112 TABLET | Refills: 0 | Status: SHIPPED | OUTPATIENT
Start: 2019-01-29 | End: 2019-02-26 | Stop reason: SDUPTHER

## 2019-01-29 RX ORDER — GABAPENTIN 300 MG/1
300 CAPSULE ORAL 3 TIMES DAILY
Qty: 90 CAPSULE | Refills: 1 | Status: SHIPPED | OUTPATIENT
Start: 2019-01-29 | End: 2019-02-26 | Stop reason: SDUPTHER

## 2019-02-11 ENCOUNTER — OFFICE VISIT (OUTPATIENT)
Dept: PULMONOLOGY | Age: 39
End: 2019-02-11
Payer: MEDICARE

## 2019-02-11 VITALS
OXYGEN SATURATION: 96 % | BODY MASS INDEX: 39.17 KG/M2 | TEMPERATURE: 97.7 F | HEART RATE: 100 BPM | SYSTOLIC BLOOD PRESSURE: 120 MMHG | DIASTOLIC BLOOD PRESSURE: 86 MMHG | RESPIRATION RATE: 16 BRPM | HEIGHT: 75 IN | WEIGHT: 315 LBS

## 2019-02-11 DIAGNOSIS — J45.40 MODERATE PERSISTENT ASTHMA WITHOUT COMPLICATION: ICD-10-CM

## 2019-02-11 PROCEDURE — G8417 CALC BMI ABV UP PARAM F/U: HCPCS | Performed by: INTERNAL MEDICINE

## 2019-02-11 PROCEDURE — 1036F TOBACCO NON-USER: CPT | Performed by: INTERNAL MEDICINE

## 2019-02-11 PROCEDURE — G8484 FLU IMMUNIZE NO ADMIN: HCPCS | Performed by: INTERNAL MEDICINE

## 2019-02-11 PROCEDURE — G8427 DOCREV CUR MEDS BY ELIG CLIN: HCPCS | Performed by: INTERNAL MEDICINE

## 2019-02-11 PROCEDURE — 99213 OFFICE O/P EST LOW 20 MIN: CPT | Performed by: INTERNAL MEDICINE

## 2019-02-11 RX ORDER — BUDESONIDE AND FORMOTEROL FUMARATE DIHYDRATE 160; 4.5 UG/1; UG/1
2 AEROSOL RESPIRATORY (INHALATION) 2 TIMES DAILY
Qty: 1 INHALER | Refills: 6 | Status: SHIPPED | OUTPATIENT
Start: 2019-02-11 | End: 2019-05-20 | Stop reason: SDUPTHER

## 2019-02-11 RX ORDER — IPRATROPIUM BROMIDE AND ALBUTEROL SULFATE 2.5; .5 MG/3ML; MG/3ML
1 SOLUTION RESPIRATORY (INHALATION) EVERY 6 HOURS PRN
Qty: 360 ML | Refills: 5 | Status: SHIPPED | OUTPATIENT
Start: 2019-02-11 | End: 2020-10-27 | Stop reason: SDUPTHER

## 2019-02-11 RX ORDER — ALBUTEROL SULFATE 90 UG/1
AEROSOL, METERED RESPIRATORY (INHALATION)
Qty: 2 INHALER | Refills: 5 | Status: SHIPPED | OUTPATIENT
Start: 2019-02-11 | End: 2020-03-05 | Stop reason: SDUPTHER

## 2019-02-11 ASSESSMENT — ASTHMA QUESTIONNAIRES
QUESTION_2 LAST FOUR WEEKS HOW OFTEN HAVE YOU HAD SHORTNESS OF BREATH: 2
QUESTION_5 LAST FOUR WEEKS HOW WOULD YOU RATE YOUR ASTHMA CONTROL: 3
QUESTION_4 LAST FOUR WEEKS HOW OFTEN HAVE YOU USED YOUR RESCUE INHALER OR NEBULIZER MEDICATION (SUCH AS ALBUTEROL): 1
QUESTION_3 LAST FOUR WEEKS HOW OFTEN DID YOUR ASTHMA SYMPTOMS (WHEEZING, COUGHING, SHORTNESS OF BREATH, CHEST TIGHTNESS OR PAIN) WAKE YOU UP AT NIGHT OR EARLIER THAN USUAL IN THE MORNING: 2
QUESTION_1 LAST FOUR WEEKS HOW MUCH OF THE TIME DID YOUR ASTHMA KEEP YOU FROM GETTING AS MUCH DONE AT WORK, SCHOOL OR AT HOME: 3
ACT_TOTALSCORE: 11

## 2019-02-26 ENCOUNTER — OFFICE VISIT (OUTPATIENT)
Dept: PAIN MANAGEMENT | Age: 39
End: 2019-02-26
Payer: MEDICARE

## 2019-02-26 VITALS
SYSTOLIC BLOOD PRESSURE: 151 MMHG | HEART RATE: 112 BPM | BODY MASS INDEX: 41.37 KG/M2 | WEIGHT: 315 LBS | DIASTOLIC BLOOD PRESSURE: 90 MMHG | OXYGEN SATURATION: 95 %

## 2019-02-26 DIAGNOSIS — E13.40 NEUROPATHY DUE TO SECONDARY DIABETES MELLITUS (HCC): ICD-10-CM

## 2019-02-26 DIAGNOSIS — E66.01 MORBID OBESITY WITH BODY MASS INDEX (BMI) OF 40.0 TO 44.9 IN ADULT (HCC): ICD-10-CM

## 2019-02-26 DIAGNOSIS — M48.00 CENTRAL SPINAL STENOSIS: ICD-10-CM

## 2019-02-26 DIAGNOSIS — F32.9 CURRENT EPISODE OF MAJOR DEPRESSIVE DISORDER WITHOUT PRIOR EPISODE, UNSPECIFIED DEPRESSION EPISODE SEVERITY: ICD-10-CM

## 2019-02-26 DIAGNOSIS — G89.4 CHRONIC PAIN SYNDROME: Primary | ICD-10-CM

## 2019-02-26 DIAGNOSIS — S14.109S INJURY OF CERVICAL SPINAL CORD, SEQUELA (HCC): ICD-10-CM

## 2019-02-26 DIAGNOSIS — M54.9 CHRONIC BACK PAIN, UNSPECIFIED BACK LOCATION, UNSPECIFIED BACK PAIN LATERALITY: ICD-10-CM

## 2019-02-26 DIAGNOSIS — G89.29 CHRONIC BACK PAIN, UNSPECIFIED BACK LOCATION, UNSPECIFIED BACK PAIN LATERALITY: ICD-10-CM

## 2019-02-26 DIAGNOSIS — F51.01 PRIMARY INSOMNIA: ICD-10-CM

## 2019-02-26 DIAGNOSIS — G89.4 CHRONIC PAIN SYNDROME: ICD-10-CM

## 2019-02-26 DIAGNOSIS — M96.1 FAILED BACK SURGICAL SYNDROME: ICD-10-CM

## 2019-02-26 PROBLEM — M79.641 RIGHT HAND PAIN: Status: ACTIVE | Noted: 2019-02-26

## 2019-02-26 PROCEDURE — G8484 FLU IMMUNIZE NO ADMIN: HCPCS | Performed by: NURSE PRACTITIONER

## 2019-02-26 PROCEDURE — 3046F HEMOGLOBIN A1C LEVEL >9.0%: CPT | Performed by: NURSE PRACTITIONER

## 2019-02-26 PROCEDURE — 99213 OFFICE O/P EST LOW 20 MIN: CPT | Performed by: NURSE PRACTITIONER

## 2019-02-26 PROCEDURE — G8417 CALC BMI ABV UP PARAM F/U: HCPCS | Performed by: NURSE PRACTITIONER

## 2019-02-26 PROCEDURE — 2022F DILAT RTA XM EVC RTNOPTHY: CPT | Performed by: NURSE PRACTITIONER

## 2019-02-26 PROCEDURE — 1036F TOBACCO NON-USER: CPT | Performed by: NURSE PRACTITIONER

## 2019-02-26 PROCEDURE — G8427 DOCREV CUR MEDS BY ELIG CLIN: HCPCS | Performed by: NURSE PRACTITIONER

## 2019-02-26 RX ORDER — GABAPENTIN 300 MG/1
300 CAPSULE ORAL 3 TIMES DAILY
Qty: 90 CAPSULE | Refills: 1 | Status: SHIPPED | OUTPATIENT
Start: 2019-02-26 | End: 2019-03-26 | Stop reason: SDUPTHER

## 2019-02-26 RX ORDER — GABAPENTIN 300 MG/1
300 CAPSULE ORAL 3 TIMES DAILY
Qty: 90 CAPSULE | Refills: 1 | OUTPATIENT
Start: 2019-02-26 | End: 2019-03-28

## 2019-02-26 RX ORDER — HYDROCODONE BITARTRATE AND ACETAMINOPHEN 5; 325 MG/1; MG/1
1 TABLET ORAL EVERY 6 HOURS PRN
Qty: 112 TABLET | Refills: 0 | Status: SHIPPED | OUTPATIENT
Start: 2019-02-26 | End: 2019-03-26 | Stop reason: SDUPTHER

## 2019-03-13 DIAGNOSIS — K21.9 GASTROESOPHAGEAL REFLUX DISEASE, ESOPHAGITIS PRESENCE NOT SPECIFIED: ICD-10-CM

## 2019-03-13 DIAGNOSIS — J45.20 MILD INTERMITTENT ASTHMA WITHOUT COMPLICATION: ICD-10-CM

## 2019-03-13 DIAGNOSIS — E11.40 TYPE 2 DIABETES MELLITUS WITH DIABETIC NEUROPATHY, WITH LONG-TERM CURRENT USE OF INSULIN (HCC): ICD-10-CM

## 2019-03-13 DIAGNOSIS — Z79.4 TYPE 2 DIABETES MELLITUS WITH DIABETIC NEUROPATHY, WITH LONG-TERM CURRENT USE OF INSULIN (HCC): ICD-10-CM

## 2019-03-13 DIAGNOSIS — E78.2 MIXED HYPERLIPIDEMIA: ICD-10-CM

## 2019-03-14 RX ORDER — MONTELUKAST SODIUM 10 MG/1
TABLET ORAL
Qty: 90 TABLET | Refills: 0 | Status: SHIPPED | OUTPATIENT
Start: 2019-03-14 | End: 2019-06-03 | Stop reason: SDUPTHER

## 2019-03-14 RX ORDER — OMEGA-3-ACID ETHYL ESTERS 1 G/1
2 CAPSULE, LIQUID FILLED ORAL 2 TIMES DAILY
Qty: 360 CAPSULE | Refills: 0 | Status: SHIPPED | OUTPATIENT
Start: 2019-03-14 | End: 2022-04-07

## 2019-03-14 RX ORDER — OMEPRAZOLE 20 MG/1
CAPSULE, DELAYED RELEASE ORAL
Qty: 90 CAPSULE | Refills: 0 | Status: SHIPPED | OUTPATIENT
Start: 2019-03-14 | End: 2019-06-03 | Stop reason: SDUPTHER

## 2019-03-19 ENCOUNTER — OFFICE VISIT (OUTPATIENT)
Dept: FAMILY MEDICINE CLINIC | Age: 39
End: 2019-03-19
Payer: MEDICARE

## 2019-03-19 VITALS
WEIGHT: 315 LBS | DIASTOLIC BLOOD PRESSURE: 88 MMHG | BODY MASS INDEX: 42.37 KG/M2 | SYSTOLIC BLOOD PRESSURE: 130 MMHG | RESPIRATION RATE: 18 BRPM | HEART RATE: 99 BPM

## 2019-03-19 DIAGNOSIS — E11.40 TYPE 2 DIABETES MELLITUS WITH DIABETIC NEUROPATHY, WITH LONG-TERM CURRENT USE OF INSULIN (HCC): Primary | ICD-10-CM

## 2019-03-19 DIAGNOSIS — E78.2 MIXED HYPERLIPIDEMIA: ICD-10-CM

## 2019-03-19 DIAGNOSIS — L97.519 DIABETIC ULCER OF TOE OF RIGHT FOOT ASSOCIATED WITH TYPE 2 DIABETES MELLITUS, UNSPECIFIED ULCER STAGE (HCC): ICD-10-CM

## 2019-03-19 DIAGNOSIS — E11.621 DIABETIC ULCER OF TOE OF RIGHT FOOT ASSOCIATED WITH TYPE 2 DIABETES MELLITUS, UNSPECIFIED ULCER STAGE (HCC): ICD-10-CM

## 2019-03-19 DIAGNOSIS — E11.40 TYPE 2 DIABETES MELLITUS WITH DIABETIC NEUROPATHY, WITH LONG-TERM CURRENT USE OF INSULIN (HCC): ICD-10-CM

## 2019-03-19 DIAGNOSIS — J45.40 MODERATE PERSISTENT ASTHMA WITHOUT COMPLICATION: ICD-10-CM

## 2019-03-19 DIAGNOSIS — I10 ESSENTIAL HYPERTENSION: ICD-10-CM

## 2019-03-19 DIAGNOSIS — Z79.4 TYPE 2 DIABETES MELLITUS WITH DIABETIC NEUROPATHY, WITH LONG-TERM CURRENT USE OF INSULIN (HCC): Primary | ICD-10-CM

## 2019-03-19 DIAGNOSIS — Z79.4 TYPE 2 DIABETES MELLITUS WITH DIABETIC NEUROPATHY, WITH LONG-TERM CURRENT USE OF INSULIN (HCC): ICD-10-CM

## 2019-03-19 DIAGNOSIS — E66.01 MORBID OBESITY WITH BMI OF 40.0-44.9, ADULT (HCC): ICD-10-CM

## 2019-03-19 LAB
A/G RATIO: 1.6 (ref 1.1–2.2)
ALBUMIN SERPL-MCNC: 5.2 G/DL (ref 3.4–5)
ALP BLD-CCNC: 95 U/L (ref 40–129)
ALT SERPL-CCNC: 48 U/L (ref 10–40)
ANION GAP SERPL CALCULATED.3IONS-SCNC: 17 MMOL/L (ref 3–16)
AST SERPL-CCNC: 23 U/L (ref 15–37)
BILIRUB SERPL-MCNC: 0.5 MG/DL (ref 0–1)
BUN BLDV-MCNC: 16 MG/DL (ref 7–20)
CALCIUM SERPL-MCNC: 11 MG/DL (ref 8.3–10.6)
CHLORIDE BLD-SCNC: 93 MMOL/L (ref 99–110)
CHOLESTEROL, TOTAL: 224 MG/DL (ref 0–199)
CO2: 27 MMOL/L (ref 21–32)
CREAT SERPL-MCNC: 0.8 MG/DL (ref 0.9–1.3)
CREATININE URINE: 97.1 MG/DL (ref 39–259)
GFR AFRICAN AMERICAN: >60
GFR NON-AFRICAN AMERICAN: >60
GLOBULIN: 3.2 G/DL
GLUCOSE BLD-MCNC: 187 MG/DL (ref 70–99)
HBA1C MFR BLD: 7.2 %
HDLC SERPL-MCNC: 38 MG/DL (ref 40–60)
LDL CHOLESTEROL CALCULATED: ABNORMAL MG/DL
LDL CHOLESTEROL DIRECT: 146 MG/DL
MICROALBUMIN UR-MCNC: <1.2 MG/DL
MICROALBUMIN/CREAT UR-RTO: NORMAL MG/G (ref 0–30)
POTASSIUM SERPL-SCNC: 5.1 MMOL/L (ref 3.5–5.1)
SODIUM BLD-SCNC: 137 MMOL/L (ref 136–145)
TOTAL PROTEIN: 8.4 G/DL (ref 6.4–8.2)
TRIGL SERPL-MCNC: 332 MG/DL (ref 0–150)
VLDLC SERPL CALC-MCNC: ABNORMAL MG/DL

## 2019-03-19 PROCEDURE — 2022F DILAT RTA XM EVC RTNOPTHY: CPT | Performed by: NURSE PRACTITIONER

## 2019-03-19 PROCEDURE — G8417 CALC BMI ABV UP PARAM F/U: HCPCS | Performed by: NURSE PRACTITIONER

## 2019-03-19 PROCEDURE — 3045F PR MOST RECENT HEMOGLOBIN A1C LEVEL 7.0-9.0%: CPT | Performed by: NURSE PRACTITIONER

## 2019-03-19 PROCEDURE — G8484 FLU IMMUNIZE NO ADMIN: HCPCS | Performed by: NURSE PRACTITIONER

## 2019-03-19 PROCEDURE — G8427 DOCREV CUR MEDS BY ELIG CLIN: HCPCS | Performed by: NURSE PRACTITIONER

## 2019-03-19 PROCEDURE — 1036F TOBACCO NON-USER: CPT | Performed by: NURSE PRACTITIONER

## 2019-03-19 PROCEDURE — 83036 HEMOGLOBIN GLYCOSYLATED A1C: CPT | Performed by: NURSE PRACTITIONER

## 2019-03-19 PROCEDURE — 99214 OFFICE O/P EST MOD 30 MIN: CPT | Performed by: NURSE PRACTITIONER

## 2019-03-19 RX ORDER — LISINOPRIL 20 MG/1
20 TABLET ORAL DAILY
Qty: 90 TABLET | Refills: 0 | Status: SHIPPED | OUTPATIENT
Start: 2019-03-19 | End: 2019-06-03 | Stop reason: SDUPTHER

## 2019-03-20 ENCOUNTER — HOSPITAL ENCOUNTER (OUTPATIENT)
Dept: WOUND CARE | Age: 39
Discharge: HOME | End: 2019-03-20
Payer: MEDICARE

## 2019-03-20 VITALS
HEIGHT: 73 IN | RESPIRATION RATE: 18 BRPM | BODY MASS INDEX: 41.75 KG/M2 | HEART RATE: 115 BPM | DIASTOLIC BLOOD PRESSURE: 54 MMHG | WEIGHT: 315 LBS | TEMPERATURE: 97.6 F | SYSTOLIC BLOOD PRESSURE: 134 MMHG

## 2019-03-20 DIAGNOSIS — E10.621 DIABETIC ULCER OF TOE OF RIGHT FOOT ASSOCIATED WITH TYPE 1 DIABETES MELLITUS, WITH FAT LAYER EXPOSED (HCC): ICD-10-CM

## 2019-03-20 DIAGNOSIS — L97.512 DIABETIC ULCER OF TOE OF RIGHT FOOT ASSOCIATED WITH TYPE 1 DIABETES MELLITUS, WITH FAT LAYER EXPOSED (HCC): ICD-10-CM

## 2019-03-20 DIAGNOSIS — E83.52 HYPERCALCEMIA: Primary | ICD-10-CM

## 2019-03-20 PROBLEM — L97.509 DIABETIC ULCER OF TOE (HCC): Status: ACTIVE | Noted: 2019-03-20

## 2019-03-20 PROBLEM — E11.621 DIABETIC ULCER OF TOE (HCC): Status: ACTIVE | Noted: 2019-03-20

## 2019-03-20 PROCEDURE — 99213 OFFICE O/P EST LOW 20 MIN: CPT

## 2019-03-20 PROCEDURE — 11042 DBRDMT SUBQ TIS 1ST 20SQCM/<: CPT

## 2019-03-20 RX ORDER — LIDOCAINE HYDROCHLORIDE 40 MG/ML
SOLUTION TOPICAL PRN
Status: DISCONTINUED | OUTPATIENT
Start: 2019-03-20 | End: 2019-03-21 | Stop reason: HOSPADM

## 2019-03-20 RX ORDER — CYANOCOBALAMIN 1000 UG/ML
1000 INJECTION INTRAMUSCULAR; SUBCUTANEOUS
COMMUNITY
End: 2020-07-23

## 2019-03-20 ASSESSMENT — PAIN SCALES - GENERAL
PAINLEVEL_OUTOF10: 0
PAINLEVEL_OUTOF10: 0

## 2019-03-21 DIAGNOSIS — E78.2 MIXED HYPERLIPIDEMIA: ICD-10-CM

## 2019-03-21 DIAGNOSIS — E83.52 HYPERCALCEMIA: ICD-10-CM

## 2019-03-21 LAB — PARATHYROID HORMONE INTACT: 2.8 PG/ML (ref 14–72)

## 2019-03-21 RX ORDER — ATORVASTATIN CALCIUM 80 MG/1
80 TABLET, FILM COATED ORAL DAILY
Qty: 90 TABLET | Refills: 0 | Status: SHIPPED | OUTPATIENT
Start: 2019-03-21 | End: 2019-06-03 | Stop reason: SDUPTHER

## 2019-03-22 ENCOUNTER — TELEPHONE (OUTPATIENT)
Dept: FAMILY MEDICINE CLINIC | Age: 39
End: 2019-03-22

## 2019-03-24 LAB
CALCIUM IONIZED, CALC AT PH 7.4: 1.33 MMOL/L (ref 1.11–1.3)
CALCIUM IONIZED: 1.28 MMOL/L (ref 1.11–1.3)

## 2019-03-26 ENCOUNTER — OFFICE VISIT (OUTPATIENT)
Dept: PAIN MANAGEMENT | Age: 39
End: 2019-03-26
Payer: MEDICARE

## 2019-03-26 VITALS — SYSTOLIC BLOOD PRESSURE: 141 MMHG | DIASTOLIC BLOOD PRESSURE: 90 MMHG | OXYGEN SATURATION: 96 % | HEART RATE: 102 BPM

## 2019-03-26 DIAGNOSIS — E13.40 NEUROPATHY DUE TO SECONDARY DIABETES MELLITUS (HCC): ICD-10-CM

## 2019-03-26 DIAGNOSIS — M48.00 CENTRAL SPINAL STENOSIS: ICD-10-CM

## 2019-03-26 DIAGNOSIS — E66.01 MORBID OBESITY WITH BODY MASS INDEX (BMI) OF 40.0 TO 44.9 IN ADULT (HCC): ICD-10-CM

## 2019-03-26 DIAGNOSIS — S14.109S INJURY OF CERVICAL SPINAL CORD, SEQUELA (HCC): ICD-10-CM

## 2019-03-26 DIAGNOSIS — G89.4 CHRONIC PAIN SYNDROME: ICD-10-CM

## 2019-03-26 DIAGNOSIS — F32.9 CURRENT EPISODE OF MAJOR DEPRESSIVE DISORDER WITHOUT PRIOR EPISODE, UNSPECIFIED DEPRESSION EPISODE SEVERITY: ICD-10-CM

## 2019-03-26 DIAGNOSIS — G89.29 CHRONIC BACK PAIN, UNSPECIFIED BACK LOCATION, UNSPECIFIED BACK PAIN LATERALITY: ICD-10-CM

## 2019-03-26 DIAGNOSIS — F51.01 PRIMARY INSOMNIA: ICD-10-CM

## 2019-03-26 DIAGNOSIS — M54.9 CHRONIC BACK PAIN, UNSPECIFIED BACK LOCATION, UNSPECIFIED BACK PAIN LATERALITY: ICD-10-CM

## 2019-03-26 DIAGNOSIS — M96.1 FAILED BACK SURGICAL SYNDROME: ICD-10-CM

## 2019-03-26 PROCEDURE — 99213 OFFICE O/P EST LOW 20 MIN: CPT | Performed by: NURSE PRACTITIONER

## 2019-03-26 PROCEDURE — 2022F DILAT RTA XM EVC RTNOPTHY: CPT | Performed by: NURSE PRACTITIONER

## 2019-03-26 PROCEDURE — G8484 FLU IMMUNIZE NO ADMIN: HCPCS | Performed by: NURSE PRACTITIONER

## 2019-03-26 PROCEDURE — G8427 DOCREV CUR MEDS BY ELIG CLIN: HCPCS | Performed by: NURSE PRACTITIONER

## 2019-03-26 PROCEDURE — 3045F PR MOST RECENT HEMOGLOBIN A1C LEVEL 7.0-9.0%: CPT | Performed by: NURSE PRACTITIONER

## 2019-03-26 PROCEDURE — G8417 CALC BMI ABV UP PARAM F/U: HCPCS | Performed by: NURSE PRACTITIONER

## 2019-03-26 PROCEDURE — 1036F TOBACCO NON-USER: CPT | Performed by: NURSE PRACTITIONER

## 2019-03-26 RX ORDER — GABAPENTIN 300 MG/1
300 CAPSULE ORAL 3 TIMES DAILY
Qty: 90 CAPSULE | Refills: 1 | Status: SHIPPED | OUTPATIENT
Start: 2019-03-26 | End: 2019-04-23 | Stop reason: SDUPTHER

## 2019-03-26 RX ORDER — HYDROCODONE BITARTRATE AND ACETAMINOPHEN 5; 325 MG/1; MG/1
1 TABLET ORAL EVERY 6 HOURS PRN
Qty: 112 TABLET | Refills: 0 | Status: SHIPPED | OUTPATIENT
Start: 2019-03-26 | End: 2019-04-23 | Stop reason: SDUPTHER

## 2019-03-27 ENCOUNTER — HOSPITAL ENCOUNTER (OUTPATIENT)
Dept: WOUND CARE | Age: 39
Discharge: HOME | End: 2019-03-27
Payer: MEDICARE

## 2019-03-27 VITALS
RESPIRATION RATE: 18 BRPM | SYSTOLIC BLOOD PRESSURE: 148 MMHG | HEART RATE: 120 BPM | TEMPERATURE: 96.9 F | DIASTOLIC BLOOD PRESSURE: 92 MMHG

## 2019-03-27 DIAGNOSIS — E10.621 DIABETIC ULCER OF TOE OF RIGHT FOOT ASSOCIATED WITH TYPE 1 DIABETES MELLITUS, WITH FAT LAYER EXPOSED (HCC): Primary | ICD-10-CM

## 2019-03-27 DIAGNOSIS — L97.512 DIABETIC ULCER OF TOE OF RIGHT FOOT ASSOCIATED WITH TYPE 1 DIABETES MELLITUS, WITH FAT LAYER EXPOSED (HCC): Primary | ICD-10-CM

## 2019-03-27 PROCEDURE — 11042 DBRDMT SUBQ TIS 1ST 20SQCM/<: CPT

## 2019-03-27 RX ORDER — LIDOCAINE HYDROCHLORIDE 40 MG/ML
SOLUTION TOPICAL PRN
Status: DISCONTINUED | OUTPATIENT
Start: 2019-03-27 | End: 2019-03-28 | Stop reason: HOSPADM

## 2019-03-27 ASSESSMENT — PAIN SCALES - GENERAL: PAINLEVEL_OUTOF10: 3

## 2019-03-27 ASSESSMENT — PAIN DESCRIPTION - ORIENTATION: ORIENTATION: RIGHT

## 2019-03-27 ASSESSMENT — PAIN DESCRIPTION - LOCATION: LOCATION: FOOT

## 2019-03-27 ASSESSMENT — PAIN DESCRIPTION - FREQUENCY: FREQUENCY: INTERMITTENT

## 2019-03-27 ASSESSMENT — PAIN DESCRIPTION - DESCRIPTORS: DESCRIPTORS: ACHING;BURNING

## 2019-03-27 ASSESSMENT — PAIN DESCRIPTION - ONSET: ONSET: ON-GOING

## 2019-03-27 ASSESSMENT — PAIN DESCRIPTION - PROGRESSION: CLINICAL_PROGRESSION: NOT CHANGED

## 2019-03-27 ASSESSMENT — PAIN DESCRIPTION - PAIN TYPE: TYPE: CHRONIC PAIN

## 2019-04-02 DIAGNOSIS — E83.52 HYPERCALCEMIA: Primary | ICD-10-CM

## 2019-04-03 ENCOUNTER — HOSPITAL ENCOUNTER (OUTPATIENT)
Dept: WOUND CARE | Age: 39
Discharge: HOME | End: 2019-04-03
Payer: MEDICARE

## 2019-04-03 ENCOUNTER — TELEPHONE (OUTPATIENT)
Dept: PAIN MANAGEMENT | Age: 39
End: 2019-04-03

## 2019-04-03 VITALS
SYSTOLIC BLOOD PRESSURE: 136 MMHG | RESPIRATION RATE: 18 BRPM | HEART RATE: 106 BPM | DIASTOLIC BLOOD PRESSURE: 94 MMHG | HEIGHT: 73 IN | BODY MASS INDEX: 41.75 KG/M2 | TEMPERATURE: 98 F | WEIGHT: 315 LBS

## 2019-04-03 DIAGNOSIS — L97.512 DIABETIC ULCER OF TOE OF RIGHT FOOT ASSOCIATED WITH TYPE 2 DIABETES MELLITUS, WITH FAT LAYER EXPOSED (HCC): Primary | ICD-10-CM

## 2019-04-03 DIAGNOSIS — E11.621 DIABETIC ULCER OF TOE OF RIGHT FOOT ASSOCIATED WITH TYPE 2 DIABETES MELLITUS, WITH FAT LAYER EXPOSED (HCC): Primary | ICD-10-CM

## 2019-04-03 DIAGNOSIS — E13.40 NEUROPATHY DUE TO SECONDARY DIABETES MELLITUS (HCC): ICD-10-CM

## 2019-04-03 DIAGNOSIS — G89.4 CHRONIC PAIN SYNDROME: ICD-10-CM

## 2019-04-03 DIAGNOSIS — F51.01 PRIMARY INSOMNIA: ICD-10-CM

## 2019-04-03 DIAGNOSIS — L60.0 INGROWING NAIL: ICD-10-CM

## 2019-04-03 PROCEDURE — 11730 AVULSION NAIL PLATE SIMPLE 1: CPT

## 2019-04-03 RX ORDER — LIDOCAINE HYDROCHLORIDE 40 MG/ML
SOLUTION TOPICAL PRN
Status: DISCONTINUED | OUTPATIENT
Start: 2019-04-03 | End: 2019-04-04 | Stop reason: HOSPADM

## 2019-04-03 ASSESSMENT — PAIN SCALES - GENERAL
PAINLEVEL_OUTOF10: 2
PAINLEVEL_OUTOF10: 0

## 2019-04-03 ASSESSMENT — PAIN DESCRIPTION - ORIENTATION: ORIENTATION: RIGHT

## 2019-04-03 ASSESSMENT — PAIN DESCRIPTION - FREQUENCY: FREQUENCY: INTERMITTENT

## 2019-04-03 ASSESSMENT — PAIN DESCRIPTION - PAIN TYPE: TYPE: CHRONIC PAIN

## 2019-04-03 ASSESSMENT — PAIN DESCRIPTION - DESCRIPTORS: DESCRIPTORS: ACHING

## 2019-04-03 ASSESSMENT — PAIN DESCRIPTION - PROGRESSION: CLINICAL_PROGRESSION: NOT CHANGED

## 2019-04-03 ASSESSMENT — PAIN DESCRIPTION - LOCATION: LOCATION: FOOT

## 2019-04-03 NOTE — TELEPHONE ENCOUNTER
Pt calling, states they were supposed to get refills of Elavil and Cymbalta, but they were not called in. Most recent scripts seem to be from last year, 10/2018, and were from another doctor. Was [facility]A taking these over?
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
## 2019-04-04 RX ORDER — DULOXETIN HYDROCHLORIDE 30 MG/1
30 CAPSULE, DELAYED RELEASE ORAL 2 TIMES DAILY
Qty: 60 CAPSULE | Refills: 1 | Status: SHIPPED | OUTPATIENT
Start: 2019-04-04 | End: 2019-04-23 | Stop reason: SDUPTHER

## 2019-04-04 RX ORDER — AMITRIPTYLINE HYDROCHLORIDE 25 MG/1
TABLET, FILM COATED ORAL
Qty: 30 TABLET | Refills: 1 | Status: SHIPPED | OUTPATIENT
Start: 2019-04-04 | End: 2019-04-23 | Stop reason: SDUPTHER

## 2019-04-04 NOTE — TELEPHONE ENCOUNTER
Per [facility]A ok to prescribe Elavil and Cymbalta[facility]

## 2019-04-08 ENCOUNTER — OFFICE VISIT (OUTPATIENT)
Dept: FAMILY MEDICINE CLINIC | Age: 39
End: 2019-04-08
Payer: MEDICARE

## 2019-04-08 VITALS
DIASTOLIC BLOOD PRESSURE: 80 MMHG | WEIGHT: 315 LBS | BODY MASS INDEX: 44.99 KG/M2 | TEMPERATURE: 98 F | SYSTOLIC BLOOD PRESSURE: 136 MMHG | RESPIRATION RATE: 18 BRPM | HEART RATE: 100 BPM

## 2019-04-08 DIAGNOSIS — R35.0 URINARY FREQUENCY: Primary | ICD-10-CM

## 2019-04-08 LAB
BILIRUBIN, POC: NEGATIVE
BLOOD URINE, POC: NEGATIVE
CLARITY, POC: NORMAL
COLOR, POC: YELLOW
GLUCOSE URINE, POC: 500
KETONES, POC: NEGATIVE
LEUKOCYTE EST, POC: NEGATIVE
NITRITE, POC: NEGATIVE
PH, POC: 5.5
PROTEIN, POC: NEGATIVE
SPECIFIC GRAVITY, POC: 1.02
UROBILINOGEN, POC: 0.2

## 2019-04-08 PROCEDURE — G8417 CALC BMI ABV UP PARAM F/U: HCPCS | Performed by: NURSE PRACTITIONER

## 2019-04-08 PROCEDURE — 1036F TOBACCO NON-USER: CPT | Performed by: NURSE PRACTITIONER

## 2019-04-08 PROCEDURE — 99213 OFFICE O/P EST LOW 20 MIN: CPT | Performed by: NURSE PRACTITIONER

## 2019-04-08 PROCEDURE — 81002 URINALYSIS NONAUTO W/O SCOPE: CPT | Performed by: NURSE PRACTITIONER

## 2019-04-08 PROCEDURE — G8427 DOCREV CUR MEDS BY ELIG CLIN: HCPCS | Performed by: NURSE PRACTITIONER

## 2019-04-08 RX ORDER — CIPROFLOXACIN 500 MG/1
500 TABLET, FILM COATED ORAL 2 TIMES DAILY
Qty: 20 TABLET | Refills: 0 | Status: SHIPPED | OUTPATIENT
Start: 2019-04-08 | End: 2019-04-11 | Stop reason: ALTCHOICE

## 2019-04-08 ASSESSMENT — ENCOUNTER SYMPTOMS
NAUSEA: 0
COUGH: 0
ABDOMINAL PAIN: 1
SHORTNESS OF BREATH: 0
VOMITING: 0

## 2019-04-08 NOTE — PROGRESS NOTES
4/8/2019    This is a 41 y.o. male   Chief Complaint   Patient presents with    Urinary Tract Infection     x8 days, frequency   . Urinary Frequency    This is a new problem. Episode onset: 8 days. The problem occurs every urination. The problem has been unchanged. The patient is experiencing no pain. Associated symptoms include frequency, hesitancy and urgency. Pertinent negatives include no chills, hematuria, nausea or vomiting. He has tried increased fluids for the symptoms. Positive for pelvic pressure prior to urinating.         Patient Active Problem List   Diagnosis    Moderate persistent asthma without complication    Hypertension    GERD (gastroesophageal reflux disease)    Tinea cruris    Chronic back pain    Type 2 diabetes mellitus with diabetic neuropathy, with long-term current use of insulin (HCC)    Mixed hyperlipidemia    Failed back surgical syndrome    Spinal [facility], C1-C7 [facility]    Chronic pain syndrome    Central spinal stenosis    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 ([facility]    Major depressive disorder    Insomnia    Diabetic eye exam ([facility]- 12/16 wnl CEI    Closed nondisplaced fracture of fifth metatarsal bone of right foot    Morbid obesity with body mass index (BMI) of 40.0 to 44.9 in adult (HCC)    Elevated liver enzymes    JULITA (obstructive sleep apnea)    Pressure ulcer of buttock    Abnormal levels of other serum enzymes    Decubitus ulcer of buttock    Chronic back pain    Right hand pain    Diabetic ulcer of toe of right foot associated with type 2 diabetes mellitus (HCC)    Diabetic ulcer of toe (HCC)    Ingrowing nail          Current Outpatient Medications   Medication Sig Dispense Refill    amitriptyline (ELAVIL) 25 MG tablet TAKE ONE TABLET BY MOUTH NIGHTLY 30 tablet 1    DULoxetine (CYMBALTA) 30 MG extended release capsule Take 1 capsule by mouth 2 times daily 60 capsule 1    HYDROcodone-acetaminophen (NORCO) 5-325 MG per tablet Take 1 tablet by mouth every 6 hours as needed for Pain for up to 28 days. No more than 4 tabs a day 112 tablet 0    gabapentin (NEURONTIN) 300 MG capsule Take 1 capsule by mouth 3 times daily for 30 days.  90 capsule 1    atorvastatin (LIPITOR) 80 MG tablet Take 1 tablet by mouth daily 90 tablet 0    Multiple Vitamins-Minerals (MULTIVITAMIN ADULT PO) Take 1 tablet by mouth daily      cyanocobalamin 1000 MCG/ML injection Inject 1,000 mcg into the muscle every 30 days      Cyanocobalamin (VITAMIN B 12 PO) Take 1 tablet by mouth daily      Insulin Degludec (TRESIBA FLEXTOUCH) 100 UNIT/ML SOPN Inject 62 Units into the skin every evening For diabetes 9 pen 2    insulin aspart (NOVOLOG FLEXPEN) 100 UNIT/ML injection pen Inject 10 Units into the skin 3 times daily (before meals) 5 pen 3    lisinopril (PRINIVIL;ZESTRIL) 20 MG tablet Take 1 tablet by mouth daily 90 tablet 0    metoprolol succinate (TOPROL XL) 50 MG extended release tablet TAKE ONE TABLET BY MOUTH DAILY FOR FOR BLOOD PRESSURE AND HEART RATE 90 tablet 0    montelukast (SINGULAIR) 10 MG tablet TAKE ONE TABLET BY MOUTH DAILY FOR BREATHING 90 tablet 0    omeprazole (PRILOSEC) 20 MG delayed release capsule TAKE ONE CAPSULE BY MOUTH DAILY FOR STOMACH 90 capsule 0    omega-3 acid ethyl esters (LOVAZA) 1 g capsule Take 2 capsules by mouth 2 times daily 360 capsule 0    canagliflozin (INVOKANA) 100 MG TABS tablet TAKE ONE TABLET BY MOUTH EVERY MORNING BEFORE BREAKFAST FOR DIABETES 90 tablet 0    budesonide-formoterol (SYMBICORT) 160-4.5 MCG/ACT AERO Inhale 2 puffs into the lungs 2 times daily 1 Inhaler 6    albuterol sulfate HFA (VENTOLIN HFA) 108 (90 Base) MCG/ACT inhaler INHALE TWO PUFFS BY MOUTH EVERY 6 HOURS AS NEEDED FOR WHEEZING OR FOR SHORTNESS OF BREATH 2 Inhaler 5    ipratropium-albuterol (DUONEB) 0.5-2.5 (3) MG/3ML SOLN nebulizer solution Inhale 3 mLs into the lungs every 6 hours as needed for Shortness of Breath 360 mL 5    Naloxone HCl (EVZIO) 0.4 MG/0.4ML SOAJ Use as directed 1 Package 0    VICTOZA 18 MG/3ML SOPN SC injection DIAL AND INJECT SUBCUTANEOUSLY 1.8MG DAILY 3 pen 0    metFORMIN (GLUCOPHAGE) 1000 MG tablet Take 1 tablet by mouth 2 times daily (with meals) For diabetes 180 tablet 0    Insulin Pen Needle 31G X 5 MM MISC 1 each by Does not apply route 2 times daily 100 each 3    blood glucose test strips (TRUE METRIX BLOOD GLUCOSE TEST) strip Use to check BS  each 5    Lancets MISC Use to check BS  each 5    Blood Glucose Monitoring Suppl (TRUE METRIX METER) w/Device KIT Use to check fasting BS and 2 hours after meal BS 1 kit 0    Lancets MISC 1 each by Does not apply route daily TRUEMETRIX 100 each 2     No current facility-administered medications for this visit. Allergies   Allergen Reactions    Penicillins Swelling     Swelling of tongue and throat    Fentanyl      Had hives at patch site and nausea      Food Swelling     Swelling of throat from Mushrooms       Review of Systems   Constitutional: Positive for fatigue. Negative for activity change, chills and fever. Respiratory: Negative for cough and shortness of breath. Cardiovascular: Negative for chest pain. Gastrointestinal: Positive for abdominal pain. Negative for nausea and vomiting. Genitourinary: Positive for frequency, hesitancy and urgency. Negative for difficulty urinating, discharge, dysuria and hematuria. Vitals:    04/08/19 1438   BP: 136/80   Site: Right Upper Arm   Position: Sitting   Cuff Size: Large Adult   Pulse: 100   Resp: 18   Temp: 98 °F (36.7 °C)   TempSrc: Oral   Weight: (!) 341 lb (154.7 kg)       Body mass index is 44.99 kg/m².      Wt Readings from Last 3 Encounters:   04/08/19 (!) 341 lb (154.7 kg)   04/03/19 (!) 350 lb 5 oz (158.9 kg)   03/20/19 (!) 340 lb 6.2 oz (154.4 kg)       BP Readings from Last 3 Encounters:   04/08/19 136/80   04/03/19 (!) 136/94   03/27/19 (!) 148/92       Physical Exam   Constitutional: He is

## 2019-04-11 LAB
ORGANISM: ABNORMAL
URINE CULTURE, ROUTINE: ABNORMAL
URINE CULTURE, ROUTINE: ABNORMAL

## 2019-04-11 RX ORDER — SULFAMETHOXAZOLE AND TRIMETHOPRIM 800; 160 MG/1; MG/1
1 TABLET ORAL 2 TIMES DAILY
Qty: 20 TABLET | Refills: 0 | Status: SHIPPED | OUTPATIENT
Start: 2019-04-11 | End: 2019-04-21

## 2019-04-22 ENCOUNTER — TELEPHONE (OUTPATIENT)
Dept: FAMILY MEDICINE CLINIC | Age: 39
End: 2019-04-22

## 2019-04-22 ENCOUNTER — OFFICE VISIT (OUTPATIENT)
Dept: FAMILY MEDICINE CLINIC | Age: 39
End: 2019-04-22
Payer: MEDICARE

## 2019-04-22 VITALS
SYSTOLIC BLOOD PRESSURE: 126 MMHG | DIASTOLIC BLOOD PRESSURE: 80 MMHG | BODY MASS INDEX: 45.25 KG/M2 | RESPIRATION RATE: 18 BRPM | HEART RATE: 95 BPM | WEIGHT: 315 LBS

## 2019-04-22 DIAGNOSIS — N30.00 ACUTE CYSTITIS WITHOUT HEMATURIA: Primary | ICD-10-CM

## 2019-04-22 LAB
BILIRUBIN, POC: NEGATIVE
BLOOD URINE, POC: NEGATIVE
CLARITY, POC: ABNORMAL
COLOR, POC: YELLOW
GLUCOSE URINE, POC: 500
KETONES, POC: NEGATIVE
LEUKOCYTE EST, POC: NEGATIVE
NITRITE, POC: POSITIVE
PH, POC: 5.5
PROTEIN, POC: NEGATIVE
SPECIFIC GRAVITY, POC: 1.02
UROBILINOGEN, POC: 0.2

## 2019-04-22 PROCEDURE — G8417 CALC BMI ABV UP PARAM F/U: HCPCS | Performed by: NURSE PRACTITIONER

## 2019-04-22 PROCEDURE — 99212 OFFICE O/P EST SF 10 MIN: CPT | Performed by: NURSE PRACTITIONER

## 2019-04-22 PROCEDURE — G8427 DOCREV CUR MEDS BY ELIG CLIN: HCPCS | Performed by: NURSE PRACTITIONER

## 2019-04-22 PROCEDURE — 81002 URINALYSIS NONAUTO W/O SCOPE: CPT | Performed by: NURSE PRACTITIONER

## 2019-04-22 PROCEDURE — 1036F TOBACCO NON-USER: CPT | Performed by: NURSE PRACTITIONER

## 2019-04-22 RX ORDER — SULFAMETHOXAZOLE AND TRIMETHOPRIM 800; 160 MG/1; MG/1
1 TABLET ORAL 2 TIMES DAILY
Qty: 10 TABLET | Refills: 0 | Status: SHIPPED | OUTPATIENT
Start: 2019-04-22 | End: 2019-04-27

## 2019-04-22 ASSESSMENT — ENCOUNTER SYMPTOMS
SHORTNESS OF BREATH: 0
COUGH: 0
ABDOMINAL PAIN: 0

## 2019-04-22 NOTE — PROGRESS NOTES
4/22/2019    This is a 41 y.o. male   Chief Complaint   Patient presents with    Follow-up     urine culture, pt. feels better    . HPI  Patient is here to f/u on UTI from 4/8/19. Urine culture showed e. coli and was treated with bactrim BID for 10 days. Reports that his pelvic pressure has resolved. Denies dysuria, urinary frequency, hematuria. Denies fever. Patient Active Problem List   Diagnosis    Moderate persistent asthma without complication    Hypertension    GERD (gastroesophageal reflux disease)    Tinea cruris    Chronic back pain    Type 2 diabetes mellitus with diabetic neuropathy, with long-term current use of insulin (HCC)    Mixed hyperlipidemia    Failed back surgical syndrome    Spinal [facility] injury, C1-C7 ([facility] [address]    Chronic pain syndrome    Central spinal stenosis    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 ([facility] [address]    Major depressive disorder    Insomnia    Diabetic eye exam ([facility] [address]- 12/16 wnl CEI    Closed nondisplaced fracture of fifth metatarsal bone of right foot    Morbid obesity with body mass index (BMI) of 40.0 to 44.9 in adult (HCC)    Elevated liver enzymes    JULITA (obstructive sleep apnea)    Pressure ulcer of buttock    Abnormal levels of other serum enzymes    Decubitus ulcer of buttock    Chronic back pain    Right hand pain    Diabetic ulcer of toe of right foot associated with type 2 diabetes mellitus (HCC)    Diabetic ulcer of toe (HCC)    Ingrowing nail          Current Outpatient Medications   Medication Sig Dispense Refill    amitriptyline (ELAVIL) 25 MG tablet TAKE ONE TABLET BY MOUTH NIGHTLY 30 tablet 1    DULoxetine (CYMBALTA) 30 MG extended release capsule Take 1 capsule by mouth 2 times daily 60 capsule 1    HYDROcodone-acetaminophen (NORCO) 5-325 MG per tablet Take 1 tablet by mouth every 6 hours as needed for Pain for up to 28 days.  No more than 4 tabs a day 112 tablet 0    gabapentin (NEURONTIN) 300 MG capsule Take 1 capsule by mouth 3 times daily for 30 days.  90 capsule 1    atorvastatin (LIPITOR) 80 MG tablet Take 1 tablet by mouth daily 90 tablet 0    Multiple Vitamins-Minerals (MULTIVITAMIN ADULT PO) Take 1 tablet by mouth daily      cyanocobalamin 1000 MCG/ML injection Inject 1,000 mcg into the muscle every 30 days      Cyanocobalamin (VITAMIN B 12 PO) Take 1 tablet by mouth daily      Insulin Degludec (TRESIBA FLEXTOUCH) 100 UNIT/ML SOPN Inject 62 Units into the skin every evening For diabetes 9 pen 2    insulin aspart (NOVOLOG FLEXPEN) 100 UNIT/ML injection pen Inject 10 Units into the skin 3 times daily (before meals) 5 pen 3    lisinopril (PRINIVIL;ZESTRIL) 20 MG tablet Take 1 tablet by mouth daily 90 tablet 0    metoprolol succinate (TOPROL XL) 50 MG extended release tablet TAKE ONE TABLET BY MOUTH DAILY FOR FOR BLOOD PRESSURE AND HEART RATE 90 tablet 0    montelukast (SINGULAIR) 10 MG tablet TAKE ONE TABLET BY MOUTH DAILY FOR BREATHING 90 tablet 0    omeprazole (PRILOSEC) 20 MG delayed release capsule TAKE ONE CAPSULE BY MOUTH DAILY FOR STOMACH 90 capsule 0    omega-3 acid ethyl esters (LOVAZA) 1 g capsule Take 2 capsules by mouth 2 times daily 360 capsule 0    canagliflozin (INVOKANA) 100 MG TABS tablet TAKE ONE TABLET BY MOUTH EVERY MORNING BEFORE BREAKFAST FOR DIABETES 90 tablet 0    budesonide-formoterol (SYMBICORT) 160-4.5 MCG/ACT AERO Inhale 2 puffs into the lungs 2 times daily 1 Inhaler 6    albuterol sulfate HFA (VENTOLIN HFA) 108 (90 Base) MCG/ACT inhaler INHALE TWO PUFFS BY MOUTH EVERY 6 HOURS AS NEEDED FOR WHEEZING OR FOR SHORTNESS OF BREATH 2 Inhaler 5    ipratropium-albuterol (DUONEB) 0.5-2.5 (3) MG/3ML SOLN nebulizer solution Inhale 3 mLs into the lungs every 6 hours as needed for Shortness of Breath 360 mL 5    Naloxone HCl (EVZIO) 0.4 MG/0.4ML SOAJ Use as directed 1 Package 0    VICTOZA 18 MG/3ML SOPN SC injection DIAL AND INJECT SUBCUTANEOUSLY 1.8MG DAILY 3 pen 0    metFORMIN (GLUCOPHAGE) 1000 MG tablet Take 1 tablet by mouth 2 times daily (with meals) For diabetes 180 tablet 0    Insulin Pen Needle 31G X 5 MM MISC 1 each by Does not apply route 2 times daily 100 each 3    blood glucose test strips (TRUE METRIX BLOOD GLUCOSE TEST) strip Use to check BS  each 5    Lancets MISC Use to check BS  each 5    Blood Glucose Monitoring Suppl (TRUE METRIX METER) w/Device KIT Use to check fasting BS and 2 hours after meal BS 1 kit 0    Lancets MISC 1 each by Does not apply route daily TRUEMETRIX 100 each 2     No current facility-administered medications for this visit. Allergies   Allergen Reactions    Penicillins Swelling     Swelling of tongue and throat    Fentanyl      Had hives at patch site and nausea      Food Swelling     Swelling of throat from Mushrooms       Review of Systems   Constitutional: Negative for activity change and fever. Respiratory: Negative for cough and shortness of breath. Gastrointestinal: Negative for abdominal pain. Genitourinary: Negative for dysuria and hematuria. Vitals:    04/22/19 0759   BP: 126/80   Site: Right Upper Arm   Position: Sitting   Cuff Size: Large Adult   Pulse: 95   Resp: 18   Weight: (!) 343 lb (155.6 kg)       Body mass index is 45.25 kg/m². Wt Readings from Last 3 Encounters:   04/22/19 (!) 343 lb (155.6 kg)   04/08/19 (!) 341 lb (154.7 kg)   04/03/19 (!) 350 lb 5 oz (158.9 kg)       BP Readings from Last 3 Encounters:   04/22/19 126/80   04/08/19 136/80   04/03/19 (!) 136/94       Physical Exam   Constitutional: He is oriented to person, place, and time. He appears well-developed and well-nourished. No distress. HENT:   Head: Normocephalic and atraumatic. Eyes: EOM are normal.   Neck: Neck supple. Cardiovascular: Normal rate, regular rhythm and normal heart sounds. Exam reveals no gallop and no friction rub. No murmur heard. Pulmonary/Chest: Effort normal and breath sounds normal.   Abdominal: Soft.  Normal appearance. There is no tenderness. There is no rebound, no guarding and no CVA tenderness. Neurological: He is alert and oriented to person, place, and time. Skin: Skin is warm and dry. Psychiatric: He has a normal mood and affect. His behavior is normal. Judgment and thought content normal.   Nursing note and vitals reviewed. KINDRA Anderson 54 was seen today for follow-up. Diagnoses and all orders for this visit:    Acute cystitis without hematuria: symptoms have resolved after bactrim therapy. Will recheck urine culture. -     POCT Urinalysis no Micro- positive for nitrites   -     URINE CULTURE  Advised to stay hydrated with water. Will have him complete another 5 days of bactrim BID due to nitrites in urine. Return if symptoms worsen or fail to improve. Should keep routine check up appt on 5/6/19.

## 2019-04-23 ENCOUNTER — OFFICE VISIT (OUTPATIENT)
Dept: PAIN MANAGEMENT | Age: 39
End: 2019-04-23
Payer: MEDICARE

## 2019-04-23 VITALS
WEIGHT: 315 LBS | BODY MASS INDEX: 45.65 KG/M2 | DIASTOLIC BLOOD PRESSURE: 91 MMHG | SYSTOLIC BLOOD PRESSURE: 127 MMHG | HEART RATE: 112 BPM | OXYGEN SATURATION: 95 %

## 2019-04-23 DIAGNOSIS — G89.4 CHRONIC PAIN SYNDROME: ICD-10-CM

## 2019-04-23 DIAGNOSIS — L97.519 DIABETIC ULCER OF TOE OF RIGHT FOOT ASSOCIATED WITH TYPE 2 DIABETES MELLITUS, UNSPECIFIED ULCER STAGE (HCC): ICD-10-CM

## 2019-04-23 DIAGNOSIS — G89.29 CHRONIC BACK PAIN, UNSPECIFIED BACK LOCATION, UNSPECIFIED BACK PAIN LATERALITY: ICD-10-CM

## 2019-04-23 DIAGNOSIS — E66.01 MORBID OBESITY WITH BMI OF 45.0-49.9, ADULT (HCC): ICD-10-CM

## 2019-04-23 DIAGNOSIS — E66.01 MORBID OBESITY WITH BODY MASS INDEX (BMI) OF 40.0 TO 44.9 IN ADULT (HCC): ICD-10-CM

## 2019-04-23 DIAGNOSIS — M54.9 CHRONIC BACK PAIN, UNSPECIFIED BACK LOCATION, UNSPECIFIED BACK PAIN LATERALITY: ICD-10-CM

## 2019-04-23 DIAGNOSIS — E13.40 NEUROPATHY DUE TO SECONDARY DIABETES MELLITUS (HCC): ICD-10-CM

## 2019-04-23 DIAGNOSIS — M96.1 FAILED BACK SURGICAL SYNDROME: ICD-10-CM

## 2019-04-23 DIAGNOSIS — M79.641 RIGHT HAND PAIN: ICD-10-CM

## 2019-04-23 DIAGNOSIS — E11.621 DIABETIC ULCER OF TOE OF RIGHT FOOT ASSOCIATED WITH TYPE 2 DIABETES MELLITUS, UNSPECIFIED ULCER STAGE (HCC): ICD-10-CM

## 2019-04-23 DIAGNOSIS — M48.00 CENTRAL SPINAL STENOSIS: ICD-10-CM

## 2019-04-23 DIAGNOSIS — F32.9 CURRENT EPISODE OF MAJOR DEPRESSIVE DISORDER WITHOUT PRIOR EPISODE, UNSPECIFIED DEPRESSION EPISODE SEVERITY: ICD-10-CM

## 2019-04-23 DIAGNOSIS — S14.109S INJURY OF CERVICAL SPINAL CORD, SEQUELA (HCC): ICD-10-CM

## 2019-04-23 DIAGNOSIS — F51.01 PRIMARY INSOMNIA: ICD-10-CM

## 2019-04-23 PROCEDURE — G8427 DOCREV CUR MEDS BY ELIG CLIN: HCPCS | Performed by: NURSE PRACTITIONER

## 2019-04-23 PROCEDURE — 3045F PR MOST RECENT HEMOGLOBIN A1C LEVEL 7.0-9.0%: CPT | Performed by: NURSE PRACTITIONER

## 2019-04-23 PROCEDURE — 1036F TOBACCO NON-USER: CPT | Performed by: NURSE PRACTITIONER

## 2019-04-23 PROCEDURE — 99213 OFFICE O/P EST LOW 20 MIN: CPT | Performed by: NURSE PRACTITIONER

## 2019-04-23 PROCEDURE — 2022F DILAT RTA XM EVC RTNOPTHY: CPT | Performed by: NURSE PRACTITIONER

## 2019-04-23 PROCEDURE — G8417 CALC BMI ABV UP PARAM F/U: HCPCS | Performed by: NURSE PRACTITIONER

## 2019-04-23 RX ORDER — GABAPENTIN 300 MG/1
300 CAPSULE ORAL 3 TIMES DAILY
Qty: 90 CAPSULE | Refills: 1 | Status: SHIPPED | OUTPATIENT
Start: 2019-04-23 | End: 2019-04-23 | Stop reason: SDUPTHER

## 2019-04-23 RX ORDER — DULOXETIN HYDROCHLORIDE 30 MG/1
30 CAPSULE, DELAYED RELEASE ORAL 2 TIMES DAILY
Qty: 60 CAPSULE | Refills: 1 | Status: SHIPPED | OUTPATIENT
Start: 2019-04-23 | End: 2019-04-23 | Stop reason: SDUPTHER

## 2019-04-23 RX ORDER — GABAPENTIN 300 MG/1
300 CAPSULE ORAL 3 TIMES DAILY
Qty: 90 CAPSULE | Refills: 1 | Status: SHIPPED | OUTPATIENT
Start: 2019-04-23 | End: 2019-05-15 | Stop reason: SDUPTHER

## 2019-04-23 RX ORDER — DULOXETIN HYDROCHLORIDE 30 MG/1
30 CAPSULE, DELAYED RELEASE ORAL 2 TIMES DAILY
Qty: 60 CAPSULE | Refills: 1 | Status: SHIPPED | OUTPATIENT
Start: 2019-04-23 | End: 2019-05-15 | Stop reason: SDUPTHER

## 2019-04-23 RX ORDER — AMITRIPTYLINE HYDROCHLORIDE 25 MG/1
TABLET, FILM COATED ORAL
Qty: 30 TABLET | Refills: 1 | Status: SHIPPED | OUTPATIENT
Start: 2019-04-23 | End: 2019-05-15 | Stop reason: SDUPTHER

## 2019-04-23 RX ORDER — HYDROCODONE BITARTRATE AND ACETAMINOPHEN 5; 325 MG/1; MG/1
1 TABLET ORAL EVERY 6 HOURS PRN
Qty: 112 TABLET | Refills: 0 | Status: SHIPPED | OUTPATIENT
Start: 2019-04-23 | End: 2019-05-15 | Stop reason: SDUPTHER

## 2019-04-23 RX ORDER — AMITRIPTYLINE HYDROCHLORIDE 25 MG/1
TABLET, FILM COATED ORAL
Qty: 30 TABLET | Refills: 1 | Status: SHIPPED | OUTPATIENT
Start: 2019-04-23 | End: 2019-04-23 | Stop reason: SDUPTHER

## 2019-04-23 NOTE — PROGRESS NOTES
Soren Olveravel  1980  [id number]      HISTORY OF PRESENT ILLNESS:  Mr. Paul Jaimes is a 41 y.o. male returns for a follow up visit for pain management  He has a diagnosis of   1. Chronic pain syndrome    2. Failed back surgical syndrome    3. Chronic back pain, unspecified back location, unspecified back pain laterality    4. Central spinal stenosis    5. 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 ([facility] [address]    6. Injury of cervical spinal cord, sequela (HCC)    7. Current episode of major depressive disorder without prior episode, unspecified depression episode severity    8. Morbid obesity with body mass index (BMI) of 40.0 to 44.9 in adult ([facility] [address]    9. Right hand pain    10. Morbid obesity with BMI of 45.0-49.9, adult ([facility] [address]    11. Primary insomnia      On the Patients Pain Assessment form:  He complains of pain in the abdomen, both buttocks, both foot/feet: entire area, right hand(s): entire area, both knee(s), both leg(s): entire limb, bilateral lower back, bilateral mid-back and bilateral upper back He rates the pain 6/10 and describes it as sharp, aching. Current treatment regimen has helped relieve about 40% of the pain. He denies any side effects from the current pain regimen. Patient reports that since the last follow up visit the physical functioning is unchanged, family/social relationships are unchanged, mood is unchanged sleep patterns are unchanged, and that the overall functioning is unchanged. Patient denies misusing/abusing his narcotic pain medications or using any illegal drugs. There are No indicators for possible drug abuse, addiction or diversion problems. Upon obtaining medical history from Mr. Paul Jaimes states that pain is manageable on current pain therapy. Takes medications as prescribed. Currently under the care of wound care for wounds to the right foot/toes. Admits to wearing shoes that were too small, that caused the ulcers.  Currently under the care of podiatry, waiting for diabetic shoes. Still working on weight loss. Mood is stable without anxiety. Sleep is fair with an average of 5-6 hours. Denies to having issues of constipation. Tolerating activities/house chores with moderate tenderness to the lower back/right foot. ALLERGIES: Patients list of allergies were reviewed     MEDICATIONS: Mr. Sly Caldwell list of medications were reviewed. His current medications are   Outpatient Medications Prior to Visit   Medication Sig Dispense Refill    sulfamethoxazole-trimethoprim (BACTRIM DS) 800-160 MG per tablet Take 1 tablet by mouth 2 times daily for 5 days For treatment of bacterial infection.  10 tablet 0    atorvastatin (LIPITOR) 80 MG tablet Take 1 tablet by mouth daily 90 tablet 0    Multiple Vitamins-Minerals (MULTIVITAMIN ADULT PO) Take 1 tablet by mouth daily      cyanocobalamin 1000 MCG/ML injection Inject 1,000 mcg into the muscle every 30 days      Cyanocobalamin (VITAMIN B 12 PO) Take 1 tablet by mouth daily      Insulin Degludec (TRESIBA FLEXTOUCH) 100 UNIT/ML SOPN Inject 62 Units into the skin every evening For diabetes 9 pen 2    insulin aspart (NOVOLOG FLEXPEN) 100 UNIT/ML injection pen Inject 10 Units into the skin 3 times daily (before meals) 5 pen 3    lisinopril (PRINIVIL;ZESTRIL) 20 MG tablet Take 1 tablet by mouth daily 90 tablet 0    metoprolol succinate (TOPROL XL) 50 MG extended release tablet TAKE ONE TABLET BY MOUTH DAILY FOR FOR BLOOD PRESSURE AND HEART RATE 90 tablet 0    montelukast (SINGULAIR) 10 MG tablet TAKE ONE TABLET BY MOUTH DAILY FOR BREATHING 90 tablet 0    omeprazole (PRILOSEC) 20 MG delayed release capsule TAKE ONE CAPSULE BY MOUTH DAILY FOR STOMACH 90 capsule 0    omega-3 acid ethyl esters (LOVAZA) 1 g capsule Take 2 capsules by mouth 2 times daily 360 capsule 0    canagliflozin (INVOKANA) 100 MG TABS tablet TAKE ONE TABLET BY MOUTH EVERY MORNING BEFORE BREAKFAST FOR DIABETES 90 tablet 0    budesonide-formoterol (SYMBICORT) 160-4.5 MCG/ACT AERO Inhale 2 puffs into the lungs 2 times daily 1 Inhaler 6    albuterol sulfate HFA (VENTOLIN HFA) 108 (90 Base) MCG/ACT inhaler INHALE TWO PUFFS BY MOUTH EVERY 6 HOURS AS NEEDED FOR WHEEZING OR FOR SHORTNESS OF BREATH 2 Inhaler 5    ipratropium-albuterol (DUONEB) 0.5-2.5 (3) MG/3ML SOLN nebulizer solution Inhale 3 mLs into the lungs every 6 hours as needed for Shortness of Breath 360 mL 5    Naloxone HCl (EVZIO) 0.4 MG/0.4ML SOAJ Use as directed 1 Package 0    VICTOZA 18 MG/3ML SOPN SC injection DIAL AND INJECT SUBCUTANEOUSLY 1.8MG DAILY 3 pen 0    metFORMIN (GLUCOPHAGE) 1000 MG tablet Take 1 tablet by mouth 2 times daily (with meals) For diabetes 180 tablet 0    Insulin Pen Needle 31G X 5 MM MISC 1 each by Does not apply route 2 times daily 100 each 3    blood glucose test strips (TRUE METRIX BLOOD GLUCOSE TEST) strip Use to check BS  each 5    Lancets MISC Use to check BS  each 5    Blood Glucose Monitoring Suppl (TRUE METRIX METER) w/Device KIT Use to check fasting BS and 2 hours after meal BS 1 kit 0    Lancets MISC 1 each by Does not apply route daily TRUEMETRIX 100 each 2    amitriptyline (ELAVIL) 25 MG tablet TAKE ONE TABLET BY MOUTH NIGHTLY 30 tablet 1    DULoxetine (CYMBALTA) 30 MG extended release capsule Take 1 capsule by mouth 2 times daily 60 capsule 1    HYDROcodone-acetaminophen (NORCO) 5-325 MG per tablet Take 1 tablet by mouth every 6 hours as needed for Pain for up to 28 days. No more than 4 tabs a day 112 tablet 0    gabapentin (NEURONTIN) 300 MG capsule Take 1 capsule by mouth 3 times daily for 30 days. 90 capsule 1     No facility-administered medications prior to visit. SOCIAL/FAMILY/PAST MEDICAL HISTORY: Mr. Aaron Oneil, family and past medical history was reviewed. REVIEW OF SYSTEMS:    Respiratory: Negative for apnea, chest tightness and shortness of breath or change in baseline breathing.     Gastrointestinal: Negative for nausea, vomiting, abdominal depression episode severity, Morbid obesity with body mass index (BMI) of 40.0 to 44.9 in adult [facility]), Right hand pain, Morbid obesity with BMI of 45.0-49.9, adult ([address] and Primary insomnia were pertinent to this visit. Risks and benefits of the medications and other alternative treatments  including no treatment were discussed with the patient. The common side effects of these medications were also explained to the patient. Informed verbal consent was obtained. Goals of current treatment regimen include improvement in pain, restoration of functioning- with focus on improvement in physical performance, general activity, work or disability,emotional distress, health care utilization and  decreased medication consumption. Will continue to monitor progress towards achieving/maintaining therapeutic goals with special emphasis on  1. Improvement in perceived interfernce  of pain with ADL's. Ability to do home exercises independently. Ability to do household chores indoor and/or outdoor work and social and leisure activities. Improve psychosocial and physical functioning. - he is showing progression towards this treatment goal with the current regimen. He was advised against drinking alcohol with the narcotic pain medicines, advised against driving or handling machinery while adjusting the dose of medicines or if having cognitive  issues related to the current medications. Risk of overdose and death, if medicines not taken as prescribed, were also discussed. If the patient develops new symptoms or if the symptoms worsen, the patient should call the office. While transcribing every attempt was made to maintain the accuracy of the note in terms of it's contents,there may have been some errors made inadvertently. Thank you for allowing me to participate in the care of this patient.     Tico Hampton CNP    Cc: AYLA Tucker - [facility]ME[facility]

## 2019-04-23 NOTE — PATIENT INSTRUCTIONS
Patient Education        Back Stretches: Exercises  Your Care Instructions  Here are some examples of exercises for stretching your back. Start each exercise slowly. Ease off the exercise if you start to have pain. Your doctor or physical therapist will tell you when you can start these exercises and which ones will work best for you. How to do the exercises  Overhead stretch    1. Stand comfortably with your feet shoulder-width apart. 2. Looking straight ahead, raise both arms over your head and reach toward the ceiling. Do not allow your head to tilt back. 3. Hold for 15 to 30 seconds, then lower your arms to your sides. 4. Repeat 2 to 4 times. Side stretch    1. Stand comfortably with your feet shoulder-width apart. 2. Raise one arm over your head, and then lean to the other side. 3. Slide your hand down your leg as you let the weight of your arm gently stretch your side muscles. Hold for 15 to 30 seconds. 4. Repeat 2 to 4 times on each side. Press-up    1. Lie on your stomach, supporting your body with your forearms. 2. Press your elbows down into the floor to raise your upper back. As you do this, relax your stomach muscles and allow your back to arch without using your back muscles. As your press up, do not let your hips or pelvis come off the floor. 3. Hold for 15 to 30 seconds, then relax. 4. Repeat 2 to 4 times. Relax and rest    1. Lie on your back with a rolled towel under your neck and a pillow under your knees. Extend your arms comfortably to your sides. 2. Relax and breathe normally. 3. Remain in this position for about 10 minutes. 4. If you can, do this 2 or 3 times each day. Follow-up care is a key part of your treatment and safety. Be sure to make and go to all appointments, and call your doctor if you are having problems. It's also a good idea to know your test results and keep a list of the medicines you take. Where can you learn more?   Go to https://chpepiceweb.[employer]. org and sign in to your [employer] account. Enter Z901 in the [place] box to learn more about \"Back Stretches: Exercises. \"     If you do not have an account, please click on the \"Sign Up Now\" link. Current as of: September 20, 2018  Content Version: 11.9  © 2568-0090 [employer]. Care instructions adapted under license by [facility] ([facility]). If you have questions about a medical condition or this instruction, always ask your healthcare professional. [address] any warranty or liability for your use of this information. Patient Education        Diabetes Foot Health: Care Instructions  Your Care Instructions    When you have diabetes, your feet need extra care and attention. Diabetes can damage the nerve endings and blood vessels in your feet, making you less likely to notice when your feet are injured. Diabetes also limits your body's ability to fight infection and get blood to areas that need it. If you get a minor foot injury, it could become an ulcer or a serious infection. With good foot care, you can prevent most of these problems. Caring for your feet can be quick and easy. Most of the care can be done when you are bathing or getting ready for bed. Follow-up care is a key part of your treatment and safety. Be sure to make and go to all appointments, and call your doctor if you are having problems. It's also a good idea to know your test results and keep a list of the medicines you take. How can you care for yourself at home? · Keep your blood sugar close to normal by watching what and how much you eat, monitoring blood sugar, taking medicines if prescribed, and getting regular exercise. · Do not smoke. Smoking affects blood flow and can make foot problems worse. If you need help quitting, talk to your doctor about stop-smoking programs and medicines. These can increase your chances of quitting for good.   · Eat a diet that is low in fats. High fat intake can cause fat to build up in your blood vessels and decrease blood flow. · Inspect your feet daily for blisters, cuts, cracks, or sores. If you cannot see well, use a mirror or have someone help you. · Take care of your feet:  ? Wash your feet every day. Use warm (not hot) water. Check the water temperature with your wrists or other part of your body, not your feet. ? Dry your feet well. Pat them dry. Do not rub the skin on your feet too hard. Dry well between your toes. If the skin on your feet stays moist, bacteria or a fungus can grow, which can lead to infection. ? Keep your skin soft. Use moisturizing skin cream to keep the skin on your feet soft and prevent calluses and cracks. But do not put the cream between your toes, and stop using any cream that causes a rash. ? Clean underneath your toenails carefully. Do not use a sharp object to clean underneath your toenails. Use the blunt end of a nail file or other rounded tool. ? Trim and file your toenails straight across to prevent ingrown toenails. Use a nail clipper, not scissors. Use an emery board to smooth the edges. · Change socks daily. Socks without seams are best, because seams often rub the feet. You can find socks for people with diabetes from specialty catalogs. · Look inside your shoes every day for things like gravel or torn linings, which could cause blisters or sores. · Buy shoes that fit well:  ? Look for shoes that have plenty of space around the toes. This helps prevent bunions and blisters. ? Try on shoes while wearing the kind of socks you will usually wear with the shoes. ? Avoid plastic shoes. They may rub your feet and cause blisters. Good shoes should be made of materials that are flexible and breathable, such as leather or cloth. ? Break in new shoes slowly by wearing them for no more than an hour a day for several days.  Take extra time to check your feet for red areas, blisters, or other problems after you wear new shoes. · Do not go barefoot. Do not wear sandals, and do not wear shoes with very thin soles. Thin soles are easy to puncture. They also do not protect your feet from hot pavement or cold weather. · Have your doctor check your feet during each visit. If you have a foot problem, see your doctor. Do not try to treat an early foot problem at home. Home remedies or treatments that you can buy without a prescription (such as corn removers) can be harmful. · Always get early treatment for foot problems. A minor irritation can lead to a major problem if not properly cared for early. When should you call for help? Call your doctor now or seek immediate medical care if:    · You have a foot sore, an ulcer or break in the skin that is not healing after 4 days, bleeding corns or calluses, or an ingrown toenail.     · You have blue or black areas, which can mean bruising or blood flow problems.     · You have peeling skin or tiny blisters between your toes or cracking or oozing of the skin.     · You have a fever for more than 24 hours and a foot sore.     · You have new numbness or tingling in your feet that does not go away after you move your feet or change positions.     · You have unexplained or unusual swelling of the foot or ankle.    Watch closely for changes in your health, and be sure to contact your doctor if:    · You cannot do proper foot care. Where can you learn more? Go to https://15FivepamMeetMeTix.[employer]. org and sign in to your [email or web address] account. Enter A748 in the [facility] box to learn more about \"Diabetes Foot Health: Care Instructions. \"     If you do not have an account, please click on the \"Sign Up Now\" link. Current as of: July 25, 2018  Content Version: 11.9  © 9385-8380 [employer]. Care instructions adapted under license by [employer] [facility] ([facility]).  If you have questions about a medical condition or this instruction, always ask your healthcare professional. [address] any warranty or liability for your use of this information.

## 2019-04-24 LAB — URINE CULTURE, ROUTINE: NORMAL

## 2019-05-06 ENCOUNTER — OFFICE VISIT (OUTPATIENT)
Dept: FAMILY MEDICINE CLINIC | Age: 39
End: 2019-05-06
Payer: MEDICARE

## 2019-05-06 VITALS
DIASTOLIC BLOOD PRESSURE: 80 MMHG | BODY MASS INDEX: 45.91 KG/M2 | SYSTOLIC BLOOD PRESSURE: 126 MMHG | WEIGHT: 315 LBS | RESPIRATION RATE: 18 BRPM | HEART RATE: 83 BPM

## 2019-05-06 DIAGNOSIS — N30.00 ACUTE CYSTITIS WITHOUT HEMATURIA: ICD-10-CM

## 2019-05-06 DIAGNOSIS — E66.01 MORBID OBESITY (HCC): ICD-10-CM

## 2019-05-06 DIAGNOSIS — I10 ESSENTIAL HYPERTENSION: ICD-10-CM

## 2019-05-06 DIAGNOSIS — E13.40 NEUROPATHY DUE TO SECONDARY DIABETES MELLITUS (HCC): ICD-10-CM

## 2019-05-06 DIAGNOSIS — F51.01 PRIMARY INSOMNIA: ICD-10-CM

## 2019-05-06 DIAGNOSIS — G89.4 CHRONIC PAIN SYNDROME: ICD-10-CM

## 2019-05-06 DIAGNOSIS — E11.40 TYPE 2 DIABETES MELLITUS WITH DIABETIC NEUROPATHY, WITH LONG-TERM CURRENT USE OF INSULIN (HCC): Primary | ICD-10-CM

## 2019-05-06 DIAGNOSIS — Z79.4 TYPE 2 DIABETES MELLITUS WITH DIABETIC NEUROPATHY, WITH LONG-TERM CURRENT USE OF INSULIN (HCC): Primary | ICD-10-CM

## 2019-05-06 DIAGNOSIS — E78.2 MIXED HYPERLIPIDEMIA: ICD-10-CM

## 2019-05-06 LAB
BILIRUBIN, POC: NEGATIVE
BLOOD URINE, POC: NEGATIVE
CLARITY, POC: ABNORMAL
COLOR, POC: YELLOW
GLUCOSE URINE, POC: 500
HBA1C MFR BLD: 6.9 %
KETONES, POC: NEGATIVE
LEUKOCYTE EST, POC: NEGATIVE
NITRITE, POC: POSITIVE
PH, POC: 5.5
PROTEIN, POC: NEGATIVE
SPECIFIC GRAVITY, POC: 1.02
UROBILINOGEN, POC: 0.2

## 2019-05-06 PROCEDURE — 83036 HEMOGLOBIN GLYCOSYLATED A1C: CPT | Performed by: NURSE PRACTITIONER

## 2019-05-06 PROCEDURE — G8427 DOCREV CUR MEDS BY ELIG CLIN: HCPCS | Performed by: NURSE PRACTITIONER

## 2019-05-06 PROCEDURE — G8417 CALC BMI ABV UP PARAM F/U: HCPCS | Performed by: NURSE PRACTITIONER

## 2019-05-06 PROCEDURE — 3044F HG A1C LEVEL LT 7.0%: CPT | Performed by: NURSE PRACTITIONER

## 2019-05-06 PROCEDURE — 2022F DILAT RTA XM EVC RTNOPTHY: CPT | Performed by: NURSE PRACTITIONER

## 2019-05-06 PROCEDURE — 99214 OFFICE O/P EST MOD 30 MIN: CPT | Performed by: NURSE PRACTITIONER

## 2019-05-06 PROCEDURE — 81002 URINALYSIS NONAUTO W/O SCOPE: CPT | Performed by: NURSE PRACTITIONER

## 2019-05-06 PROCEDURE — 1036F TOBACCO NON-USER: CPT | Performed by: NURSE PRACTITIONER

## 2019-05-06 NOTE — PROGRESS NOTES
Diabetes Mellitus Follow-up  Chief Complaint   Patient presents with    Follow-up     DM       Subjective:     Minh Butt is an 41 y.o. male who presents for follow up of diabetes, HTN and hyperlipidemia. Diabetes Mellitus Type 2:   Reports that he is checking his fasting BS daily. He did not bring his log and reports that his wife keeps track of his sugars, but she did not come to his appt today. Taking his tresiba to 62 units nightly and novolog 10 units before meals. Has not been checking meal time sugars consistently. Denies episodes of hypoglycemia. Seen podiatrist and foot ulcers have healed. Getting fitted for diabetic shoes. Hypertension:  Home blood pressure monitoring: No.  He is not adherent to a low sodium diet. Patient denies chest pain, shortness of breath, peripheral edema and dry cough. Antihypertensive medication side effects: no medication side effects noted. Hyperlipidemia:  No new myalgias or GI upset on atorvastatin (Lipitor). Dr. Cailin Marcial reported that he is not a surgical weight loss candidate till his DM is controlled. He has decided against weight loss surgery at this time. He reports that he has been working on his diet and exercise. Reports that he has stopped drinking drinks that contain sugar. Highest weight was 410 lbs. Asthma is stable. Sees pulmonologist Dr. Danna Reese. Had UTI last month. Reports that urinary symptoms have resolved. Denies urinary frequency, hematuria, pelvic pain. · Patient checks sugars 1  time daily. · Patent follows diabetic diet? Diet had improved. Has not been drinking sugar drinks. · Exercise: riding bike daily for hour or every other day for 2 hours. · Taking medicines daily as directed? Yes when he is not out of medications. · Is the patient reporting any side effects of antihypertensive medications? No  · Any shortness of breath? No  · Any chest pain? No  · Any leg swelling? Yes-- if wearing compression stockings no edema  · Any leg numbness or tingling? Yes-- denies increase from baseline   · Any vision changes? No  · Patient checks feet daily? Yes  · Tobacco history updated:  reports that he has never smoked. He has never used smokeless tobacco.  · Blood pressure taken correctly and will be repeated if > 130/80  · See Health maintenance list below for last retinal exam and microalbumin.   · See med list below for diabetes, blood pressure or OTC meds and whether taking aspirin  ·   Patient Active Problem List   Diagnosis    Moderate persistent asthma without complication    Hypertension    GERD (gastroesophageal reflux disease)    Tinea cruris    Chronic back pain    Type 2 diabetes mellitus with diabetic neuropathy, with long-term current use of insulin (HCC)    Mixed hyperlipidemia    Failed back surgical syndrome    Spinal cord injury, C1-C7 ([address]    Chronic pain syndrome    Central spinal stenosis    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 ([address]    Major depressive disorder    Insomnia    Diabetic eye exam ([facility]- 12/16 wnl CEI    Closed nondisplaced fracture of fifth metatarsal bone of right foot    Morbid obesity with body mass index (BMI) of 40.0 to 44.9 in adult ([address]    Elevated liver enzymes    JULITA (obstructive sleep apnea)    Pressure ulcer of buttock    Abnormal levels of other serum enzymes    Decubitus ulcer of buttock    Chronic back pain    Right hand pain    Diabetic ulcer of toe of right foot associated with type 2 diabetes mellitus ([address]    Diabetic ulcer of toe ([address]    Ingrowing nail       HEALTH MAINTENANCE DUE  Health Maintenance   Topic Date Due    Varicella Vaccine (1 of 2 - 13+ 2-dose series) 07/30/1994    Hepatitis B Vaccine (1 of 3 - Risk 3-dose series) 07/30/2000    DTaP/Tdap/Td vaccine (1 - Tdap) 07/30/2000    Diabetic retinal exam  12/19/2017    Flu vaccine (Season Ended) 09/01/2019    Diabetic foot exam  03/19/2020    A1C test (Diabetic or Prediabetic)  03/19/2020    Diabetic microalbuminuria test  03/19/2020    Lipid screen  03/19/2020    Potassium monitoring  03/19/2020    Creatinine monitoring  03/19/2020    Pneumococcal 0-64 years Vaccine  Completed    HIV screen  Completed      Lab Review  Lab Results   Component Value Date    LABA1C 6.9 05/06/2019    LABA1C 7.2 03/19/2019    LABA1C 6.7 12/17/2018    LABA1C 6.9 08/17/2018    LABA1C 8.0 07/02/2018     LDL Calculated   Date Value Ref Range Status   03/19/2019 see below <100 mg/dL Final     Comment:     When the triglyceride is <30 mg/dL or >300 mg/dL the  calculated LDL and  VLDL are not valid and a measured  LDL is performed. LDL Direct   Date Value Ref Range Status   03/19/2019 146 (H) <100 mg/dL Final     HDL   Date Value Ref Range Status   03/19/2019 38 (L) 40 - 60 mg/dL Final     Triglycerides   Date Value Ref Range Status   03/19/2019 332 (H) 0 - 150 mg/dL Final     Lab Results   Component Value Date     03/19/2019    K 5.1 03/19/2019    CREATININE 0.8 (L) 03/19/2019    GLUCOSE 187 (H) 03/19/2019     No results found for: TSH  Lab Results   Component Value Date    WBC 9.3 01/16/2016    HGB 15.2 01/16/2016    HCT 47.6 01/16/2016    MCV 80.7 01/16/2016     01/16/2016        Review of Systems:  See above    Medical History:  Patient's medications, allergies, past medical, surgical, social and family histories were reviewed and updated as appropriate.   Social History     Socioeconomic History    Marital status:      Spouse name: Ignacio Watters Number of children: 3    Years of education: None    Highest education level: None   Occupational History    Occupation: disabled   Social Needs    Financial resource strain: None    Food insecurity:     Worry: None     Inability: None    Transportation needs:     Medical: None     Non-medical: None   Tobacco Use    Smoking status: Never Smoker    Smokeless tobacco: Never Used Substance and Sexual Activity    Alcohol use: No    Drug use: No    Sexual activity: Yes     Partners: Female   Lifestyle    Physical activity:     Days per week: None     Minutes per session: None    Stress: None   Relationships    Social connections:     Talks on phone: None     Gets together: None     Attends Latter day service: None     Active member of club or organization: None     Attends meetings of clubs or organizations: None     Relationship status: None    Intimate partner violence:     Fear of current or ex partner: None     Emotionally abused: None     Physically abused: None     Forced sexual activity: None   Other Topics Concern    None   Social History Narrative    Caffeine:        SODA - 0          TEA - 0        COFFEE - 0     Prior to Visit Medications    Medication Sig Taking? Authorizing Provider   DULoxetine (CYMBALTA) 30 MG extended release capsule Take 1 capsule by mouth 2 times daily Yes AYLA Palacios CNP   gabapentin (NEURONTIN) 300 MG capsule Take 1 capsule by mouth 3 times daily for 30 days. Yes AYLA Palacios CNP   HYDROcodone-acetaminophen (NORCO) 5-325 MG per tablet Take 1 tablet by mouth every 6 hours as needed for Pain for up to 28 days.  No more than 4 tabs a day Yes AYLA Palacios CNP   amitriptyline (ELAVIL) 25 MG tablet TAKE ONE TABLET BY MOUTH NIGHTLY Yes AYLA Palacios CNP   atorvastatin (LIPITOR) 80 MG tablet Take 1 tablet by mouth daily Yes AYLA Chua CNP   Multiple Vitamins-Minerals (MULTIVITAMIN ADULT PO) Take 1 tablet by mouth daily Yes Historical Provider, MD   cyanocobalamin 1000 MCG/ML injection Inject 1,000 mcg into the muscle every 30 days Yes Historical Provider, MD   Cyanocobalamin (VITAMIN B 12 PO) Take 1 tablet by mouth daily Yes Historical Provider, MD   Insulin Degludec (TRESIBA FLEXTOUCH) 100 UNIT/ML SOPN Inject 62 Units into the skin every evening For diabetes Yes AYLA Chua CNP insulin aspart (NOVOLOG FLEXPEN) 100 UNIT/ML injection pen Inject 10 Units into the skin 3 times daily (before meals) Yes AYLA Perry CNP   lisinopril (PRINIVIL;ZESTRIL) 20 MG tablet Take 1 tablet by mouth daily Yes AYLA Perry CNP   metoprolol succinate (TOPROL XL) 50 MG extended release tablet TAKE ONE TABLET BY MOUTH DAILY FOR FOR BLOOD PRESSURE AND HEART RATE Yes AYLA Perry CNP   montelukast (SINGULAIR) 10 MG tablet TAKE ONE TABLET BY MOUTH DAILY FOR BREATHING Yes AYLA Perry CNP   omeprazole (PRILOSEC) 20 MG delayed release capsule TAKE ONE CAPSULE BY MOUTH DAILY FOR STOMACH Yes AYLA Perry CNP   omega-3 acid ethyl esters (LOVAZA) 1 g capsule Take 2 capsules by mouth 2 times daily Yes AYLA Perry CNP   canagliflozin (INVOKANA) 100 MG TABS tablet TAKE ONE TABLET BY MOUTH EVERY MORNING BEFORE BREAKFAST FOR DIABETES Yes AYLA Perry CNP   budesonide-formoterol (SYMBICORT) 160-4.5 MCG/ACT AERO Inhale 2 puffs into the lungs 2 times daily Yes Azra Esteban MD   albuterol sulfate HFA (VENTOLIN HFA) 108 (90 Base) MCG/ACT inhaler INHALE TWO PUFFS BY MOUTH EVERY 6 HOURS AS NEEDED FOR WHEEZING OR FOR SHORTNESS OF BREATH Yes Azra Esteabn MD   ipratropium-albuterol (DUONEB) 0.5-2.5 (3) MG/3ML SOLN nebulizer solution Inhale 3 mLs into the lungs every 6 hours as needed for Shortness of Breath Yes Azra Esteban MD   Naloxone HCl (EVZIO) 0.4 MG/0.4ML SOAJ Use as directed Yes AYLA Paredes CNP   VICTOZA 18 MG/3ML SOPN SC injection DIAL AND INJECT SUBCUTANEOUSLY 1.8MG DAILY Yes Lucina Woody MD   metFORMIN (GLUCOPHAGE) 1000 MG tablet Take 1 tablet by mouth 2 times daily (with meals) For diabetes Yes Willem Roberson MD   Insulin Pen Needle 31G X 5 MM MISC 1 each by Does not apply route 2 times daily Yes Nestora Flor, APRN - CNP   blood glucose test strips (TRUE METRIX BLOOD GLUCOSE TEST) strip Use to check BS QID Yes AYLA Mendez CNP   Lancets MISC Use to check BS QID Yes AYLA Mendez CNP   Blood Glucose Monitoring Suppl (TRUE METRIX METER) w/Device KIT Use to check fasting BS and 2 hours after meal BS Yes AYLA Mendez CNP   Lancets MISC 1 each by Does not apply route daily TRUEMETRIX Yes AYLA Mendez CNP        Objective:   PHYSICAL EXAM  /80 (Site: Right Upper Arm, Position: Sitting, Cuff Size: Large Adult)   Pulse 83   Resp 18   Wt (!) 348 lb (157.9 kg)   BMI 45.91 kg/m²   Blood pressure is Good. BP Readings from Last 5 Encounters:   05/06/19 126/80   04/23/19 (!) 127/91   04/22/19 126/80   04/08/19 136/80   04/03/19 (!) 136/94     Weight is increased  Wt Readings from Last 5 Encounters:   05/06/19 (!) 348 lb (157.9 kg)   04/23/19 (!) 346 lb (156.9 kg)   04/22/19 (!) 343 lb (155.6 kg)   04/08/19 (!) 341 lb (154.7 kg)   04/03/19 (!) 350 lb 5 oz (158.9 kg)      GENERAL: well-developed, well-nourished, alert, no distress, calm, assistive device: cane    EYES:  negative findings: lids and lashes normal  LUNGS:  Breathing unlabored  · clear to auscultation bilaterally and good air movement  CARDIOVASC: regular rate and rhythm, S1, S2 normal, no murmur, click, rub or gallop  · Apical impulse normal  LEGS:  Lower extremity edema: trace jersey ankles   · Pedal pulses palpable     Assessment and Plan:     1. Type 2 diabetes mellitus with diabetic neuropathy, with long-term current use of insulin ([facility])  Hemoglobin A1C- 6.9%. Improving diabetes control. Goal HgA1c is less than 7.0%. metformin to 1000 mg BID. victoza 1.8 mg SQ daily  invokana 100 mg daily   Tresiba 62 units qhs. Novolog to 10 units before meals. He did not bring his BS log. Advised patient to check fasting BS, before meal BS and 2 hour after meal BS. Patient is to bring BS log in at visits.     Low carb diet, aerobic exercise, and weight loss discussed and needed. On gabapentin 300 mg TID for neuropathy from pain specialist.    2. Morbid obesity with BMI of 40.0-44.9, adult (HCC)  Seen Dr. Starlyn Gosselin and has discussed gastric sleeve surgery. Patient reports that this is currently on hold. Weight has decreased with increasing exercise. Diet, aerobic exercise, and weight loss discussed and needed    3. Mixed hyperlipidemia  On atorvastatin 80 mg daily and lovaza 2 grams BID  Lipids checked 3/19 and atorvastatin was increased at that time. Low fat/cholesterol diet and weight loss discussed and needed. 4. Essential hypertension  Controlled. Continue metoprolol succinate to 50 mg daily and lisinopril to 20 mg daily. Low salt diet, aerobic exercise, and weight loss discussed and needed. Moderate persistent asthma without complication: controlled   -     budesonide-formoterol (SYMBICORT) 160-4.5 MCG/ACT AERO; INHALE TWO PUFFS BY MOUTH TWICE A DAY FOR LONG TERM CONTROL OF ASTHMA, RINSE MOUTH OUT AFTER USE  -     albuterol sulfate HFA (VENTOLIN HFA) 108 (90 Base) MCG/ACT inhaler; INHALE TWO PUFFS BY MOUTH EVERY 6 HOURS AS NEEDED FOR WHEEZING OR FOR SHORTNESS OF BREATH  Montelukast 10 mg daily   Continue to f/u with pulmonologist Dr. Jolene Lorenzana patient again to see GI Dr. Riya Dai for elevated liver enzymes and fatty liver. Patient reports that he will schedule an appt. Diet and weight loss discussed and needed. Continue to f/u with podiatrist Dr. Michelle Tilley for his diabetic foot care. Acute cystitis without hematuria: treated with bactrim antibiotic and symptoms resolved. Will repeat urine culture.    -     POCT Urinalysis no Micro  -     URINE CULTURE    FOLLOW UP: 3 months

## 2019-05-08 ENCOUNTER — OFFICE VISIT (OUTPATIENT)
Dept: SLEEP MEDICINE | Age: 39
End: 2019-05-08
Payer: MEDICARE

## 2019-05-08 VITALS
BODY MASS INDEX: 39.17 KG/M2 | TEMPERATURE: 98.6 F | HEART RATE: 100 BPM | WEIGHT: 315 LBS | OXYGEN SATURATION: 96 % | RESPIRATION RATE: 16 BRPM | SYSTOLIC BLOOD PRESSURE: 141 MMHG | HEIGHT: 75 IN | DIASTOLIC BLOOD PRESSURE: 94 MMHG

## 2019-05-08 DIAGNOSIS — Z99.89 OSA ON CPAP: Primary | ICD-10-CM

## 2019-05-08 DIAGNOSIS — Z99.89 DEPENDENCE ON OTHER ENABLING MACHINES AND DEVICES: ICD-10-CM

## 2019-05-08 DIAGNOSIS — G47.33 OSA ON CPAP: Primary | ICD-10-CM

## 2019-05-08 LAB
ORGANISM: ABNORMAL
URINE CULTURE, ROUTINE: ABNORMAL
URINE CULTURE, ROUTINE: ABNORMAL

## 2019-05-08 PROCEDURE — G8428 CUR MEDS NOT DOCUMENT: HCPCS | Performed by: PSYCHIATRY & NEUROLOGY

## 2019-05-08 PROCEDURE — 99213 OFFICE O/P EST LOW 20 MIN: CPT | Performed by: PSYCHIATRY & NEUROLOGY

## 2019-05-08 PROCEDURE — 1036F TOBACCO NON-USER: CPT | Performed by: PSYCHIATRY & NEUROLOGY

## 2019-05-08 PROCEDURE — G8417 CALC BMI ABV UP PARAM F/U: HCPCS | Performed by: PSYCHIATRY & NEUROLOGY

## 2019-05-08 RX ORDER — CIPROFLOXACIN 500 MG/1
500 TABLET, FILM COATED ORAL 2 TIMES DAILY
Qty: 20 TABLET | Refills: 0 | Status: SHIPPED | OUTPATIENT
Start: 2019-05-08 | End: 2019-05-18

## 2019-05-08 ASSESSMENT — SLEEP AND FATIGUE QUESTIONNAIRES
HOW LIKELY ARE YOU TO NOD OFF OR FALL ASLEEP WHILE SITTING INACTIVE IN A PUBLIC PLACE: 1
HOW LIKELY ARE YOU TO NOD OFF OR FALL ASLEEP WHEN YOU ARE A PASSENGER IN A CAR FOR AN HOUR WITHOUT A BREAK: 1
HOW LIKELY ARE YOU TO NOD OFF OR FALL ASLEEP WHILE LYING DOWN TO REST IN THE AFTERNOON WHEN CIRCUMSTANCES PERMIT: 2
HOW LIKELY ARE YOU TO NOD OFF OR FALL ASLEEP WHILE SITTING AND READING: 2
HOW LIKELY ARE YOU TO NOD OFF OR FALL ASLEEP IN A CAR, WHILE STOPPED FOR A FEW MINUTES IN TRAFFIC: 1
HOW LIKELY ARE YOU TO NOD OFF OR FALL ASLEEP WHILE SITTING AND TALKING TO SOMEONE: 0
HOW LIKELY ARE YOU TO NOD OFF OR FALL ASLEEP WHILE SITTING QUIETLY AFTER LUNCH WITHOUT ALCOHOL: 2
ESS TOTAL SCORE: 10
HOW LIKELY ARE YOU TO NOD OFF OR FALL ASLEEP WHILE WATCHING TV: 1

## 2019-05-08 ASSESSMENT — ENCOUNTER SYMPTOMS
APNEA: 1
CHOKING: 1

## 2019-05-08 NOTE — PROGRESS NOTES
MD RENZO Robbins Board Certified in Sleep Medicine  Certified in [address] [place] [address] Certified in Neurology Tajeduarda Mccain [address] [place] [address], KINDRA [phone]   [address] [place] [address]  [address] [place] [address], [address]                      [address]  [address] [facility] [address] [phone]    Subjective:     Patient ID: Soren Boateng is a 41 y.o. male. Chief Complaint   Patient presents with    Follow-up     cpap        HPI:        Soren Boateng is a 41 y.o. male was seen today as annual follow for moderate obstructive sleep apnea with apnea hypopnea index of 21.9 with lowest O2 saturation of 82%, patient spent about 2.7 minutes below 90%. Patient is using the PAP machine about 10% of the time, more than 4 hours a nightabout  10 %, in total average of 7:17 hours a night in last 30 days. Currently on PAP at 9 cm, the AHI is only 5.3 events per hour atthis pressure. The Patient scored Total score: 10 on [place] Sleepiness Scale ( more than 10 is indicative of daytime sleepiness)   Ha d pneumonia 3 times therefore he quit using the CPAP, he restarted again after he bought the so clean machine.     DOT/CDL - N/A  Previous Report(s)Reviewed: historical medical records         Social History     Socioeconomic History    Marital status:      Spouse name: Heriberto Meade Number of children: 3    Years of education: Not on file    Highest education level: Not on file   Occupational History    Occupation: disabled   Social Needs    Financial resource strain: Not on file    Food insecurity:     Worry: Not on file     Inability: Not on file   [facility] needs:     Medical: Not on file     Non-medical: Not on file   Tobacco Use    Smoking status: Never Smoker    Smokeless tobacco: Never Used   Substance and Sexual Activity  Alcohol use: No    Drug use: No    Sexual activity: Yes     Partners: Female   Lifestyle    Physical activity:     Days per week: Not on file     Minutes per session: Not on file    Stress: Not on file   Relationships    Social connections:     Talks on phone: Not on file     Gets together: Not on file     Attends Anabaptism service: Not on file     Active member of club or organization: Not on file     Attends meetings of clubs or organizations: Not on file     Relationship status: Not on file    Intimate partner violence:     Fear of current or ex partner: Not on file     Emotionally abused: Not on file     Physically abused: Not on file     Forced sexual activity: Not on file   Other Topics Concern    Not on file   Social History Narrative    Caffeine:        SODA - 0          TEA - 0        COFFEE - 0       Prior to Admission medications    Medication Sig Start Date End Date Taking? Authorizing Provider   Insulin Degludec (TRESIBA FLEXTOUCH) 100 UNIT/ML SOPN Inject 62 Units into the skin every evening For diabetes 5/6/19   AYLA Bangura CNP   DULoxetine (CYMBALTA) 30 MG extended release capsule Take 1 capsule by mouth 2 times daily 4/23/19   AYLA Bansal CNP   gabapentin (NEURONTIN) 300 MG capsule Take 1 capsule by mouth 3 times daily for 30 days. 4/23/19 5/23/19  AYLA Bansal CNP   HYDROcodone-acetaminophen (NORCO) 5-325 MG per tablet Take 1 tablet by mouth every 6 hours as needed for Pain for up to 28 days.  No more than 4 tabs a day 4/23/19 5/21/19  AYLA Bansal CNP   amitriptyline (ELAVIL) 25 MG tablet TAKE ONE TABLET BY MOUTH NIGHTLY 4/23/19   AYLA Bansal CNP   atorvastatin (LIPITOR) 80 MG tablet Take 1 tablet by mouth daily 3/21/19   AYLA Bangura CNP   Multiple Vitamins-Minerals (MULTIVITAMIN ADULT PO) Take 1 tablet by mouth daily    Historical Provider, MD   cyanocobalamin 1000 MCG/ML injection Inject 1,000 mcg into the muscle every 30 days    Historical Provider, MD   Cyanocobalamin (VITAMIN B 12 PO) Take 1 tablet by mouth daily    Historical Provider, MD   insulin aspart (NOVOLOG FLEXPEN) 100 UNIT/ML injection pen Inject 10 Units into the skin 3 times daily (before meals) 3/19/19   AYLA Bailey CNP   lisinopril (PRINIVIL;ZESTRIL) 20 MG tablet Take 1 tablet by mouth daily 3/19/19   AYLA Bailey CNP   metoprolol succinate (TOPROL XL) 50 MG extended release tablet TAKE ONE TABLET BY MOUTH DAILY FOR FOR BLOOD PRESSURE AND HEART RATE 3/15/19   AYLA Bailey CNP   montelukast (SINGULAIR) 10 MG tablet TAKE ONE TABLET BY MOUTH DAILY FOR BREATHING 3/14/19   AYLA Bailey CNP   omeprazole (PRILOSEC) 20 MG delayed release capsule TAKE ONE CAPSULE BY MOUTH DAILY FOR STOMACH 3/14/19   AYLA Bailey CNP   omega-3 acid ethyl esters (LOVAZA) 1 g capsule Take 2 capsules by mouth 2 times daily 3/14/19   AYLA Bailey CNP   canagliflozin (INVOKANA) 100 MG TABS tablet TAKE ONE TABLET BY MOUTH EVERY MORNING BEFORE BREAKFAST FOR DIABETES 3/14/19   AYLA Bailey CNP   budesonide-formoterol [facility]) 160-4.5 MCG/ACT AERO Inhale 2 puffs into the lungs 2 times daily 2/11/19   Karla Alcazar MD   albuterol sulfate HFA (VENTOLIN HFA) 108 (90 Base) MCG/ACT inhaler INHALE TWO PUFFS BY MOUTH EVERY 6 HOURS AS NEEDED FOR WHEEZING OR FOR SHORTNESS OF BREATH 2/11/19   Karla Alcazar MD   ipratropium-albuterol (DUONEB) 0.5-2.5 (3) MG/3ML SOLN nebulizer solution Inhale 3 mLs into the lungs every 6 hours as needed for Shortness of Breath 2/11/19   Karla Alcazar MD   Naloxone HCl (EVZIO) 0.4 MG/0.4ML SOAJ Use as directed 1/29/19   AYLA Stein CNP   VICTOZA 18 MG/3ML SOPN SC injection DIAL AND INJECT SUBCUTANEOUSLY 1.8MG DAILY 12/7/18   Jaxon Hawkins MD   metFORMIN (GLUCOPHAGE) 1000 MG tablet Take 1 tablet by mouth 2 times daily (with meals) For diabetes 10/19/18   Sarah Gomez MD   Insulin Pen Needle 31G X 5 MM MISC 1 each by Does not apply route 2 times daily 8/17/18   AYLA Muñoz CNP   blood glucose test strips (TRUE METRIX BLOOD GLUCOSE TEST) strip Use to check BS QID 7/2/18   AYLA Muñoz CNP   Lancets MISC Use to check BS QID 7/2/18   AYLA Muñoz CNP   Blood Glucose Monitoring Suppl (TRUE METRIX METER) w/Device KIT Use to check fasting BS and 2 hours after meal BS 4/9/18   AYLA Muñoz CNP   Lancets MISC 1 each by Does not apply route daily TRUEMETRIX 1/15/18   AYLA Muñoz CNP       Allergies as of 05/08/2019 - Review Complete 05/08/2019   Allergen Reaction Noted    Penicillins Swelling and Anaphylaxis 03/26/2014    Fentanyl  01/16/2016    Food Swelling 12/12/2016    Mushroom extract complex  01/15/2016       Patient Active Problem List   Diagnosis    Moderate persistent asthma without complication    Hypertension    GERD (gastroesophageal reflux disease)    Tinea cruris    Chronic back pain    Type 2 diabetes mellitus with diabetic neuropathy, with long-term current use of insulin (HCC)    Mixed hyperlipidemia    Failed back surgical syndrome    Spinal cord injury, C1-C7 ([address]    Chronic pain syndrome    Central spinal stenosis    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 ([address]    Major depressive disorder    Insomnia    Diabetic eye exam ([address]- 12/16 wnl CEI    Closed nondisplaced fracture of fifth metatarsal bone of right foot    Morbid obesity with body mass index (BMI) of 40.0 to 44.9 in adult (HCC)    Elevated liver enzymes    JULITA (obstructive sleep apnea)    Pressure ulcer of buttock    Abnormal levels of other serum enzymes    Decubitus ulcer of buttock    Chronic back pain    Right hand pain    Diabetic ulcer of toe of right foot associated with type 2 diabetes mellitus ([address]    Diabetic ulcer of toe (HCC)    Ingrowing nail Past Medical History:   Diagnosis Date    Asthma     Chronic back pain     Depression     GERD (gastroesophageal reflux disease)     Hyperlipidemia     Hypertension     Neuropathy     Obesity     Osteoarthritis     Peripheral vascular disease (HCC)     Restless legs syndrome     Sleep apnea     Type 2 diabetes mellitus without complication (HCC)     Type II or unspecified type diabetes mellitus without mention of complication, not stated as uncontrolled     Urinary incontinence        Past Surgical History:   Procedure Laterality Date    BACK SURGERY  2013    four surgeries; rods placed    FRACTURE SURGERY Right     arm- two    FRACTURE SURGERY Right     hand- three    HAND SURGERY  1999    KNEE ARTHROSCOPY Right     PELVIC FRACTURE SURGERY      SPINE SURGERY  2011    SPINE SURGERY  2010    UVULECTOMY         Family History   Problem Relation Age of Onset    Cancer Mother         stomach    Diabetes Mother     Asthma Mother     Diabetes Brother     Cancer Paternal Uncle         testicle    Cancer Paternal Grandmother         breast    Cancer Paternal Cousin         gum       Review of Systems   Respiratory: Positive for apnea and choking. Cardiovascular: Negative for leg swelling. Objective:     Vitals:  Weight BMI Neck circumference    Wt Readings from Last 3 Encounters:   05/08/19 (!) 334 lb (151.5 kg)   05/06/19 (!) 348 lb (157.9 kg)   04/23/19 (!) 346 lb (156.9 kg)    Body mass index is 41.75 kg/m². BP HR SaO2   BP Readings from Last 3 Encounters:   05/08/19 (!) 141/94   05/06/19 126/80   04/23/19 (!) 127/91    Pulse Readings from Last 3 Encounters:   05/08/19 100   05/06/19 83   04/23/19 112    SpO2 Readings from Last 3 Encounters:   05/08/19 96%   04/23/19 95%   03/26/19 96%      Themandibular molar Class :   [x]1 []2 []3      Mallampati I Airway Classification:   []1 []2 []3 [x]4      Physical Exam   Constitutional: No distress.    HENT:   Nose: Nose normal. Eyes: EOM are normal.   Neck: Neck supple. Cardiovascular: Normal rate, regular rhythm and normal heart sounds. Pulmonary/Chest: Effort normal and breath sounds normal. No respiratory distress. Musculoskeletal: Normal range of motion. He exhibits no edema. Neurological: He is alert. Skin: Skin is warm. Psychiatric: He has a normal mood and affect. Nursing note and vitals reviewed. Assessment:   Moderate Obstructive Sleep Apnea/Hypopnea Syndrome , he quit using the CPAP because of pneumonia, he restarted again after he got the so clean machine. Diagnosis Orders   1. JULITA on CPAP     2. Dependence on other enabling machines and devices       Plan: Will continue the PAP at 9 cmwp, I encouraged the patient to use the PAP on nightly basis. I educated to use the PAP every night more than 4 hours at least.    I will order PAP supplies, mask, filters. ... No orders of the defined types were placed in this encounter. Return in about 3 months (around 8/8/2019) for Reveiwing CPAP usage and compliance report and tro.     Allison Boudreaux MD  Medical Director - [facility]

## 2019-05-08 NOTE — PATIENT INSTRUCTIONS
Some machines have air pressure that adjusts on its own. Others have air pressures that are different when you breathe in than when you breathe out. This may reduce discomfort caused by too much pressure in your nose. Where can you learn more? Go to [email or web address] and sign in to your [employer] account. Enter R505 in the [employer] box to learn more about \"Learning About CPAP for Sleep Apnea. \"     If you do not have an account, please click on the \"Sign Up Now\" link. Current as of: September 5, 2018  Content Version: 12.0  © 3904-5922 Healthwise, Incorporated. Care instructions adapted under license by [facility] ([facility]). If you have questions about a medical condition or this instruction, always ask your healthcare professional. [address] any warranty or liability for your use of this information.

## 2019-05-15 ENCOUNTER — OFFICE VISIT (OUTPATIENT)
Dept: PAIN MANAGEMENT | Age: 39
End: 2019-05-15
Payer: MEDICARE

## 2019-05-15 VITALS — OXYGEN SATURATION: 97 % | DIASTOLIC BLOOD PRESSURE: 94 MMHG | HEART RATE: 99 BPM | SYSTOLIC BLOOD PRESSURE: 151 MMHG

## 2019-05-15 DIAGNOSIS — G89.4 CHRONIC PAIN SYNDROME: ICD-10-CM

## 2019-05-15 DIAGNOSIS — E66.01 MORBID OBESITY WITH BODY MASS INDEX (BMI) OF 40.0 TO 44.9 IN ADULT (HCC): ICD-10-CM

## 2019-05-15 DIAGNOSIS — E13.40 NEUROPATHY DUE TO SECONDARY DIABETES MELLITUS (HCC): ICD-10-CM

## 2019-05-15 DIAGNOSIS — S14.109S INJURY OF CERVICAL SPINAL CORD, SEQUELA (HCC): ICD-10-CM

## 2019-05-15 DIAGNOSIS — M96.1 FAILED BACK SURGICAL SYNDROME: ICD-10-CM

## 2019-05-15 DIAGNOSIS — M48.00 CENTRAL SPINAL STENOSIS: ICD-10-CM

## 2019-05-15 DIAGNOSIS — M54.9 CHRONIC BACK PAIN, UNSPECIFIED BACK LOCATION, UNSPECIFIED BACK PAIN LATERALITY: ICD-10-CM

## 2019-05-15 DIAGNOSIS — M79.641 RIGHT HAND PAIN: ICD-10-CM

## 2019-05-15 DIAGNOSIS — F32.9 CURRENT EPISODE OF MAJOR DEPRESSIVE DISORDER WITHOUT PRIOR EPISODE, UNSPECIFIED DEPRESSION EPISODE SEVERITY: ICD-10-CM

## 2019-05-15 DIAGNOSIS — G89.29 CHRONIC BACK PAIN, UNSPECIFIED BACK LOCATION, UNSPECIFIED BACK PAIN LATERALITY: ICD-10-CM

## 2019-05-15 DIAGNOSIS — F51.01 PRIMARY INSOMNIA: ICD-10-CM

## 2019-05-15 PROCEDURE — 1036F TOBACCO NON-USER: CPT | Performed by: NURSE PRACTITIONER

## 2019-05-15 PROCEDURE — G8427 DOCREV CUR MEDS BY ELIG CLIN: HCPCS | Performed by: NURSE PRACTITIONER

## 2019-05-15 PROCEDURE — 99213 OFFICE O/P EST LOW 20 MIN: CPT | Performed by: NURSE PRACTITIONER

## 2019-05-15 PROCEDURE — 2022F DILAT RTA XM EVC RTNOPTHY: CPT | Performed by: NURSE PRACTITIONER

## 2019-05-15 PROCEDURE — 3044F HG A1C LEVEL LT 7.0%: CPT | Performed by: NURSE PRACTITIONER

## 2019-05-15 PROCEDURE — G8417 CALC BMI ABV UP PARAM F/U: HCPCS | Performed by: NURSE PRACTITIONER

## 2019-05-15 RX ORDER — HYDROCODONE BITARTRATE AND ACETAMINOPHEN 5; 325 MG/1; MG/1
1 TABLET ORAL EVERY 6 HOURS PRN
Qty: 112 TABLET | Refills: 0 | Status: SHIPPED | OUTPATIENT
Start: 2019-05-15 | End: 2019-05-15 | Stop reason: SDUPTHER

## 2019-05-15 RX ORDER — GABAPENTIN 300 MG/1
300 CAPSULE ORAL 3 TIMES DAILY
Qty: 90 CAPSULE | Refills: 1 | Status: SHIPPED | OUTPATIENT
Start: 2019-05-15 | End: 2019-06-19 | Stop reason: SDUPTHER

## 2019-05-15 RX ORDER — AMITRIPTYLINE HYDROCHLORIDE 25 MG/1
TABLET, FILM COATED ORAL
Qty: 30 TABLET | Refills: 1 | Status: SHIPPED | OUTPATIENT
Start: 2019-05-15 | End: 2019-06-19 | Stop reason: SDUPTHER

## 2019-05-15 RX ORDER — HYDROCODONE BITARTRATE AND ACETAMINOPHEN 5; 325 MG/1; MG/1
1 TABLET ORAL EVERY 6 HOURS PRN
Qty: 120 TABLET | Refills: 0 | Status: SHIPPED | OUTPATIENT
Start: 2019-05-15 | End: 2019-06-19 | Stop reason: SDUPTHER

## 2019-05-15 RX ORDER — DULOXETIN HYDROCHLORIDE 30 MG/1
30 CAPSULE, DELAYED RELEASE ORAL 2 TIMES DAILY
Qty: 60 CAPSULE | Refills: 1 | Status: SHIPPED | OUTPATIENT
Start: 2019-05-15 | End: 2019-06-19 | Stop reason: SDUPTHER

## 2019-05-15 NOTE — PATIENT INSTRUCTIONS
Patient Education        Back Stretches: Exercises  Your Care Instructions  Here are some examples of exercises for stretching your back. Start each exercise slowly. Ease off the exercise if you start to have pain. Your doctor or physical therapist will tell you when you can start these exercises and which ones will work best for you. How to do the exercises  Overhead stretch    1. Stand comfortably with your feet shoulder-width apart. 2. Looking straight ahead, raise both arms over your head and reach toward the ceiling. Do not allow your head to tilt back. 3. Hold for 15 to 30 seconds, then lower your arms to your sides. 4. Repeat 2 to 4 times. Side stretch    1. Stand comfortably with your feet shoulder-width apart. 2. Raise one arm over your head, and then lean to the other side. 3. Slide your hand down your leg as you let the weight of your arm gently stretch your side muscles. Hold for 15 to 30 seconds. 4. Repeat 2 to 4 times on each side. Press-up    1. Lie on your stomach, supporting your body with your forearms. 2. Press your elbows down into the floor to raise your upper back. As you do this, relax your stomach muscles and allow your back to arch without using your back muscles. As your press up, do not let your hips or pelvis come off the floor. 3. Hold for 15 to 30 seconds, then relax. 4. Repeat 2 to 4 times. Relax and rest    1. Lie on your back with a rolled towel under your neck and a pillow under your knees. Extend your arms comfortably to your sides. 2. Relax and breathe normally. 3. Remain in this position for about 10 minutes. 4. If you can, do this 2 or 3 times each day. Follow-up care is a key part of your treatment and safety. Be sure to make and go to all appointments, and call your doctor if you are having problems. It's also a good idea to know your test results and keep a list of the medicines you take. Where can you learn more?   Go to [email or web address] and sign in to your [employer] account. Enter H464 in the [employer] box to learn more about \"Back Stretches: Exercises. \"     If you do not have an account, please click on the \"Sign Up Now\" link. Current as of: September 20, 2018  Content Version: 12.0  © 9465-5065 Healthwise, Incorporated. Care instructions adapted under license by [facility] ([facility]). If you have questions about a medical condition or this instruction, always ask your healthcare professional. [address] any warranty or liability for your use of this information.

## 2019-05-15 NOTE — PROGRESS NOTES
Sonia Cisse  1980  [id number]      HISTORY OF PRESENT ILLNESS:  Mr. Estefany Blackmon is a 41 y.o. male returns for a follow up visit for pain management  He has a diagnosis of   1. Chronic pain syndrome    2. Failed back surgical syndrome    3. Chronic back pain, unspecified back location, unspecified back pain laterality    4. Central spinal stenosis    5. Injury of cervical spinal cord, sequela ([facility] [address]    6. Right hand pain    7. 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 ([facility] [address]    8. Current episode of major depressive disorder without prior episode, unspecified depression episode severity    9. Primary insomnia    10. Morbid obesity with body mass index (BMI) of 40.0 to 44.9 in [facility])      On the Patients Pain Assessment form:  He complains of pain in the abdomen, both arm(s): entire limb, both buttocks, both foot/feet: entire area, both hand(s): entire area, both knee(s), both leg(s): entire limb, bilateral lower back, bilateral mid-back and bilateral upper back He rates the pain 6/10 and describes it as sharp, aching. Current treatment regimen has helped relieve about 40% of the pain. He denies any side effects from the current pain regimen. Patient reports that since the last follow up visit the physical functioning is unchanged, family/social relationships are unchanged, mood is unchanged sleep patterns are unchanged, and that the overall functioning is unchanged. Patient denies misusing/abusing his narcotic pain medications or using any illegal drugs. There are No indicators for possible drug abuse, addiction or diversion problems. Upon obtaining medical history from Mr. Estefany Blackmon states that pain is manageable on current pain therapy. Takes medications as prescribed. Mood is stable without anxiety. Sleep is fair with an average of 5-6 hours. Denies to having issues of constipation. Tolerating activities/house chores with moderate tenderness to the lower back.     ALLERGIES: Patients list of solution Inhale 3 mLs into the lungs every 6 hours as needed for Shortness of Breath 360 mL 5    Naloxone HCl (EVZIO) 0.4 MG/0.4ML SOAJ Use as directed 1 Package 0    VICTOZA 18 MG/3ML SOPN SC injection DIAL AND INJECT SUBCUTANEOUSLY 1.8MG DAILY 3 pen 0    metFORMIN (GLUCOPHAGE) 1000 MG tablet Take 1 tablet by mouth 2 times daily (with meals) For diabetes 180 tablet 0    Insulin Pen Needle 31G X 5 MM MISC 1 each by Does not apply route 2 times daily 100 each 3    blood glucose test strips (TRUE METRIX BLOOD GLUCOSE TEST) strip Use to check BS  each 5    Lancets MISC Use to check BS  each 5    Blood Glucose Monitoring Suppl (TRUE METRIX METER) w/Device KIT Use to check fasting BS and 2 hours after meal BS 1 kit 0    Lancets MISC 1 each by Does not apply route daily TRUEMETRIX 100 each 2    DULoxetine (CYMBALTA) 30 MG extended release capsule Take 1 capsule by mouth 2 times daily 60 capsule 1    gabapentin (NEURONTIN) 300 MG capsule Take 1 capsule by mouth 3 times daily for 30 days. 90 capsule 1    HYDROcodone-acetaminophen (NORCO) 5-325 MG per tablet Take 1 tablet by mouth every 6 hours as needed for Pain for up to 28 days. No more than 4 tabs a day 112 tablet 0    amitriptyline (ELAVIL) 25 MG tablet TAKE ONE TABLET BY MOUTH NIGHTLY 30 tablet 1     No facility-administered medications prior to visit. SOCIAL/FAMILY/PAST MEDICAL HISTORY: Mr. Terese Santos, family and past medical history was reviewed. REVIEW OF SYSTEMS:    Respiratory: Negative for apnea, chest tightness and shortness of breath or change in baseline breathing. Gastrointestinal: Negative for nausea, vomiting, abdominal pain, diarrhea, constipation, blood in stool and abdominal distention. PHYSICAL EXAM:   Nursing note and vitals reviewed. BP (!) 151/94   Pulse 99   SpO2 97%   Constitutional: He appears well-developed and well-nourished. No acute distress. Skin: Skin is warm and dry, good turgor.  No rash noted. He is not diaphoretic. Cardiovascular: Normal rate, regular rhythm, normal heart sounds, and does not have murmur. Pulmonary/Chest: Effort normal. No respiratory distress. He does not have wheezes in the lung fields. He has no rales. Neurological/Psychiatric:He is alert and oriented to person, place, and time. Coordination is  normal.  His mood isAppropriate and affect is Neutral/Euthymic(normal) . IMPRESSION:   1. Chronic pain syndrome    2. Failed back surgical syndrome    3. Chronic back pain, unspecified back location, unspecified back pain laterality    4. Central spinal stenosis    5. Injury of cervical spinal cord, sequela ([facility] [address]    6. Right hand pain    7. 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 ([facility] [address]    8. Current episode of major depressive disorder without prior episode, unspecified depression episode severity    9. Primary insomnia    10. Morbid obesity with body mass index (BMI) of 40.0 to 44.9 in adult [facility])        PLAN:  Informed verbal consent was obtained  -Continue with Norco, Neurontin, Cymbalta, Elavil, Evzio  -Jose Cruz exercises  -Maintain f/u with wound care for wound to the right toes  -Monitor BP, f/u with PCP if it continues to stay elevated or he becomes symptomatic with SOB, CP, syncopy. he is currently asymptomatic   -CBT techniques- relaxation therapies such as biofeedback, mindfulness based stress reduction, imagery, cognitive restructuring, problem solving discussed with patient  -Last UDS consistent  -Return in about 5 weeks (around 6/19/2019). -[place] was obtained and reviewed  for the last one year and no indicators of drug misuse  were found. Any other controlled substance prescriptions  seen on the record have been accounted for, I am aware of the patient receiving these medications. Nette Mortensen [place] record will be rechecked as part of office protocol.      Current Outpatient Medications   Medication Sig Dispense Refill    DULoxetine (CYMBALTA) 30 MG extended release capsule Take 1 capsule by mouth 2 times daily 60 capsule 1    gabapentin (NEURONTIN) 300 MG capsule Take 1 capsule by mouth 3 times daily for 30 days. 90 capsule 1    amitriptyline (ELAVIL) 25 MG tablet TAKE ONE TABLET BY MOUTH NIGHTLY 30 tablet 1    HYDROcodone-acetaminophen (NORCO) 5-325 MG per tablet Take 1 tablet by mouth every 6 hours as needed for Pain for up to 30 days. No more than 4 tabs a day 120 tablet 0    ciprofloxacin (CIPRO) 500 MG tablet Take 1 tablet by mouth 2 times daily for 10 days For treatment of urinary tract infection.  20 tablet 0    Insulin Degludec (TRESIBA FLEXTOUCH) 100 UNIT/ML SOPN Inject 62 Units into the skin every evening For diabetes 9 pen 2    atorvastatin (LIPITOR) 80 MG tablet Take 1 tablet by mouth daily 90 tablet 0    Multiple Vitamins-Minerals (MULTIVITAMIN ADULT PO) Take 1 tablet by mouth daily      cyanocobalamin 1000 MCG/ML injection Inject 1,000 mcg into the muscle every 30 days      Cyanocobalamin (VITAMIN B 12 PO) Take 1 tablet by mouth daily      insulin aspart (NOVOLOG FLEXPEN) 100 UNIT/ML injection pen Inject 10 Units into the skin 3 times daily (before meals) 5 pen 3    lisinopril (PRINIVIL;ZESTRIL) 20 MG tablet Take 1 tablet by mouth daily 90 tablet 0    metoprolol succinate (TOPROL XL) 50 MG extended release tablet TAKE ONE TABLET BY MOUTH DAILY FOR FOR BLOOD PRESSURE AND HEART RATE 90 tablet 0    montelukast (SINGULAIR) 10 MG tablet TAKE ONE TABLET BY MOUTH DAILY FOR BREATHING 90 tablet 0    omeprazole (PRILOSEC) 20 MG delayed release capsule TAKE ONE CAPSULE BY MOUTH DAILY FOR STOMACH 90 capsule 0    omega-3 acid ethyl esters (LOVAZA) 1 g capsule Take 2 capsules by mouth 2 times daily 360 capsule 0    canagliflozin (INVOKANA) 100 MG TABS tablet TAKE ONE TABLET BY MOUTH EVERY MORNING BEFORE BREAKFAST FOR DIABETES 90 tablet 0    budesonide-formoterol (SYMBICORT) 160-4.5 MCG/ACT AERO Inhale 2 puffs into the lungs 2 times daily 1 Inhaler 6    albuterol sulfate HFA (VENTOLIN HFA) 108 (90 Base) MCG/ACT inhaler INHALE TWO PUFFS BY MOUTH EVERY 6 HOURS AS NEEDED FOR WHEEZING OR FOR SHORTNESS OF BREATH 2 Inhaler 5    ipratropium-albuterol (DUONEB) 0.5-2.5 (3) MG/3ML SOLN nebulizer solution Inhale 3 mLs into the lungs every 6 hours as needed for Shortness of Breath 360 mL 5    Naloxone HCl (EVZIO) 0.4 MG/0.4ML SOAJ Use as directed 1 Package 0    VICTOZA 18 MG/3ML SOPN SC injection DIAL AND INJECT SUBCUTANEOUSLY 1.8MG DAILY 3 pen 0    metFORMIN (GLUCOPHAGE) 1000 MG tablet Take 1 tablet by mouth 2 times daily (with meals) For diabetes 180 tablet 0    Insulin Pen Needle 31G X 5 MM MISC 1 each by Does not apply route 2 times daily 100 each 3    blood glucose test strips (TRUE METRIX BLOOD GLUCOSE TEST) strip Use to check BS  each 5    Lancets MISC Use to check BS  each 5    Blood Glucose Monitoring Suppl (TRUE METRIX METER) w/Device KIT Use to check fasting BS and 2 hours after meal BS 1 kit 0    Lancets MISC 1 each by Does not apply route daily TRUEMETRIX 100 each 2     No current facility-administered medications for this visit. I will continue his current medication regimen  which is part of the above treatment schedule. It has been helping with Mr. David Kennedy chronic  medical problems which for this visit include:   Diagnoses of Chronic pain syndrome, Failed back surgical syndrome, Chronic back pain, unspecified back location, unspecified back pain laterality, Central spinal stenosis, Injury of cervical spinal cord, sequela (HCC), Right hand pain, 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 ([facility] [address] Current episode of major depressive disorder without prior episode, unspecified depression episode severity, Primary insomnia, and Morbid obesity with body mass index (BMI) of 40.0 to 44.9 in [facility]) were pertinent to this visit.    Risks and benefits of the medications and other alternative treatments  including no treatment were discussed with the patient. The common side effects of these medications were also explained to the patient. Informed verbal consent was obtained. Goals of current treatment regimen include improvement in pain, restoration of functioning- with focus on improvement in physical performance, general activity, work or disability,emotional distress, health care utilization and  decreased medication consumption. Will continue to monitor progress towards achieving/maintaining therapeutic goals with special emphasis on  1. Improvement in perceived interfernce  of pain with ADL's. Ability to do home exercises independently. Ability to do household chores indoor and/or outdoor work and social and leisure activities. Improve psychosocial and physical functioning. - he is showing progression towards this treatment goal with the current regimen. He was advised against drinking alcohol with the narcotic pain medicines, advised against driving or handling machinery while adjusting the dose of medicines or if having cognitive  issues related to the current medications. Risk of overdose and death, if medicines not taken as prescribed, were also discussed. If the patient develops new symptoms or if the symptoms worsen, the patient should call the office. While transcribing every attempt was made to maintain the accuracy of the note in terms of it's contents,there may have been some errors made inadvertently. Thank you for allowing me to participate in the care of this patient.     Yumiko Silverman CNP    Cc: AYLA Gale - [place]

## 2019-05-21 RX ORDER — DULOXETIN HYDROCHLORIDE 30 MG/1
30 CAPSULE, DELAYED RELEASE ORAL 2 TIMES DAILY
Qty: 60 CAPSULE | Refills: 1 | OUTPATIENT
Start: 2019-05-21

## 2019-05-21 RX ORDER — AMITRIPTYLINE HYDROCHLORIDE 25 MG/1
TABLET, FILM COATED ORAL
Qty: 30 TABLET | Refills: 1 | OUTPATIENT
Start: 2019-05-21

## 2019-06-03 DIAGNOSIS — E11.40 TYPE 2 DIABETES MELLITUS WITH DIABETIC NEUROPATHY, WITH LONG-TERM CURRENT USE OF INSULIN (HCC): ICD-10-CM

## 2019-06-03 DIAGNOSIS — Z79.4 TYPE 2 DIABETES MELLITUS WITH DIABETIC NEUROPATHY, WITH LONG-TERM CURRENT USE OF INSULIN (HCC): ICD-10-CM

## 2019-06-03 DIAGNOSIS — J45.20 MILD INTERMITTENT ASTHMA WITHOUT COMPLICATION: ICD-10-CM

## 2019-06-03 DIAGNOSIS — K21.9 GASTROESOPHAGEAL REFLUX DISEASE, ESOPHAGITIS PRESENCE NOT SPECIFIED: ICD-10-CM

## 2019-06-03 DIAGNOSIS — E78.2 MIXED HYPERLIPIDEMIA: ICD-10-CM

## 2019-06-03 DIAGNOSIS — I10 ESSENTIAL HYPERTENSION: ICD-10-CM

## 2019-06-03 RX ORDER — ATORVASTATIN CALCIUM 80 MG/1
80 TABLET, FILM COATED ORAL DAILY
Qty: 90 TABLET | Refills: 0 | Status: SHIPPED | OUTPATIENT
Start: 2019-06-03 | End: 2019-11-25 | Stop reason: SDUPTHER

## 2019-06-03 RX ORDER — LISINOPRIL 10 MG/1
TABLET ORAL
Qty: 90 TABLET | Refills: 0 | OUTPATIENT
Start: 2019-06-03

## 2019-06-03 RX ORDER — MONTELUKAST SODIUM 10 MG/1
TABLET ORAL
Qty: 90 TABLET | Refills: 0 | Status: SHIPPED | OUTPATIENT
Start: 2019-06-03 | End: 2019-11-22 | Stop reason: SDUPTHER

## 2019-06-03 RX ORDER — OMEPRAZOLE 20 MG/1
CAPSULE, DELAYED RELEASE ORAL
Qty: 90 CAPSULE | Refills: 0 | Status: SHIPPED | OUTPATIENT
Start: 2019-06-03 | End: 2019-11-22 | Stop reason: SDUPTHER

## 2019-06-03 RX ORDER — LISINOPRIL 20 MG/1
20 TABLET ORAL DAILY
Qty: 90 TABLET | Refills: 0 | Status: SHIPPED | OUTPATIENT
Start: 2019-06-03 | End: 2019-08-06 | Stop reason: SDUPTHER

## 2019-06-19 ENCOUNTER — OFFICE VISIT (OUTPATIENT)
Dept: PAIN MANAGEMENT | Age: 39
End: 2019-06-19
Payer: MEDICARE

## 2019-06-19 VITALS — DIASTOLIC BLOOD PRESSURE: 96 MMHG | SYSTOLIC BLOOD PRESSURE: 156 MMHG | HEART RATE: 122 BPM | OXYGEN SATURATION: 97 %

## 2019-06-19 DIAGNOSIS — G89.29 CHRONIC BACK PAIN, UNSPECIFIED BACK LOCATION, UNSPECIFIED BACK PAIN LATERALITY: ICD-10-CM

## 2019-06-19 DIAGNOSIS — E66.01 MORBID OBESITY WITH BODY MASS INDEX (BMI) OF 40.0 TO 44.9 IN ADULT (HCC): ICD-10-CM

## 2019-06-19 DIAGNOSIS — M48.00 CENTRAL SPINAL STENOSIS: ICD-10-CM

## 2019-06-19 DIAGNOSIS — F32.9 CURRENT EPISODE OF MAJOR DEPRESSIVE DISORDER WITHOUT PRIOR EPISODE, UNSPECIFIED DEPRESSION EPISODE SEVERITY: ICD-10-CM

## 2019-06-19 DIAGNOSIS — S14.109S INJURY OF CERVICAL SPINAL CORD, SEQUELA (HCC): ICD-10-CM

## 2019-06-19 DIAGNOSIS — M54.9 CHRONIC BACK PAIN, UNSPECIFIED BACK LOCATION, UNSPECIFIED BACK PAIN LATERALITY: ICD-10-CM

## 2019-06-19 DIAGNOSIS — G89.4 CHRONIC PAIN SYNDROME: ICD-10-CM

## 2019-06-19 DIAGNOSIS — M79.641 RIGHT HAND PAIN: ICD-10-CM

## 2019-06-19 DIAGNOSIS — E13.40 NEUROPATHY DUE TO SECONDARY DIABETES MELLITUS (HCC): ICD-10-CM

## 2019-06-19 DIAGNOSIS — F51.01 PRIMARY INSOMNIA: ICD-10-CM

## 2019-06-19 DIAGNOSIS — M96.1 FAILED BACK SURGICAL SYNDROME: ICD-10-CM

## 2019-06-19 PROCEDURE — 3044F HG A1C LEVEL LT 7.0%: CPT | Performed by: NURSE PRACTITIONER

## 2019-06-19 PROCEDURE — 1036F TOBACCO NON-USER: CPT | Performed by: NURSE PRACTITIONER

## 2019-06-19 PROCEDURE — 2022F DILAT RTA XM EVC RTNOPTHY: CPT | Performed by: NURSE PRACTITIONER

## 2019-06-19 PROCEDURE — G8417 CALC BMI ABV UP PARAM F/U: HCPCS | Performed by: NURSE PRACTITIONER

## 2019-06-19 PROCEDURE — 99213 OFFICE O/P EST LOW 20 MIN: CPT | Performed by: NURSE PRACTITIONER

## 2019-06-19 PROCEDURE — G8427 DOCREV CUR MEDS BY ELIG CLIN: HCPCS | Performed by: NURSE PRACTITIONER

## 2019-06-19 RX ORDER — GABAPENTIN 300 MG/1
300 CAPSULE ORAL 3 TIMES DAILY
Qty: 90 CAPSULE | Refills: 1 | Status: SHIPPED | OUTPATIENT
Start: 2019-06-19 | End: 2019-07-26 | Stop reason: SDUPTHER

## 2019-06-19 RX ORDER — DULOXETIN HYDROCHLORIDE 30 MG/1
30 CAPSULE, DELAYED RELEASE ORAL 2 TIMES DAILY
Qty: 60 CAPSULE | Refills: 1 | Status: SHIPPED | OUTPATIENT
Start: 2019-06-19 | End: 2019-07-26 | Stop reason: SDUPTHER

## 2019-06-19 RX ORDER — HYDROCODONE BITARTRATE AND ACETAMINOPHEN 5; 325 MG/1; MG/1
1 TABLET ORAL EVERY 6 HOURS PRN
Qty: 28 TABLET | Refills: 0 | Status: SHIPPED | OUTPATIENT
Start: 2019-06-19 | End: 2019-07-26 | Stop reason: SDUPTHER

## 2019-06-19 RX ORDER — AMITRIPTYLINE HYDROCHLORIDE 25 MG/1
TABLET, FILM COATED ORAL
Qty: 30 TABLET | Refills: 1 | Status: SHIPPED | OUTPATIENT
Start: 2019-06-19 | End: 2019-07-26 | Stop reason: SDUPTHER

## 2019-06-19 RX ORDER — HYDROCODONE BITARTRATE AND ACETAMINOPHEN 5; 325 MG/1; MG/1
1 TABLET ORAL EVERY 6 HOURS PRN
Qty: 120 TABLET | Refills: 0 | Status: SHIPPED | OUTPATIENT
Start: 2019-06-19 | End: 2019-06-19 | Stop reason: SDUPTHER

## 2019-06-19 NOTE — PROGRESS NOTES
tenderness to the lower back. ALLERGIES: Patients list of allergies were reviewed     MEDICATIONS: Mr. Codie Lange list of medications were reviewed. His current medications are   Outpatient Medications Prior to Visit   Medication Sig Dispense Refill    canagliflozin (INVOKANA) 100 MG TABS tablet TAKE ONE TABLET BY MOUTH EVERY MORNING BEFORE BREAKFAST FOR DIABETES 90 tablet 0    montelukast (SINGULAIR) 10 MG tablet TAKE ONE TABLET BY MOUTH DAILY FOR BREATHING 90 tablet 0    omeprazole (PRILOSEC) 20 MG delayed release capsule TAKE ONE CAPSULE BY MOUTH DAILY FOR STOMACH 90 capsule 0    metFORMIN (GLUCOPHAGE) 1000 MG tablet TAKE 1 TABLET BY MOUTH TWICE DAILY WITH MEALS FOR DIABETES 180 tablet 0    atorvastatin (LIPITOR) 80 MG tablet TAKE 1 TABLET BY MOUTH DAILY 90 tablet 0    lisinopril (PRINIVIL;ZESTRIL) 20 MG tablet Take 1 tablet by mouth daily 90 tablet 0    budesonide-formoterol (SYMBICORT) 160-4.5 MCG/ACT AERO INHALE TWO PUFFS BY MOUTH TWICE A DAY FOR LONG TERM CONTROL OF ASTHMA, RINSE MOUTH OUT AFTER USE 30.6 g 5    Insulin Degludec (TRESIBA FLEXTOUCH) 100 UNIT/ML SOPN Inject 62 Units into the skin every evening For diabetes 9 pen 2    Multiple Vitamins-Minerals (MULTIVITAMIN ADULT PO) Take 1 tablet by mouth daily      cyanocobalamin 1000 MCG/ML injection Inject 1,000 mcg into the muscle every 30 days      Cyanocobalamin (VITAMIN B 12 PO) Take 1 tablet by mouth daily      insulin aspart (NOVOLOG FLEXPEN) 100 UNIT/ML injection pen Inject 10 Units into the skin 3 times daily (before meals) 5 pen 3    metoprolol succinate (TOPROL XL) 50 MG extended release tablet TAKE ONE TABLET BY MOUTH DAILY FOR FOR BLOOD PRESSURE AND HEART RATE 90 tablet 0    omega-3 acid ethyl esters (LOVAZA) 1 g capsule Take 2 capsules by mouth 2 times daily 360 capsule 0    albuterol sulfate HFA (VENTOLIN HFA) 108 (90 Base) MCG/ACT inhaler INHALE TWO PUFFS BY MOUTH EVERY 6 HOURS AS NEEDED FOR WHEEZING OR FOR SHORTNESS OF BREATH 2 in the lung fields. He has no rales. Neurological/Psychiatric:He is alert and oriented to person, place, and time. Coordination is  normal.  His mood isAppropriate and affect is Neutral/Euthymic(normal) . IMPRESSION:   1. Chronic pain syndrome    2. Failed back surgical syndrome    3. Chronic back pain, unspecified back location, unspecified back pain laterality    4. Central spinal stenosis    5. Injury of cervical spinal cord, sequela ([facility] [address]    6. Right hand pain    7. 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 ([facility] [address]    8. Current episode of major depressive disorder without prior episode, unspecified depression episode severity    9. Primary insomnia    10. Morbid obesity with body mass index (BMI) of 40.0 to 44.9 in adult [facility])        PLAN:  Informed verbal consent was obtained  -Continue with Norco, Neurontin, Cymbalta, Elavil, Evzio  -Jose Cruz exercises  -Monitor BP, f/u with PCP if it continues to stay elevated or he becomes symptomatic with SOB, CP, syncopy. he is currently asymptomatic  -He was advised weight reduction, diet changes- 800-1200 omid diet, diet diary, exercising, nutritional  consult increased physical activity as tolerated  -CBT techniques- relaxation therapies such as biofeedback, mindfulness based stress reduction, imagery, cognitive restructuring, problem solving discussed with patient  -Last UDS consistent  -Return in about 5 weeks (around 7/24/2019). Current Outpatient Medications   Medication Sig Dispense Refill    DULoxetine (CYMBALTA) 30 MG extended release capsule Take 1 capsule by mouth 2 times daily 60 capsule 1    gabapentin (NEURONTIN) 300 MG capsule Take 1 capsule by mouth 3 times daily for 30 days. 90 capsule 1    amitriptyline (ELAVIL) 25 MG tablet TAKE ONE TABLET BY MOUTH NIGHTLY 30 tablet 1    HYDROcodone-acetaminophen (NORCO) 5-325 MG per tablet Take 1 tablet by mouth every 6 hours as needed for Pain for up to 7 days.  No more than 4 tabs a day 28 tablet 0  canagliflozin (INVOKANA) 100 MG TABS tablet TAKE ONE TABLET BY MOUTH EVERY MORNING BEFORE BREAKFAST FOR DIABETES 90 tablet 0    montelukast (SINGULAIR) 10 MG tablet TAKE ONE TABLET BY MOUTH DAILY FOR BREATHING 90 tablet 0    omeprazole (PRILOSEC) 20 MG delayed release capsule TAKE ONE CAPSULE BY MOUTH DAILY FOR STOMACH 90 capsule 0    metFORMIN (GLUCOPHAGE) 1000 MG tablet TAKE 1 TABLET BY MOUTH TWICE DAILY WITH MEALS FOR DIABETES 180 tablet 0    atorvastatin (LIPITOR) 80 MG tablet TAKE 1 TABLET BY MOUTH DAILY 90 tablet 0    lisinopril (PRINIVIL;ZESTRIL) 20 MG tablet Take 1 tablet by mouth daily 90 tablet 0    budesonide-formoterol (SYMBICORT) 160-4.5 MCG/ACT AERO INHALE TWO PUFFS BY MOUTH TWICE A DAY FOR LONG TERM CONTROL OF ASTHMA, RINSE MOUTH OUT AFTER USE 30.6 g 5    Insulin Degludec (TRESIBA FLEXTOUCH) 100 UNIT/ML SOPN Inject 62 Units into the skin every evening For diabetes 9 pen 2    Multiple Vitamins-Minerals (MULTIVITAMIN ADULT PO) Take 1 tablet by mouth daily      cyanocobalamin 1000 MCG/ML injection Inject 1,000 mcg into the muscle every 30 days      Cyanocobalamin (VITAMIN B 12 PO) Take 1 tablet by mouth daily      insulin aspart (NOVOLOG FLEXPEN) 100 UNIT/ML injection pen Inject 10 Units into the skin 3 times daily (before meals) 5 pen 3    metoprolol succinate (TOPROL XL) 50 MG extended release tablet TAKE ONE TABLET BY MOUTH DAILY FOR FOR BLOOD PRESSURE AND HEART RATE 90 tablet 0    omega-3 acid ethyl esters (LOVAZA) 1 g capsule Take 2 capsules by mouth 2 times daily 360 capsule 0    albuterol sulfate HFA (VENTOLIN HFA) 108 (90 Base) MCG/ACT inhaler INHALE TWO PUFFS BY MOUTH EVERY 6 HOURS AS NEEDED FOR WHEEZING OR FOR SHORTNESS OF BREATH 2 Inhaler 5    ipratropium-albuterol (DUONEB) 0.5-2.5 (3) MG/3ML SOLN nebulizer solution Inhale 3 mLs into the lungs every 6 hours as needed for Shortness of Breath 360 mL 5    Naloxone HCl (EVZIO) 0.4 MG/0.4ML SOAJ Use as directed 1 Package 0    Improvement in perceived interfernce  of pain with ADL's. Ability to do home exercises independently. Ability to do household chores indoor and/or outdoor work and social and leisure activities. Improve psychosocial and physical functioning. - he is showing progression towards this treatment goal with the current regimen. He was advised against drinking alcohol with the narcotic pain medicines, advised against driving or handling machinery while adjusting the dose of medicines or if having cognitive  issues related to the current medications. Risk of overdose and death, if medicines not taken as prescribed, were also discussed. If the patient develops new symptoms or if the symptoms worsen, the patient should call the office. While transcribing every attempt was made to maintain the accuracy of the note in terms of it's contents,there may have been some errors made inadvertently. Thank you for allowing me to participate in the care of this patient.     Mk Murphy CNP    Cc: AYLA Hewitt - [place]

## 2019-06-24 DIAGNOSIS — E11.40 TYPE 2 DIABETES MELLITUS WITH DIABETIC NEUROPATHY, WITH LONG-TERM CURRENT USE OF INSULIN (HCC): ICD-10-CM

## 2019-06-24 DIAGNOSIS — Z79.4 TYPE 2 DIABETES MELLITUS WITH DIABETIC NEUROPATHY, WITH LONG-TERM CURRENT USE OF INSULIN (HCC): ICD-10-CM

## 2019-06-24 RX ORDER — INSULIN LISPRO 100 [IU]/ML
INJECTION, SOLUTION INTRAVENOUS; SUBCUTANEOUS
Qty: 15 ML | Refills: 2 | Status: SHIPPED | OUTPATIENT
Start: 2019-06-24 | End: 2019-10-31 | Stop reason: SDUPTHER

## 2019-06-24 RX ORDER — INSULIN DEGLUDEC INJECTION 100 U/ML
INJECTION, SOLUTION SUBCUTANEOUS
Qty: 15 ML | Refills: 2 | Status: SHIPPED | OUTPATIENT
Start: 2019-06-24 | End: 2019-12-03

## 2019-07-16 DIAGNOSIS — Z79.4 TYPE 2 DIABETES MELLITUS WITH DIABETIC NEUROPATHY, WITH LONG-TERM CURRENT USE OF INSULIN (HCC): ICD-10-CM

## 2019-07-16 DIAGNOSIS — E11.40 TYPE 2 DIABETES MELLITUS WITH DIABETIC NEUROPATHY, WITH LONG-TERM CURRENT USE OF INSULIN (HCC): ICD-10-CM

## 2019-07-26 ENCOUNTER — OFFICE VISIT (OUTPATIENT)
Dept: PAIN MANAGEMENT | Age: 39
End: 2019-07-26
Payer: MEDICARE

## 2019-07-26 VITALS — DIASTOLIC BLOOD PRESSURE: 101 MMHG | OXYGEN SATURATION: 96 % | SYSTOLIC BLOOD PRESSURE: 136 MMHG | HEART RATE: 112 BPM

## 2019-07-26 DIAGNOSIS — E13.40 NEUROPATHY DUE TO SECONDARY DIABETES MELLITUS (HCC): ICD-10-CM

## 2019-07-26 DIAGNOSIS — M54.9 CHRONIC BACK PAIN, UNSPECIFIED BACK LOCATION, UNSPECIFIED BACK PAIN LATERALITY: ICD-10-CM

## 2019-07-26 DIAGNOSIS — F51.01 PRIMARY INSOMNIA: ICD-10-CM

## 2019-07-26 DIAGNOSIS — S14.109S INJURY OF CERVICAL SPINAL CORD, SEQUELA (HCC): ICD-10-CM

## 2019-07-26 DIAGNOSIS — M48.00 CENTRAL SPINAL STENOSIS: ICD-10-CM

## 2019-07-26 DIAGNOSIS — M79.641 RIGHT HAND PAIN: ICD-10-CM

## 2019-07-26 DIAGNOSIS — M96.1 FAILED BACK SURGICAL SYNDROME: ICD-10-CM

## 2019-07-26 DIAGNOSIS — G89.29 CHRONIC BACK PAIN, UNSPECIFIED BACK LOCATION, UNSPECIFIED BACK PAIN LATERALITY: ICD-10-CM

## 2019-07-26 DIAGNOSIS — F32.9 CURRENT EPISODE OF MAJOR DEPRESSIVE DISORDER WITHOUT PRIOR EPISODE, UNSPECIFIED DEPRESSION EPISODE SEVERITY: ICD-10-CM

## 2019-07-26 DIAGNOSIS — G89.4 CHRONIC PAIN SYNDROME: ICD-10-CM

## 2019-07-26 DIAGNOSIS — E66.01 MORBID OBESITY WITH BODY MASS INDEX (BMI) OF 40.0 TO 44.9 IN ADULT (HCC): ICD-10-CM

## 2019-07-26 PROCEDURE — 3044F HG A1C LEVEL LT 7.0%: CPT | Performed by: NURSE PRACTITIONER

## 2019-07-26 PROCEDURE — G8427 DOCREV CUR MEDS BY ELIG CLIN: HCPCS | Performed by: NURSE PRACTITIONER

## 2019-07-26 PROCEDURE — 99213 OFFICE O/P EST LOW 20 MIN: CPT | Performed by: NURSE PRACTITIONER

## 2019-07-26 PROCEDURE — G8417 CALC BMI ABV UP PARAM F/U: HCPCS | Performed by: NURSE PRACTITIONER

## 2019-07-26 PROCEDURE — 2022F DILAT RTA XM EVC RTNOPTHY: CPT | Performed by: NURSE PRACTITIONER

## 2019-07-26 PROCEDURE — 1036F TOBACCO NON-USER: CPT | Performed by: NURSE PRACTITIONER

## 2019-07-26 RX ORDER — HYDROCODONE BITARTRATE AND ACETAMINOPHEN 5; 325 MG/1; MG/1
1 TABLET ORAL EVERY 6 HOURS PRN
Qty: 120 TABLET | Refills: 0 | Status: SHIPPED | OUTPATIENT
Start: 2019-07-26 | End: 2019-08-27 | Stop reason: SDUPTHER

## 2019-07-26 RX ORDER — AMITRIPTYLINE HYDROCHLORIDE 25 MG/1
TABLET, FILM COATED ORAL
Qty: 30 TABLET | Refills: 1 | Status: SHIPPED | OUTPATIENT
Start: 2019-07-26 | End: 2019-08-05 | Stop reason: SDUPTHER

## 2019-07-26 RX ORDER — DULOXETIN HYDROCHLORIDE 30 MG/1
30 CAPSULE, DELAYED RELEASE ORAL 2 TIMES DAILY
Qty: 60 CAPSULE | Refills: 1 | Status: SHIPPED | OUTPATIENT
Start: 2019-07-26 | End: 2019-08-05 | Stop reason: SDUPTHER

## 2019-07-26 RX ORDER — GABAPENTIN 300 MG/1
300 CAPSULE ORAL 3 TIMES DAILY
Qty: 90 CAPSULE | Refills: 1 | Status: SHIPPED | OUTPATIENT
Start: 2019-07-26 | End: 2019-08-27 | Stop reason: SDUPTHER

## 2019-07-26 NOTE — PATIENT INSTRUCTIONS
[email or web address] and sign in to your [employer] account. Enter Q202 in the My[employer] box to learn more about \"Back Stretches: Exercises. \"     If you do not have an account, please click on the \"Sign Up Now\" link. Current as of: September 20, 2018  Content Version: 12.0  © 3824-1367 Healthwise, Incorporated. Care instructions adapted under license by [facility] ([facility]). If you have questions about a medical condition or this instruction, always ask your healthcare professional. [address] any warranty or liability for your use of this information.

## 2019-07-26 NOTE — PROGRESS NOTES
Effort normal. No respiratory distress. He does not have wheezes in the lung fields. He has no rales. Neurological/Psychiatric:He is alert and oriented to person, place, and time. Coordination is  normal.  His mood isAppropriate and affect is Neutral/Euthymic(normal) . IMPRESSION:   1. Chronic pain syndrome    2. Failed back surgical syndrome    3. Chronic back pain, unspecified back location, unspecified back pain laterality    4. Central spinal stenosis    5. Injury of cervical spinal cord, sequela ([facility] [address]    6. Right hand pain    7. 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 ([facility] [address]    8. Current episode of major depressive disorder without prior episode, unspecified depression episode severity    9. Primary insomnia    10. Morbid obesity with body mass index (BMI) of 40.0 to 44.9 in adult [facility])        PLAN:  Informed verbal consent was obtained  -Continue with Norco, Neurontin, Cymbalta, Elavil, Evzio  -Jose Cruz exercises  -Monitor BP, f/u with PCP if it continues to stay elevated or he becomes symptomatic with SOB, CP, syncopy. he is currently asymptomatic   -CBT techniques- relaxation therapies such as biofeedback, mindfulness based stress reduction, imagery, cognitive restructuring, problem solving discussed with patient  -He was advised weight reduction, diet changes- 800-1200 omid diet, diet diary, exercising, nutritional  consult increased physical activity as tolerated  -Last UDS 8/22/18 consistent  -Return in about 4 weeks (around 8/23/2019). Current Outpatient Medications   Medication Sig Dispense Refill    DULoxetine (CYMBALTA) 30 MG extended release capsule Take 1 capsule by mouth 2 times daily 60 capsule 1    amitriptyline (ELAVIL) 25 MG tablet TAKE ONE TABLET BY MOUTH NIGHTLY 30 tablet 1    gabapentin (NEURONTIN) 300 MG capsule Take 1 capsule by mouth 3 times daily for 30 days.  90 capsule 1    HYDROcodone-acetaminophen (NORCO) 5-325 MG per tablet Take 1 tablet by mouth every 6 hours as

## 2019-08-02 ENCOUNTER — TELEPHONE (OUTPATIENT)
Dept: PAIN MANAGEMENT | Age: 39
End: 2019-08-02

## 2019-08-02 DIAGNOSIS — E13.40 NEUROPATHY DUE TO SECONDARY DIABETES MELLITUS (HCC): ICD-10-CM

## 2019-08-02 DIAGNOSIS — G89.4 CHRONIC PAIN SYNDROME: ICD-10-CM

## 2019-08-02 DIAGNOSIS — F51.01 PRIMARY INSOMNIA: ICD-10-CM

## 2019-08-05 ENCOUNTER — OFFICE VISIT (OUTPATIENT)
Dept: PULMONOLOGY | Age: 39
End: 2019-08-05
Payer: MEDICARE

## 2019-08-05 VITALS
OXYGEN SATURATION: 96 % | TEMPERATURE: 98 F | HEART RATE: 94 BPM | WEIGHT: 315 LBS | DIASTOLIC BLOOD PRESSURE: 80 MMHG | SYSTOLIC BLOOD PRESSURE: 126 MMHG | BODY MASS INDEX: 39.17 KG/M2 | HEIGHT: 75 IN | RESPIRATION RATE: 16 BRPM

## 2019-08-05 DIAGNOSIS — J45.40 MODERATE PERSISTENT ASTHMA WITHOUT COMPLICATION: ICD-10-CM

## 2019-08-05 PROCEDURE — G8427 DOCREV CUR MEDS BY ELIG CLIN: HCPCS | Performed by: INTERNAL MEDICINE

## 2019-08-05 PROCEDURE — 1036F TOBACCO NON-USER: CPT | Performed by: INTERNAL MEDICINE

## 2019-08-05 PROCEDURE — G8417 CALC BMI ABV UP PARAM F/U: HCPCS | Performed by: INTERNAL MEDICINE

## 2019-08-05 PROCEDURE — 99213 OFFICE O/P EST LOW 20 MIN: CPT | Performed by: INTERNAL MEDICINE

## 2019-08-05 RX ORDER — BUDESONIDE AND FORMOTEROL FUMARATE DIHYDRATE 160; 4.5 UG/1; UG/1
AEROSOL RESPIRATORY (INHALATION)
Qty: 30.6 G | Refills: 5 | Status: SHIPPED | OUTPATIENT
Start: 2019-08-05 | End: 2020-02-24

## 2019-08-05 RX ORDER — AMITRIPTYLINE HYDROCHLORIDE 25 MG/1
TABLET, FILM COATED ORAL
Qty: 30 TABLET | Refills: 1 | Status: SHIPPED | OUTPATIENT
Start: 2019-08-05 | End: 2019-09-24 | Stop reason: SDUPTHER

## 2019-08-05 RX ORDER — DULOXETIN HYDROCHLORIDE 30 MG/1
30 CAPSULE, DELAYED RELEASE ORAL 2 TIMES DAILY
Qty: 60 CAPSULE | Refills: 1 | Status: SHIPPED | OUTPATIENT
Start: 2019-08-05 | End: 2019-09-24 | Stop reason: SDUPTHER

## 2019-08-05 ASSESSMENT — ASTHMA QUESTIONNAIRES
ACT_TOTALSCORE: 16
QUESTION_4 LAST FOUR WEEKS HOW OFTEN HAVE YOU USED YOUR RESCUE INHALER OR NEBULIZER MEDICATION (SUCH AS ALBUTEROL): 3
QUESTION_5 LAST FOUR WEEKS HOW WOULD YOU RATE YOUR ASTHMA CONTROL: 3
QUESTION_1 LAST FOUR WEEKS HOW MUCH OF THE TIME DID YOUR ASTHMA KEEP YOU FROM GETTING AS MUCH DONE AT WORK, SCHOOL OR AT HOME: 3
QUESTION_3 LAST FOUR WEEKS HOW OFTEN DID YOUR ASTHMA SYMPTOMS (WHEEZING, COUGHING, SHORTNESS OF BREATH, CHEST TIGHTNESS OR PAIN) WAKE YOU UP AT NIGHT OR EARLIER THAN USUAL IN THE MORNING: 4
QUESTION_2 LAST FOUR WEEKS HOW OFTEN HAVE YOU HAD SHORTNESS OF BREATH: 3

## 2019-08-05 NOTE — PROGRESS NOTES
Breath 360 mL 5    Naloxone HCl (EVZIO) 0.4 MG/0.4ML SOAJ Use as directed 1 Package 0    VICTOZA 18 MG/3ML SOPN SC injection DIAL AND INJECT SUBCUTANEOUSLY 1.8MG DAILY 3 pen 0    Insulin Pen Needle 31G X 5 MM MISC 1 each by Does not apply route 2 times daily 100 each 3    blood glucose test strips (TRUE METRIX BLOOD GLUCOSE TEST) strip Use to check BS  each 5    Lancets MISC Use to check BS  each 5    Blood Glucose Monitoring Suppl (TRUE METRIX METER) w/Device KIT Use to check fasting BS and 2 hours after meal BS 1 kit 0    Lancets MISC 1 each by Does not apply route daily TRUEMETRIX 100 each 2     No current facility-administered medications for this visit. Data Reviewed:   CBC and Renal reviewed  Last CBC  Lab Results   Component Value Date    WBC 9.3 01/16/2016    RBC 5.89 01/16/2016    HGB 15.2 01/16/2016    MCV 80.7 01/16/2016     01/16/2016     Last Renal  Lab Results   Component Value Date     03/19/2019    K 5.1 03/19/2019    CL 93 03/19/2019    CO2 27 03/19/2019    CO2 24 12/17/2018    CO2 27 07/02/2018    BUN 16 03/19/2019    CREATININE 0.8 03/19/2019    GLUCOSE 187 03/19/2019    CALCIUM 11.0 03/19/2019       Last ABG  POC Blood Gas: No results found for: POCPH, POCPCO2, POCPO2, POCHCO3, NBEA, LIZQ6MLQ  No results for input(s): PH, PCO2, PO2, HCO3, BE, O2SAT in the last 72 hours. CXR portable: No results found for this or any previous visit. Assessment:      Asthma  Sleep apnea - Dr. Abi Capps  Obesity  Depression  GERD      Plan:      Problem List Items Addressed This Visit     Moderate persistent asthma without complication     He is currently using albuterol only. ACT score of 16. It is not clear why he is not filling his Symbicort. Her last visit, he did not have it either. Prescription is been sent in and he does not have a co-pay.   Another prescription was given         Relevant Medications    budesonide-formoterol (SYMBICORT) 160-4.5 MCG/ACT AERO

## 2019-08-06 ENCOUNTER — OFFICE VISIT (OUTPATIENT)
Dept: FAMILY MEDICINE CLINIC | Age: 39
End: 2019-08-06
Payer: MEDICARE

## 2019-08-06 VITALS
DIASTOLIC BLOOD PRESSURE: 80 MMHG | RESPIRATION RATE: 18 BRPM | WEIGHT: 315 LBS | HEART RATE: 120 BPM | SYSTOLIC BLOOD PRESSURE: 135 MMHG | BODY MASS INDEX: 44.12 KG/M2

## 2019-08-06 DIAGNOSIS — I10 ESSENTIAL HYPERTENSION: ICD-10-CM

## 2019-08-06 DIAGNOSIS — N30.00 ACUTE CYSTITIS WITHOUT HEMATURIA: ICD-10-CM

## 2019-08-06 DIAGNOSIS — E78.2 MIXED HYPERLIPIDEMIA: ICD-10-CM

## 2019-08-06 DIAGNOSIS — E11.40 TYPE 2 DIABETES MELLITUS WITH DIABETIC NEUROPATHY, WITH LONG-TERM CURRENT USE OF INSULIN (HCC): Primary | ICD-10-CM

## 2019-08-06 DIAGNOSIS — E66.01 MORBID OBESITY WITH BMI OF 40.0-44.9, ADULT (HCC): ICD-10-CM

## 2019-08-06 DIAGNOSIS — Z79.4 TYPE 2 DIABETES MELLITUS WITH DIABETIC NEUROPATHY, WITH LONG-TERM CURRENT USE OF INSULIN (HCC): Primary | ICD-10-CM

## 2019-08-06 LAB
BILIRUBIN, POC: NEGATIVE
BLOOD URINE, POC: ABNORMAL
CLARITY, POC: ABNORMAL
COLOR, POC: YELLOW
GLUCOSE URINE, POC: 500
HBA1C MFR BLD: 6.4 %
KETONES, POC: ABNORMAL
LEUKOCYTE EST, POC: NEGATIVE
NITRITE, POC: POSITIVE
PH, POC: 5.5
PROTEIN, POC: NEGATIVE
SPECIFIC GRAVITY, POC: 1.01
UROBILINOGEN, POC: 0.2

## 2019-08-06 PROCEDURE — 1036F TOBACCO NON-USER: CPT | Performed by: NURSE PRACTITIONER

## 2019-08-06 PROCEDURE — G8417 CALC BMI ABV UP PARAM F/U: HCPCS | Performed by: NURSE PRACTITIONER

## 2019-08-06 PROCEDURE — 81002 URINALYSIS NONAUTO W/O SCOPE: CPT | Performed by: NURSE PRACTITIONER

## 2019-08-06 PROCEDURE — 2022F DILAT RTA XM EVC RTNOPTHY: CPT | Performed by: NURSE PRACTITIONER

## 2019-08-06 PROCEDURE — G8427 DOCREV CUR MEDS BY ELIG CLIN: HCPCS | Performed by: NURSE PRACTITIONER

## 2019-08-06 PROCEDURE — 99214 OFFICE O/P EST MOD 30 MIN: CPT | Performed by: NURSE PRACTITIONER

## 2019-08-06 PROCEDURE — 83036 HEMOGLOBIN GLYCOSYLATED A1C: CPT | Performed by: NURSE PRACTITIONER

## 2019-08-06 PROCEDURE — 3044F HG A1C LEVEL LT 7.0%: CPT | Performed by: NURSE PRACTITIONER

## 2019-08-06 RX ORDER — LISINOPRIL 20 MG/1
20 TABLET ORAL DAILY
Qty: 90 TABLET | Refills: 0 | Status: SHIPPED | OUTPATIENT
Start: 2019-08-06 | End: 2019-12-03 | Stop reason: SDUPTHER

## 2019-08-06 ASSESSMENT — ENCOUNTER SYMPTOMS
VOMITING: 0
BACK PAIN: 1
CHEST TIGHTNESS: 0
SHORTNESS OF BREATH: 0
ABDOMINAL PAIN: 0
NAUSEA: 0
COUGH: 0
DIARRHEA: 0
CONSTIPATION: 0

## 2019-08-06 NOTE — PROGRESS NOTES
8/6/2019      Chief Complaint   Patient presents with    Follow-up     DM    . HPI   Mr. Rosita Hudson is a 38y/o male with a PMH of DM type II, HTN, HLD, morbid obesity, and chronic back pain presenting today for follow up of chronic conditions. Diabetes Mellitus Type 2: Current symptoms/problems include no blurry vision, issues urinating, nausea, vomiting, and diarrhea. .  Home blood sugar records: fasting range: 122-136s on average, no recent high sugar levels. Any episodes of hypoglycemia? no  Eye exam current (within one year): no, last time was at least two years ago but he is not having changes in vision. Tobacco history: He  reports that he has never smoked. He has never used smokeless tobacco.   Daily Aspirin? Yes    Hypertension:  Home blood pressure monitoring: No.  He is adherent to a low sodium diet. Patient denies chest pain, shortness of breath, headache, lightheadedness, blurred vision, peripheral edema, palpitations, dry cough and fatigue. Antihypertensive medication side effects: no medication side effects noted. Hyperlipidemia:  No new myalgias or GI upset on atorvastatin (Lipitor). Morbid Obesity: Says he works out on a bike once a week but doesn't work out consistently. He states he eats healthy pre-made meals for lunch and drinks much more water than he used to. He says he will consider weight loss surgery now that his HbA1C is under control. Recent UTI: He is not experiencing any urinary retention, frequency, dysuria. Elevated Liver Enzymes: He has not seen a GI doctor but says he will make an appointment. Has not had abdominal pain/issues or yellowing of the skin recently, no changes in his urine color.      Lab Results   Component Value Date    LABA1C 6.4 08/06/2019    LABA1C 6.9 05/06/2019    LABA1C 7.2 03/19/2019     Lab Results   Component Value Date    LABMICR <1.20 03/19/2019    CREATININE 0.8 (L) 03/19/2019     Lab Results   Component Value Date    ALT 48 (H) 03/19/2019    AST 23 03/19/2019     Lab Results   Component Value Date    CHOL 224 (H) 03/19/2019    TRIG 332 (H) 03/19/2019    HDL 38 (L) 03/19/2019    LDLCALC see below 03/19/2019    LDLDIRECT 146 (H) 03/19/2019           Patient Active Problem List   Diagnosis    Moderate persistent asthma without complication    Hypertension    GERD (gastroesophageal reflux disease)    Tinea cruris    Chronic back pain    Type 2 diabetes mellitus with diabetic neuropathy, with long-term current use of insulin (HCC)    Mixed hyperlipidemia    Failed back surgical syndrome    Spinal cord injury, C1-C7 ([facility] [address]    Chronic pain syndrome    Central spinal stenosis    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 ([facility] [address]    Major depressive disorder    Insomnia    Diabetic eye exam ([facility] [address]- 12/16 wnl CEI    Closed nondisplaced fracture of fifth metatarsal bone of right foot    Morbid obesity with body mass index (BMI) of 40.0 to 44.9 in adult (HCC)    Elevated liver enzymes    JULITA (obstructive sleep apnea)    Pressure ulcer of buttock    Abnormal levels of other serum enzymes    Decubitus ulcer of buttock    Chronic back pain    Right hand pain    Diabetic ulcer of toe of right foot associated with type 2 diabetes mellitus (HCC)    Diabetic ulcer of toe (HCC)    Ingrowing nail          Current Outpatient Medications   Medication Sig Dispense Refill    lisinopril (PRINIVIL;ZESTRIL) 20 MG tablet Take 1 tablet by mouth daily 90 tablet 0    budesonide-formoterol (SYMBICORT) 160-4.5 MCG/ACT AERO INHALE TWO PUFFS BY MOUTH TWICE A DAY FOR LONG TERM CONTROL OF ASTHMA, RINSE MOUTH OUT AFTER USE 30.6 g 5    DULoxetine (CYMBALTA) 30 MG extended release capsule Take 1 capsule by mouth 2 times daily 60 capsule 1    amitriptyline (ELAVIL) 25 MG tablet TAKE ONE TABLET BY MOUTH NIGHTLY 30 tablet 1    gabapentin (NEURONTIN) 300 MG capsule Take 1 capsule by mouth 3 times daily for 30 days.  90 capsule 1    fat/low sodium diet, exercise daily, and hydrate often  On atorvastatin 80 mg daily and lovaza 4 grams daily     Morbid obesity with BMI of 40.0-44.9, adult ([facility] [address]        -     Maintain a healthy low fat, low carb, low sodium diet, exercise daily, and hydrate often        -     Discussed weight loss surgery now that HbA1C is down, patient considering    Acute cystitis without hematuria: treated for UTI 5/2019. Patient reports that symptoms have resolved. -     URINE CULTURE        -     UA done in office, still shows nitrates. Will wait for culture to see if bacteria is present. Elevated Liver Enzymes       -      Schedule an appointment with GI doctor for elevated liver enzymes      Return in about 3 months (around 11/6/2019), or if symptoms worsen or fail to improve, for chronic conditions.

## 2019-08-08 DIAGNOSIS — G89.4 CHRONIC PAIN SYNDROME: ICD-10-CM

## 2019-08-08 DIAGNOSIS — M96.1 FAILED BACK SURGICAL SYNDROME: ICD-10-CM

## 2019-08-08 DIAGNOSIS — M48.00 CENTRAL SPINAL STENOSIS: ICD-10-CM

## 2019-08-08 DIAGNOSIS — E13.40 NEUROPATHY DUE TO SECONDARY DIABETES MELLITUS (HCC): ICD-10-CM

## 2019-08-08 LAB — URINE CULTURE, ROUTINE: NORMAL

## 2019-08-14 RX ORDER — GABAPENTIN 300 MG/1
300 CAPSULE ORAL 3 TIMES DAILY
Qty: 90 CAPSULE | Refills: 1 | OUTPATIENT
Start: 2019-08-14 | End: 2019-09-13

## 2019-08-27 ENCOUNTER — OFFICE VISIT (OUTPATIENT)
Dept: PAIN MANAGEMENT | Age: 39
End: 2019-08-27
Payer: MEDICAID

## 2019-08-27 VITALS
HEART RATE: 115 BPM | DIASTOLIC BLOOD PRESSURE: 100 MMHG | WEIGHT: 315 LBS | BODY MASS INDEX: 42.32 KG/M2 | OXYGEN SATURATION: 95 % | SYSTOLIC BLOOD PRESSURE: 147 MMHG

## 2019-08-27 DIAGNOSIS — S14.109S INJURY OF CERVICAL SPINAL CORD, SEQUELA (HCC): ICD-10-CM

## 2019-08-27 DIAGNOSIS — G89.29 CHRONIC BACK PAIN, UNSPECIFIED BACK LOCATION, UNSPECIFIED BACK PAIN LATERALITY: ICD-10-CM

## 2019-08-27 DIAGNOSIS — M48.00 CENTRAL SPINAL STENOSIS: ICD-10-CM

## 2019-08-27 DIAGNOSIS — F51.01 PRIMARY INSOMNIA: ICD-10-CM

## 2019-08-27 DIAGNOSIS — G89.4 CHRONIC PAIN SYNDROME: ICD-10-CM

## 2019-08-27 DIAGNOSIS — E13.40 NEUROPATHY DUE TO SECONDARY DIABETES MELLITUS (HCC): ICD-10-CM

## 2019-08-27 DIAGNOSIS — M96.1 FAILED BACK SURGICAL SYNDROME: ICD-10-CM

## 2019-08-27 DIAGNOSIS — M79.641 RIGHT HAND PAIN: ICD-10-CM

## 2019-08-27 DIAGNOSIS — E66.01 MORBID OBESITY WITH BODY MASS INDEX (BMI) OF 40.0 TO 44.9 IN ADULT (HCC): ICD-10-CM

## 2019-08-27 DIAGNOSIS — M54.9 CHRONIC BACK PAIN, UNSPECIFIED BACK LOCATION, UNSPECIFIED BACK PAIN LATERALITY: ICD-10-CM

## 2019-08-27 DIAGNOSIS — F32.9 CURRENT EPISODE OF MAJOR DEPRESSIVE DISORDER WITHOUT PRIOR EPISODE, UNSPECIFIED DEPRESSION EPISODE SEVERITY: ICD-10-CM

## 2019-08-27 PROCEDURE — 2022F DILAT RTA XM EVC RTNOPTHY: CPT | Performed by: NURSE PRACTITIONER

## 2019-08-27 PROCEDURE — G8427 DOCREV CUR MEDS BY ELIG CLIN: HCPCS | Performed by: NURSE PRACTITIONER

## 2019-08-27 PROCEDURE — 1036F TOBACCO NON-USER: CPT | Performed by: NURSE PRACTITIONER

## 2019-08-27 PROCEDURE — 99213 OFFICE O/P EST LOW 20 MIN: CPT | Performed by: NURSE PRACTITIONER

## 2019-08-27 PROCEDURE — 3044F HG A1C LEVEL LT 7.0%: CPT | Performed by: NURSE PRACTITIONER

## 2019-08-27 PROCEDURE — G8417 CALC BMI ABV UP PARAM F/U: HCPCS | Performed by: NURSE PRACTITIONER

## 2019-08-27 RX ORDER — HYDROCODONE BITARTRATE AND ACETAMINOPHEN 5; 325 MG/1; MG/1
1 TABLET ORAL EVERY 6 HOURS PRN
Qty: 120 TABLET | Refills: 0 | Status: SHIPPED | OUTPATIENT
Start: 2019-08-27 | End: 2019-10-02 | Stop reason: SDUPTHER

## 2019-08-27 RX ORDER — GABAPENTIN 300 MG/1
300 CAPSULE ORAL 3 TIMES DAILY
Qty: 90 CAPSULE | Refills: 1 | Status: SHIPPED | OUTPATIENT
Start: 2019-08-27 | End: 2019-10-29 | Stop reason: SDUPTHER

## 2019-08-27 NOTE — PROGRESS NOTES
Coordination is  normal.  His mood isAppropriate and affect is Neutral/Euthymic(normal) . IMPRESSION:   1. Chronic pain syndrome    2. Failed back surgical syndrome    3. Central spinal stenosis    4. 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 Primary insomnia    6. Chronic back pain, unspecified back location, unspecified back pain laterality    7. Injury of cervical spinal cord, sequela ([facility] [address]    8. Right hand pain    9. Current episode of major depressive disorder without prior episode, unspecified depression episode severity    10. Morbid obesity with body mass index (BMI) of 40.0 to 44.9 in adult [facility])        PLAN:  Informed verbal consent was obtained  -Continue with Norco, Neurontin, Cymbalta, Elavil, Evzio  -Jose Cruz exercises  -Monitor BP/HR, f/u with PCP if it continues to stay elevated or he becomes symptomatic with SOB, CP, syncopy. he is currently asymptomatic   -CBT techniques- relaxation therapies such as biofeedback, mindfulness based stress reduction, imagery, cognitive restructuring, problem solving discussed with patient  -He was advised weight reduction, diet changes- 800-1200 omid diet, diet diary, exercising, nutritional  consult increased physical activity as tolerated  -Last UDS 8/15/19 consistent/UDS today  -Return in about 4 weeks (around 9/24/2019). -[place] was obtained and reviewed  for the last one year and no indicators of drug misuse  were found. Any other controlled substance prescriptions  seen on the record have been accounted for, I am aware of the patient receiving these medications. Sancehz Ruiz [place] record will be rechecked as part of office protocol. Current Outpatient Medications   Medication Sig Dispense Refill    HYDROcodone-acetaminophen (NORCO) 5-325 MG per tablet Take 1 tablet by mouth every 6 hours as needed for Pain for up to 30 days.  No more than 4 tabs a day 120 tablet 0    gabapentin (NEURONTIN) 300 MG capsule Take 1 capsule by mouth 3 times daily for 30 days.  90 capsule 1    lisinopril (PRINIVIL;ZESTRIL) 20 MG tablet Take 1 tablet by mouth daily 90 tablet 0    budesonide-formoterol (SYMBICORT) 160-4.5 MCG/ACT AERO INHALE TWO PUFFS BY MOUTH TWICE A DAY FOR LONG TERM CONTROL OF ASTHMA, RINSE MOUTH OUT AFTER USE 30.6 g 5    DULoxetine (CYMBALTA) 30 MG extended release capsule Take 1 capsule by mouth 2 times daily 60 capsule 1    amitriptyline (ELAVIL) 25 MG tablet TAKE ONE TABLET BY MOUTH NIGHTLY 30 tablet 1    metFORMIN (GLUCOPHAGE) 1000 MG tablet TAKE 1 TABLET BY MOUTH TWICE DAILY WITH MEALS FOR DIABETES 180 tablet 0    TRESIBA FLEXTOUCH 100 UNIT/ML SOPN INJECT 62 UNITS INTO THE SKIN EVERY EVENING FOR DIABETES 15 mL 2    HUMALOG KWIKPEN 100 UNIT/ML pen USE 10 UNITS SUBCUTANEOUSLY THREE TIMES DAILY BEFORE A MEAL 15 mL 2    canagliflozin (INVOKANA) 100 MG TABS tablet TAKE ONE TABLET BY MOUTH EVERY MORNING BEFORE BREAKFAST FOR DIABETES 90 tablet 0    montelukast (SINGULAIR) 10 MG tablet TAKE ONE TABLET BY MOUTH DAILY FOR BREATHING 90 tablet 0    omeprazole (PRILOSEC) 20 MG delayed release capsule TAKE ONE CAPSULE BY MOUTH DAILY FOR STOMACH 90 capsule 0    atorvastatin (LIPITOR) 80 MG tablet TAKE 1 TABLET BY MOUTH DAILY 90 tablet 0    Multiple Vitamins-Minerals (MULTIVITAMIN ADULT PO) Take 1 tablet by mouth daily      cyanocobalamin 1000 MCG/ML injection Inject 1,000 mcg into the muscle every 30 days      Cyanocobalamin (VITAMIN B 12 PO) Take 1 tablet by mouth daily      metoprolol succinate (TOPROL XL) 50 MG extended release tablet TAKE ONE TABLET BY MOUTH DAILY FOR FOR BLOOD PRESSURE AND HEART RATE 90 tablet 0    omega-3 acid ethyl esters (LOVAZA) 1 g capsule Take 2 capsules by mouth 2 times daily 360 capsule 0    albuterol sulfate HFA (VENTOLIN HFA) 108 (90 Base) MCG/ACT inhaler INHALE TWO PUFFS BY MOUTH EVERY 6 HOURS AS NEEDED FOR WHEEZING OR FOR SHORTNESS OF BREATH 2 Inhaler 5    ipratropium-albuterol (DUONEB) 0.5-2.5 (3) MG/3ML SOLN nebulizer solution Inhale 3 mLs into the lungs every 6 hours as needed for Shortness of Breath 360 mL 5    Naloxone HCl (EVZIO) 0.4 MG/0.4ML SOAJ Use as directed 1 Package 0    VICTOZA 18 MG/3ML SOPN SC injection DIAL AND INJECT SUBCUTANEOUSLY 1.8MG DAILY 3 pen 0    Insulin Pen Needle 31G X 5 MM MISC 1 each by Does not apply route 2 times daily 100 each 3    blood glucose test strips (TRUE METRIX BLOOD GLUCOSE TEST) strip Use to check BS  each 5    Lancets MISC Use to check BS  each 5    Blood Glucose Monitoring Suppl (TRUE METRIX METER) w/Device KIT Use to check fasting BS and 2 hours after meal BS 1 kit 0    Lancets MISC 1 each by Does not apply route daily TRUEMETRIX 100 each 2     No current facility-administered medications for this visit. I will continue his current medication regimen  which is part of the above treatment schedule. It has been helping with Mr. Naz Ayoub chronic  medical problems which for this visit include:   Diagnoses of Chronic pain syndrome, Failed back surgical syndrome, Central spinal stenosis, 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 ([facility] [address] Primary insomnia, Chronic back pain, unspecified back location, unspecified back pain laterality, Injury of cervical spinal cord, sequela (HCC), Right hand pain, Current episode of major depressive disorder without prior episode, unspecified depression episode severity, and Morbid obesity with body mass index (BMI) of 40.0 to 44.9 in [facility]) were pertinent to this visit. Risks and benefits of the medications and other alternative treatments  including no treatment were discussed with the patient. The common side effects of these medications were also explained to the patient. Informed verbal consent was obtained.    Goals of current treatment regimen include improvement in pain, restoration of functioning- with focus on improvement in physical performance, general activity, work or disability,emotional distress,

## 2019-09-24 ENCOUNTER — OFFICE VISIT (OUTPATIENT)
Dept: PAIN MANAGEMENT | Age: 39
End: 2019-09-24
Payer: MEDICARE

## 2019-09-24 VITALS
HEART RATE: 121 BPM | OXYGEN SATURATION: 95 % | BODY MASS INDEX: 39.92 KG/M2 | DIASTOLIC BLOOD PRESSURE: 106 MMHG | WEIGHT: 315 LBS | SYSTOLIC BLOOD PRESSURE: 146 MMHG

## 2019-09-24 DIAGNOSIS — M54.9 CHRONIC BACK PAIN, UNSPECIFIED BACK LOCATION, UNSPECIFIED BACK PAIN LATERALITY: ICD-10-CM

## 2019-09-24 DIAGNOSIS — F51.01 PRIMARY INSOMNIA: ICD-10-CM

## 2019-09-24 DIAGNOSIS — E66.01 MORBID OBESITY WITH BODY MASS INDEX (BMI) OF 40.0 TO 44.9 IN ADULT (HCC): ICD-10-CM

## 2019-09-24 DIAGNOSIS — G89.29 CHRONIC BACK PAIN, UNSPECIFIED BACK LOCATION, UNSPECIFIED BACK PAIN LATERALITY: ICD-10-CM

## 2019-09-24 DIAGNOSIS — S14.109S INJURY OF CERVICAL SPINAL CORD, SEQUELA (HCC): ICD-10-CM

## 2019-09-24 DIAGNOSIS — M48.00 CENTRAL SPINAL STENOSIS: ICD-10-CM

## 2019-09-24 DIAGNOSIS — M79.641 RIGHT HAND PAIN: ICD-10-CM

## 2019-09-24 DIAGNOSIS — E13.40 NEUROPATHY DUE TO SECONDARY DIABETES MELLITUS (HCC): ICD-10-CM

## 2019-09-24 DIAGNOSIS — M96.1 FAILED BACK SURGICAL SYNDROME: ICD-10-CM

## 2019-09-24 DIAGNOSIS — F32.9 CURRENT EPISODE OF MAJOR DEPRESSIVE DISORDER WITHOUT PRIOR EPISODE, UNSPECIFIED DEPRESSION EPISODE SEVERITY: ICD-10-CM

## 2019-09-24 DIAGNOSIS — G89.4 CHRONIC PAIN SYNDROME: ICD-10-CM

## 2019-09-24 PROCEDURE — 1036F TOBACCO NON-USER: CPT | Performed by: NURSE PRACTITIONER

## 2019-09-24 PROCEDURE — G8427 DOCREV CUR MEDS BY ELIG CLIN: HCPCS | Performed by: NURSE PRACTITIONER

## 2019-09-24 PROCEDURE — G8417 CALC BMI ABV UP PARAM F/U: HCPCS | Performed by: NURSE PRACTITIONER

## 2019-09-24 PROCEDURE — 2022F DILAT RTA XM EVC RTNOPTHY: CPT | Performed by: NURSE PRACTITIONER

## 2019-09-24 PROCEDURE — 3044F HG A1C LEVEL LT 7.0%: CPT | Performed by: NURSE PRACTITIONER

## 2019-09-24 PROCEDURE — 99214 OFFICE O/P EST MOD 30 MIN: CPT | Performed by: NURSE PRACTITIONER

## 2019-09-24 RX ORDER — AMITRIPTYLINE HYDROCHLORIDE 25 MG/1
TABLET, FILM COATED ORAL
Qty: 30 TABLET | Refills: 1 | Status: SHIPPED | OUTPATIENT
Start: 2019-09-24 | End: 2019-10-29 | Stop reason: SDUPTHER

## 2019-09-24 RX ORDER — DULOXETIN HYDROCHLORIDE 30 MG/1
30 CAPSULE, DELAYED RELEASE ORAL 2 TIMES DAILY
Qty: 60 CAPSULE | Refills: 1 | Status: SHIPPED | OUTPATIENT
Start: 2019-09-24 | End: 2019-10-29 | Stop reason: SDUPTHER

## 2019-10-02 DIAGNOSIS — M54.9 CHRONIC BACK PAIN, UNSPECIFIED BACK LOCATION, UNSPECIFIED BACK PAIN LATERALITY: ICD-10-CM

## 2019-10-02 DIAGNOSIS — G89.4 CHRONIC PAIN SYNDROME: ICD-10-CM

## 2019-10-02 DIAGNOSIS — E13.40 NEUROPATHY DUE TO SECONDARY DIABETES MELLITUS (HCC): ICD-10-CM

## 2019-10-02 DIAGNOSIS — S14.109S INJURY OF CERVICAL SPINAL CORD, SEQUELA (HCC): ICD-10-CM

## 2019-10-02 DIAGNOSIS — G89.29 CHRONIC BACK PAIN, UNSPECIFIED BACK LOCATION, UNSPECIFIED BACK PAIN LATERALITY: ICD-10-CM

## 2019-10-02 DIAGNOSIS — M48.00 CENTRAL SPINAL STENOSIS: ICD-10-CM

## 2019-10-02 DIAGNOSIS — M96.1 FAILED BACK SURGICAL SYNDROME: ICD-10-CM

## 2019-10-02 RX ORDER — HYDROCODONE BITARTRATE AND ACETAMINOPHEN 5; 325 MG/1; MG/1
1 TABLET ORAL EVERY 6 HOURS PRN
Qty: 80 TABLET | Refills: 0 | Status: SHIPPED | OUTPATIENT
Start: 2019-10-02 | End: 2019-10-22

## 2019-10-29 ENCOUNTER — OFFICE VISIT (OUTPATIENT)
Dept: PAIN MANAGEMENT | Age: 39
End: 2019-10-29
Payer: MEDICARE

## 2019-10-29 ENCOUNTER — TELEPHONE (OUTPATIENT)
Dept: PAIN MANAGEMENT | Age: 39
End: 2019-10-29

## 2019-10-29 VITALS
BODY MASS INDEX: 38.82 KG/M2 | WEIGHT: 310.6 LBS | DIASTOLIC BLOOD PRESSURE: 83 MMHG | SYSTOLIC BLOOD PRESSURE: 136 MMHG | HEART RATE: 110 BPM | OXYGEN SATURATION: 96 %

## 2019-10-29 DIAGNOSIS — G89.4 CHRONIC PAIN SYNDROME: ICD-10-CM

## 2019-10-29 DIAGNOSIS — G89.29 CHRONIC BACK PAIN, UNSPECIFIED BACK LOCATION, UNSPECIFIED BACK PAIN LATERALITY: ICD-10-CM

## 2019-10-29 DIAGNOSIS — M79.641 RIGHT HAND PAIN: ICD-10-CM

## 2019-10-29 DIAGNOSIS — S14.109S INJURY OF CERVICAL SPINAL CORD, SEQUELA (HCC): ICD-10-CM

## 2019-10-29 DIAGNOSIS — M48.00 CENTRAL SPINAL STENOSIS: ICD-10-CM

## 2019-10-29 DIAGNOSIS — M96.1 FAILED BACK SURGICAL SYNDROME: ICD-10-CM

## 2019-10-29 DIAGNOSIS — M54.9 CHRONIC BACK PAIN, UNSPECIFIED BACK LOCATION, UNSPECIFIED BACK PAIN LATERALITY: ICD-10-CM

## 2019-10-29 DIAGNOSIS — E13.40 NEUROPATHY DUE TO SECONDARY DIABETES MELLITUS (HCC): ICD-10-CM

## 2019-10-29 DIAGNOSIS — F32.9 CURRENT EPISODE OF MAJOR DEPRESSIVE DISORDER WITHOUT PRIOR EPISODE, UNSPECIFIED DEPRESSION EPISODE SEVERITY: ICD-10-CM

## 2019-10-29 DIAGNOSIS — F51.01 PRIMARY INSOMNIA: ICD-10-CM

## 2019-10-29 DIAGNOSIS — E66.01 MORBID OBESITY WITH BODY MASS INDEX (BMI) OF 40.0 TO 44.9 IN ADULT (HCC): ICD-10-CM

## 2019-10-29 PROCEDURE — 1036F TOBACCO NON-USER: CPT | Performed by: NURSE PRACTITIONER

## 2019-10-29 PROCEDURE — G8427 DOCREV CUR MEDS BY ELIG CLIN: HCPCS | Performed by: NURSE PRACTITIONER

## 2019-10-29 PROCEDURE — 2022F DILAT RTA XM EVC RTNOPTHY: CPT | Performed by: NURSE PRACTITIONER

## 2019-10-29 PROCEDURE — 3044F HG A1C LEVEL LT 7.0%: CPT | Performed by: NURSE PRACTITIONER

## 2019-10-29 PROCEDURE — 99213 OFFICE O/P EST LOW 20 MIN: CPT | Performed by: NURSE PRACTITIONER

## 2019-10-29 PROCEDURE — G8417 CALC BMI ABV UP PARAM F/U: HCPCS | Performed by: NURSE PRACTITIONER

## 2019-10-29 PROCEDURE — G8484 FLU IMMUNIZE NO ADMIN: HCPCS | Performed by: NURSE PRACTITIONER

## 2019-10-29 RX ORDER — DULOXETIN HYDROCHLORIDE 30 MG/1
30 CAPSULE, DELAYED RELEASE ORAL 2 TIMES DAILY
Qty: 60 CAPSULE | Refills: 1 | Status: SHIPPED | OUTPATIENT
Start: 2019-10-29 | End: 2019-12-04 | Stop reason: SDUPTHER

## 2019-10-29 RX ORDER — AMITRIPTYLINE HYDROCHLORIDE 25 MG/1
TABLET, FILM COATED ORAL
Qty: 30 TABLET | Refills: 1 | Status: SHIPPED | OUTPATIENT
Start: 2019-10-29 | End: 2019-12-04 | Stop reason: SDUPTHER

## 2019-10-29 RX ORDER — GABAPENTIN 300 MG/1
300 CAPSULE ORAL 3 TIMES DAILY
Qty: 90 CAPSULE | Refills: 1 | Status: SHIPPED | OUTPATIENT
Start: 2019-10-29 | End: 2019-12-04 | Stop reason: SDUPTHER

## 2019-10-30 RX ORDER — BUPRENORPHINE 10 UG/H
1 PATCH TRANSDERMAL WEEKLY
Qty: 4 PATCH | Refills: 0 | OUTPATIENT
Start: 2019-10-30 | End: 2019-12-04 | Stop reason: SDUPTHER

## 2019-11-01 RX ORDER — INSULIN LISPRO 100 [IU]/ML
INJECTION, SOLUTION INTRAVENOUS; SUBCUTANEOUS
Qty: 15 ML | Refills: 2 | Status: SHIPPED | OUTPATIENT
Start: 2019-11-01 | End: 2019-12-03

## 2019-11-05 ENCOUNTER — TELEPHONE (OUTPATIENT)
Dept: PAIN MANAGEMENT | Age: 39
End: 2019-11-05

## 2019-11-22 DIAGNOSIS — E11.40 TYPE 2 DIABETES MELLITUS WITH DIABETIC NEUROPATHY, WITH LONG-TERM CURRENT USE OF INSULIN (HCC): ICD-10-CM

## 2019-11-22 DIAGNOSIS — J45.20 MILD INTERMITTENT ASTHMA WITHOUT COMPLICATION: ICD-10-CM

## 2019-11-22 DIAGNOSIS — K21.9 GASTROESOPHAGEAL REFLUX DISEASE, ESOPHAGITIS PRESENCE NOT SPECIFIED: ICD-10-CM

## 2019-11-22 DIAGNOSIS — Z79.4 TYPE 2 DIABETES MELLITUS WITH DIABETIC NEUROPATHY, WITH LONG-TERM CURRENT USE OF INSULIN (HCC): ICD-10-CM

## 2019-11-22 RX ORDER — OMEPRAZOLE 20 MG/1
CAPSULE, DELAYED RELEASE ORAL
Qty: 90 CAPSULE | Refills: 0 | Status: SHIPPED | OUTPATIENT
Start: 2019-11-22 | End: 2020-05-04

## 2019-11-22 RX ORDER — MONTELUKAST SODIUM 10 MG/1
TABLET ORAL
Qty: 90 TABLET | Refills: 0 | Status: SHIPPED | OUTPATIENT
Start: 2019-11-22 | End: 2019-12-03 | Stop reason: SDUPTHER

## 2019-11-25 DIAGNOSIS — E78.2 MIXED HYPERLIPIDEMIA: ICD-10-CM

## 2019-11-26 RX ORDER — ATORVASTATIN CALCIUM 80 MG/1
80 TABLET, FILM COATED ORAL DAILY
Qty: 90 TABLET | Refills: 0 | Status: SHIPPED | OUTPATIENT
Start: 2019-11-26 | End: 2019-12-03 | Stop reason: SDUPTHER

## 2019-12-03 ENCOUNTER — OFFICE VISIT (OUTPATIENT)
Dept: FAMILY MEDICINE CLINIC | Age: 39
End: 2019-12-03
Payer: MEDICAID

## 2019-12-03 VITALS
DIASTOLIC BLOOD PRESSURE: 82 MMHG | WEIGHT: 307 LBS | RESPIRATION RATE: 16 BRPM | SYSTOLIC BLOOD PRESSURE: 130 MMHG | BODY MASS INDEX: 38.17 KG/M2 | HEART RATE: 94 BPM | HEIGHT: 75 IN

## 2019-12-03 DIAGNOSIS — Z79.4 TYPE 2 DIABETES MELLITUS WITH DIABETIC NEUROPATHY, WITH LONG-TERM CURRENT USE OF INSULIN (HCC): Primary | ICD-10-CM

## 2019-12-03 DIAGNOSIS — Z79.4 TYPE 2 DIABETES MELLITUS WITH DIABETIC NEUROPATHY, WITH LONG-TERM CURRENT USE OF INSULIN (HCC): ICD-10-CM

## 2019-12-03 DIAGNOSIS — I10 ESSENTIAL HYPERTENSION: ICD-10-CM

## 2019-12-03 DIAGNOSIS — E11.40 TYPE 2 DIABETES MELLITUS WITH DIABETIC NEUROPATHY, WITH LONG-TERM CURRENT USE OF INSULIN (HCC): ICD-10-CM

## 2019-12-03 DIAGNOSIS — E78.2 MIXED HYPERLIPIDEMIA: ICD-10-CM

## 2019-12-03 DIAGNOSIS — E66.9 OBESITY, CLASS II, BMI 35-39.9: ICD-10-CM

## 2019-12-03 DIAGNOSIS — E11.40 TYPE 2 DIABETES MELLITUS WITH DIABETIC NEUROPATHY, WITH LONG-TERM CURRENT USE OF INSULIN (HCC): Primary | ICD-10-CM

## 2019-12-03 LAB
A/G RATIO: 1.9 (ref 1.1–2.2)
ALBUMIN SERPL-MCNC: 5 G/DL (ref 3.4–5)
ALP BLD-CCNC: 117 U/L (ref 40–129)
ALT SERPL-CCNC: 62 U/L (ref 10–40)
ANION GAP SERPL CALCULATED.3IONS-SCNC: 19 MMOL/L (ref 3–16)
AST SERPL-CCNC: 37 U/L (ref 15–37)
BILIRUB SERPL-MCNC: 0.8 MG/DL (ref 0–1)
BUN BLDV-MCNC: 16 MG/DL (ref 7–20)
CALCIUM SERPL-MCNC: 10 MG/DL (ref 8.3–10.6)
CHLORIDE BLD-SCNC: 98 MMOL/L (ref 99–110)
CHOLESTEROL, TOTAL: 120 MG/DL (ref 0–199)
CO2: 24 MMOL/L (ref 21–32)
CREAT SERPL-MCNC: 0.7 MG/DL (ref 0.9–1.3)
GFR AFRICAN AMERICAN: >60
GFR NON-AFRICAN AMERICAN: >60
GLOBULIN: 2.7 G/DL
GLUCOSE BLD-MCNC: 92 MG/DL (ref 70–99)
HBA1C MFR BLD: 5.9 %
HDLC SERPL-MCNC: 42 MG/DL (ref 40–60)
LDL CHOLESTEROL CALCULATED: 50 MG/DL
POTASSIUM SERPL-SCNC: 4.6 MMOL/L (ref 3.5–5.1)
SODIUM BLD-SCNC: 141 MMOL/L (ref 136–145)
TOTAL PROTEIN: 7.7 G/DL (ref 6.4–8.2)
TRIGL SERPL-MCNC: 142 MG/DL (ref 0–150)
VLDLC SERPL CALC-MCNC: 28 MG/DL

## 2019-12-03 PROCEDURE — 99214 OFFICE O/P EST MOD 30 MIN: CPT | Performed by: NURSE PRACTITIONER

## 2019-12-03 PROCEDURE — 3044F HG A1C LEVEL LT 7.0%: CPT | Performed by: NURSE PRACTITIONER

## 2019-12-03 PROCEDURE — G8417 CALC BMI ABV UP PARAM F/U: HCPCS | Performed by: NURSE PRACTITIONER

## 2019-12-03 PROCEDURE — 2022F DILAT RTA XM EVC RTNOPTHY: CPT | Performed by: NURSE PRACTITIONER

## 2019-12-03 PROCEDURE — G8484 FLU IMMUNIZE NO ADMIN: HCPCS | Performed by: NURSE PRACTITIONER

## 2019-12-03 PROCEDURE — 83036 HEMOGLOBIN GLYCOSYLATED A1C: CPT | Performed by: NURSE PRACTITIONER

## 2019-12-03 PROCEDURE — 1036F TOBACCO NON-USER: CPT | Performed by: NURSE PRACTITIONER

## 2019-12-03 PROCEDURE — G8427 DOCREV CUR MEDS BY ELIG CLIN: HCPCS | Performed by: NURSE PRACTITIONER

## 2019-12-03 RX ORDER — LISINOPRIL 20 MG/1
20 TABLET ORAL DAILY
Qty: 90 TABLET | Refills: 0 | Status: SHIPPED | OUTPATIENT
Start: 2019-12-03 | End: 2020-04-30

## 2019-12-03 RX ORDER — ATORVASTATIN CALCIUM 80 MG/1
80 TABLET, FILM COATED ORAL DAILY
Qty: 90 TABLET | Refills: 0 | Status: SHIPPED | OUTPATIENT
Start: 2019-12-03 | End: 2020-03-27

## 2019-12-03 RX ORDER — GREEN TEA/HOODIA GORDONII 315-12.5MG
1 CAPSULE ORAL DAILY
Qty: 90 TABLET | Refills: 0 | Status: SHIPPED | OUTPATIENT
Start: 2019-12-03 | End: 2020-01-02

## 2019-12-03 RX ORDER — MONTELUKAST SODIUM 10 MG/1
TABLET ORAL
Qty: 90 TABLET | Refills: 0 | Status: SHIPPED | OUTPATIENT
Start: 2019-12-03 | End: 2020-03-27

## 2019-12-03 RX ORDER — BUPRENORPHINE 10 UG/H
1 PATCH TRANSDERMAL WEEKLY
COMMUNITY
End: 2020-06-19

## 2019-12-03 RX ORDER — INSULIN LISPRO 100 [IU]/ML
INJECTION, SOLUTION INTRAVENOUS; SUBCUTANEOUS
Qty: 15 ML | Refills: 2 | Status: SHIPPED | OUTPATIENT
Start: 2019-12-03 | End: 2020-08-12

## 2019-12-03 RX ORDER — METOPROLOL SUCCINATE 50 MG/1
TABLET, EXTENDED RELEASE ORAL
Qty: 90 TABLET | Refills: 0 | Status: SHIPPED | OUTPATIENT
Start: 2019-12-03 | End: 2020-03-27

## 2019-12-03 ASSESSMENT — ENCOUNTER SYMPTOMS
NAUSEA: 0
VOMITING: 0
CONSTIPATION: 0
CHEST TIGHTNESS: 0
DIARRHEA: 0
COUGH: 0
ABDOMINAL PAIN: 0
BACK PAIN: 1
SHORTNESS OF BREATH: 0

## 2019-12-04 ENCOUNTER — OFFICE VISIT (OUTPATIENT)
Dept: PAIN MANAGEMENT | Age: 39
End: 2019-12-04
Payer: MEDICAID

## 2019-12-04 VITALS — HEART RATE: 101 BPM | OXYGEN SATURATION: 96 % | SYSTOLIC BLOOD PRESSURE: 152 MMHG | DIASTOLIC BLOOD PRESSURE: 99 MMHG

## 2019-12-04 DIAGNOSIS — E66.01 MORBID OBESITY WITH BODY MASS INDEX (BMI) OF 40.0 TO 44.9 IN ADULT (HCC): ICD-10-CM

## 2019-12-04 DIAGNOSIS — S14.109S INJURY OF CERVICAL SPINAL CORD, SEQUELA (HCC): ICD-10-CM

## 2019-12-04 DIAGNOSIS — M79.641 RIGHT HAND PAIN: ICD-10-CM

## 2019-12-04 DIAGNOSIS — M96.1 FAILED BACK SURGICAL SYNDROME: ICD-10-CM

## 2019-12-04 DIAGNOSIS — M48.00 CENTRAL SPINAL STENOSIS: ICD-10-CM

## 2019-12-04 DIAGNOSIS — E13.40 NEUROPATHY DUE TO SECONDARY DIABETES MELLITUS (HCC): ICD-10-CM

## 2019-12-04 DIAGNOSIS — G89.29 CHRONIC BACK PAIN, UNSPECIFIED BACK LOCATION, UNSPECIFIED BACK PAIN LATERALITY: ICD-10-CM

## 2019-12-04 DIAGNOSIS — F51.01 PRIMARY INSOMNIA: ICD-10-CM

## 2019-12-04 DIAGNOSIS — F32.9 CURRENT EPISODE OF MAJOR DEPRESSIVE DISORDER WITHOUT PRIOR EPISODE, UNSPECIFIED DEPRESSION EPISODE SEVERITY: ICD-10-CM

## 2019-12-04 DIAGNOSIS — G89.4 CHRONIC PAIN SYNDROME: ICD-10-CM

## 2019-12-04 DIAGNOSIS — M54.9 CHRONIC BACK PAIN, UNSPECIFIED BACK LOCATION, UNSPECIFIED BACK PAIN LATERALITY: ICD-10-CM

## 2019-12-04 PROCEDURE — G8427 DOCREV CUR MEDS BY ELIG CLIN: HCPCS | Performed by: NURSE PRACTITIONER

## 2019-12-04 PROCEDURE — 3044F HG A1C LEVEL LT 7.0%: CPT | Performed by: NURSE PRACTITIONER

## 2019-12-04 PROCEDURE — G8417 CALC BMI ABV UP PARAM F/U: HCPCS | Performed by: NURSE PRACTITIONER

## 2019-12-04 PROCEDURE — G8484 FLU IMMUNIZE NO ADMIN: HCPCS | Performed by: NURSE PRACTITIONER

## 2019-12-04 PROCEDURE — 2022F DILAT RTA XM EVC RTNOPTHY: CPT | Performed by: NURSE PRACTITIONER

## 2019-12-04 PROCEDURE — 99213 OFFICE O/P EST LOW 20 MIN: CPT | Performed by: NURSE PRACTITIONER

## 2019-12-04 PROCEDURE — 1036F TOBACCO NON-USER: CPT | Performed by: NURSE PRACTITIONER

## 2019-12-04 RX ORDER — GABAPENTIN 300 MG/1
300 CAPSULE ORAL 3 TIMES DAILY
Qty: 90 CAPSULE | Refills: 1 | Status: SHIPPED | OUTPATIENT
Start: 2019-12-04 | End: 2020-06-19

## 2019-12-04 RX ORDER — AMITRIPTYLINE HYDROCHLORIDE 25 MG/1
TABLET, FILM COATED ORAL
Qty: 30 TABLET | Refills: 1 | Status: SHIPPED | OUTPATIENT
Start: 2019-12-04 | End: 2020-06-19

## 2019-12-04 RX ORDER — DULOXETIN HYDROCHLORIDE 30 MG/1
30 CAPSULE, DELAYED RELEASE ORAL 2 TIMES DAILY
Qty: 60 CAPSULE | Refills: 1 | Status: SHIPPED | OUTPATIENT
Start: 2019-12-04 | End: 2021-01-27

## 2019-12-04 RX ORDER — BUPRENORPHINE 10 UG/H
1 PATCH TRANSDERMAL WEEKLY
Qty: 4 PATCH | Refills: 0 | Status: SHIPPED | OUTPATIENT
Start: 2019-12-04 | End: 2020-01-03

## 2019-12-27 ENCOUNTER — TELEPHONE (OUTPATIENT)
Dept: FAMILY MEDICINE CLINIC | Age: 39
End: 2019-12-27

## 2020-01-02 ENCOUNTER — TELEPHONE (OUTPATIENT)
Dept: FAMILY MEDICINE CLINIC | Age: 40
End: 2020-01-02

## 2020-01-02 NOTE — TELEPHONE ENCOUNTER
PA SUBMITTED VIA CM FOR Symbicort 160-4. 5MCG/ACT aerosol  Key: [id number] - PA Case ID: [id number]   STATUS: PENDING

## 2020-01-03 ENCOUNTER — TELEPHONE (OUTPATIENT)
Dept: FAMILY MEDICINE CLINIC | Age: 40
End: 2020-01-03

## 2020-01-03 NOTE — TELEPHONE ENCOUNTER
Denied on January 2   Request Reference Number: [id number]. SYMBICORT -4.5 is denied for not meeting the prior authorization requirement(s). For further questions, call [phone]. Appeals are not supported through [address] [place] [address]. Please refer to the fax case notice for appeals information and instructions.

## 2020-03-05 ENCOUNTER — OFFICE VISIT (OUTPATIENT)
Dept: FAMILY MEDICINE CLINIC | Age: 40
End: 2020-03-05
Payer: MEDICARE

## 2020-03-05 VITALS
HEIGHT: 75 IN | DIASTOLIC BLOOD PRESSURE: 70 MMHG | BODY MASS INDEX: 37.18 KG/M2 | WEIGHT: 299 LBS | HEART RATE: 96 BPM | SYSTOLIC BLOOD PRESSURE: 134 MMHG | OXYGEN SATURATION: 97 %

## 2020-03-05 DIAGNOSIS — E78.2 MIXED HYPERLIPIDEMIA: ICD-10-CM

## 2020-03-05 DIAGNOSIS — I10 ESSENTIAL HYPERTENSION: ICD-10-CM

## 2020-03-05 DIAGNOSIS — E11.40 TYPE 2 DIABETES MELLITUS WITH DIABETIC NEUROPATHY, WITH LONG-TERM CURRENT USE OF INSULIN (HCC): ICD-10-CM

## 2020-03-05 DIAGNOSIS — Z79.4 TYPE 2 DIABETES MELLITUS WITH DIABETIC NEUROPATHY, WITH LONG-TERM CURRENT USE OF INSULIN (HCC): ICD-10-CM

## 2020-03-05 LAB
A/G RATIO: 1.8 (ref 1.1–2.2)
ALBUMIN SERPL-MCNC: 4.4 G/DL (ref 3.4–5)
ALP BLD-CCNC: 87 U/L (ref 40–129)
ALT SERPL-CCNC: 47 U/L (ref 10–40)
ANION GAP SERPL CALCULATED.3IONS-SCNC: 17 MMOL/L (ref 3–16)
AST SERPL-CCNC: 23 U/L (ref 15–37)
BILIRUB SERPL-MCNC: 0.4 MG/DL (ref 0–1)
BUN BLDV-MCNC: 12 MG/DL (ref 7–20)
CALCIUM SERPL-MCNC: 9 MG/DL (ref 8.3–10.6)
CHLORIDE BLD-SCNC: 102 MMOL/L (ref 99–110)
CO2: 24 MMOL/L (ref 21–32)
CREAT SERPL-MCNC: 0.7 MG/DL (ref 0.9–1.3)
CREATININE URINE: 130.9 MG/DL (ref 39–259)
GFR AFRICAN AMERICAN: >60
GFR NON-AFRICAN AMERICAN: >60
GLOBULIN: 2.4 G/DL
GLUCOSE BLD-MCNC: 128 MG/DL (ref 70–99)
HBA1C MFR BLD: 6.6 %
MICROALBUMIN UR-MCNC: <1.2 MG/DL
MICROALBUMIN/CREAT UR-RTO: NORMAL MG/G (ref 0–30)
POTASSIUM SERPL-SCNC: 4.2 MMOL/L (ref 3.5–5.1)
SODIUM BLD-SCNC: 143 MMOL/L (ref 136–145)
TOTAL PROTEIN: 6.8 G/DL (ref 6.4–8.2)

## 2020-03-05 PROCEDURE — G8427 DOCREV CUR MEDS BY ELIG CLIN: HCPCS | Performed by: NURSE PRACTITIONER

## 2020-03-05 PROCEDURE — 2022F DILAT RTA XM EVC RTNOPTHY: CPT | Performed by: NURSE PRACTITIONER

## 2020-03-05 PROCEDURE — 3044F HG A1C LEVEL LT 7.0%: CPT | Performed by: NURSE PRACTITIONER

## 2020-03-05 PROCEDURE — G8417 CALC BMI ABV UP PARAM F/U: HCPCS | Performed by: NURSE PRACTITIONER

## 2020-03-05 PROCEDURE — 99214 OFFICE O/P EST MOD 30 MIN: CPT | Performed by: NURSE PRACTITIONER

## 2020-03-05 PROCEDURE — 83036 HEMOGLOBIN GLYCOSYLATED A1C: CPT | Performed by: NURSE PRACTITIONER

## 2020-03-05 PROCEDURE — 1036F TOBACCO NON-USER: CPT | Performed by: NURSE PRACTITIONER

## 2020-03-05 PROCEDURE — G8484 FLU IMMUNIZE NO ADMIN: HCPCS | Performed by: NURSE PRACTITIONER

## 2020-03-05 RX ORDER — ALBUTEROL SULFATE 90 UG/1
AEROSOL, METERED RESPIRATORY (INHALATION)
Qty: 1 INHALER | Refills: 1 | Status: SHIPPED | OUTPATIENT
Start: 2020-03-05 | End: 2020-05-18 | Stop reason: SDUPTHER

## 2020-03-05 ASSESSMENT — ENCOUNTER SYMPTOMS
CHEST TIGHTNESS: 0
VOMITING: 0
NAUSEA: 0
SHORTNESS OF BREATH: 0
DIARRHEA: 0
ABDOMINAL PAIN: 0
BACK PAIN: 1
CONSTIPATION: 0
COUGH: 0

## 2020-03-05 NOTE — PROGRESS NOTES
3/5/2020      Chief Complaint   Patient presents with    Diabetes    Follow-up   . HPI   Mr. Ana Jacobs is a 38y/o male with a PMH of DM type II, HTN, HLD, obesity, and chronic back pain presenting today for follow up of chronic conditions. Diabetes Mellitus Type 2: Current symptoms/problems include no blurry vision, issues urinating, nausea, vomiting, and diarrhea. Home blood sugar records: patient does not test  Any episodes of hypoglycemia? no  Eye exam current (within one year): no, last time was at least two years ago but he is not having changes in vision. Tobacco history: He  reports that he has never smoked. He has never used smokeless tobacco.   Daily Aspirin? Yes    Wife and him . She would monitor his blood sugar when they were together. Follows with podiatrist at [facility]. Reports that he was just seen a couple of days ago for nail trimming. Hypertension:  Home blood pressure monitoring: No.  He is adherent to a low sodium diet. Patient denies chest pain, shortness of breath, headache, lightheadedness, blurred vision, peripheral edema, palpitations, dry cough and fatigue. Antihypertensive medication side effects: no medication side effects noted. Hyperlipidemia:  No new myalgias or GI upset on atorvastatin (Lipitor). Obesity: Says he works out on a bike twice per week but doesn't work out consistently. He states he eats healthy pre-made meals for lunch and drinks much more water than he used to. He says he will consider weight loss surgery now that his HbA1C is under control. Elevated Liver Enzymes: He has not seen a GI doctor but says he will make an appointment. Has not had abdominal pain/issues or yellowing of the skin recently, no changes in his urine color. Chronic back pain. Follows with pain specialist. Now seeing a new one. Previously saw EDDIE Quiroz, [place].  Currently on butrans patch, but does not feel that it works as well as when he was taking hydrocodone. Asthma- no recent flares. Rare use of albuterol inhaler.      Lab Results   Component Value Date    LABA1C 5.9 12/03/2019    LABA1C 6.4 08/06/2019    LABA1C 6.9 05/06/2019     Lab Results   Component Value Date    LABMICR <1.20 03/19/2019    CREATININE 0.7 (L) 12/03/2019     Lab Results   Component Value Date    ALT 62 (H) 12/03/2019    AST 37 12/03/2019     Lab Results   Component Value Date    CHOL 120 12/03/2019    TRIG 142 12/03/2019    HDL 42 12/03/2019    LDLCALC 50 12/03/2019    LDLDIRECT 146 (H) 03/19/2019           Patient Active Problem List   Diagnosis    Moderate persistent asthma without complication    Hypertension    GERD (gastroesophageal reflux disease)    Tinea cruris    Chronic back pain    Type 2 diabetes mellitus with diabetic neuropathy, with long-term current use of insulin (HCC)    Mixed hyperlipidemia    Failed back surgical syndrome    Spinal cord injury, C1-C7 ([address] [address]    Chronic pain syndrome    Central spinal stenosis    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 ([address]    Major depressive disorder    Insomnia    Diabetic eye exam ([address]- 12/16 wnl CEI    Closed nondisplaced fracture of fifth metatarsal bone of right foot    Morbid obesity with body mass index (BMI) of 40.0 to 44.9 in adult (HCC)    Elevated liver enzymes    JULITA (obstructive sleep apnea)    Pressure ulcer of buttock    Abnormal levels of other serum enzymes    Decubitus ulcer of buttock    Chronic back pain    Right hand pain    Diabetic ulcer of toe of right foot associated with type 2 diabetes mellitus (HCC)    Diabetic ulcer of toe (HCC)    Ingrowing nail          Current Outpatient Medications   Medication Sig Dispense Refill    budesonide-formoterol (SYMBICORT) 160-4.5 MCG/ACT AERO INHALE TWO PUFFS BY MOUTH TWICE A DAY FOR LONG TERM CONTROL OF ASTHMA, RINSE MOUTH OUT AFTER USE 10.2 g 2    amitriptyline (ELAVIL) 25 MG tablet TAKE ONE TABLET BY MOUTH NIGHTLY 30 tablet 1    DULoxetine (CYMBALTA) 30 MG extended release capsule Take 1 capsule by mouth 2 times daily 60 capsule 1    atorvastatin (LIPITOR) 80 MG tablet Take 1 tablet by mouth daily 90 tablet 0    canagliflozin (INVOKANA) 100 MG TABS tablet TAKE ONE TABLET BY MOUTH EVERY MORNING BEFORE BREAKFAST FOR DIABETES 90 tablet 0    montelukast (SINGULAIR) 10 MG tablet TAKE ONE TABLET BY MOUTH DAILY FOR BREATHING 90 tablet 0    lisinopril (PRINIVIL;ZESTRIL) 20 MG tablet Take 1 tablet by mouth daily 90 tablet 0    metFORMIN (GLUCOPHAGE) 1000 MG tablet TAKE 1 TABLET BY MOUTH TWICE DAILY WITH MEALS FOR DIABETES 180 tablet 0    metoprolol succinate (TOPROL XL) 50 MG extended release tablet TAKE ONE TABLET BY MOUTH DAILY FOR FOR BLOOD PRESSURE AND HEART RATE 90 tablet 0    buprenorphine (BUTRANS) 10 MCG/HR PTWK Place 1 patch onto the skin once a week.       Insulin Degludec (TRESIBA FLEXTOUCH) 100 UNIT/ML SOPN Inject 58 Units as directed every evening 15 mL 2    insulin lispro, 1 Unit Dial, (HUMALOG KWIKPEN) 100 UNIT/ML SOPN USE 9 UNITS SUBCUTANEOUSLY THREE TIMES DAILY BEFORE A MEAL 15 mL 2    omeprazole (PRILOSEC) 20 MG delayed release capsule TAKE ONE CAPSULE BY MOUTH DAILY FOR STOMACH 90 capsule 0    Multiple Vitamins-Minerals (MULTIVITAMIN ADULT PO) Take 1 tablet by mouth daily      cyanocobalamin 1000 MCG/ML injection Inject 1,000 mcg into the muscle every 30 days      Cyanocobalamin (VITAMIN B 12 PO) Take 1 tablet by mouth daily      omega-3 acid ethyl esters (LOVAZA) 1 g capsule Take 2 capsules by mouth 2 times daily 360 capsule 0    albuterol sulfate HFA (VENTOLIN HFA) 108 (90 Base) MCG/ACT inhaler INHALE TWO PUFFS BY MOUTH EVERY 6 HOURS AS NEEDED FOR WHEEZING OR FOR SHORTNESS OF BREATH 2 Inhaler 5    ipratropium-albuterol (DUONEB) 0.5-2.5 (3) MG/3ML SOLN nebulizer solution Inhale 3 mLs into the lungs every 6 hours as needed for Shortness of Breath 360 mL 5    VICTOZA 18 MG/3ML SOPN SC injection DIAL AND INJECT Pulse: 96   SpO2: 97%   Weight: 299 lb (135.6 kg)   Height: 6' 3\" (1.905 m)       Body mass index is 37.37 kg/m². Wt Readings from Last 3 Encounters:   03/05/20 299 lb (135.6 kg)   12/03/19 (!) 307 lb (139.3 kg)   10/29/19 (!) 310 lb 9.6 oz (140.9 kg)       BP Readings from Last 3 Encounters:   03/05/20 134/70   12/04/19 (!) 152/99   12/03/19 130/82     Physical Exam  Vitals signs and nursing note reviewed. Constitutional:       Appearance: He is well-developed. Comments: Ambulates with cane    HENT:      Head: Normocephalic and atraumatic. Eyes:      Conjunctiva/sclera: Conjunctivae normal.      Pupils: Pupils are equal, round, and reactive to light. Neck:      Musculoskeletal: Normal range of motion and neck supple. Cardiovascular:      Rate and Rhythm: Normal rate and regular rhythm. Heart sounds: Normal heart sounds. Pulmonary:      Effort: Pulmonary effort is normal.      Breath sounds: Normal breath sounds. Abdominal:      Palpations: Abdomen is soft. Musculoskeletal: Normal range of motion. Feet:      Right foot:      Protective Sensation: 10 sites tested. 1 site sensed. Skin integrity: No ulcer, blister or skin breakdown. Left foot:      Protective Sensation: 10 sites tested. 10 sites sensed. Skin integrity: Skin breakdown present. No ulcer or blister. Comments: Left lateral foot with some skin breakdown and scab. Patient reports that podiatrist is monitoring. Skin:     General: Skin is warm and dry. Neurological:      Mental Status: He is alert and oriented to person, place, and time. Cranial Nerves: No cranial nerve deficit. Sensory: No sensory deficit. Psychiatric:         Speech: Speech normal.         Behavior: Behavior normal.         Thought Content: Thought content normal.         Judgment: Judgment normal.         Assessmentand Plan  Leonidas Rodriguez was seen today for follow-up of chronic conditions.     Diagnoses and all orders for this visit:  Type 2 diabetes mellitus with diabetic neuropathy, with long-term current use of insulin ([facility]): controlled   -     POCT glycosylated hemoglobin (Hb A1C): 6.6%  -     Comprehensive Metabolic Panel; Future  -     Maintain a healthy low fat, low carb, low sodium diet, exercise daily, and hydrate often  - Advised to check fasting blood sugar, before meal blood sugar, and 2 hours after meal blood sugar. Advised to call with blood sugar readings in a couple weeks. Advised to call if he develops hypoglycemia. - continue current medications. Continue to f/u with podiatrist for foot care. Essential hypertension        -     Controlled  -     Continue current medications         -     Maintain a healthy low fat/low sodium diet, exercise daily, and hydrate often    Mixed hyperlipidemia  -     Comprehensive Metabolic Panel; Future  -     Lipids controlled 12/2019        -     Maintain a healthy low fat/low sodium diet, exercise daily, and hydrate often  On atorvastatin 80 mg daily and lovaza 4 grams daily     Obesity: weight has been decreasing         -     Maintain a healthy low fat, low carb, low sodium diet, exercise daily, and hydrate often        -     Discussed weight loss surgery now that HbA1C is down, patient considering    Elevated Liver Enzymes       -      Schedule an appointment with GI doctor to elevate liver enzymes    Moderate persistent asthma without complication: stable on symbicort. Rare use of albuterol inhaler, but he does need new Rx.   -     albuterol sulfate HFA (VENTOLIN HFA) 108 (90 Base) MCG/ACT inhaler; INHALE TWO PUFFS BY MOUTH EVERY 6 HOURS AS NEEDED FOR WHEEZING OR FOR SHORTNESS OF BREATH    Return in about 3 months (around 6/5/2020), or if symptoms worsen or fail to improve, for diabetes.

## 2020-03-27 RX ORDER — CANAGLIFLOZIN 100 MG/1
TABLET, FILM COATED ORAL
Qty: 90 TABLET | Refills: 0 | Status: SHIPPED
Start: 2020-03-27 | End: 2020-06-19 | Stop reason: SINTOL

## 2020-03-27 RX ORDER — MONTELUKAST SODIUM 10 MG/1
TABLET ORAL
Qty: 90 TABLET | Refills: 0 | Status: SHIPPED | OUTPATIENT
Start: 2020-03-27 | End: 2020-06-02

## 2020-03-27 RX ORDER — METOPROLOL SUCCINATE 50 MG/1
TABLET, EXTENDED RELEASE ORAL
Qty: 90 TABLET | Refills: 0 | Status: SHIPPED | OUTPATIENT
Start: 2020-03-27 | End: 2020-06-11

## 2020-03-27 RX ORDER — ATORVASTATIN CALCIUM 80 MG/1
80 TABLET, FILM COATED ORAL DAILY
Qty: 90 TABLET | Refills: 0 | Status: SHIPPED | OUTPATIENT
Start: 2020-03-27 | End: 2020-07-30

## 2020-04-06 RX ORDER — ALBUTEROL SULFATE 90 UG/1
AEROSOL, METERED RESPIRATORY (INHALATION)
Qty: 8.5 G | Refills: 1 | OUTPATIENT
Start: 2020-04-06

## 2020-04-21 RX ORDER — DULOXETIN HYDROCHLORIDE 30 MG/1
30 CAPSULE, DELAYED RELEASE ORAL 2 TIMES DAILY
Qty: 60 CAPSULE | Refills: 1 | OUTPATIENT
Start: 2020-04-21

## 2020-04-21 RX ORDER — AMITRIPTYLINE HYDROCHLORIDE 25 MG/1
TABLET, FILM COATED ORAL
Qty: 30 TABLET | Refills: 1 | OUTPATIENT
Start: 2020-04-21

## 2020-04-30 RX ORDER — LISINOPRIL 20 MG/1
20 TABLET ORAL DAILY
Qty: 90 TABLET | Refills: 0 | Status: SHIPPED | OUTPATIENT
Start: 2020-04-30 | End: 2020-10-16

## 2020-05-04 RX ORDER — OMEPRAZOLE 20 MG/1
CAPSULE, DELAYED RELEASE ORAL
Qty: 90 CAPSULE | Refills: 0 | Status: SHIPPED | OUTPATIENT
Start: 2020-05-04 | End: 2020-07-30

## 2020-05-07 ENCOUNTER — TELEPHONE (OUTPATIENT)
Dept: PULMONOLOGY | Age: 40
End: 2020-05-07

## 2020-05-07 ENCOUNTER — VIRTUAL VISIT (OUTPATIENT)
Dept: PULMONOLOGY | Age: 40
End: 2020-05-07
Payer: MEDICARE

## 2020-05-07 ENCOUNTER — VIRTUAL VISIT (OUTPATIENT)
Dept: FAMILY MEDICINE CLINIC | Age: 40
End: 2020-05-07
Payer: MEDICARE

## 2020-05-07 VITALS — BODY MASS INDEX: 34.39 KG/M2 | HEIGHT: 74 IN | WEIGHT: 268 LBS

## 2020-05-07 PROBLEM — J30.89 ENVIRONMENTAL AND SEASONAL ALLERGIES: Status: ACTIVE | Noted: 2020-05-07

## 2020-05-07 PROCEDURE — G8427 DOCREV CUR MEDS BY ELIG CLIN: HCPCS | Performed by: NURSE PRACTITIONER

## 2020-05-07 PROCEDURE — 1036F TOBACCO NON-USER: CPT | Performed by: INTERNAL MEDICINE

## 2020-05-07 PROCEDURE — G8417 CALC BMI ABV UP PARAM F/U: HCPCS | Performed by: NURSE PRACTITIONER

## 2020-05-07 PROCEDURE — 1036F TOBACCO NON-USER: CPT | Performed by: NURSE PRACTITIONER

## 2020-05-07 PROCEDURE — G8428 CUR MEDS NOT DOCUMENT: HCPCS | Performed by: INTERNAL MEDICINE

## 2020-05-07 PROCEDURE — G8417 CALC BMI ABV UP PARAM F/U: HCPCS | Performed by: INTERNAL MEDICINE

## 2020-05-07 PROCEDURE — 99213 OFFICE O/P EST LOW 20 MIN: CPT | Performed by: INTERNAL MEDICINE

## 2020-05-07 PROCEDURE — 99213 OFFICE O/P EST LOW 20 MIN: CPT | Performed by: NURSE PRACTITIONER

## 2020-05-07 RX ORDER — CIPROFLOXACIN 500 MG/1
500 TABLET, FILM COATED ORAL 2 TIMES DAILY
Qty: 20 TABLET | Refills: 0 | Status: SHIPPED | OUTPATIENT
Start: 2020-05-07 | End: 2020-05-17

## 2020-05-07 RX ORDER — CETIRIZINE HYDROCHLORIDE 10 MG/1
10 TABLET ORAL DAILY
Qty: 30 TABLET | Refills: 5 | Status: SHIPPED | OUTPATIENT
Start: 2020-05-07 | End: 2020-09-30

## 2020-05-07 RX ORDER — LANCETS 30 GAUGE
EACH MISCELLANEOUS
Qty: 100 EACH | Refills: 5 | Status: SHIPPED | OUTPATIENT
Start: 2020-05-07

## 2020-05-07 RX ORDER — CALCIUM CITRATE/VITAMIN D3 200MG-6.25
1 TABLET ORAL 4 TIMES DAILY
Qty: 100 EACH | Refills: 3 | Status: SHIPPED | OUTPATIENT
Start: 2020-05-07

## 2020-05-07 RX ORDER — MONTELUKAST SODIUM 10 MG/1
10 TABLET ORAL NIGHTLY
Qty: 30 TABLET | Refills: 5 | Status: SHIPPED | OUTPATIENT
Start: 2020-05-07 | End: 2020-10-27 | Stop reason: SDUPTHER

## 2020-05-07 ASSESSMENT — ENCOUNTER SYMPTOMS
VOMITING: 0
COUGH: 0
NAUSEA: 0
SHORTNESS OF BREATH: 0
ABDOMINAL PAIN: 0

## 2020-05-07 ASSESSMENT — ASTHMA QUESTIONNAIRES
QUESTION_3 LAST FOUR WEEKS HOW OFTEN DID YOUR ASTHMA SYMPTOMS (WHEEZING, COUGHING, SHORTNESS OF BREATH, CHEST TIGHTNESS OR PAIN) WAKE YOU UP AT NIGHT OR EARLIER THAN USUAL IN THE MORNING: 4
QUESTION_2 LAST FOUR WEEKS HOW OFTEN HAVE YOU HAD SHORTNESS OF BREATH: 3
ACT_TOTALSCORE: 15
QUESTION_5 LAST FOUR WEEKS HOW WOULD YOU RATE YOUR ASTHMA CONTROL: 3
QUESTION_1 LAST FOUR WEEKS HOW MUCH OF THE TIME DID YOUR ASTHMA KEEP YOU FROM GETTING AS MUCH DONE AT WORK, SCHOOL OR AT HOME: 3
QUESTION_4 LAST FOUR WEEKS HOW OFTEN HAVE YOU USED YOUR RESCUE INHALER OR NEBULIZER MEDICATION (SUCH AS ALBUTEROL): 2

## 2020-05-07 NOTE — ASSESSMENT & PLAN NOTE
Controlled with Symbicort once daily with albuterol HFA and nebulizers as needed. He was advised that he can increase his Symbicort up to twice a day if needed with increased symptoms. He expressed understanding for this.

## 2020-05-07 NOTE — PROGRESS NOTES
Right     arm- two    FRACTURE SURGERY Right     hand- three    HAND SURGERY  1999    KNEE ARTHROSCOPY Right     PELVIC FRACTURE SURGERY      SPINE SURGERY  2011    SPINE SURGERY  2010    UVULECTOMY         FAMILY HISTORY:  family history includes Asthma in his mother; Cancer in his mother, paternal cousin, paternal grandmother, and paternal uncle; Diabetes in his brother and mother. SOCIAL HISTORY:   reports that he has never smoked. He has never used smokeless tobacco.      ALLERGIES:  Patient is allergic to penicillins; fentanyl; food; and mushroom extract complex. REVIEW OF SYSTEMS:  Constitutional: Negative for fever   HENT: Negative for sore throat  Respiratory: Negative for dyspnea, cough  Cardiovascular: Negative for chest pain  Gastrointestinal: Negative for vomiting, diarrhea   Skin: Negative for rash  Psychiatric/Behavorial: Negative for anxiety     Objective:   PHYSICAL EXAM:  There were no vitals taken for this visit.'  Gen: No distress. Eyes:  No sclera icterus. No conjunctival injection. ENT: No discharge. Pharynx clear. External appearance of ears and nose normal.  Neck: Trachea midline. No obvious mass. Resp: No accessory muscle use. No clear wheezing. Speaking full sentences. CV:     GI:    Skin: Warm, dry, normal texture and turgor. No nodule on exposed extremities. Lymph:    M/S: No cyanosis. Neuro: Moves all four extremities. Psych: Oriented x 3. No anxiety. Awake. Alert. Intact judgement and insight.                  Current Outpatient Medications   Medication Sig Dispense Refill    cetirizine (ZYRTEC) 10 MG tablet Take 1 tablet by mouth daily 30 tablet 5    omeprazole (PRILOSEC) 20 MG delayed release capsule TAKE ONE CAPSULE BY MOUTH DAILY FOR STOMACH 90 capsule 0    lisinopril (PRINIVIL;ZESTRIL) 20 MG tablet TAKE 1 TABLET BY MOUTH DAILY 90 tablet 0    atorvastatin (LIPITOR) 80 MG tablet TAKE 1 TABLET BY MOUTH DAILY 90 tablet 0    metFORMIN (GLUCOPHAGE) 1000 MG tablet TAKE 1 TABLET BY MOUTH TWICE DAILY WITH MEALS FOR DIABETES 180 tablet 0    metoprolol succinate (TOPROL XL) 50 MG extended release tablet TAKE ONE TABLET BY MOUTH DAILY FOR FOR BLOOD PRESSURE AND HEART RATE 90 tablet 0    montelukast (SINGULAIR) 10 MG tablet TAKE ONE TABLET BY MOUTH DAILY FOR BREATHING 90 tablet 0    canagliflozin (INVOKANA) 100 MG TABS tablet TAKE ONE TABLET BY MOUTH EVERY MORNING BEFORE BREAKFAST FOR DIABETES 90 tablet 0    albuterol sulfate HFA (VENTOLIN HFA) 108 (90 Base) MCG/ACT inhaler INHALE TWO PUFFS BY MOUTH EVERY 6 HOURS AS NEEDED FOR WHEEZING OR FOR SHORTNESS OF BREATH 1 Inhaler 1    budesonide-formoterol (SYMBICORT) 160-4.5 MCG/ACT AERO INHALE TWO PUFFS BY MOUTH TWICE A DAY FOR LONG TERM CONTROL OF ASTHMA, RINSE MOUTH OUT AFTER USE 10.2 g 2    amitriptyline (ELAVIL) 25 MG tablet TAKE ONE TABLET BY MOUTH NIGHTLY 30 tablet 1    DULoxetine (CYMBALTA) 30 MG extended release capsule Take 1 capsule by mouth 2 times daily 60 capsule 1    gabapentin (NEURONTIN) 300 MG capsule Take 1 capsule by mouth 3 times daily for 30 days. 90 capsule 1    buprenorphine (BUTRANS) 10 MCG/HR PTWK Place 1 patch onto the skin once a week.       Insulin Degludec (TRESIBA FLEXTOUCH) 100 UNIT/ML SOPN Inject 58 Units as directed every evening 15 mL 2    insulin lispro, 1 Unit Dial, (HUMALOG KWIKPEN) 100 UNIT/ML SOPN USE 9 UNITS SUBCUTANEOUSLY THREE TIMES DAILY BEFORE A MEAL 15 mL 2    Multiple Vitamins-Minerals (MULTIVITAMIN ADULT PO) Take 1 tablet by mouth daily      cyanocobalamin 1000 MCG/ML injection Inject 1,000 mcg into the muscle every 30 days      Cyanocobalamin (VITAMIN B 12 PO) Take 1 tablet by mouth daily      omega-3 acid ethyl esters (LOVAZA) 1 g capsule Take 2 capsules by mouth 2 times daily 360 capsule 0    ipratropium-albuterol (DUONEB) 0.5-2.5 (3) MG/3ML SOLN nebulizer solution Inhale 3 mLs into the lungs every 6 hours as needed for Shortness of Breath 360 mL 5    Naloxone

## 2020-05-07 NOTE — PROGRESS NOTES
2020    TELEHEALTH EVALUATION -- Audio/Visual (During LVBMS-28 public health emergency)    HPI:    Sofia Sandoval (:  1980) has requested an audio/video evaluation for the following concern(s):  Chief Complaint   Patient presents with    Urinary Tract Infection     [employer], frequency    Medication Refill     doesn't see pain doc any more, wants Ativan refilled if you can-m needs BS test strips     Patient reports that he feels that he has a UTI. Started with urinary frequency 2 days ago. Positive for dysuria and urinary urgency. Denies hematuria, fever. Having some pelvic pain before having to urinate. This resolves after urination. Has not taken anything for symptoms. Has history of UTI about a year ago. Needs refills on diabetic test strips and lancets. Review of Systems   Constitutional: Negative for activity change, fatigue and fever. Respiratory: Negative for cough and shortness of breath. Cardiovascular: Negative for chest pain. Gastrointestinal: Negative for abdominal pain, nausea and vomiting. Genitourinary: Positive for dysuria and frequency. Negative for difficulty urinating, discharge, hematuria and scrotal swelling. Prior to Visit Medications    Medication Sig Taking?  Authorizing Provider   cetirizine (ZYRTEC) 10 MG tablet Take 1 tablet by mouth daily  Wan Nassar MD   montelukast (SINGULAIR) 10 MG tablet Take 1 tablet by mouth nightly  Wan Nassar MD   omeprazole (PRILOSEC) 20 MG delayed release capsule TAKE ONE CAPSULE BY MOUTH DAILY FOR STOMACH  AYLA Cavanaugh CNP   lisinopril (PRINIVIL;ZESTRIL) 20 MG tablet TAKE 1 TABLET BY MOUTH DAILY  AYLA Cavanaugh CNP   atorvastatin (LIPITOR) 80 MG tablet TAKE 1 TABLET BY MOUTH DAILY  AYLA Cavanaugh CNP   metFORMIN (GLUCOPHAGE) 1000 MG tablet TAKE 1 TABLET BY MOUTH TWICE DAILY WITH MEALS FOR DIABETES  AYLA Cavanaugh CNP   metoprolol succinate

## 2020-05-07 NOTE — TELEPHONE ENCOUNTER
Called Liane Oliver and let him know that Dr Samantha Sharma sent in [facility] and let him know of possible mood change which is rare.  He said it wasn't that the Zyrtec was not covered it would be $3.00, but he was checking to see if could find something cheaper

## 2020-05-08 ENCOUNTER — TELEPHONE (OUTPATIENT)
Dept: FAMILY MEDICINE CLINIC | Age: 40
End: 2020-05-08

## 2020-05-08 NOTE — TELEPHONE ENCOUNTER
Pt called in wants to know if she can prescribe a plasma flow his legs to pump at night they are expensive he wants to insurance to cover   Pt should be going to a pain specialist he is not he is trying to get in   Pt is having a lot of pain       Please advise

## 2020-05-18 ENCOUNTER — TELEPHONE (OUTPATIENT)
Dept: FAMILY MEDICINE CLINIC | Age: 40
End: 2020-05-18

## 2020-05-18 DIAGNOSIS — R30.0 DYSURIA: ICD-10-CM

## 2020-05-18 LAB
BACTERIA: ABNORMAL /HPF
BILIRUBIN URINE: NEGATIVE
BLOOD, URINE: NEGATIVE
CLARITY: CLEAR
COLOR: YELLOW
EPITHELIAL CELLS, UA: 0 /HPF (ref 0–5)
GLUCOSE URINE: >=1000 MG/DL
HYALINE CASTS: 0 /LPF (ref 0–8)
KETONES, URINE: NEGATIVE MG/DL
LEUKOCYTE ESTERASE, URINE: NEGATIVE
MICROSCOPIC EXAMINATION: YES
NITRITE, URINE: POSITIVE
PH UA: 6.5 (ref 5–8)
PROTEIN UA: NEGATIVE MG/DL
RBC UA: 0 /HPF (ref 0–4)
SPECIFIC GRAVITY UA: 1.03 (ref 1–1.03)
URINE TYPE: ABNORMAL
UROBILINOGEN, URINE: 0.2 E.U./DL
WBC UA: 2 /HPF (ref 0–5)

## 2020-05-18 RX ORDER — ALBUTEROL SULFATE 90 UG/1
AEROSOL, METERED RESPIRATORY (INHALATION)
Qty: 1 INHALER | Refills: 1 | Status: SHIPPED | OUTPATIENT
Start: 2020-05-18 | End: 2020-07-09

## 2020-05-18 NOTE — TELEPHONE ENCOUNTER
Spoke to pt  He will get labs done today (UA C&S)  States his symptoms are about the same  Urgency is very bad in the morning. Has a hard time making it to the bathroom. Not having any burning with urination.

## 2020-05-19 ENCOUNTER — TELEPHONE (OUTPATIENT)
Dept: FAMILY MEDICINE CLINIC | Age: 40
End: 2020-05-19

## 2020-05-19 LAB — URINE CULTURE, ROUTINE: NORMAL

## 2020-05-20 DIAGNOSIS — R30.0 DYSURIA: ICD-10-CM

## 2020-05-22 LAB
C. TRACHOMATIS DNA ,URINE: NEGATIVE
N. GONORRHOEAE DNA, URINE: NEGATIVE

## 2020-05-22 RX ORDER — PEN NEEDLE, DIABETIC 32GX 5/32"
NEEDLE, DISPOSABLE MISCELLANEOUS
Qty: 100 EACH | Refills: 2 | Status: SHIPPED | OUTPATIENT
Start: 2020-05-22 | End: 2020-08-12

## 2020-05-28 ENCOUNTER — TELEPHONE (OUTPATIENT)
Dept: FAMILY MEDICINE CLINIC | Age: 40
End: 2020-05-28

## 2020-05-29 ENCOUNTER — TELEPHONE (OUTPATIENT)
Dept: FAMILY MEDICINE CLINIC | Age: 40
End: 2020-05-29

## 2020-05-29 NOTE — TELEPHONE ENCOUNTER
Informed him of lab results, and to stop invokana. He said urology did urine specimen only, waiting for results. He is still having frequency. Told him to make in office appt with urology to address this problem. He said he will report Blood sugars on my chart after testing for 1 week.

## 2020-06-02 ENCOUNTER — HOSPITAL ENCOUNTER (OUTPATIENT)
Dept: INFUSION THERAPY | Age: 40
Setting detail: INFUSION SERIES
Discharge: HOME | End: 2020-06-02
Payer: MEDICARE

## 2020-06-02 VITALS
TEMPERATURE: 98 F | OXYGEN SATURATION: 97 % | RESPIRATION RATE: 16 BRPM | SYSTOLIC BLOOD PRESSURE: 150 MMHG | HEART RATE: 94 BPM | DIASTOLIC BLOOD PRESSURE: 87 MMHG

## 2020-06-02 DIAGNOSIS — N39.0 URINARY TRACT INFECTION WITHOUT HEMATURIA, SITE UNSPECIFIED: Primary | ICD-10-CM

## 2020-06-02 PROCEDURE — 36569 INSJ PICC 5 YR+ W/O IMAGING: CPT

## 2020-06-02 PROCEDURE — 2580000003 HC RX 258: Performed by: UROLOGY

## 2020-06-02 PROCEDURE — 76937 US GUIDE VASCULAR ACCESS: CPT

## 2020-06-02 PROCEDURE — C1751 CATH, INF, PER/CENT/MIDLINE: HCPCS

## 2020-06-02 PROCEDURE — 96365 THER/PROPH/DIAG IV INF INIT: CPT

## 2020-06-02 PROCEDURE — 6360000002 HC RX W HCPCS: Performed by: UROLOGY

## 2020-06-02 RX ORDER — SODIUM CHLORIDE 9 MG/ML
INJECTION, SOLUTION INTRAVENOUS CONTINUOUS
Status: CANCELLED | OUTPATIENT
Start: 2020-06-02

## 2020-06-02 RX ORDER — SODIUM CHLORIDE 0.9 % (FLUSH) 0.9 %
10 SYRINGE (ML) INJECTION PRN
Status: DISCONTINUED | OUTPATIENT
Start: 2020-06-02 | End: 2020-06-03 | Stop reason: HOSPADM

## 2020-06-02 RX ORDER — DIPHENHYDRAMINE HYDROCHLORIDE 50 MG/ML
50 INJECTION INTRAMUSCULAR; INTRAVENOUS ONCE
Status: CANCELLED | OUTPATIENT
Start: 2020-06-02

## 2020-06-02 RX ORDER — SODIUM CHLORIDE 9 MG/ML
INJECTION, SOLUTION INTRAVENOUS CONTINUOUS
Status: CANCELLED | OUTPATIENT
Start: 2020-06-03

## 2020-06-02 RX ORDER — EPINEPHRINE 1 MG/ML
0.3 INJECTION, SOLUTION, CONCENTRATE INTRAVENOUS PRN
Status: CANCELLED | OUTPATIENT
Start: 2020-06-03

## 2020-06-02 RX ORDER — AMOXICILLIN 500 MG
1 CAPSULE ORAL DAILY
COMMUNITY
End: 2020-10-07

## 2020-06-02 RX ORDER — DIPHENHYDRAMINE HYDROCHLORIDE 50 MG/ML
50 INJECTION INTRAMUSCULAR; INTRAVENOUS ONCE
Status: CANCELLED | OUTPATIENT
Start: 2020-06-03

## 2020-06-02 RX ORDER — METHYLPREDNISOLONE SODIUM SUCCINATE 125 MG/2ML
125 INJECTION, POWDER, LYOPHILIZED, FOR SOLUTION INTRAMUSCULAR; INTRAVENOUS ONCE
Status: CANCELLED | OUTPATIENT
Start: 2020-06-02

## 2020-06-02 RX ORDER — METHYLPREDNISOLONE SODIUM SUCCINATE 125 MG/2ML
125 INJECTION, POWDER, LYOPHILIZED, FOR SOLUTION INTRAMUSCULAR; INTRAVENOUS ONCE
Status: CANCELLED | OUTPATIENT
Start: 2020-06-03

## 2020-06-02 RX ORDER — SODIUM CHLORIDE 0.9 % (FLUSH) 0.9 %
10 SYRINGE (ML) INJECTION PRN
Status: CANCELLED | OUTPATIENT
Start: 2020-06-03

## 2020-06-02 RX ORDER — MV,IRON,MIN/GINKGO/PAN.GINSENG
1 TABLET ORAL DAILY
COMMUNITY

## 2020-06-02 RX ORDER — CA/D3/MAG OX/ZINC/COP/MANG/BOR 600 MG-800
1 TABLET,CHEWABLE ORAL DAILY
COMMUNITY

## 2020-06-02 RX ORDER — EPINEPHRINE 1 MG/ML
0.3 INJECTION, SOLUTION, CONCENTRATE INTRAVENOUS PRN
Status: CANCELLED | OUTPATIENT
Start: 2020-06-02

## 2020-06-02 RX ADMIN — Medication 30 ML: at 11:02

## 2020-06-02 RX ADMIN — ERTAPENEM SODIUM 1000 MG: 1 INJECTION, POWDER, LYOPHILIZED, FOR SOLUTION INTRAMUSCULAR; INTRAVENOUS at 10:09

## 2020-06-02 RX ADMIN — Medication 10 ML: at 10:48

## 2020-06-02 RX ADMIN — Medication 10 ML: at 10:09

## 2020-06-02 ASSESSMENT — PAIN SCALES - GENERAL
PAINLEVEL_OUTOF10: 0
PAINLEVEL_OUTOF10: 0

## 2020-06-02 NOTE — PROGRESS NOTES
PICC PLACEMENT:  Preprocedure & timeout done w primary RN Humza Marti; no difficulty accessing R BASILIC vein; picc tip is verified using [employer] 3cg Confirmation system w positive pwave and no negative deflection visualized at 0 cm out; waveform printed and placed in chart; reported same and ok to use PICC to primary RN; pt tolerated procedure very well; he will remain in bed post procedure to promote hemostasis to the insertion site; he will also be receiving his IV infusions here in the infusion ctr; pt is left in a position of comfort w siderails up x2, call light in reach and bed is locked in lowest position; Order for OK to use PICC is placed in EMR

## 2020-06-02 NOTE — PROGRESS NOTES
[address]    PICC placement and IV Antibiotic Visit    NAME:  Emelina Florence  YOB: 1980  MEDICAL RECORD NUMBER:  [id number]  DATE:  6/2/2020    Patient arrived to [facility]   [] per wheelchair   [x] ambulatory with cane     PICC line placed this AM by [facility] and order stating that tip is in proper placement for use was received via [facility]. Patient to receive 14 days of IV Ertapenem due to Urinary Tract Infection. Patient reports that he was having urinary urgency and frequency and that initially his urine culture was negative so he was referred to Dr Joshua Hays. When Dr Joshua Hays did a urine culture, it showed >100,000 E. Coli and Dr Joshua Hays ordered the IV Ertapenem. IV Antibiotic Visit    Itching: No  Rash: No  Mouth Sores: No  Nausea: No  Vomiting: No  Diarrhea: No - discussed with patient that IV antibiotics can cause diarrhea. Patient reports that he takes a daily Probiotic and also eats yogurt. Discussed with patient that both of these things help to maintain the natural markos in the GI tract and therefore prevent diarrhea. Patient verbalized understanding. Night Sweats: No  Fever: No    Administered: [] Peripheral access    [x] PICC  Access - right basilic    [] Port access    Allergies   Allergen Reactions    Penicillins Swelling and Anaphylaxis     Swelling of tongue and throat    Fentanyl      Had hives at patch site and nausea    Other reaction(s): Vomiting    Food Swelling     Swelling of throat from Mushrooms    Mushroom Extract Complex      Other reaction(s): Angioedema       Name of Antibiotic Administered: Ertapenem  Dose of Antibiotic Administered: 1000 mg. Indication for Antibiotic: Urinary Tract Infection      Verbal and written discharge instructions reviewed with patient. Patient verbalized understanding. Written copy given via AVS    Response to treatment:  Well tolerated by patient.        Scheduled to return for next antibiotic dose tomorrow     Electronically signed by Concha Limon RN on 6/2/2020 at 4:42 PM

## 2020-06-03 ENCOUNTER — HOSPITAL ENCOUNTER (OUTPATIENT)
Dept: INFUSION THERAPY | Age: 40
Setting detail: INFUSION SERIES
Discharge: HOME | End: 2020-06-03
Payer: MEDICARE

## 2020-06-03 VITALS
TEMPERATURE: 97.7 F | RESPIRATION RATE: 16 BRPM | OXYGEN SATURATION: 98 % | DIASTOLIC BLOOD PRESSURE: 62 MMHG | HEART RATE: 88 BPM | SYSTOLIC BLOOD PRESSURE: 146 MMHG

## 2020-06-03 DIAGNOSIS — N39.0 URINARY TRACT INFECTION WITHOUT HEMATURIA, SITE UNSPECIFIED: Primary | ICD-10-CM

## 2020-06-03 PROCEDURE — 2580000003 HC RX 258: Performed by: UROLOGY

## 2020-06-03 PROCEDURE — 6360000002 HC RX W HCPCS: Performed by: UROLOGY

## 2020-06-03 PROCEDURE — 96365 THER/PROPH/DIAG IV INF INIT: CPT

## 2020-06-03 RX ORDER — SODIUM CHLORIDE 9 MG/ML
INJECTION, SOLUTION INTRAVENOUS CONTINUOUS
Status: CANCELLED | OUTPATIENT
Start: 2020-06-04

## 2020-06-03 RX ORDER — SODIUM CHLORIDE 0.9 % (FLUSH) 0.9 %
10 SYRINGE (ML) INJECTION PRN
Status: DISCONTINUED | OUTPATIENT
Start: 2020-06-03 | End: 2020-06-04 | Stop reason: HOSPADM

## 2020-06-03 RX ORDER — EPINEPHRINE 1 MG/ML
0.3 INJECTION, SOLUTION, CONCENTRATE INTRAVENOUS PRN
Status: CANCELLED | OUTPATIENT
Start: 2020-06-04

## 2020-06-03 RX ORDER — SODIUM CHLORIDE 0.9 % (FLUSH) 0.9 %
10 SYRINGE (ML) INJECTION PRN
Status: CANCELLED | OUTPATIENT
Start: 2020-06-04

## 2020-06-03 RX ORDER — DIPHENHYDRAMINE HYDROCHLORIDE 50 MG/ML
50 INJECTION INTRAMUSCULAR; INTRAVENOUS ONCE
Status: CANCELLED | OUTPATIENT
Start: 2020-06-04

## 2020-06-03 RX ORDER — METHYLPREDNISOLONE SODIUM SUCCINATE 125 MG/2ML
125 INJECTION, POWDER, LYOPHILIZED, FOR SOLUTION INTRAMUSCULAR; INTRAVENOUS ONCE
Status: CANCELLED | OUTPATIENT
Start: 2020-06-04

## 2020-06-03 RX ADMIN — Medication 20 ML: at 13:25

## 2020-06-03 RX ADMIN — ERTAPENEM SODIUM 1000 MG: 1 INJECTION, POWDER, LYOPHILIZED, FOR SOLUTION INTRAMUSCULAR; INTRAVENOUS at 12:48

## 2020-06-03 RX ADMIN — Medication 10 ML: at 12:48

## 2020-06-03 ASSESSMENT — PAIN SCALES - GENERAL: PAINLEVEL_OUTOF10: 0

## 2020-06-03 NOTE — PROGRESS NOTES
[facility]    IV Antibiotic Visit    NAME:  Sharia Cheadle  YOB: 1980  MEDICAL RECORD NUMBER:  [id number]  DATE:  6/3/2020    Patient arrived to [facility]   [] per wheelchair   [x] ambulatory     Alert and oriented X 4. Denies any side effects from last infusion. Denies any s/s of infection. Afebrile. States his fequency has already decreased since first dose. Patient wearing face mask to prevent the spread of Covid-19. Itching: No  Rash: No  Mouth Sores: No  Nausea: No  Vomiting: No  Diarrhea: No states more loose but is taking daily probiotics  Night Sweats: No  Fever: No    Administered: [] Peripheral access    [x] PICC access right upper arm    [] Port access    Allergies   Allergen Reactions    Penicillins Swelling and Anaphylaxis     Swelling of tongue and throat    Fentanyl      Had hives at patch site and nausea    Other reaction(s): Vomiting    Food Swelling     Swelling of throat from Mushrooms    Mushroom Extract Complex      Other reaction(s): Angioedema       Name of Antibiotic Administered: Invanz (ertapenum)  Dose of Antibiotic Administered: 1000 mg. Indication for Antibiotic: UTI      Response to treatment:  Well tolerated by patient.        Scheduled to return for next antibiotic dose tomorrow     Electronically signed by Alto Gilford, RN on 6/3/2020 at 1:32 PM

## 2020-06-04 ENCOUNTER — HOSPITAL ENCOUNTER (OUTPATIENT)
Dept: INFUSION THERAPY | Age: 40
Setting detail: INFUSION SERIES
Discharge: HOME | End: 2020-06-04
Payer: MEDICARE

## 2020-06-04 ENCOUNTER — TELEMEDICINE (OUTPATIENT)
Dept: FAMILY MEDICINE CLINIC | Age: 40
End: 2020-06-04
Payer: MEDICARE

## 2020-06-04 VITALS
RESPIRATION RATE: 18 BRPM | DIASTOLIC BLOOD PRESSURE: 87 MMHG | TEMPERATURE: 98.6 F | SYSTOLIC BLOOD PRESSURE: 148 MMHG | HEART RATE: 99 BPM

## 2020-06-04 VITALS — TEMPERATURE: 98.2 F | WEIGHT: 305 LBS | BODY MASS INDEX: 39.16 KG/M2

## 2020-06-04 DIAGNOSIS — E11.40 TYPE 2 DIABETES MELLITUS WITH DIABETIC NEUROPATHY, WITH LONG-TERM CURRENT USE OF INSULIN (HCC): ICD-10-CM

## 2020-06-04 DIAGNOSIS — Z79.4 TYPE 2 DIABETES MELLITUS WITH DIABETIC NEUROPATHY, WITH LONG-TERM CURRENT USE OF INSULIN (HCC): ICD-10-CM

## 2020-06-04 DIAGNOSIS — N39.0 URINARY TRACT INFECTION WITHOUT HEMATURIA, SITE UNSPECIFIED: Primary | ICD-10-CM

## 2020-06-04 LAB
A/G RATIO: 1.9 (ref 1.1–2.2)
ALBUMIN SERPL-MCNC: 4.8 G/DL (ref 3.4–5)
ALP BLD-CCNC: 102 U/L (ref 40–129)
ALT SERPL-CCNC: 60 U/L (ref 10–40)
ANION GAP SERPL CALCULATED.3IONS-SCNC: 18 MMOL/L (ref 3–16)
AST SERPL-CCNC: 40 U/L (ref 15–37)
BILIRUB SERPL-MCNC: 0.6 MG/DL (ref 0–1)
BUN BLDV-MCNC: 19 MG/DL (ref 7–20)
CALCIUM SERPL-MCNC: 10.2 MG/DL (ref 8.3–10.6)
CHLORIDE BLD-SCNC: 99 MMOL/L (ref 99–110)
CO2: 27 MMOL/L (ref 21–32)
CREAT SERPL-MCNC: 0.9 MG/DL (ref 0.9–1.3)
GFR AFRICAN AMERICAN: >60
GFR NON-AFRICAN AMERICAN: >60
GLOBULIN: 2.5 G/DL
GLUCOSE BLD-MCNC: 108 MG/DL (ref 70–99)
POTASSIUM SERPL-SCNC: 4.7 MMOL/L (ref 3.5–5.1)
SODIUM BLD-SCNC: 144 MMOL/L (ref 136–145)
TOTAL PROTEIN: 7.3 G/DL (ref 6.4–8.2)

## 2020-06-04 PROCEDURE — G8427 DOCREV CUR MEDS BY ELIG CLIN: HCPCS | Performed by: NURSE PRACTITIONER

## 2020-06-04 PROCEDURE — 2580000003 HC RX 258: Performed by: UROLOGY

## 2020-06-04 PROCEDURE — 6360000002 HC RX W HCPCS: Performed by: UROLOGY

## 2020-06-04 PROCEDURE — 99214 OFFICE O/P EST MOD 30 MIN: CPT | Performed by: NURSE PRACTITIONER

## 2020-06-04 PROCEDURE — G8417 CALC BMI ABV UP PARAM F/U: HCPCS | Performed by: NURSE PRACTITIONER

## 2020-06-04 PROCEDURE — 2022F DILAT RTA XM EVC RTNOPTHY: CPT | Performed by: NURSE PRACTITIONER

## 2020-06-04 PROCEDURE — 3044F HG A1C LEVEL LT 7.0%: CPT | Performed by: NURSE PRACTITIONER

## 2020-06-04 PROCEDURE — 96365 THER/PROPH/DIAG IV INF INIT: CPT

## 2020-06-04 PROCEDURE — 1036F TOBACCO NON-USER: CPT | Performed by: NURSE PRACTITIONER

## 2020-06-04 RX ORDER — DIPHENHYDRAMINE HYDROCHLORIDE 50 MG/ML
50 INJECTION INTRAMUSCULAR; INTRAVENOUS ONCE
Status: CANCELLED | OUTPATIENT
Start: 2020-06-05

## 2020-06-04 RX ORDER — SODIUM CHLORIDE 0.9 % (FLUSH) 0.9 %
10 SYRINGE (ML) INJECTION PRN
Status: CANCELLED | OUTPATIENT
Start: 2020-06-05

## 2020-06-04 RX ORDER — INSULIN DEGLUDEC INJECTION 100 U/ML
50 INJECTION, SOLUTION SUBCUTANEOUS EVERY EVENING
Qty: 15 ML | Refills: 2 | Status: SHIPPED
Start: 2020-06-04 | End: 2020-07-22

## 2020-06-04 RX ORDER — SODIUM CHLORIDE 0.9 % (FLUSH) 0.9 %
10 SYRINGE (ML) INJECTION PRN
Status: DISCONTINUED | OUTPATIENT
Start: 2020-06-04 | End: 2020-06-05 | Stop reason: HOSPADM

## 2020-06-04 RX ORDER — METHYLPREDNISOLONE SODIUM SUCCINATE 125 MG/2ML
125 INJECTION, POWDER, LYOPHILIZED, FOR SOLUTION INTRAMUSCULAR; INTRAVENOUS ONCE
Status: CANCELLED | OUTPATIENT
Start: 2020-06-05

## 2020-06-04 RX ORDER — EPINEPHRINE 1 MG/ML
0.3 INJECTION, SOLUTION, CONCENTRATE INTRAVENOUS PRN
Status: CANCELLED | OUTPATIENT
Start: 2020-06-05

## 2020-06-04 RX ORDER — SODIUM CHLORIDE 9 MG/ML
INJECTION, SOLUTION INTRAVENOUS CONTINUOUS
Status: CANCELLED | OUTPATIENT
Start: 2020-06-05

## 2020-06-04 RX ADMIN — ERTAPENEM SODIUM 1000 MG: 1 INJECTION, POWDER, LYOPHILIZED, FOR SOLUTION INTRAMUSCULAR; INTRAVENOUS at 13:00

## 2020-06-04 RX ADMIN — Medication 10 ML: at 13:00

## 2020-06-04 RX ADMIN — Medication 30 ML: at 13:35

## 2020-06-04 ASSESSMENT — ENCOUNTER SYMPTOMS
ABDOMINAL PAIN: 0
DIARRHEA: 0
COUGH: 0
BACK PAIN: 1
NAUSEA: 0
SHORTNESS OF BREATH: 0
VOMITING: 0
CHEST TIGHTNESS: 0
CONSTIPATION: 0

## 2020-06-04 NOTE — PROGRESS NOTES
[facility]    IV Antibiotic Visit    NAME:  Morenita Burns  YOB: 1980  MEDICAL RECORD NUMBER:  [id number]  DATE:  6/4/2020    Patient arrived to [facility]   [] per wheelchair   [x] ambulatory     Itching: No  Rash: No  Mouth Sores: No  Nausea: No  Vomiting: No  Diarrhea: No  Night Sweats: No  Fever: No    Patient tolerating antibiotic very well. Is scheduled for Cystoscopy on 6/10/2020 with Dr. Teressa Vinson. Patient states he did take a shower yesterday but wrapped port site with plastic and and kept PICC arm out of running water. States he did not get dressing wet. Administered: [] Peripheral access    [x] PICC access    [] Port access    Allergies   Allergen Reactions    Penicillins Swelling and Anaphylaxis     Swelling of tongue and throat    Fentanyl      Had hives at patch site and nausea    Other reaction(s): Vomiting    Food Swelling     Swelling of throat from Mushrooms    Mushroom Extract Complex      Other reaction(s): Angioedema       Name of Antibiotic Administered: Ertapenem  Dose of Antibiotic Administered: 1 Gram.    Indication for Antibiotic: UTI      Response to treatment:  Well tolerated by patient.        Scheduled to return for next antibiotic dose tomorrow     Electronically signed by Brandi Wray RN on 6/4/2020 at 1:04 PM

## 2020-06-04 NOTE — PROGRESS NOTES
to. He says he will consider weight loss surgery now that his HbA1C is under control. Elevated Liver Enzymes: He has not seen a GI doctor but says he will make an appointment. Has not had abdominal pain/issues or yellowing of the skin recently, no changes in his urine color. Chronic back pain. Follows with pain specialist. Now seeing a new one- Dr. Susana Benites. Previously saw EDDIE Camara, CNP. Currently on butrans patch, but does not feel that it works as well as when he was taking hydrocodone. Asthma- no recent flares. Rare use of albuterol inhaler.      Lab Results   Component Value Date    LABA1C 6.6 03/05/2020    LABA1C 5.9 12/03/2019    LABA1C 6.4 08/06/2019     Lab Results   Component Value Date    LABMICR YES 05/18/2020    CREATININE 0.7 (L) 03/05/2020     Lab Results   Component Value Date    ALT 47 (H) 03/05/2020    AST 23 03/05/2020     Lab Results   Component Value Date    CHOL 120 12/03/2019    TRIG 142 12/03/2019    HDL 42 12/03/2019    LDLCALC 50 12/03/2019    LDLDIRECT 146 (H) 03/19/2019           Patient Active Problem List   Diagnosis    Moderate persistent asthma without complication    Hypertension    GERD (gastroesophageal reflux disease)    Tinea cruris    Type 2 diabetes mellitus with diabetic neuropathy, with long-term current use of insulin (HCC)    Mixed hyperlipidemia    Failed back surgical syndrome    Spinal cord injury, C1-C7 ([address]    Chronic pain syndrome    Central spinal stenosis    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 ([address]    Major depressive disorder    Insomnia    Diabetic eye exam ([address]- 12/16 wnl CEI    Closed nondisplaced fracture of fifth metatarsal bone of right foot    Morbid obesity with body mass index (BMI) of 40.0 to 44.9 in adult (HCC)    Elevated liver enzymes    JULITA (obstructive sleep apnea)    Pressure ulcer of buttock    Abnormal levels of other serum enzymes    Decubitus ulcer of buttock    Chronic back pain    Right hand pain    Diabetic ulcer of toe of right foot associated with type 2 diabetes mellitus ([address]    Diabetic ulcer of toe [facility]    Ingrowing nail    Environmental and seasonal allergies    Urinary tract infection, site not specified          Current Outpatient Medications   Medication Sig Dispense Refill    Probiotic Product (PROBIOTIC ADVANCED) CAPS Take 1 capsule by mouth daily      Specialty Vitamins Products (CENTRUM PERFORMANCE) TABS Take 1 tablet by mouth daily      Omega-3 Fatty Acids (FISH OIL) 1200 MG CAPS Take 1 capsule by mouth daily      ULTICARE MICRO PEN NEEDLES 32G X 4 MM MISC USE TWICE DAILY 100 each 2    albuterol sulfate HFA (VENTOLIN HFA) 108 (90 Base) MCG/ACT inhaler INHALE TWO PUFFS BY MOUTH EVERY 6 HOURS AS NEEDED FOR WHEEZING OR FOR SHORTNESS OF BREATH 1 Inhaler 1    cetirizine (ZYRTEC) 10 MG tablet Take 1 tablet by mouth daily 30 tablet 5    blood glucose test strips (TRUE METRIX BLOOD GLUCOSE TEST) strip 1 each by In Vitro route 4 times daily 100 each 3    montelukast (SINGULAIR) 10 MG tablet Take 1 tablet by mouth nightly 30 tablet 5    Lancets MISC Use to check BS  each 5    omeprazole (PRILOSEC) 20 MG delayed release capsule TAKE ONE CAPSULE BY MOUTH DAILY FOR STOMACH 90 capsule 0    lisinopril (PRINIVIL;ZESTRIL) 20 MG tablet TAKE 1 TABLET BY MOUTH DAILY 90 tablet 0    atorvastatin (LIPITOR) 80 MG tablet TAKE 1 TABLET BY MOUTH DAILY 90 tablet 0    metFORMIN (GLUCOPHAGE) 1000 MG tablet TAKE 1 TABLET BY MOUTH TWICE DAILY WITH MEALS FOR DIABETES 180 tablet 0    metoprolol succinate (TOPROL XL) 50 MG extended release tablet TAKE ONE TABLET BY MOUTH DAILY FOR FOR BLOOD PRESSURE AND HEART RATE 90 tablet 0    canagliflozin (INVOKANA) 100 MG TABS tablet TAKE ONE TABLET BY MOUTH EVERY MORNING BEFORE BREAKFAST FOR DIABETES 90 tablet 0    budesonide-formoterol (SYMBICORT) 160-4.5 MCG/ACT AERO INHALE TWO PUFFS BY MOUTH TWICE A DAY FOR LONG TERM CONTROL OF ASTHMA, RINSE MOUTH OUT AFTER Mushroom Extract Complex      Other reaction(s): Angioedema     Review of Systems   Constitutional: Negative for appetite change, chills, fatigue and fever. HENT: Negative for congestion and sneezing. Eyes: Negative for visual disturbance. Respiratory: Negative for cough, chest tightness and shortness of breath. Cardiovascular: Negative for chest pain, palpitations and leg swelling. Gastrointestinal: Negative for abdominal pain, constipation, diarrhea, nausea and vomiting. Endocrine: Negative for polydipsia, polyphagia and polyuria. Genitourinary: Positive for dysuria and frequency. Negative for difficulty urinating and hematuria. Musculoskeletal: Positive for back pain. Negative for arthralgias and myalgias. Neurological: Negative for dizziness, syncope, weakness, light-headedness and headaches. Vitals:    06/04/20 0810   Temp: 98.2 °F (36.8 °C)   Weight: (!) 305 lb (138.3 kg)   Patient reported     Body mass index is 39.16 kg/m². Wt Readings from Last 3 Encounters:   06/04/20 (!) 305 lb (138.3 kg)   05/07/20 268 lb (121.6 kg)   03/05/20 299 lb (135.6 kg)       BP Readings from Last 3 Encounters:   06/03/20 (!) 146/62   06/02/20 (!) 150/87   03/05/20 134/70     Physical Exam  Vitals signs and nursing note reviewed. Constitutional:       Appearance: He is well-developed. HENT:      Head: Normocephalic and atraumatic. Nose: Nose normal.   Eyes:      Extraocular Movements: Extraocular movements intact. Conjunctiva/sclera: Conjunctivae normal.   Pulmonary:      Effort: Pulmonary effort is normal.   Neurological:      Mental Status: He is alert and oriented to person, place, and time. Psychiatric:         Behavior: Behavior normal.         Thought Content: Thought content normal.         Judgment: Judgment normal.         Assessmentand Plan  Maggy Cunningham was seen today for follow-up of chronic conditions.     Diagnoses and all orders for this visit:  Type 2 diabetes mellitus with Vitals/Constitutional/EENT/Resp/CV/GI//MS/Neuro/Skin/Heme-Lymph-Imm. Pursuant to the emergency declaration under the [address] [place] [address], [address] [place] [address] and the [employer] Act, this Virtual Visit was conducted with patient's (and/or legal guardian's) consent, to reduce the patient's risk of exposure to COVID-19 and provide necessary medical care. The patient (and/or legal guardian) has also been advised to contact this office for worsening conditions or problems, and seek emergency medical treatment and/or call 911 if deemed necessary. Patient identification was verified at the start of the visit: Yes    Total time spent for this encounter: 25 minutes     Services were provided through a video synchronous discussion virtually to substitute for in-person clinic visit. Patient and provider were located at their individual homes. --AYLA Rollins - CNP on 6/4/2020 at 9:07 AM    An electronic signature was used to authenticate this note.

## 2020-06-05 ENCOUNTER — HOSPITAL ENCOUNTER (OUTPATIENT)
Dept: INFUSION THERAPY | Age: 40
Setting detail: INFUSION SERIES
Discharge: HOME | End: 2020-06-05
Payer: MEDICARE

## 2020-06-05 VITALS
RESPIRATION RATE: 17 BRPM | OXYGEN SATURATION: 95 % | HEART RATE: 101 BPM | SYSTOLIC BLOOD PRESSURE: 127 MMHG | TEMPERATURE: 98.3 F | DIASTOLIC BLOOD PRESSURE: 83 MMHG

## 2020-06-05 DIAGNOSIS — N39.0 URINARY TRACT INFECTION WITHOUT HEMATURIA, SITE UNSPECIFIED: Primary | ICD-10-CM

## 2020-06-05 LAB
ESTIMATED AVERAGE GLUCOSE: 122.6 MG/DL
HBA1C MFR BLD: 5.9 %

## 2020-06-05 PROCEDURE — 96365 THER/PROPH/DIAG IV INF INIT: CPT

## 2020-06-05 PROCEDURE — 6360000002 HC RX W HCPCS: Performed by: UROLOGY

## 2020-06-05 PROCEDURE — 2580000003 HC RX 258: Performed by: UROLOGY

## 2020-06-05 RX ORDER — METHYLPREDNISOLONE SODIUM SUCCINATE 125 MG/2ML
125 INJECTION, POWDER, LYOPHILIZED, FOR SOLUTION INTRAMUSCULAR; INTRAVENOUS ONCE
Status: CANCELLED | OUTPATIENT
Start: 2020-06-06

## 2020-06-05 RX ORDER — SODIUM CHLORIDE 9 MG/ML
INJECTION, SOLUTION INTRAVENOUS CONTINUOUS
Status: CANCELLED | OUTPATIENT
Start: 2020-06-06

## 2020-06-05 RX ORDER — SODIUM CHLORIDE 0.9 % (FLUSH) 0.9 %
10 SYRINGE (ML) INJECTION PRN
Status: CANCELLED | OUTPATIENT
Start: 2020-06-06

## 2020-06-05 RX ORDER — SODIUM CHLORIDE 0.9 % (FLUSH) 0.9 %
10 SYRINGE (ML) INJECTION PRN
Status: DISCONTINUED | OUTPATIENT
Start: 2020-06-05 | End: 2020-06-06 | Stop reason: HOSPADM

## 2020-06-05 RX ORDER — DIPHENHYDRAMINE HYDROCHLORIDE 50 MG/ML
50 INJECTION INTRAMUSCULAR; INTRAVENOUS ONCE
Status: CANCELLED | OUTPATIENT
Start: 2020-06-06

## 2020-06-05 RX ORDER — EPINEPHRINE 1 MG/ML
0.3 INJECTION, SOLUTION, CONCENTRATE INTRAVENOUS PRN
Status: CANCELLED | OUTPATIENT
Start: 2020-06-06

## 2020-06-05 RX ADMIN — Medication 10 ML: at 13:54

## 2020-06-05 RX ADMIN — Medication 10 ML: at 14:24

## 2020-06-05 RX ADMIN — ERTAPENEM SODIUM 1000 MG: 1 INJECTION, POWDER, LYOPHILIZED, FOR SOLUTION INTRAMUSCULAR; INTRAVENOUS at 13:55

## 2020-06-05 ASSESSMENT — PAIN SCALES - GENERAL: PAINLEVEL_OUTOF10: 0

## 2020-06-05 NOTE — PROGRESS NOTES
[address] [facility]    IV Antibiotic Visit    NAME:  Tanvi Garcia  YOB: 1980  MEDICAL RECORD NUMBER:  [id number]  DATE:  6/5/2020    Patient arrived to [facility] [address]   [] per wheelchair   [x] ambulatory     Itching: Yes, just around PICC site but no redness or signs of infection. Rash: No  Mouth Sores: No  Nausea: No  Vomiting: No  Diarrhea: No  Night Sweats: No  Fever: No    Administered: [] Peripheral access    [x] PICC access    [] Port access    Allergies   Allergen Reactions    Penicillins Swelling and Anaphylaxis     Swelling of tongue and throat    Fentanyl      Had hives at patch site and nausea    Other reaction(s): Vomiting    Food Swelling     Swelling of throat from Mushrooms    Mushroom Extract Complex      Other reaction(s): Angioedema       Name of Antibiotic Administered: Invanz  Dose of Antibiotic Administered: 1000 mg. Indication for Antibiotic: UTI      Response to treatment:  Well tolerated by patient.        Scheduled to return for next antibiotic dose tomorrow     Electronically signed by Nalini Rossi RN on 6/5/2020 at 2:02 PM

## 2020-06-06 ENCOUNTER — HOSPITAL ENCOUNTER (OUTPATIENT)
Dept: INFUSION THERAPY | Age: 40
Setting detail: INFUSION SERIES
Discharge: HOME | End: 2020-06-06
Payer: MEDICARE

## 2020-06-06 VITALS
BODY MASS INDEX: 39.14 KG/M2 | TEMPERATURE: 98.8 F | DIASTOLIC BLOOD PRESSURE: 84 MMHG | HEART RATE: 101 BPM | HEIGHT: 74 IN | RESPIRATION RATE: 18 BRPM | SYSTOLIC BLOOD PRESSURE: 126 MMHG | WEIGHT: 305 LBS

## 2020-06-06 DIAGNOSIS — N39.0 URINARY TRACT INFECTION WITHOUT HEMATURIA, SITE UNSPECIFIED: Primary | ICD-10-CM

## 2020-06-06 PROCEDURE — 6360000002 HC RX W HCPCS: Performed by: UROLOGY

## 2020-06-06 PROCEDURE — 96365 THER/PROPH/DIAG IV INF INIT: CPT

## 2020-06-06 PROCEDURE — 2580000003 HC RX 258: Performed by: UROLOGY

## 2020-06-06 RX ORDER — SODIUM CHLORIDE 0.9 % (FLUSH) 0.9 %
10 SYRINGE (ML) INJECTION PRN
Status: CANCELLED | OUTPATIENT
Start: 2020-06-07

## 2020-06-06 RX ORDER — DIPHENHYDRAMINE HYDROCHLORIDE 50 MG/ML
50 INJECTION INTRAMUSCULAR; INTRAVENOUS ONCE
Status: CANCELLED | OUTPATIENT
Start: 2020-06-07

## 2020-06-06 RX ORDER — METHYLPREDNISOLONE SODIUM SUCCINATE 125 MG/2ML
125 INJECTION, POWDER, LYOPHILIZED, FOR SOLUTION INTRAMUSCULAR; INTRAVENOUS ONCE
Status: CANCELLED | OUTPATIENT
Start: 2020-06-07

## 2020-06-06 RX ORDER — SODIUM CHLORIDE 0.9 % (FLUSH) 0.9 %
10 SYRINGE (ML) INJECTION PRN
Status: DISCONTINUED | OUTPATIENT
Start: 2020-06-06 | End: 2020-06-07 | Stop reason: HOSPADM

## 2020-06-06 RX ORDER — EPINEPHRINE 1 MG/ML
0.3 INJECTION, SOLUTION, CONCENTRATE INTRAVENOUS PRN
Status: CANCELLED | OUTPATIENT
Start: 2020-06-07

## 2020-06-06 RX ORDER — SODIUM CHLORIDE 9 MG/ML
INJECTION, SOLUTION INTRAVENOUS CONTINUOUS
Status: CANCELLED | OUTPATIENT
Start: 2020-06-07

## 2020-06-06 RX ADMIN — Medication 20 ML: at 09:48

## 2020-06-06 RX ADMIN — Medication 10 ML: at 09:20

## 2020-06-06 RX ADMIN — ERTAPENEM SODIUM 1000 MG: 1 INJECTION, POWDER, LYOPHILIZED, FOR SOLUTION INTRAMUSCULAR; INTRAVENOUS at 09:20

## 2020-06-06 ASSESSMENT — PAIN DESCRIPTION - DESCRIPTORS: DESCRIPTORS: ACHING

## 2020-06-06 ASSESSMENT — PAIN DESCRIPTION - LOCATION: LOCATION: BACK;LEG

## 2020-06-06 ASSESSMENT — PAIN SCALES - GENERAL: PAINLEVEL_OUTOF10: 2

## 2020-06-06 ASSESSMENT — PAIN DESCRIPTION - PAIN TYPE: TYPE: CHRONIC PAIN

## 2020-06-06 NOTE — PROGRESS NOTES
Outpatient [address] [facility]    IV Antibiotic Visit    NAME:  Fabiola Lee  YOB: 1980  MEDICAL RECORD NUMBER:  [id number]  DATE:  6/6/2020    Patient arrived to [place] [address]   [] per wheelchair   [x] ambulatory     Itching: Yes at insertion site only  Rash: No  Mouth Sores: No  Nausea: No  Vomiting: No  Diarrhea: No  Night Sweats: No  Fever: No    Administered: [] Peripheral access    [x] PICC access    [] Port access    Allergies   Allergen Reactions    Penicillins Swelling and Anaphylaxis     Swelling of tongue and throat    Fentanyl      Had hives at patch site and nausea    Other reaction(s): Vomiting    Food Swelling     Swelling of throat from Mushrooms    Mushroom Extract Complex      Other reaction(s): Angioedema       Name of Antibiotic Administered: invanz  Dose of Antibiotic Administered: 1000 mg. Indication for Antibiotic: UTI      Response to treatment:  Well tolerated by patient.        Scheduled to return for next antibiotic dose tomorrow     Electronically signed by Martha Major RN on 6/6/2020 at 8:58 AM

## 2020-06-07 ENCOUNTER — HOSPITAL ENCOUNTER (OUTPATIENT)
Dept: INFUSION THERAPY | Age: 40
Setting detail: INFUSION SERIES
Discharge: HOME | End: 2020-06-07
Payer: MEDICARE

## 2020-06-07 VITALS
HEART RATE: 91 BPM | OXYGEN SATURATION: 96 % | RESPIRATION RATE: 16 BRPM | SYSTOLIC BLOOD PRESSURE: 124 MMHG | DIASTOLIC BLOOD PRESSURE: 80 MMHG | TEMPERATURE: 98.2 F

## 2020-06-07 DIAGNOSIS — N39.0 URINARY TRACT INFECTION WITHOUT HEMATURIA, SITE UNSPECIFIED: Primary | ICD-10-CM

## 2020-06-07 PROCEDURE — 6360000002 HC RX W HCPCS

## 2020-06-07 PROCEDURE — 2580000003 HC RX 258

## 2020-06-07 PROCEDURE — 96365 THER/PROPH/DIAG IV INF INIT: CPT

## 2020-06-07 PROCEDURE — 2580000003 HC RX 258: Performed by: UROLOGY

## 2020-06-07 RX ORDER — ERTAPENEM 1 G/1
INJECTION, POWDER, LYOPHILIZED, FOR SOLUTION INTRAMUSCULAR; INTRAVENOUS
Status: COMPLETED
Start: 2020-06-07 | End: 2020-06-07

## 2020-06-07 RX ORDER — SODIUM CHLORIDE 9 MG/ML
INJECTION, SOLUTION INTRAVENOUS CONTINUOUS
Status: CANCELLED | OUTPATIENT
Start: 2020-06-08

## 2020-06-07 RX ORDER — DIPHENHYDRAMINE HYDROCHLORIDE 50 MG/ML
50 INJECTION INTRAMUSCULAR; INTRAVENOUS ONCE
Status: CANCELLED | OUTPATIENT
Start: 2020-06-08

## 2020-06-07 RX ORDER — SODIUM CHLORIDE 0.9 % (FLUSH) 0.9 %
10 SYRINGE (ML) INJECTION PRN
Status: CANCELLED | OUTPATIENT
Start: 2020-06-08

## 2020-06-07 RX ORDER — SODIUM CHLORIDE 0.9 % (FLUSH) 0.9 %
10 SYRINGE (ML) INJECTION PRN
Status: DISCONTINUED | OUTPATIENT
Start: 2020-06-07 | End: 2020-06-08 | Stop reason: HOSPADM

## 2020-06-07 RX ORDER — EPINEPHRINE 1 MG/ML
0.3 INJECTION, SOLUTION, CONCENTRATE INTRAVENOUS PRN
Status: CANCELLED | OUTPATIENT
Start: 2020-06-08

## 2020-06-07 RX ORDER — SODIUM CHLORIDE 9 MG/ML
INJECTION, SOLUTION INTRAVENOUS
Status: COMPLETED
Start: 2020-06-07 | End: 2020-06-07

## 2020-06-07 RX ORDER — METHYLPREDNISOLONE SODIUM SUCCINATE 125 MG/2ML
125 INJECTION, POWDER, LYOPHILIZED, FOR SOLUTION INTRAMUSCULAR; INTRAVENOUS ONCE
Status: CANCELLED | OUTPATIENT
Start: 2020-06-08

## 2020-06-07 RX ADMIN — ERTAPENEM SODIUM 1000 MG: 1 INJECTION, POWDER, LYOPHILIZED, FOR SOLUTION INTRAMUSCULAR; INTRAVENOUS at 11:09

## 2020-06-07 RX ADMIN — Medication 20 ML: at 11:45

## 2020-06-07 RX ADMIN — SODIUM CHLORIDE 50 ML: 900 INJECTION INTRAVENOUS at 11:10

## 2020-06-07 RX ADMIN — Medication 10 ML: at 11:09

## 2020-06-07 ASSESSMENT — PAIN SCALES - GENERAL: PAINLEVEL_OUTOF10: 2

## 2020-06-07 NOTE — PROGRESS NOTES
[address] [facility]    IV Antibiotic Visit    NAME:  Flora Saha  YOB: 1980  MEDICAL RECORD NUMBER:  [id number]  DATE:  6/7/2020    Patient arrived to [facility]   [] per wheelchair   [x] ambulatory     Alert and oriented X 4. Denies side effects from previous infusion. Afebrile. No s/s of respiratory infection. SOB at times secondary to asthma. Wearing a face mask to prevent the spread of COVID-19. Itching: No  Rash: No  Mouth Sores: No  Nausea: No  Vomiting: No  Diarrhea: No taking Probiotics daily  Night Sweats: No  Fever: No    Administered: [] Peripheral access    [x] PICC access    [] Port access    Allergies   Allergen Reactions    Penicillins Swelling and Anaphylaxis     Swelling of tongue and throat    Fentanyl      Had hives at patch site and nausea    Other reaction(s): Vomiting    Food Swelling     Swelling of throat from Mushrooms    Mushroom Extract Complex      Other reaction(s): Angioedema       Name of Antibiotic Administered: Invanz (ertapenum)  Dose of Antibiotic Administered: 1000 mg. Indication for Antibiotic: UTI      Response to treatment:  Well tolerated by patient.        Scheduled to return for next antibiotic dose tomorrow     Electronically signed by Peña Wong RN on 6/7/2020 at 11:21 AM

## 2020-06-08 ENCOUNTER — HOSPITAL ENCOUNTER (OUTPATIENT)
Dept: INFUSION THERAPY | Age: 40
Setting detail: INFUSION SERIES
Discharge: HOME | End: 2020-06-08
Payer: MEDICARE

## 2020-06-08 ENCOUNTER — TELEPHONE (OUTPATIENT)
Dept: FAMILY MEDICINE CLINIC | Age: 40
End: 2020-06-08

## 2020-06-08 VITALS
HEIGHT: 75 IN | RESPIRATION RATE: 16 BRPM | BODY MASS INDEX: 39.17 KG/M2 | TEMPERATURE: 98 F | DIASTOLIC BLOOD PRESSURE: 78 MMHG | HEART RATE: 88 BPM | WEIGHT: 315 LBS | SYSTOLIC BLOOD PRESSURE: 126 MMHG

## 2020-06-08 DIAGNOSIS — K76.0 FATTY LIVER: ICD-10-CM

## 2020-06-08 DIAGNOSIS — N39.0 URINARY TRACT INFECTION WITHOUT HEMATURIA, SITE UNSPECIFIED: Primary | ICD-10-CM

## 2020-06-08 DIAGNOSIS — K21.9 GASTROESOPHAGEAL REFLUX DISEASE, ESOPHAGITIS PRESENCE NOT SPECIFIED: Primary | ICD-10-CM

## 2020-06-08 DIAGNOSIS — R74.8 ELEVATED LIVER ENZYMES: ICD-10-CM

## 2020-06-08 PROCEDURE — 6360000002 HC RX W HCPCS: Performed by: UROLOGY

## 2020-06-08 PROCEDURE — 2580000003 HC RX 258: Performed by: UROLOGY

## 2020-06-08 PROCEDURE — 96365 THER/PROPH/DIAG IV INF INIT: CPT

## 2020-06-08 RX ORDER — EPINEPHRINE 1 MG/ML
0.3 INJECTION, SOLUTION, CONCENTRATE INTRAVENOUS PRN
Status: CANCELLED | OUTPATIENT
Start: 2020-06-09

## 2020-06-08 RX ORDER — METHYLPREDNISOLONE SODIUM SUCCINATE 125 MG/2ML
125 INJECTION, POWDER, LYOPHILIZED, FOR SOLUTION INTRAMUSCULAR; INTRAVENOUS ONCE
Status: CANCELLED | OUTPATIENT
Start: 2020-06-09

## 2020-06-08 RX ORDER — SODIUM CHLORIDE 0.9 % (FLUSH) 0.9 %
10 SYRINGE (ML) INJECTION PRN
Status: DISCONTINUED | OUTPATIENT
Start: 2020-06-08 | End: 2020-06-09 | Stop reason: HOSPADM

## 2020-06-08 RX ORDER — DIPHENHYDRAMINE HYDROCHLORIDE 50 MG/ML
50 INJECTION INTRAMUSCULAR; INTRAVENOUS ONCE
Status: CANCELLED | OUTPATIENT
Start: 2020-06-09

## 2020-06-08 RX ORDER — SODIUM CHLORIDE 9 MG/ML
INJECTION, SOLUTION INTRAVENOUS CONTINUOUS
Status: CANCELLED | OUTPATIENT
Start: 2020-06-09

## 2020-06-08 RX ORDER — SODIUM CHLORIDE 0.9 % (FLUSH) 0.9 %
10 SYRINGE (ML) INJECTION PRN
Status: CANCELLED | OUTPATIENT
Start: 2020-06-09

## 2020-06-08 RX ADMIN — Medication 10 ML: at 13:10

## 2020-06-08 RX ADMIN — Medication 20 ML: at 13:34

## 2020-06-08 RX ADMIN — ERTAPENEM SODIUM 1000 MG: 1 INJECTION, POWDER, LYOPHILIZED, FOR SOLUTION INTRAMUSCULAR; INTRAVENOUS at 13:10

## 2020-06-08 ASSESSMENT — PAIN DESCRIPTION - LOCATION: LOCATION: BACK

## 2020-06-08 ASSESSMENT — PAIN DESCRIPTION - PAIN TYPE: TYPE: CHRONIC PAIN

## 2020-06-08 NOTE — PROGRESS NOTES
[address]    IV Antibiotic Visit    NAME:  Brittney Sanabria  YOB: 1980  MEDICAL RECORD NUMBER:  [id number]  DATE:  6/8/2020    Patient arrived to [facility] [address]   [] per wheelchair   [x] ambulatory     Itching: no  Rash: No  Mouth Sores: No  Nausea: No  Vomiting: No  Diarrhea: No  Night Sweats: No  Fever: No    Administered: [] Peripheral access    [] PICC access    [] Port access    Allergies   Allergen Reactions    Penicillins Swelling and Anaphylaxis     Swelling of tongue and throat    Fentanyl      Had hives at patch site and nausea    Other reaction(s): Vomiting    Food Swelling     Swelling of throat from Mushrooms    Mushroom Extract Complex      Other reaction(s): Angioedema       Name of Antibiotic Administered: invanz  Dose of Antibiotic Administered: 1000 mg. Indication for Antibiotic: UTI      Response to treatment:  Well tolerated by patient.        Scheduled to return for next antibiotic dose tomorrow     Electronically signed by Luciana Guerra RN on 6/8/2020 at 1:15 PM

## 2020-06-08 NOTE — TELEPHONE ENCOUNTER
Patient called stating he was seeing dr Cruz Cabral in the [facility] fro endoscopia but missed his appts  Patient stated he has missed a few appts with him due to other dr metz, and girlfriend emergency appts. Patient stated dr Cruz Cabral is not going to see him anymore for missing his appts. scheduled back to back and missed those appts with dr Cruz Cabral for endoscopia   Patient wanting to know if he could be referred to a different dr to be seen for endoscopia?      Please advise

## 2020-06-09 ENCOUNTER — HOSPITAL ENCOUNTER (OUTPATIENT)
Dept: INFUSION THERAPY | Age: 40
Setting detail: INFUSION SERIES
Discharge: HOME | End: 2020-06-09
Payer: MEDICARE

## 2020-06-09 VITALS
BODY MASS INDEX: 39.17 KG/M2 | DIASTOLIC BLOOD PRESSURE: 78 MMHG | SYSTOLIC BLOOD PRESSURE: 128 MMHG | OXYGEN SATURATION: 98 % | RESPIRATION RATE: 17 BRPM | HEIGHT: 75 IN | HEART RATE: 85 BPM | TEMPERATURE: 98.2 F | WEIGHT: 315 LBS

## 2020-06-09 DIAGNOSIS — N39.0 URINARY TRACT INFECTION WITHOUT HEMATURIA, SITE UNSPECIFIED: Primary | ICD-10-CM

## 2020-06-09 PROCEDURE — 96365 THER/PROPH/DIAG IV INF INIT: CPT

## 2020-06-09 PROCEDURE — 6360000002 HC RX W HCPCS: Performed by: UROLOGY

## 2020-06-09 PROCEDURE — 2580000003 HC RX 258: Performed by: UROLOGY

## 2020-06-09 RX ORDER — SODIUM CHLORIDE 9 MG/ML
INJECTION, SOLUTION INTRAVENOUS CONTINUOUS
Status: CANCELLED | OUTPATIENT
Start: 2020-06-10

## 2020-06-09 RX ORDER — EPINEPHRINE 1 MG/ML
0.3 INJECTION, SOLUTION, CONCENTRATE INTRAVENOUS PRN
Status: CANCELLED | OUTPATIENT
Start: 2020-06-10

## 2020-06-09 RX ORDER — DIPHENHYDRAMINE HYDROCHLORIDE 50 MG/ML
50 INJECTION INTRAMUSCULAR; INTRAVENOUS ONCE
Status: CANCELLED | OUTPATIENT
Start: 2020-06-10

## 2020-06-09 RX ORDER — METHYLPREDNISOLONE SODIUM SUCCINATE 125 MG/2ML
125 INJECTION, POWDER, LYOPHILIZED, FOR SOLUTION INTRAMUSCULAR; INTRAVENOUS ONCE
Status: CANCELLED | OUTPATIENT
Start: 2020-06-10

## 2020-06-09 RX ORDER — SODIUM CHLORIDE 0.9 % (FLUSH) 0.9 %
10 SYRINGE (ML) INJECTION PRN
Status: CANCELLED | OUTPATIENT
Start: 2020-06-10

## 2020-06-09 RX ORDER — SODIUM CHLORIDE 0.9 % (FLUSH) 0.9 %
10 SYRINGE (ML) INJECTION PRN
Status: DISCONTINUED | OUTPATIENT
Start: 2020-06-09 | End: 2020-06-10 | Stop reason: HOSPADM

## 2020-06-09 RX ADMIN — ERTAPENEM SODIUM 1000 MG: 1 INJECTION, POWDER, LYOPHILIZED, FOR SOLUTION INTRAMUSCULAR; INTRAVENOUS at 13:11

## 2020-06-09 RX ADMIN — Medication 10 ML: at 13:10

## 2020-06-09 ASSESSMENT — PAIN DESCRIPTION - PAIN TYPE: TYPE: CHRONIC PAIN

## 2020-06-09 ASSESSMENT — PAIN SCALES - GENERAL: PAINLEVEL_OUTOF10: 2

## 2020-06-09 ASSESSMENT — PAIN DESCRIPTION - LOCATION: LOCATION: BACK

## 2020-06-09 NOTE — PROGRESS NOTES
[address] [facility] Antibiotic Visit    NAME:  Judy Rivas  YOB: 1980  MEDICAL RECORD NUMBER:  [id number]  DATE:  6/9/2020    Patient arrived to [place] [address]   [] per wheelchair   [x] ambulatory     Itching: No  Rash: No  Mouth Sores: No  Nausea: No  Vomiting: No  Diarrhea: No  Night Sweats: No  Fever: No    Administered: [] Peripheral access    [x] PICC access    [] Port access    Allergies   Allergen Reactions    Penicillins Swelling and Anaphylaxis     Swelling of tongue and throat    Fentanyl      Had hives at patch site and nausea    Other reaction(s): Vomiting    Food Swelling     Swelling of throat from Mushrooms    Mushroom Extract Complex      Other reaction(s): Angioedema       Name of Antibiotic Administered: invanz  Dose of Antibiotic Administered: 1000 mg. Indication for Antibiotic: UTI      Response to treatment:  Well tolerated by patient.        Scheduled to return for next antibiotic dose tomorrow     Electronically signed by Ashley Cain RN on 6/9/2020 at 1:59 PM

## 2020-06-09 NOTE — PROGRESS NOTES
Outpatient [address] [facility]    PICC Dressing Change    NAME:  Rachel Barclay  YOB: 1980  MEDICAL RECORD NUMBER:  [id number]  DATE:  6/9/2020    Patient arrived to [facility] [address]   [] per wheelchair   [x] ambulatory     PICC Location:right arm    PICC Site:  Redness: No  Bruising: No   Edema: No  Pain: No     PICC Cleansed:  Current dressing and stat lock removed: Yes  Chloroprep Scrub for 30 seconds and air dried completely for 1 minute: Yes     PICC Dressing Change  Skin barrier: Yes  Stat lock applied and PICC line secured Yes  Biopatch applied: Yes   PICC site covered with Tegaderm Yes  Date and initials applied to PICC dressing Yes  [] Other:    Next Dressing Due: June 16, 2020. Response to treatment:  Well tolerated by patient.       Electronically signed by Talat Rosado RN on 6/9/2020 at 2:00 PM

## 2020-06-09 NOTE — PROGRESS NOTES
Clinic Level of Care Assessment  Infusion Center      NAME:  Preethi Morelos  YOB: 1980 GENDER: male  MEDICAL RECORD NUMBER:  [id number]   DATE:  6/9/2020     Ambulation Status Document in Daily Care/Safety Tab   Status Definition Points   Independent Independently able to ambulate. Fully able (without any assistance) to get on/off exam table/chair. [x]   0   Minimal Physical Assistance Requires physical assistance of one person to ambulate and/or position patient to be examined. Includes necessary physical assistance to position lower extremities on/off stool. []   1   Moderate Physical Assistance Requires at least one staff member to physically assist patient in ambulating into treatment room, and/or on off chair/bed. Requires assistance to bathroom. []   2   Full Assistance Requires assistance of at least two staff members to transfer patient into treatment room and/or on/off bed/chair. \"Total Transfer\". Unable to use bathroom requires bedside commode and/or bedpan []   3       Dressing Complexity Document in LDA Navigator  Complexity Definition Points   No Dressing  []   0   Simple Minimal, simple dressing. i.e. bandaid, gauze, simple wrap. Peripheral IV dressing. []   1   Intermediate Moderately complicated PICC dressing change requiring sterile procedure and site assessment. [x]   2   Complex Complicated dressing change port access requiring sterile procedure and site assessment. []   3       Teaching Effort Document in AVS and/or Education Navigator    Effort Definition Points   No Teaching  []   0   Simple Teach two or less topics. Teaching documented in discharge instructions in AVS and copy given to patient. []   1   Intermediate Teach three to four topics. Teaching documented in Discharge Instructions in AVS using References and Attachments. Copy given to patient. []   2   Complex Teach five to six topics.  Teaching documented in Discharge Instructions in AVS using References and Attachments. May also be documentation in Education Navigator. Copy given to patient. []   3           Patient Assessment  Planning Definition Points   Simple Complete all tabs in patient assessment navigator. Review or complete patient'sTherapy Plan. [x]   1   Intermediate Complete all tabs in patient assessment navigator. Review or completed patient'sTherapy Plan. Contact doctor based on nursing assessment. []   2   Complex Complete all tabs in patient assessment navigator. Completed patient's Therapy Plan. Contact doctor based on nursing assessment and write order related to needed care. []   3     Patient Planning   Planning Definition Points   Simple Discharged, instructions and After Visit Summary given to patient/caregiver and instructions completed. Simple follow-up with routine assessment and planning.      []   1   Intermediate Discharged, instructions and After Visit Summary given to patient/caregiver and instructions completed. Contact with outside resources; i.e. Telephone calls to PCP, home health, ECF and/or arranging transportation. []   2   Complex Discharged, instructions and After Visit Summary given to patient/caregiver and instructions completed. Contact with outside resources; i.e. Telephone calls to PCP, home health, ECF and/or arranging transportation. May include filling out forms and/or writing letters, communication with insurance , preauthorization for treatment.    []   3       Is this the Patient's First Visit to the [facility]  No    Is this Patient Established @ this [facility] SPECIALTY [facility]  Yes              Clinical Level of Care      Points  0-2  Level 1 []     Points  3-5  Level 2 [x]     Points  6-9  Level 3 []     Points  10-12  Level 4 []     Points  13-15  Level 5 []       Electronically signed by Getachew Tate RN on 6/9/2020 at 2:03 PM

## 2020-06-10 ENCOUNTER — HOSPITAL ENCOUNTER (OUTPATIENT)
Dept: INFUSION THERAPY | Age: 40
Setting detail: INFUSION SERIES
Discharge: HOME | End: 2020-06-10
Payer: MEDICARE

## 2020-06-10 ENCOUNTER — TELEPHONE (OUTPATIENT)
Dept: FAMILY MEDICINE CLINIC | Age: 40
End: 2020-06-10

## 2020-06-10 VITALS
DIASTOLIC BLOOD PRESSURE: 80 MMHG | OXYGEN SATURATION: 96 % | SYSTOLIC BLOOD PRESSURE: 129 MMHG | HEART RATE: 88 BPM | TEMPERATURE: 98.1 F | RESPIRATION RATE: 16 BRPM

## 2020-06-10 DIAGNOSIS — N39.0 URINARY TRACT INFECTION WITHOUT HEMATURIA, SITE UNSPECIFIED: Primary | ICD-10-CM

## 2020-06-10 PROCEDURE — 2580000003 HC RX 258: Performed by: UROLOGY

## 2020-06-10 PROCEDURE — 6360000002 HC RX W HCPCS: Performed by: UROLOGY

## 2020-06-10 PROCEDURE — 96365 THER/PROPH/DIAG IV INF INIT: CPT

## 2020-06-10 RX ORDER — SODIUM CHLORIDE 0.9 % (FLUSH) 0.9 %
10 SYRINGE (ML) INJECTION PRN
Status: DISCONTINUED | OUTPATIENT
Start: 2020-06-10 | End: 2020-06-11 | Stop reason: HOSPADM

## 2020-06-10 RX ORDER — SODIUM CHLORIDE 9 MG/ML
INJECTION, SOLUTION INTRAVENOUS CONTINUOUS
Status: CANCELLED | OUTPATIENT
Start: 2020-06-11

## 2020-06-10 RX ORDER — METHYLPREDNISOLONE SODIUM SUCCINATE 125 MG/2ML
125 INJECTION, POWDER, LYOPHILIZED, FOR SOLUTION INTRAMUSCULAR; INTRAVENOUS ONCE
Status: CANCELLED | OUTPATIENT
Start: 2020-06-11

## 2020-06-10 RX ORDER — SODIUM CHLORIDE 0.9 % (FLUSH) 0.9 %
10 SYRINGE (ML) INJECTION PRN
Status: CANCELLED | OUTPATIENT
Start: 2020-06-11

## 2020-06-10 RX ORDER — DIPHENHYDRAMINE HYDROCHLORIDE 50 MG/ML
50 INJECTION INTRAMUSCULAR; INTRAVENOUS ONCE
Status: CANCELLED | OUTPATIENT
Start: 2020-06-11

## 2020-06-10 RX ORDER — EPINEPHRINE 1 MG/ML
0.3 INJECTION, SOLUTION, CONCENTRATE INTRAVENOUS PRN
Status: CANCELLED | OUTPATIENT
Start: 2020-06-11

## 2020-06-10 RX ADMIN — Medication 20 ML: at 13:26

## 2020-06-10 RX ADMIN — ERTAPENEM SODIUM 1000 MG: 1 INJECTION, POWDER, LYOPHILIZED, FOR SOLUTION INTRAMUSCULAR; INTRAVENOUS at 12:51

## 2020-06-10 RX ADMIN — Medication 10 ML: at 12:50

## 2020-06-10 NOTE — PROGRESS NOTES
[facility]    IV Antibiotic Visit    NAME:  Prashant Hunt  YOB: 1980  MEDICAL RECORD NUMBER:  [id number]  DATE:  6/10/2020    Patient arrived to [facility] [address]   [] per wheelchair   [x] ambulatory     Alert and oriented X 4. Denies any side effects from last infusion. Denies s/s of new infection, afebrile. Wearing a face mask to prevent the spread of COVID-19. Itching: No  Rash: No  Mouth Sores: No  Nausea: No  Vomiting: No  Diarrhea: No  Night Sweats: No  Fever: No    Administered: [] Peripheral access    [x] PICC access    [] Port access    Allergies   Allergen Reactions    Penicillins Swelling and Anaphylaxis     Swelling of tongue and throat    Fentanyl      Had hives at patch site and nausea    Other reaction(s): Vomiting    Food Swelling     Swelling of throat from Mushrooms    Mushroom Extract Complex      Other reaction(s): Angioedema       Name of Antibiotic Administered: Invanz (ertapenum)  Dose of Antibiotic Administered: 1000 mg. Indication for Antibiotic: ESBL UTI      Response to treatment:  Well tolerated by patient.        Scheduled to return for next antibiotic dose tomorrow     Electronically signed by Vickie Mendoza RN on 6/10/2020 at 1:00 PM

## 2020-06-10 NOTE — TELEPHONE ENCOUNTER
Looks like you coded the referral with fatty liver, elevated liver enzymes and gasto reflux disease. Anything else you want me to tell them?

## 2020-06-11 ENCOUNTER — HOSPITAL ENCOUNTER (OUTPATIENT)
Dept: INFUSION THERAPY | Age: 40
Setting detail: INFUSION SERIES
Discharge: HOME | End: 2020-06-11
Payer: MEDICARE

## 2020-06-11 VITALS
OXYGEN SATURATION: 95 % | RESPIRATION RATE: 16 BRPM | HEART RATE: 93 BPM | DIASTOLIC BLOOD PRESSURE: 81 MMHG | SYSTOLIC BLOOD PRESSURE: 131 MMHG | TEMPERATURE: 97.9 F

## 2020-06-11 DIAGNOSIS — N39.0 URINARY TRACT INFECTION WITHOUT HEMATURIA, SITE UNSPECIFIED: Primary | ICD-10-CM

## 2020-06-11 PROCEDURE — 2580000003 HC RX 258: Performed by: UROLOGY

## 2020-06-11 PROCEDURE — 6360000002 HC RX W HCPCS: Performed by: UROLOGY

## 2020-06-11 PROCEDURE — 96365 THER/PROPH/DIAG IV INF INIT: CPT

## 2020-06-11 RX ORDER — METOPROLOL SUCCINATE 50 MG/1
TABLET, EXTENDED RELEASE ORAL
Qty: 90 TABLET | Refills: 0 | Status: SHIPPED | OUTPATIENT
Start: 2020-06-11 | End: 2020-10-02

## 2020-06-11 RX ORDER — SODIUM CHLORIDE 9 MG/ML
INJECTION, SOLUTION INTRAVENOUS CONTINUOUS
Status: CANCELLED | OUTPATIENT
Start: 2020-06-12

## 2020-06-11 RX ORDER — SODIUM CHLORIDE 0.9 % (FLUSH) 0.9 %
10 SYRINGE (ML) INJECTION PRN
Status: DISCONTINUED | OUTPATIENT
Start: 2020-06-11 | End: 2020-06-12 | Stop reason: HOSPADM

## 2020-06-11 RX ORDER — SODIUM CHLORIDE 0.9 % (FLUSH) 0.9 %
10 SYRINGE (ML) INJECTION PRN
Status: CANCELLED | OUTPATIENT
Start: 2020-06-12

## 2020-06-11 RX ORDER — EPINEPHRINE 1 MG/ML
0.3 INJECTION, SOLUTION, CONCENTRATE INTRAVENOUS PRN
Status: CANCELLED | OUTPATIENT
Start: 2020-06-12

## 2020-06-11 RX ORDER — METHYLPREDNISOLONE SODIUM SUCCINATE 125 MG/2ML
125 INJECTION, POWDER, LYOPHILIZED, FOR SOLUTION INTRAMUSCULAR; INTRAVENOUS ONCE
Status: CANCELLED | OUTPATIENT
Start: 2020-06-12

## 2020-06-11 RX ORDER — DIPHENHYDRAMINE HYDROCHLORIDE 50 MG/ML
50 INJECTION INTRAMUSCULAR; INTRAVENOUS ONCE
Status: CANCELLED | OUTPATIENT
Start: 2020-06-12

## 2020-06-11 RX ADMIN — Medication 10 ML: at 12:24

## 2020-06-11 RX ADMIN — ERTAPENEM SODIUM 1000 MG: 1 INJECTION, POWDER, LYOPHILIZED, FOR SOLUTION INTRAMUSCULAR; INTRAVENOUS at 12:26

## 2020-06-11 ASSESSMENT — PAIN SCALES - GENERAL: PAINLEVEL_OUTOF10: 6

## 2020-06-11 ASSESSMENT — PAIN DESCRIPTION - LOCATION: LOCATION: LEG

## 2020-06-11 ASSESSMENT — PAIN DESCRIPTION - DESCRIPTORS: DESCRIPTORS: ACHING

## 2020-06-11 ASSESSMENT — PAIN DESCRIPTION - ORIENTATION: ORIENTATION: RIGHT

## 2020-06-11 ASSESSMENT — PAIN DESCRIPTION - PAIN TYPE: TYPE: CHRONIC PAIN

## 2020-06-11 NOTE — PROGRESS NOTES
[address] [facility]    IV Antibiotic Visit    NAME:  Zachary Reed  YOB: 1980  MEDICAL RECORD NUMBER:  [id number]  DATE:  6/11/2020    Patient arrived to [place] [address]   [] per wheelchair   [x] ambulatory with a cane    Itching: No  Rash: No  Mouth Sores: No  Nausea: No  Vomiting: No  Diarrhea: No  Night Sweats: No  Fever: No    Administered: [] Peripheral access    [x] PICC access    [] Port access    Allergies   Allergen Reactions    Penicillins Swelling and Anaphylaxis     Swelling of tongue and throat    Fentanyl      Had hives at patch site and nausea    Other reaction(s): Vomiting    Food Swelling     Swelling of throat from Mushrooms    Mushroom Extract Complex      Other reaction(s): Angioedema       Name of Antibiotic Administered: Invanz  Dose of Antibiotic Administered: 1000 mg. Indication for Antibiotic: UTI      Response to treatment:  Well tolerated by patient.        Scheduled to return for next antibiotic dose tomorrow     Electronically signed by Sameer Pabon RN on 6/11/2020 at 1:10 PM

## 2020-06-12 ENCOUNTER — TELEPHONE (OUTPATIENT)
Dept: FAMILY MEDICINE CLINIC | Age: 40
End: 2020-06-12

## 2020-06-12 ENCOUNTER — HOSPITAL ENCOUNTER (OUTPATIENT)
Dept: INFUSION THERAPY | Age: 40
Setting detail: INFUSION SERIES
Discharge: HOME | End: 2020-06-12
Payer: MEDICARE

## 2020-06-12 VITALS
RESPIRATION RATE: 16 BRPM | SYSTOLIC BLOOD PRESSURE: 134 MMHG | HEART RATE: 96 BPM | TEMPERATURE: 98.6 F | DIASTOLIC BLOOD PRESSURE: 86 MMHG

## 2020-06-12 DIAGNOSIS — N39.0 URINARY TRACT INFECTION WITHOUT HEMATURIA, SITE UNSPECIFIED: Primary | ICD-10-CM

## 2020-06-12 PROCEDURE — 96365 THER/PROPH/DIAG IV INF INIT: CPT

## 2020-06-12 PROCEDURE — 2580000003 HC RX 258: Performed by: UROLOGY

## 2020-06-12 PROCEDURE — 6360000002 HC RX W HCPCS: Performed by: UROLOGY

## 2020-06-12 RX ORDER — SODIUM CHLORIDE 9 MG/ML
INJECTION, SOLUTION INTRAVENOUS CONTINUOUS
Status: CANCELLED | OUTPATIENT
Start: 2020-06-13

## 2020-06-12 RX ORDER — SODIUM CHLORIDE 0.9 % (FLUSH) 0.9 %
10 SYRINGE (ML) INJECTION PRN
Status: CANCELLED | OUTPATIENT
Start: 2020-06-13

## 2020-06-12 RX ORDER — SODIUM CHLORIDE 0.9 % (FLUSH) 0.9 %
10 SYRINGE (ML) INJECTION PRN
Status: DISCONTINUED | OUTPATIENT
Start: 2020-06-12 | End: 2020-06-13 | Stop reason: HOSPADM

## 2020-06-12 RX ORDER — METHYLPREDNISOLONE SODIUM SUCCINATE 125 MG/2ML
125 INJECTION, POWDER, LYOPHILIZED, FOR SOLUTION INTRAMUSCULAR; INTRAVENOUS ONCE
Status: CANCELLED | OUTPATIENT
Start: 2020-06-13

## 2020-06-12 RX ORDER — DIPHENHYDRAMINE HYDROCHLORIDE 50 MG/ML
50 INJECTION INTRAMUSCULAR; INTRAVENOUS ONCE
Status: CANCELLED | OUTPATIENT
Start: 2020-06-13

## 2020-06-12 RX ORDER — EPINEPHRINE 1 MG/ML
0.3 INJECTION, SOLUTION, CONCENTRATE INTRAVENOUS PRN
Status: CANCELLED | OUTPATIENT
Start: 2020-06-13

## 2020-06-12 RX ADMIN — ERTAPENEM SODIUM 1000 MG: 1 INJECTION, POWDER, LYOPHILIZED, FOR SOLUTION INTRAMUSCULAR; INTRAVENOUS at 13:22

## 2020-06-12 RX ADMIN — Medication 10 ML: at 13:20

## 2020-06-12 RX ADMIN — Medication 10 ML: at 13:52

## 2020-06-12 ASSESSMENT — PAIN SCALES - GENERAL: PAINLEVEL_OUTOF10: 0

## 2020-06-13 ENCOUNTER — HOSPITAL ENCOUNTER (OUTPATIENT)
Dept: INFUSION THERAPY | Age: 40
Setting detail: INFUSION SERIES
Discharge: HOME | End: 2020-06-13
Payer: MEDICARE

## 2020-06-13 VITALS
WEIGHT: 302 LBS | SYSTOLIC BLOOD PRESSURE: 125 MMHG | HEIGHT: 75 IN | OXYGEN SATURATION: 96 % | RESPIRATION RATE: 18 BRPM | HEART RATE: 95 BPM | TEMPERATURE: 98.1 F | BODY MASS INDEX: 37.55 KG/M2 | DIASTOLIC BLOOD PRESSURE: 81 MMHG

## 2020-06-13 DIAGNOSIS — N39.0 URINARY TRACT INFECTION WITHOUT HEMATURIA, SITE UNSPECIFIED: Primary | ICD-10-CM

## 2020-06-13 PROCEDURE — 6360000002 HC RX W HCPCS: Performed by: UROLOGY

## 2020-06-13 PROCEDURE — 96365 THER/PROPH/DIAG IV INF INIT: CPT

## 2020-06-13 PROCEDURE — 2580000003 HC RX 258: Performed by: UROLOGY

## 2020-06-13 RX ORDER — SODIUM CHLORIDE 0.9 % (FLUSH) 0.9 %
10 SYRINGE (ML) INJECTION PRN
Status: CANCELLED | OUTPATIENT
Start: 2020-06-14

## 2020-06-13 RX ORDER — EPINEPHRINE 1 MG/ML
0.3 INJECTION, SOLUTION, CONCENTRATE INTRAVENOUS PRN
Status: CANCELLED | OUTPATIENT
Start: 2020-06-14

## 2020-06-13 RX ORDER — SODIUM CHLORIDE 9 MG/ML
INJECTION, SOLUTION INTRAVENOUS CONTINUOUS
Status: CANCELLED | OUTPATIENT
Start: 2020-06-14

## 2020-06-13 RX ORDER — METHYLPREDNISOLONE SODIUM SUCCINATE 125 MG/2ML
125 INJECTION, POWDER, LYOPHILIZED, FOR SOLUTION INTRAMUSCULAR; INTRAVENOUS ONCE
Status: CANCELLED | OUTPATIENT
Start: 2020-06-14

## 2020-06-13 RX ORDER — SODIUM CHLORIDE 0.9 % (FLUSH) 0.9 %
10 SYRINGE (ML) INJECTION PRN
Status: DISCONTINUED | OUTPATIENT
Start: 2020-06-13 | End: 2020-06-14 | Stop reason: HOSPADM

## 2020-06-13 RX ORDER — DIPHENHYDRAMINE HYDROCHLORIDE 50 MG/ML
50 INJECTION INTRAMUSCULAR; INTRAVENOUS ONCE
Status: CANCELLED | OUTPATIENT
Start: 2020-06-14

## 2020-06-13 RX ADMIN — Medication 20 ML: at 12:15

## 2020-06-13 RX ADMIN — Medication 20 ML: at 11:48

## 2020-06-13 RX ADMIN — ERTAPENEM SODIUM 1000 MG: 1 INJECTION, POWDER, LYOPHILIZED, FOR SOLUTION INTRAMUSCULAR; INTRAVENOUS at 11:50

## 2020-06-13 ASSESSMENT — PAIN DESCRIPTION - LOCATION: LOCATION: BACK

## 2020-06-13 ASSESSMENT — PAIN SCALES - GENERAL: PAINLEVEL_OUTOF10: 3

## 2020-06-13 ASSESSMENT — PAIN DESCRIPTION - PAIN TYPE: TYPE: CHRONIC PAIN

## 2020-06-13 NOTE — PROGRESS NOTES
[address] [facility]    IV Antibiotic Visit    NAME:  Sadiq Mcmullen  YOB: 1980  MEDICAL RECORD NUMBER:  [id number]  DATE:  6/13/2020    Patient arrived to [place] [address]   [] per wheelchair   [x] ambulatory     Itching: No  Rash: No  Mouth Sores: No  Nausea: No  Vomiting: No  Diarrhea: No  Night Sweats: No  Fever: No    Administered: [] Peripheral access    [x] PICC access    [] Port access    Allergies   Allergen Reactions    Penicillins Swelling and Anaphylaxis     Swelling of tongue and throat    Fentanyl      Had hives at patch site and nausea    Other reaction(s): Vomiting    Food Swelling     Swelling of throat from Mushrooms    Mushroom Extract Complex      Other reaction(s): Angioedema       Name of Antibiotic Administered: invanz  Dose of Antibiotic Administered: 1000 mg. Indication for Antibiotic: UTI      Response to treatment:  Well tolerated by patient.        Scheduled to return for next antibiotic dose tomorrow     Electronically signed by Celina Juan RN on 6/13/2020 at 11:58 AM

## 2020-06-14 ENCOUNTER — HOSPITAL ENCOUNTER (OUTPATIENT)
Dept: INFUSION THERAPY | Age: 40
Setting detail: INFUSION SERIES
Discharge: HOME | End: 2020-06-14
Payer: MEDICARE

## 2020-06-14 VITALS
DIASTOLIC BLOOD PRESSURE: 65 MMHG | SYSTOLIC BLOOD PRESSURE: 143 MMHG | OXYGEN SATURATION: 95 % | TEMPERATURE: 98.6 F | HEART RATE: 106 BPM | RESPIRATION RATE: 18 BRPM

## 2020-06-14 DIAGNOSIS — N39.0 URINARY TRACT INFECTION WITHOUT HEMATURIA, SITE UNSPECIFIED: Primary | ICD-10-CM

## 2020-06-14 PROCEDURE — 2580000003 HC RX 258: Performed by: UROLOGY

## 2020-06-14 PROCEDURE — 96365 THER/PROPH/DIAG IV INF INIT: CPT

## 2020-06-14 PROCEDURE — 6360000002 HC RX W HCPCS: Performed by: UROLOGY

## 2020-06-14 RX ORDER — METHYLPREDNISOLONE SODIUM SUCCINATE 125 MG/2ML
125 INJECTION, POWDER, LYOPHILIZED, FOR SOLUTION INTRAMUSCULAR; INTRAVENOUS ONCE
Status: CANCELLED | OUTPATIENT
Start: 2020-06-15

## 2020-06-14 RX ORDER — SODIUM CHLORIDE 0.9 % (FLUSH) 0.9 %
10 SYRINGE (ML) INJECTION PRN
Status: DISCONTINUED | OUTPATIENT
Start: 2020-06-14 | End: 2020-06-15 | Stop reason: HOSPADM

## 2020-06-14 RX ORDER — SODIUM CHLORIDE 0.9 % (FLUSH) 0.9 %
10 SYRINGE (ML) INJECTION PRN
Status: CANCELLED | OUTPATIENT
Start: 2020-06-15

## 2020-06-14 RX ORDER — DIPHENHYDRAMINE HYDROCHLORIDE 50 MG/ML
50 INJECTION INTRAMUSCULAR; INTRAVENOUS ONCE
Status: CANCELLED | OUTPATIENT
Start: 2020-06-15

## 2020-06-14 RX ORDER — SODIUM CHLORIDE 9 MG/ML
INJECTION, SOLUTION INTRAVENOUS CONTINUOUS
Status: CANCELLED | OUTPATIENT
Start: 2020-06-15

## 2020-06-14 RX ORDER — EPINEPHRINE 1 MG/ML
0.3 INJECTION, SOLUTION, CONCENTRATE INTRAVENOUS PRN
Status: CANCELLED | OUTPATIENT
Start: 2020-06-15

## 2020-06-14 RX ADMIN — ERTAPENEM SODIUM 1000 MG: 1 INJECTION, POWDER, LYOPHILIZED, FOR SOLUTION INTRAMUSCULAR; INTRAVENOUS at 11:43

## 2020-06-14 RX ADMIN — Medication 10 ML: at 11:44

## 2020-06-14 RX ADMIN — Medication 20 ML: at 12:14

## 2020-06-14 ASSESSMENT — PAIN SCALES - GENERAL: PAINLEVEL_OUTOF10: 0

## 2020-06-14 NOTE — PROGRESS NOTES
[address] [facility]    IV Antibiotic Visit    NAME:  Emelina Florence  YOB: 1980  MEDICAL RECORD NUMBER:  [id number]  DATE:  6/14/2020    Patient arrived to [facility] [address]   [] per wheelchair   [x] ambulatory with cane. Itching: No  Rash: No  Mouth Sores: No  Nausea: No  Vomiting: No  Diarrhea: No  Night Sweats: No  Fever: No    Administered: [] Peripheral access    [x] PICC access    [] Port access    Allergies   Allergen Reactions    Penicillins Swelling and Anaphylaxis     Swelling of tongue and throat    Fentanyl      Had hives at patch site and nausea    Other reaction(s): Vomiting    Food Swelling     Swelling of throat from Mushrooms    Mushroom Extract Complex      Other reaction(s): Angioedema       Name of Antibiotic Administered: Invanz  dose of Antibiotic Administered: 1000 mg. Indication for Antibiotic: UTI      Response to treatment:  Well tolerated by patient. Scheduled to return for next antibiotic dose tomorrow   denies any pain or cont'd sx's.   Electronically signed by Soraya Rodriguez RN on 6/14/2020 at 1:08 PM

## 2020-06-15 ENCOUNTER — HOSPITAL ENCOUNTER (OUTPATIENT)
Dept: INFUSION THERAPY | Age: 40
Setting detail: INFUSION SERIES
Discharge: HOME | End: 2020-06-15
Payer: MEDICARE

## 2020-06-15 DIAGNOSIS — N39.0 URINARY TRACT INFECTION WITHOUT HEMATURIA, SITE UNSPECIFIED: Primary | ICD-10-CM

## 2020-06-15 PROCEDURE — 99212 OFFICE O/P EST SF 10 MIN: CPT

## 2020-06-15 PROCEDURE — 96365 THER/PROPH/DIAG IV INF INIT: CPT

## 2020-06-15 PROCEDURE — 6360000002 HC RX W HCPCS: Performed by: UROLOGY

## 2020-06-15 PROCEDURE — 2580000003 HC RX 258: Performed by: UROLOGY

## 2020-06-15 RX ORDER — METHYLPREDNISOLONE SODIUM SUCCINATE 125 MG/2ML
125 INJECTION, POWDER, LYOPHILIZED, FOR SOLUTION INTRAMUSCULAR; INTRAVENOUS ONCE
Status: CANCELLED | OUTPATIENT
Start: 2020-06-15

## 2020-06-15 RX ORDER — EPINEPHRINE 1 MG/ML
0.3 INJECTION, SOLUTION, CONCENTRATE INTRAVENOUS PRN
Status: CANCELLED | OUTPATIENT
Start: 2020-06-15

## 2020-06-15 RX ORDER — SODIUM CHLORIDE 0.9 % (FLUSH) 0.9 %
10 SYRINGE (ML) INJECTION PRN
Status: CANCELLED | OUTPATIENT
Start: 2020-06-15

## 2020-06-15 RX ORDER — SODIUM CHLORIDE 0.9 % (FLUSH) 0.9 %
10 SYRINGE (ML) INJECTION PRN
Status: DISCONTINUED | OUTPATIENT
Start: 2020-06-15 | End: 2020-06-15

## 2020-06-15 RX ORDER — SODIUM CHLORIDE 9 MG/ML
INJECTION, SOLUTION INTRAVENOUS CONTINUOUS
Status: CANCELLED | OUTPATIENT
Start: 2020-06-15

## 2020-06-15 RX ORDER — DIPHENHYDRAMINE HYDROCHLORIDE 50 MG/ML
50 INJECTION INTRAMUSCULAR; INTRAVENOUS ONCE
Status: CANCELLED | OUTPATIENT
Start: 2020-06-15

## 2020-06-15 RX ADMIN — Medication 20 ML: at 13:20

## 2020-06-15 RX ADMIN — Medication 10 ML: at 12:45

## 2020-06-15 RX ADMIN — ERTAPENEM SODIUM 1000 MG: 1 INJECTION, POWDER, LYOPHILIZED, FOR SOLUTION INTRAMUSCULAR; INTRAVENOUS at 12:46

## 2020-06-15 NOTE — PROGRESS NOTES
[facility]    IV Antibiotic Visit    NAME:  Umu Schwartz  YOB: 1980  MEDICAL RECORD NUMBER:  [id number]  DATE:  6/15/2020    Patient arrived to [facility]   [] per wheelchair   [x] ambulatory with walking stick     Alert and oriented X 4. Denies any side effects from last infusion. States no dysuria today. Denies new s/s of infection or increase in respiratory distress. Afebrile. Wearing face mask to prevent the spread of COVID-19. States had labs drawn today for pre- op for endoscopy. States is having a procedure to assess his fatty liver. Itching: No  Rash: No  Mouth Sores: No  Nausea: No  Vomiting: No  Diarrhea: No  Night Sweats: No  Fever: No    Administered: [] Peripheral access    [x] PICC access    [] Port access    Allergies   Allergen Reactions    Penicillins Swelling and Anaphylaxis     Swelling of tongue and throat    Fentanyl      Had hives at patch site and nausea    Other reaction(s): Vomiting    Food Swelling     Swelling of throat from Mushrooms    Mushroom Extract Complex      Other reaction(s): Angioedema       Name of Antibiotic Administered: Invanz (ertapenum)  Dose of Antibiotic Administered: 1000 mg. Indication for Antibiotic: UTI      Response to treatment:  Well tolerated by patient. Scheduled to return for next antibiotic dose N/A, today is the last dose     Electronically signed by Shane Mcpherson RN on 6/15/2020 at [address] Removal      PICC Location:  right arm    Patient has been treated for:  UTI ( disease or infection )    Patient has completed treatment of: 2  weeks of treatment     Order for removal of PICC line given by: Dr. Nenita Browne    PICC placed on:   June 2,2020. PICC cut to 52 cm.     Patient positioned:   [] Lying   [x] Sitting   [] Other         PICC dressing removed:  Yes     New pair of sterile gloves donned and sterile 4 x 4 gauze placed under PICC line: Yes    Keeping a sterile 4x4 over top of exit site, PICC removed slowly by small increments and line not stretched. PICC line removed without any complications:  Yes    Pressure held to exit site for 30 - 60 seconds:  Yes    Bleeding at site:  no    Site scrubbed with Chloroprep Scrub for 30 seconds and air dried completely for 1 minute. Yes  Small piece of vaseline gauze placed over exit site and covered with a dry 2 x 2 gauze and covered with a tegaderm dressing. Yes     Patient instructed to keep dressing dry and intact for 48 hours. Yes      Response to treatment:  Well tolerated by patient.       Electronically signed by Josefa Toney RN on 6/15/2020 at 1:54 PM

## 2020-06-15 NOTE — PROGRESS NOTES
Clinic Level of Care Assessment  Infusion Center      NAME:  Carolina Fried  YOB: 1980 GENDER: male  MEDICAL RECORD NUMBER:  [id number]   DATE:  6/15/2020     Ambulation Status Document in Daily Care/Safety Tab   Status Definition Points   Independent Independently able to ambulate. Fully able (without any assistance) to get on/off exam table/chair. [x]   0   Minimal Physical Assistance Requires physical assistance of one person to ambulate and/or position patient to be examined. Includes necessary physical assistance to position lower extremities on/off stool. []   1   Moderate Physical Assistance Requires at least one staff member to physically assist patient in ambulating into treatment room, and/or on off chair/bed. Requires assistance to bathroom. []   2   Full Assistance Requires assistance of at least two staff members to transfer patient into treatment room and/or on/off bed/chair. \"Total Transfer\". Unable to use bathroom requires bedside commode and/or bedpan []   3       Dressing Complexity Document in LDA Navigator  Complexity Definition Points   No Dressing  []   0   Simple Minimal, simple dressing. i.e. bandaid, gauze, simple wrap. Peripheral IV dressing. []   1   Intermediate Moderately complicated PICC dressing change requiring sterile procedure and site assessment. [x]   2   Complex Complicated dressing change port access requiring sterile procedure and site assessment. []   3       Teaching Effort Document in AVS and/or Education Navigator    Effort Definition Points   No Teaching  []   0   Simple Teach two or less topics. Teaching documented in discharge instructions in AVS and copy given to patient. [x]   1   Intermediate Teach three to four topics. Teaching documented in Discharge Instructions in AVS using References and Attachments. Copy given to patient. []   2   Complex Teach five to six topics.  Teaching documented in Discharge Instructions in AVS using References and Attachments. May also be documentation in Education Navigator. Copy given to patient. []   3           Patient Assessment  Planning Definition Points   Simple Complete all tabs in patient assessment navigator. Review or complete patient'sTherapy Plan. [x]   1   Intermediate Complete all tabs in patient assessment navigator. Review or completed patient'sTherapy Plan. Contact doctor based on nursing assessment. []   2   Complex Complete all tabs in patient assessment navigator. Completed patient's Therapy Plan. Contact doctor based on nursing assessment and write order related to needed care. []   3     Patient Planning   Planning Definition Points   Simple Discharged, instructions and After Visit Summary given to patient/caregiver and instructions completed. Simple follow-up with routine assessment and planning. [x]   1   Intermediate Discharged, instructions and After Visit Summary given to patient/caregiver and instructions completed. Contact with outside resources; i.e. Telephone calls to PCP, home health, ECF and/or arranging transportation. []   2   Complex Discharged, instructions and After Visit Summary given to patient/caregiver and instructions completed. Contact with outside resources; i.e. Telephone calls to PCP, home health, ECF and/or arranging transportation. May include filling out forms and/or writing letters, communication with insurance , preauthorization for treatment.    []   3       Is this the Patient's First Visit to the [facility]  No    Is this Patient Established @ this [facility]  Yes              Clinical Level of Care      Points  0-2  Level 1 []     Points  3-5  Level 2 [x]     Points  6-9  Level 3 []     Points  10-12  Level 4 []     Points  13-15  Level 5 []       Electronically signed by Peña Wong RN on 6/15/2020 at 2:02 PM

## 2020-06-16 ENCOUNTER — TELEPHONE (OUTPATIENT)
Dept: FAMILY MEDICINE CLINIC | Age: 40
End: 2020-06-16

## 2020-06-16 ENCOUNTER — TELEPHONE (OUTPATIENT)
Dept: PULMONOLOGY | Age: 40
End: 2020-06-16

## 2020-06-16 RX ORDER — GABAPENTIN 300 MG/1
300 CAPSULE ORAL 3 TIMES DAILY
Qty: 90 CAPSULE | Refills: 1 | OUTPATIENT
Start: 2020-06-16 | End: 2020-07-16

## 2020-06-16 NOTE — TELEPHONE ENCOUNTER
Pt needs a pain management Dr referral  He had one and they said do not come back     Dr Venessa Henry   Phone is [phone]  Fax is [phone]

## 2020-06-18 DIAGNOSIS — J45.40 MODERATE PERSISTENT ASTHMA WITHOUT COMPLICATION: ICD-10-CM

## 2020-06-19 ENCOUNTER — OFFICE VISIT (OUTPATIENT)
Dept: PRIMARY CARE CLINIC | Age: 40
End: 2020-06-19

## 2020-06-19 ENCOUNTER — OFFICE VISIT (OUTPATIENT)
Dept: FAMILY MEDICINE CLINIC | Age: 40
End: 2020-06-19
Payer: MEDICARE

## 2020-06-19 VITALS
RESPIRATION RATE: 18 BRPM | DIASTOLIC BLOOD PRESSURE: 80 MMHG | HEIGHT: 75 IN | TEMPERATURE: 97.1 F | HEART RATE: 94 BPM | WEIGHT: 315 LBS | OXYGEN SATURATION: 96 % | SYSTOLIC BLOOD PRESSURE: 130 MMHG | BODY MASS INDEX: 39.17 KG/M2

## 2020-06-19 PROCEDURE — G8417 CALC BMI ABV UP PARAM F/U: HCPCS | Performed by: FAMILY MEDICINE

## 2020-06-19 PROCEDURE — 99213 OFFICE O/P EST LOW 20 MIN: CPT | Performed by: FAMILY MEDICINE

## 2020-06-19 PROCEDURE — G8427 DOCREV CUR MEDS BY ELIG CLIN: HCPCS | Performed by: FAMILY MEDICINE

## 2020-06-19 PROCEDURE — 2022F DILAT RTA XM EVC RTNOPTHY: CPT | Performed by: FAMILY MEDICINE

## 2020-06-19 PROCEDURE — 93000 ELECTROCARDIOGRAM COMPLETE: CPT | Performed by: FAMILY MEDICINE

## 2020-06-19 RX ORDER — CANAGLIFLOZIN 100 MG/1
TABLET, FILM COATED ORAL
Qty: 90 TABLET | Refills: 0 | COMMUNITY
Start: 2020-06-19 | End: 2020-06-25

## 2020-06-19 ASSESSMENT — ENCOUNTER SYMPTOMS
GASTROINTESTINAL NEGATIVE: 1
EYES NEGATIVE: 1
ALLERGIC/IMMUNOLOGIC NEGATIVE: 1
RESPIRATORY NEGATIVE: 1

## 2020-06-19 NOTE — PROGRESS NOTES
Diabetes Mother     Asthma Mother     Diabetes Brother     Cancer Paternal Uncle         testicle    Cancer Paternal Grandmother         breast    Cancer Paternal Cousin         gum     Current Outpatient Medications   Medication Sig Dispense Refill    canagliflozin (INVOKANA) 100 MG TABS tablet TAKE ONE TABLET BY MOUTH EVERY MORNING BEFORE BREAKFAST FOR DIABETES 90 tablet 0    metoprolol succinate (TOPROL XL) 50 MG extended release tablet TAKE ONE TABLET BY MOUTH DAILY FOR FOR BLOOD PRESSURE AND HEART RATE 90 tablet 0    Insulin Degludec (TRESIBA FLEXTOUCH) 100 UNIT/ML SOPN Inject 50 Units as directed every evening 15 mL 2    Probiotic Product (PROBIOTIC ADVANCED) CAPS Take 1 capsule by mouth daily      Specialty Vitamins Products (CENTRUM PERFORMANCE) TABS Take 1 tablet by mouth daily      Omega-3 Fatty Acids (FISH OIL) 1200 MG CAPS Take 1 capsule by mouth daily      ULTICARE MICRO PEN NEEDLES 32G X 4 MM MISC USE TWICE DAILY 100 each 2    albuterol sulfate HFA (VENTOLIN HFA) 108 (90 Base) MCG/ACT inhaler INHALE TWO PUFFS BY MOUTH EVERY 6 HOURS AS NEEDED FOR WHEEZING OR FOR SHORTNESS OF BREATH 1 Inhaler 1    blood glucose test strips (TRUE METRIX BLOOD GLUCOSE TEST) strip 1 each by In Vitro route 4 times daily 100 each 3    montelukast (SINGULAIR) 10 MG tablet Take 1 tablet by mouth nightly 30 tablet 5    Lancets MISC Use to check BS  each 5    omeprazole (PRILOSEC) 20 MG delayed release capsule TAKE ONE CAPSULE BY MOUTH DAILY FOR STOMACH 90 capsule 0    lisinopril (PRINIVIL;ZESTRIL) 20 MG tablet TAKE 1 TABLET BY MOUTH DAILY 90 tablet 0    atorvastatin (LIPITOR) 80 MG tablet TAKE 1 TABLET BY MOUTH DAILY 90 tablet 0    metFORMIN (GLUCOPHAGE) 1000 MG tablet TAKE 1 TABLET BY MOUTH TWICE DAILY WITH MEALS FOR DIABETES 180 tablet 0    budesonide-formoterol (SYMBICORT) 160-4.5 MCG/ACT AERO INHALE TWO PUFFS BY MOUTH TWICE A DAY FOR LONG TERM CONTROL OF ASTHMA, RINSE MOUTH OUT AFTER USE 10.2 g 2  DULoxetine (CYMBALTA) 30 MG extended release capsule Take 1 capsule by mouth 2 times daily 60 capsule 1    insulin lispro, 1 Unit Dial, (HUMALOG KWIKPEN) 100 UNIT/ML SOPN USE 9 UNITS SUBCUTANEOUSLY THREE TIMES DAILY BEFORE A MEAL 15 mL 2    Multiple Vitamins-Minerals (MULTIVITAMIN ADULT PO) Take 1 tablet by mouth daily      cyanocobalamin 1000 MCG/ML injection Inject 1,000 mcg into the muscle every 30 days      Cyanocobalamin (VITAMIN B 12 PO) Take 1 tablet by mouth daily      omega-3 acid ethyl esters (LOVAZA) 1 g capsule Take 2 capsules by mouth 2 times daily 360 capsule 0    ipratropium-albuterol (DUONEB) 0.5-2.5 (3) MG/3ML SOLN nebulizer solution Inhale 3 mLs into the lungs every 6 hours as needed for Shortness of Breath 360 mL 5    Blood Glucose Monitoring Suppl (TRUE METRIX METER) w/Device KIT Use to check fasting BS and 2 hours after meal BS 1 kit 0     No current facility-administered medications for this visit. Allergies   Allergen Reactions    Penicillins Swelling and Anaphylaxis     Swelling of tongue and throat    Fentanyl      Had hives at patch site and nausea    Other reaction(s): Vomiting    Food Swelling     Swelling of throat from Mushrooms    Mushroom Extract Complex      Other reaction(s): Angioedema     Social History     Occupational History    Occupation: disabled   Tobacco Use    Smoking status: Never Smoker    Smokeless tobacco: Never Used   Substance and Sexual Activity    Alcohol use: No    Drug use: No    Sexual activity: Yes     Partners: Female       Review of Systems   Constitutional: Negative. HENT: Negative. Eyes: Negative. Respiratory: Negative. Cardiovascular: Negative. Gastrointestinal: Negative. Endocrine: Negative. Genitourinary: Negative. Musculoskeletal: Negative. Skin: Negative. Allergic/Immunologic: Negative. Neurological: Negative. Hematological: Negative. Psychiatric/Behavioral: Negative.            Objective: Vitals:    06/19/20 1011   BP: 130/80   Site: Right Upper Arm   Position: Sitting   Cuff Size: Large Adult   Pulse: 94   Resp: 18   Temp: 97.1 °F (36.2 °C)   TempSrc: Infrared   SpO2: 96%   Weight: (!) 347 lb 6.4 oz (157.6 kg)   Height: 6' 3\" (1.905 m)     Physical Exam  Vitals signs and nursing note reviewed. Constitutional:       General: He is not in acute distress. Appearance: Normal appearance. He is well-developed. He is not diaphoretic. HENT:      Head: Normocephalic and atraumatic. Right Ear: Tympanic membrane, ear canal and external ear normal.      Left Ear: Tympanic membrane, ear canal and external ear normal.      Nose: Nose normal. No congestion or rhinorrhea. Mouth/Throat:      Mouth: Mucous membranes are moist.      Pharynx: Oropharynx is clear. No oropharyngeal exudate or posterior oropharyngeal erythema. Eyes:      General: No scleral icterus. Right eye: No discharge. Left eye: No discharge. Conjunctiva/sclera: Conjunctivae normal.      Pupils: Pupils are equal, round, and reactive to light. Neck:      Musculoskeletal: Normal range of motion and neck supple. Cardiovascular:      Rate and Rhythm: Normal rate and regular rhythm. Heart sounds: Normal heart sounds. No murmur. Pulmonary:      Effort: Pulmonary effort is normal. No respiratory distress. Breath sounds: Normal breath sounds. No wheezing or rales. Abdominal:      General: Bowel sounds are normal. There is no distension. Palpations: Abdomen is soft. There is no mass. Tenderness: There is no abdominal tenderness. There is no guarding or rebound. Hernia: No hernia is present. Musculoskeletal:         General: No swelling. Right lower leg: No edema. Left lower leg: No edema. Lymphadenopathy:      Cervical: No cervical adenopathy. Skin:     General: Skin is warm and dry. Capillary Refill: Capillary refill takes less than 2 seconds.    Neurological: General: No focal deficit present. Mental Status: He is alert and oriented to person, place, and time. Mental status is at baseline. Psychiatric:         Mood and Affect: Mood normal.         Behavior: Behavior normal.         Thought Content: Thought content normal.         Judgment: Judgment normal.           Cardiographics  ECG: nonspecific t wave abnormality 2018 prior to an operation. Today, is NSR. Echocardiogram: not done    Lab Review   Orders Only on 06/15/2020   Component Date Value    A-1 Antitrypsin 06/15/2020 123     A-1 Antitrypsin Pheno 06/15/2020 M1S    Orders Only on 06/15/2020   Component Date Value    Ceruloplasmin 06/15/2020 27    Orders Only on 06/15/2020   Component Date Value    F-ACTIN AB IGG 06/15/2020 5    Orders Only on 06/15/2020   Component Date Value    Mitochondrial M2 Ab, Igg 06/15/2020 4.3    Orders Only on 06/15/2020   Component Date Value    Hep A Total Ab 06/15/2020 Negative    Orders Only on 06/15/2020   Component Date Value    CHANTEL 06/15/2020 Negative    Orders Only on 06/15/2020   Component Date Value    Hep C Ab Interp 06/15/2020 Non-reactive    Orders Only on 06/15/2020   Component Date Value    Hep B S Ag Interp 06/15/2020 Non-reactive    Orders Only on 06/15/2020   Component Date Value    Hep B S Ab 06/15/2020 <3.50    Orders Only on 06/15/2020   Component Date Value    Ferritin 06/15/2020 30.8    There may be more visits with results that are not included. Assessment:     46 y.o. y.o. male with planned surgery as above. 1. Preop examination  - cleared for surgery. Check EKG today: NSR. No abnormality. 2. Urinary tract infection without hematuria, site unspecified  - proceed with cysto for bladder problems    3. Essential hypertension  - bp stable on current meds. Hold lisinopril the am of surgery. But take toprol the AM of surgery.     4. Type 2 diabetes mellitus with diabetic neuropathy, with long-term current use of insulin ([address]  - use 1/2 dose of tresiba the morning of surgery. - hold invokana before surgery.  - Hold metformin the day of surgery (may resume)    5. Obesity, Class II, BMI 35-39.9  - not having intubation. But if he does, he has had prior UP3 surgery so vocal cord visualization should be fine. 6. Gastroesophageal reflux disease, esophagitis presence not specified  - use prilosec the AM of surgery. 7. Moderate persistent asthma without complication  - continue all pulm meds and use both albuterol and symbicort the morning of surgery. Cardiac Risk Estimation: per the Revised Cardiac Risk Index (Circ. 100:1043, 1999), the patient's risk factors for cardiaccomplications include on insulin, putting him/her in: RCI RISK CLASS II (1 risk factor, risk of major cardiac compl. appr. 1.3%)     : Ok to proceed with the procedure. Low cardiac risk. Patient advised to hold blood thinning medication 7 days prior to procedure. Prophylaxis for cardiac events with perioperative beta-blockers: already taking. Cody Mo M.D. [address] [place] [address].  [id number] [place] [address]  Phone [phone]  Fax      [phone]

## 2020-06-20 LAB
SARS-COV-2: NOT DETECTED
SOURCE: NORMAL

## 2020-06-21 LAB
ALLERGEN ASPERGILLUS ALTERNATA IGE: <0.1 KU/L
ALLERGEN ASPERGILLUS FUMIGATUS IGE: <0.1 KU/L
ALLERGEN BERMUDA GRASS IGE: <0.1 KU/L
ALLERGEN BIRCH IGE: <0.1 KU/L
ALLERGEN CAT DANDER IGE: 2.37 KU/L
ALLERGEN COMMON SHORT RAGWEED IGE: <0.1 KU/L
ALLERGEN COTTONWOOD: <0.1 KU/L
ALLERGEN DOG DANDER IGE: 7.69 KU/L
ALLERGEN ELM IGE: <0.1 KU/L
ALLERGEN FUNGI/MOLD M.RACEMOSUS IGE: <0.1 KU/L
ALLERGEN GERMAN COCKROACH IGE: 0.11 KU/L
ALLERGEN HORMODENDRUM HORDEI IGE: <0.1 KU/L
ALLERGEN MAPLE/BOX ELDER IGE: <0.1 KU/L
ALLERGEN MITE DUST FARINAE IGE: 0.15 KU/L
ALLERGEN MITE DUST PTERONYSSINUS IGE: 0.15 KU/L
ALLERGEN MOUNTAIN CEDAR: <0.1 KU/L
ALLERGEN MOUSE EPITHELIA IGE: 0.13 KU/L
ALLERGEN OAK TREE IGE: <0.1 KU/L
ALLERGEN PECAN TREE IGE: <0.1 KU/L
ALLERGEN PENICILLIUM NOTATUM: <0.1 KU/L
ALLERGEN ROUGH PIGWEED (W14) IGE: <0.1 KU/L
ALLERGEN RUSSIAN THISTLE IGE: <0.1 KU/L
ALLERGEN SEE NOTE: ABNORMAL
ALLERGEN SHEEP SORREL (W18) IGE: <0.1 KU/L
ALLERGEN TIMOTHY GRASS: <0.1 KU/L
ALLERGEN TREE SYCAMORE: <0.1 KU/L
ALLERGEN WALNUT TREE IGE: <0.1 KU/L
ALLERGEN WHITE MULBERRY TREE, IGE: <0.1 KU/L
ALLERGEN, TREE, WHITE ASH IGE: <0.1 KU/L
IGE: 55 KU/L

## 2020-06-24 ENCOUNTER — HOSPITAL ENCOUNTER (OUTPATIENT)
Dept: CT IMAGING | Age: 40
Discharge: HOME | End: 2020-06-24
Payer: MEDICARE

## 2020-06-24 PROCEDURE — 74176 CT ABD & PELVIS W/O CONTRAST: CPT

## 2020-06-24 RX ORDER — FLASH GLUCOSE SCANNING READER
EACH MISCELLANEOUS
Qty: 1 DEVICE | Refills: 0 | Status: SHIPPED | OUTPATIENT
Start: 2020-06-24 | End: 2021-06-15 | Stop reason: CLARIF

## 2020-06-24 RX ORDER — FLASH GLUCOSE SENSOR
KIT MISCELLANEOUS
Qty: 2 EACH | Refills: 5 | Status: SHIPPED | OUTPATIENT
Start: 2020-06-24 | End: 2021-06-15 | Stop reason: CLARIF

## 2020-06-24 NOTE — TELEPHONE ENCOUNTER
Was discharged due to not having medication in system when doing a drug screen. Was not doing as Rx. Sent to [facility] for assessment and was d/c.

## 2020-06-24 NOTE — TELEPHONE ENCOUNTER
Meter order sent to his pharmacy. In regards to his pain management where is he currently going deepak why was he discharged? I do not have any consult notes from his current pain management. He wants a referral to   [facility] which can be pended.

## 2020-06-25 RX ORDER — CANAGLIFLOZIN 100 MG/1
TABLET, FILM COATED ORAL
Qty: 90 TABLET | Refills: 0 | Status: SHIPPED | OUTPATIENT
Start: 2020-06-25 | End: 2020-10-02

## 2020-07-06 PROBLEM — J45.50 SEVERE PERSISTENT ASTHMA WITHOUT COMPLICATION: Status: ACTIVE | Noted: 2020-07-06

## 2020-07-06 RX ORDER — METHYLPREDNISOLONE SODIUM SUCCINATE 125 MG/2ML
125 INJECTION, POWDER, LYOPHILIZED, FOR SOLUTION INTRAMUSCULAR; INTRAVENOUS ONCE
Status: CANCELLED | OUTPATIENT
Start: 2020-07-20

## 2020-07-06 RX ORDER — EPINEPHRINE 1 MG/ML
0.3 INJECTION, SOLUTION, CONCENTRATE INTRAVENOUS PRN
Status: CANCELLED | OUTPATIENT
Start: 2020-07-20

## 2020-07-06 RX ORDER — SODIUM CHLORIDE 9 MG/ML
INJECTION, SOLUTION INTRAVENOUS CONTINUOUS
Status: CANCELLED | OUTPATIENT
Start: 2020-07-20

## 2020-07-06 RX ORDER — DIPHENHYDRAMINE HYDROCHLORIDE 50 MG/ML
50 INJECTION INTRAMUSCULAR; INTRAVENOUS ONCE
Status: CANCELLED | OUTPATIENT
Start: 2020-07-20

## 2020-07-07 ENCOUNTER — TELEPHONE (OUTPATIENT)
Dept: PULMONOLOGY | Age: 40
End: 2020-07-07

## 2020-07-07 NOTE — TELEPHONE ENCOUNTER
Spoke to Jair at [facility].  She said they ran a PA for his Nucala injections and said that it did not need approval.  She will reach out to him about scheduling his injections

## 2020-07-08 RX ORDER — METOPROLOL SUCCINATE 50 MG/1
TABLET, EXTENDED RELEASE ORAL
Qty: 90 TABLET | Refills: 0 | OUTPATIENT
Start: 2020-07-08

## 2020-07-20 RX ORDER — GABAPENTIN 300 MG/1
300 CAPSULE ORAL 3 TIMES DAILY
Qty: 90 CAPSULE | Refills: 1 | OUTPATIENT
Start: 2020-07-20 | End: 2020-08-19

## 2020-07-22 RX ORDER — INSULIN DEGLUDEC INJECTION 100 U/ML
INJECTION, SOLUTION SUBCUTANEOUS
Qty: 15 ML | Refills: 2 | Status: SHIPPED | OUTPATIENT
Start: 2020-07-22 | End: 2020-09-28

## 2020-07-23 ENCOUNTER — HOSPITAL ENCOUNTER (OUTPATIENT)
Dept: INFUSION THERAPY | Age: 40
Setting detail: INFUSION SERIES
Discharge: HOME | End: 2020-07-23
Payer: MEDICARE

## 2020-07-23 VITALS
SYSTOLIC BLOOD PRESSURE: 137 MMHG | HEART RATE: 92 BPM | OXYGEN SATURATION: 97 % | DIASTOLIC BLOOD PRESSURE: 78 MMHG | TEMPERATURE: 97.9 F | RESPIRATION RATE: 18 BRPM

## 2020-07-23 DIAGNOSIS — J45.50 SEVERE PERSISTENT ASTHMA WITHOUT COMPLICATION: Primary | ICD-10-CM

## 2020-07-23 PROCEDURE — 96372 THER/PROPH/DIAG INJ SC/IM: CPT

## 2020-07-23 PROCEDURE — 2580000003 HC RX 258

## 2020-07-23 PROCEDURE — 6360000002 HC RX W HCPCS: Performed by: INTERNAL MEDICINE

## 2020-07-23 RX ORDER — DIPHENHYDRAMINE HYDROCHLORIDE 50 MG/ML
50 INJECTION INTRAMUSCULAR; INTRAVENOUS ONCE
Status: CANCELLED | OUTPATIENT
Start: 2020-08-20

## 2020-07-23 RX ORDER — METHYLPREDNISOLONE SODIUM SUCCINATE 125 MG/2ML
125 INJECTION, POWDER, LYOPHILIZED, FOR SOLUTION INTRAMUSCULAR; INTRAVENOUS ONCE
Status: CANCELLED | OUTPATIENT
Start: 2020-08-20

## 2020-07-23 RX ORDER — SODIUM CHLORIDE 9 MG/ML
INJECTION, SOLUTION INTRAVENOUS CONTINUOUS
Status: CANCELLED | OUTPATIENT
Start: 2020-08-20

## 2020-07-23 RX ORDER — EPINEPHRINE 1 MG/ML
0.3 INJECTION, SOLUTION, CONCENTRATE INTRAVENOUS PRN
Status: CANCELLED | OUTPATIENT
Start: 2020-08-20

## 2020-07-23 RX ADMIN — WATER 10 ML: 1 INJECTION INTRAMUSCULAR; INTRAVENOUS; SUBCUTANEOUS at 16:16

## 2020-07-23 RX ADMIN — MEPOLIZUMAB 100 MG: 100 INJECTION, POWDER, FOR SOLUTION SUBCUTANEOUS at 16:16

## 2020-07-23 ASSESSMENT — PAIN DESCRIPTION - FREQUENCY
FREQUENCY: INTERMITTENT
FREQUENCY: INTERMITTENT

## 2020-07-23 ASSESSMENT — PAIN SCALES - GENERAL
PAINLEVEL_OUTOF10: 8
PAINLEVEL_OUTOF10: 6

## 2020-07-23 ASSESSMENT — PAIN - FUNCTIONAL ASSESSMENT
PAIN_FUNCTIONAL_ASSESSMENT: PREVENTS OR INTERFERES WITH MANY ACTIVE NOT PASSIVE ACTIVITIES
PAIN_FUNCTIONAL_ASSESSMENT: PREVENTS OR INTERFERES WITH MANY ACTIVE NOT PASSIVE ACTIVITIES

## 2020-07-23 ASSESSMENT — PAIN DESCRIPTION - LOCATION
LOCATION: FOOT
LOCATION: FOOT

## 2020-07-23 ASSESSMENT — PAIN DESCRIPTION - ORIENTATION
ORIENTATION: RIGHT
ORIENTATION: RIGHT

## 2020-07-23 ASSESSMENT — PAIN DESCRIPTION - PROGRESSION
CLINICAL_PROGRESSION: NOT CHANGED
CLINICAL_PROGRESSION: GRADUALLY IMPROVING

## 2020-07-23 ASSESSMENT — PAIN DESCRIPTION - ONSET
ONSET: ON-GOING
ONSET: ON-GOING

## 2020-07-23 ASSESSMENT — PAIN DESCRIPTION - PAIN TYPE
TYPE: CHRONIC PAIN
TYPE: CHRONIC PAIN

## 2020-07-23 NOTE — PROGRESS NOTES
 Diabetic eye exam ([facility] [address]- 12/16 wnl CEI    Closed nondisplaced fracture of fifth metatarsal bone of right foot    Morbid obesity with body mass index (BMI) of 40.0 to 44.9 in adult (HCC)    Elevated liver enzymes    JULITA (obstructive sleep apnea)    Pressure ulcer of buttock    Abnormal levels of other serum enzymes    Decubitus ulcer of buttock    Chronic back pain    Right hand pain    Diabetic ulcer of toe of right foot associated with type 2 diabetes mellitus (HCC)    Diabetic ulcer of toe (HCC)    Ingrowing nail    Environmental and seasonal allergies    Urinary tract infection, site not specified    Severe persistent asthma without complication        /22   Pulse 92   Temp 97.9 °F (36.6 °C) (Oral)   Resp 18   SpO2 96%     Has the patient experienced frequency of exacerbations? This is patient's first Nucala injection. He reports that the last time he used his nebulizer was in January, 2020 and his asthma is fairly well controlled with his current medications. Patient states that he requested to start taking Nucala because he had read about and had heard that it really helped some people that he knew. He states that he wanted to start taking it so that he might be able to control his asthma without having to take as many medications as he does right now. Patient reports that he uses his rescue inhaler (Albuterol) almost every day. Maintenance therapies currently using: Singulair tablet every night, Symbicort inhaler twice each day, Albuterol inhaler every 6 hours as needed and DuoNeb nebulizer every 6 hours as needed. Breath Sounds: Respirations even and easy while at rest. Slight dyspnea noted when patient ambulated to restroom. Lungs sounds clear to auscultation x 4 lung fields. No crackles noted. - See DOC flow sheets.     Pulse Oximetry: 96 %    Premedicated: None ordered  [] Benadryl 25 mg IV  [] Benadryl 50 mg IV  [] Benadryl 25 mg oral   [] Benadryl 50 mg oral  [] Other    Nucala administered: Yes    Nucala dosage: 100 mg was administered slowly subcutaneously into the right upper arm. Response to treatment:  Well tolerated by patient. Patient was monitored for 1 hour post injection and showed no signs/symptoms of hypersensitivity reaction. VS stable upon discharge. See DOC flow sheets for respiratory assessment. Verbal and written discharge instructions reviewed with patient and wife including thorough review of medication - dose, schedule, side effects including risks and benefits. Patient and wife verbalized understanding. Written copy given via AVS.    Scheduled to return for next dose of Nucala on August 20, 2020 .      Electronically signed by Solitario March RN on 7/23/2020 at 6:30 PM

## 2020-07-27 RX ORDER — BUDESONIDE AND FORMOTEROL FUMARATE DIHYDRATE 160; 4.5 UG/1; UG/1
AEROSOL RESPIRATORY (INHALATION)
Qty: 10.2 G | Refills: 2 | Status: SHIPPED | OUTPATIENT
Start: 2020-07-27 | End: 2020-10-21

## 2020-07-30 RX ORDER — ATORVASTATIN CALCIUM 80 MG/1
80 TABLET, FILM COATED ORAL DAILY
Qty: 90 TABLET | Refills: 0 | Status: SHIPPED | OUTPATIENT
Start: 2020-07-30 | End: 2020-10-21 | Stop reason: SDUPTHER

## 2020-07-30 RX ORDER — OMEPRAZOLE 20 MG/1
CAPSULE, DELAYED RELEASE ORAL
Qty: 90 CAPSULE | Refills: 0 | Status: SHIPPED | OUTPATIENT
Start: 2020-07-30 | End: 2020-10-16

## 2020-08-12 RX ORDER — PEN NEEDLE, DIABETIC 32GX 5/32"
NEEDLE, DISPOSABLE MISCELLANEOUS
Qty: 100 EACH | Refills: 0 | Status: SHIPPED | OUTPATIENT
Start: 2020-08-12 | End: 2020-10-26

## 2020-08-12 RX ORDER — INSULIN LISPRO 100 [IU]/ML
INJECTION, SOLUTION INTRAVENOUS; SUBCUTANEOUS
Qty: 15 ML | Refills: 0 | Status: SHIPPED | OUTPATIENT
Start: 2020-08-12 | End: 2021-01-27

## 2020-08-12 RX ORDER — CANAGLIFLOZIN 100 MG/1
TABLET, FILM COATED ORAL
Qty: 90 TABLET | Refills: 0 | OUTPATIENT
Start: 2020-08-12

## 2020-08-20 ENCOUNTER — TELEPHONE (OUTPATIENT)
Dept: PULMONOLOGY | Age: 40
End: 2020-08-20

## 2020-08-20 ENCOUNTER — HOSPITAL ENCOUNTER (OUTPATIENT)
Dept: INFUSION THERAPY | Age: 40
Setting detail: INFUSION SERIES
Discharge: HOME | End: 2020-08-20
Payer: MEDICARE

## 2020-08-20 VITALS
RESPIRATION RATE: 17 BRPM | DIASTOLIC BLOOD PRESSURE: 86 MMHG | SYSTOLIC BLOOD PRESSURE: 147 MMHG | HEART RATE: 99 BPM | OXYGEN SATURATION: 99 % | TEMPERATURE: 98.1 F

## 2020-08-20 DIAGNOSIS — J45.50 SEVERE PERSISTENT ASTHMA WITHOUT COMPLICATION: Primary | ICD-10-CM

## 2020-08-20 PROCEDURE — 96372 THER/PROPH/DIAG INJ SC/IM: CPT

## 2020-08-20 PROCEDURE — 2580000003 HC RX 258

## 2020-08-20 PROCEDURE — 6360000002 HC RX W HCPCS: Performed by: INTERNAL MEDICINE

## 2020-08-20 RX ORDER — EPINEPHRINE 1 MG/ML
0.3 INJECTION, SOLUTION, CONCENTRATE INTRAVENOUS PRN
Status: CANCELLED | OUTPATIENT
Start: 2020-09-17

## 2020-08-20 RX ORDER — GABAPENTIN 300 MG/1
300 CAPSULE ORAL 3 TIMES DAILY
COMMUNITY
Start: 2020-07-08 | End: 2021-02-22

## 2020-08-20 RX ORDER — METHYLPREDNISOLONE SODIUM SUCCINATE 125 MG/2ML
125 INJECTION, POWDER, LYOPHILIZED, FOR SOLUTION INTRAMUSCULAR; INTRAVENOUS ONCE
Status: CANCELLED | OUTPATIENT
Start: 2020-09-17

## 2020-08-20 RX ORDER — SODIUM CHLORIDE 9 MG/ML
INJECTION, SOLUTION INTRAVENOUS CONTINUOUS
Status: CANCELLED | OUTPATIENT
Start: 2020-09-17

## 2020-08-20 RX ORDER — DIPHENHYDRAMINE HYDROCHLORIDE 50 MG/ML
50 INJECTION INTRAMUSCULAR; INTRAVENOUS ONCE
Status: CANCELLED | OUTPATIENT
Start: 2020-09-17

## 2020-08-20 RX ADMIN — WATER 1.2 ML: 1 INJECTION INTRAMUSCULAR; INTRAVENOUS; SUBCUTANEOUS at 09:14

## 2020-08-20 RX ADMIN — MEPOLIZUMAB 100 MG: 100 INJECTION, POWDER, FOR SOLUTION SUBCUTANEOUS at 09:15

## 2020-08-20 ASSESSMENT — PAIN DESCRIPTION - ONSET: ONSET: ON-GOING

## 2020-08-20 ASSESSMENT — PAIN DESCRIPTION - LOCATION: LOCATION: FOOT

## 2020-08-20 ASSESSMENT — PAIN DESCRIPTION - PAIN TYPE: TYPE: CHRONIC PAIN

## 2020-08-20 ASSESSMENT — PAIN DESCRIPTION - DESCRIPTORS: DESCRIPTORS: BURNING;PINS AND NEEDLES

## 2020-08-20 ASSESSMENT — PAIN - FUNCTIONAL ASSESSMENT: PAIN_FUNCTIONAL_ASSESSMENT: PREVENTS OR INTERFERES SOME ACTIVE ACTIVITIES AND ADLS

## 2020-08-20 ASSESSMENT — PAIN DESCRIPTION - FREQUENCY: FREQUENCY: INTERMITTENT

## 2020-08-20 ASSESSMENT — PAIN DESCRIPTION - PROGRESSION: CLINICAL_PROGRESSION: GRADUALLY IMPROVING

## 2020-08-20 ASSESSMENT — PAIN SCALES - GENERAL: PAINLEVEL_OUTOF10: 2

## 2020-08-20 ASSESSMENT — PAIN DESCRIPTION - ORIENTATION: ORIENTATION: RIGHT

## 2020-08-20 NOTE — PROGRESS NOTES
Patient educated in [address] [place] [address] on the correct use of Nucala autoinjector. Patient expressed understanding utilizing teachback method.   Note and prescription for Nucala autoinjector sent to Dr. Sonia Partida for signature.           Nichole Munoz Student     Claudette Ambrosia, [address] [place] [address], PharmD, BCPS

## 2020-08-20 NOTE — PROGRESS NOTES
Patient educated in [address] [place] [address] on the correct use of Nucala autoinjector. Patient expressed understanding utilizing teachback method. Note and prescription for Nucala autoinjector sent to Dr. Samira Moy for signature.         Yessenia Heredia, [address] [place] [address], PharmD, BCPS

## 2020-08-20 NOTE — TELEPHONE ENCOUNTER
----- Message from Nelda Rosado, [facility] sent at 8/20/2020  9:34 AM EDT -----  Regarding: Christiana Beecham autoinjector  FYI - Patient educated on Christiana Beecham autoinjector in [address] [facility]. Script routed to Dr. Mina [facility] for signature.       Shantel DurhamD

## 2020-08-20 NOTE — PROGRESS NOTES
[address] Injection Visit    NAME:  Carolina Fried  YOB: 1980  MEDICAL RECORD NUMBER:  [id number]  Episode Date:  8/20/2020    Patient arrived to [facility]   [] per wheelchair   [x] ambulatory     Most recent Eosinophil count? 0.2 (3.5%)    Opportunistic Infections  Does patient have history of Herpes Zoster? no  Does patient have history of Parasitic Helminth infection? no    Patient is aware of potential hypersensitivity reactions: anaphylaxsis, angioedema, bronchospasm, hypotension, urticaria, rash? These can occur within hours of injection but can have a delayed onset of few days. yes    Does patient have an EPI pen at home?no  Patient is instructed on less severe potential reactions: headache, injection site reaction, back pain, fatigue, influenza, urinary tract infection, upper abdominal pain, eczema, muscle spasms, allergic rhinitis  Yes  Is this the patient's first Nucala Injection? No   Did the patient experience any adverse reactions to previous Nucala Injection?  No    Patient Active Problem List   Diagnosis    Moderate persistent asthma without complication    Hypertension    GERD (gastroesophageal reflux disease)    Tinea cruris    Type 2 diabetes mellitus with diabetic neuropathy, with long-term current use of insulin (HCC)    Mixed hyperlipidemia    Failed back surgical syndrome    Spinal cord injury, C1-C7 ([address]    Chronic pain syndrome    Central spinal stenosis    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 ([address]    Major depressive disorder    Insomnia    Diabetic eye exam ([facility] [address]- 12/16 wnl CEI    Closed nondisplaced fracture of fifth metatarsal bone of right foot    Morbid obesity with body mass index (BMI) of 40.0 to 44.9 in adult (HCC)    Elevated liver enzymes    JULITA (obstructive sleep apnea)    Pressure ulcer of buttock    Abnormal levels of other serum enzymes    Decubitus ulcer of buttock    Chronic back pain    Right hand pain    Diabetic ulcer of toe of right foot associated with type 2 diabetes mellitus ([address]    Diabetic ulcer of toe (HCC)    Ingrowing nail    Environmental and seasonal allergies    Urinary tract infection, site not specified    Severe persistent asthma without complication        BP (!) 147/86   Pulse 99   Temp 98.1 °F (36.7 °C) (Oral)   Resp 17   SpO2 99%     Has the patient experienced frequency of exacerbations? No   Maintenance therapies currently using: Albuteral inhalers and nebulizers,singulair,symbicort inhaler     Breath Sounds: No increased work of breathing, Breath sounds clear to auscultation bilaterally and Good air exchange  Pulse Oximetry: 99 %    Premedicated:none ordered  [] Benadryl 25 mg IV  [] Benadryl 50 mg IV  [] Benadryl 25 mg oral   [] Benadryl 50 mg oral  [] Other    Nucala administered: Yes    Nucala dosage: 100 mg was administered slowly subcutaneously into the left upper arm. Response to treatment:  Well tolerated by patient. Patirnt was instructed on giving self injections with next shot by our pharmacy teck, patient given practice syringe and information to take home.      Electronically signed by Lorenzo Man RN on 8/20/2020 at 9:06 AM

## 2020-09-09 ENCOUNTER — TELEPHONE (OUTPATIENT)
Dept: FAMILY MEDICINE CLINIC | Age: 40
End: 2020-09-09

## 2020-09-09 NOTE — TELEPHONE ENCOUNTER
Eliu Duty calling from [facility] because pt is stating he needs vaccines bc he is a diabetic   he was given the tdap but pt is stating there is another that he needs  Pharmacy is wondering if he should be given pneumonia or hep b now?    Please advise

## 2020-09-28 ENCOUNTER — TELEPHONE (OUTPATIENT)
Dept: PULMONOLOGY | Age: 40
End: 2020-09-28

## 2020-09-28 NOTE — TELEPHONE ENCOUNTER
Army Cunningham called questioning the authorization for his Nucala injections. I explained that I had spoken to Jessy Tucker with [address] [place] last week who said they were still working on [place].   Placed a phone call and lvm for Ariela Yung today to check the status

## 2020-09-29 ENCOUNTER — OFFICE VISIT (OUTPATIENT)
Dept: FAMILY MEDICINE CLINIC | Age: 40
End: 2020-09-29
Payer: MEDICARE

## 2020-09-29 VITALS
DIASTOLIC BLOOD PRESSURE: 80 MMHG | SYSTOLIC BLOOD PRESSURE: 126 MMHG | BODY MASS INDEX: 39.17 KG/M2 | HEART RATE: 119 BPM | TEMPERATURE: 97 F | HEIGHT: 75 IN | OXYGEN SATURATION: 94 % | RESPIRATION RATE: 18 BRPM | WEIGHT: 315 LBS

## 2020-09-29 DIAGNOSIS — I10 ESSENTIAL HYPERTENSION: ICD-10-CM

## 2020-09-29 DIAGNOSIS — E78.2 MIXED HYPERLIPIDEMIA: ICD-10-CM

## 2020-09-29 DIAGNOSIS — Z79.4 TYPE 2 DIABETES MELLITUS WITH DIABETIC NEUROPATHY, WITH LONG-TERM CURRENT USE OF INSULIN (HCC): ICD-10-CM

## 2020-09-29 DIAGNOSIS — E11.40 TYPE 2 DIABETES MELLITUS WITH DIABETIC NEUROPATHY, WITH LONG-TERM CURRENT USE OF INSULIN (HCC): ICD-10-CM

## 2020-09-29 DIAGNOSIS — E66.01 OBESITY, MORBID, BMI 40.0-49.9 (HCC): ICD-10-CM

## 2020-09-29 LAB
A/G RATIO: 2.1 (ref 1.1–2.2)
ALBUMIN SERPL-MCNC: 4.8 G/DL (ref 3.4–5)
ALP BLD-CCNC: 100 U/L (ref 40–129)
ALT SERPL-CCNC: 54 U/L (ref 10–40)
ANION GAP SERPL CALCULATED.3IONS-SCNC: 14 MMOL/L (ref 3–16)
AST SERPL-CCNC: 35 U/L (ref 15–37)
BILIRUB SERPL-MCNC: 0.6 MG/DL (ref 0–1)
BUN BLDV-MCNC: 20 MG/DL (ref 7–20)
CALCIUM SERPL-MCNC: 10.2 MG/DL (ref 8.3–10.6)
CHLORIDE BLD-SCNC: 100 MMOL/L (ref 99–110)
CHOLESTEROL, TOTAL: 131 MG/DL (ref 0–199)
CO2: 27 MMOL/L (ref 21–32)
CREAT SERPL-MCNC: 0.7 MG/DL (ref 0.9–1.3)
GFR AFRICAN AMERICAN: >60
GFR NON-AFRICAN AMERICAN: >60
GLOBULIN: 2.3 G/DL
GLUCOSE BLD-MCNC: 111 MG/DL (ref 70–99)
HBA1C MFR BLD: 6.1 %
HDLC SERPL-MCNC: 35 MG/DL (ref 40–60)
LDL CHOLESTEROL CALCULATED: 49 MG/DL
POTASSIUM SERPL-SCNC: 4.4 MMOL/L (ref 3.5–5.1)
SODIUM BLD-SCNC: 141 MMOL/L (ref 136–145)
TOTAL PROTEIN: 7.1 G/DL (ref 6.4–8.2)
TRIGL SERPL-MCNC: 234 MG/DL (ref 0–150)
TSH REFLEX: 1.08 UIU/ML (ref 0.27–4.2)
VLDLC SERPL CALC-MCNC: 47 MG/DL

## 2020-09-29 PROCEDURE — G8427 DOCREV CUR MEDS BY ELIG CLIN: HCPCS | Performed by: NURSE PRACTITIONER

## 2020-09-29 PROCEDURE — G8417 CALC BMI ABV UP PARAM F/U: HCPCS | Performed by: NURSE PRACTITIONER

## 2020-09-29 PROCEDURE — 1036F TOBACCO NON-USER: CPT | Performed by: NURSE PRACTITIONER

## 2020-09-29 PROCEDURE — 83036 HEMOGLOBIN GLYCOSYLATED A1C: CPT | Performed by: NURSE PRACTITIONER

## 2020-09-29 PROCEDURE — 3044F HG A1C LEVEL LT 7.0%: CPT | Performed by: NURSE PRACTITIONER

## 2020-09-29 PROCEDURE — 2022F DILAT RTA XM EVC RTNOPTHY: CPT | Performed by: NURSE PRACTITIONER

## 2020-09-29 PROCEDURE — 99214 OFFICE O/P EST MOD 30 MIN: CPT | Performed by: NURSE PRACTITIONER

## 2020-09-29 RX ORDER — INSULIN DEGLUDEC INJECTION 100 U/ML
INJECTION, SOLUTION SUBCUTANEOUS
Qty: 15 ML | Refills: 0
Start: 2020-09-29 | End: 2021-04-01 | Stop reason: SDUPTHER

## 2020-09-29 ASSESSMENT — ENCOUNTER SYMPTOMS
BACK PAIN: 1
SINUS PAIN: 0
RHINORRHEA: 0
DIARRHEA: 0
SINUS PRESSURE: 0
ABDOMINAL PAIN: 0
VOMITING: 0
COUGH: 0
CONSTIPATION: 0
SHORTNESS OF BREATH: 0
CHEST TIGHTNESS: 0
NAUSEA: 0

## 2020-09-29 NOTE — PROGRESS NOTES
9/29/2020    This is a 45 y.o. male   Chief Complaint   Patient presents with    Diabetes     no issues and concerns. numbers have been good   . HPI    Diabetes Mellitus Type 2: Current symptoms/problems include none. Home blood sugar records: fasting range: 90s, 2 hours post dinner: 160s   He is currently taking 9 units of humalog three times daily before meals. Also taking Tresiba 50 units daily, metformin 1000 mg twice daily with meals and Invokana 100 mg daily. Any episodes of hypoglycemia? no  Eye exam current (within one year): yes- patient states he went to [facility] [place] within past year but no records have been sent over. Tobacco history: He  reports that he has never smoked. He has never used smokeless tobacco.   Daily Aspirin? No    Patient follows with podiatrist at [facility] for routine foot care. Has diabetic shoes. Hypertension:  Home blood pressure monitoring: No.  He is adherent to a low sodium diet. Patient denies chest pain, shortness of breath, headache, lightheadedness, blurred vision, peripheral edema and palpitations. Antihypertensive medication side effects: no medication side effects noted. Use of agents associated with hypertension: none. He is currently taking metoprolol 50 mg daily and lisinopril 20 mg daily. Hyperlipidemia:  No new myalgias or GI upset on atorvastatin (Lipitor). Obesity:  Patient is following with bariatric specialist, Dr. Jeffery Esteves, with [facility]. He states he is planning on having weight loss surgery within the next few months. He states that his girlfriend has a history of weight loss surgery and she has been helping him with diet and portion control. Patient states that he does not exercise regularly due to chronic leg pain. His weight has been increasing. Elevated liver enzymes: Patient had labs completed by Dr. Tristan Nunez, gastroenterology, but has not followed up since.   Denies  Abdominal pain, yellowing of the skin, or changes in urine color. Lab Results   Component Value Date    LABA1C 5.9 06/04/2020    LABA1C 6.6 03/05/2020    LABA1C 5.9 12/03/2019     Lab Results   Component Value Date    LABMICR YES 05/18/2020    CREATININE 0.8 (L) 06/15/2020     Lab Results   Component Value Date    ALT 74 (H) 06/15/2020    AST 40 (H) 06/15/2020     Lab Results   Component Value Date    CHOL 120 12/03/2019    TRIG 142 12/03/2019    HDL 42 12/03/2019    LDLCALC 50 12/03/2019    LDLDIRECT 146 (H) 03/19/2019        GERD: stable on omeprazole 20 mg daily. Asthma: currently well controlled on singulair 10 mg daily symbicort daily. Rarely needs to use albuterol rescue inhaler. He follows with pulmonologist Dr. Aurora Ho. Patient states that he was interested in trying Argelia Pikes for his asthma and his pulmonologist agreed to this. He has been unable to start this medication due to insurance issues. Pain- not currently being followed by a pain specialist.  He states that he has been trying to get in with a new pain specialist.  Not taking any medications for pain at this time. Reports pain and cramping in legs. Has been using compression boots at night which he states helps with his leg cramping.      Patient Active Problem List   Diagnosis    Moderate persistent asthma without complication    Hypertension    GERD (gastroesophageal reflux disease)    Tinea cruris    Type 2 diabetes mellitus with diabetic neuropathy, with long-term current use of insulin (HCC)    Mixed hyperlipidemia    Failed back surgical syndrome    [facility] C1-C7 [facility]    Chronic pain syndrome    Central spinal stenosis    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 ([facility]    Major depressive disorder    Insomnia    Diabetic eye exam ([facility]- 12/16 wnl CEI    Closed nondisplaced fracture of fifth metatarsal bone of right foot    Morbid obesity with body mass index (BMI) of 40.0 to 44.9 in adult (HCC)    Elevated liver enzymes    JULITA (obstructive sleep apnea)    Pressure ulcer of buttock    Abnormal levels of other serum enzymes    Decubitus ulcer of buttock    Chronic back pain    Right hand pain    Diabetic ulcer of toe of right foot associated with type 2 diabetes mellitus (HCC)    Diabetic ulcer of toe (HCC)    Ingrowing nail    Environmental and seasonal allergies    Urinary tract infection, site not specified    Severe persistent asthma without complication          Current Outpatient Medications   Medication Sig Dispense Refill    TRESIBA FLEXTOUCH 100 UNIT/ML SOPN INJECT 50 UNITS EVERY EVENING AS DIRECTED 15 mL 0    albuterol sulfate  (90 Base) MCG/ACT inhaler INHALE TWO PUFFS BY MOUTH EVERY 6 HOURS AS NEEDED FOR WHEEZING OR FOR SHORTNESS OF BREATH 6.7 g 0    gabapentin (NEURONTIN) 300 MG capsule Take 300 mg by mouth 3 times daily.       ULTICARE MICRO PEN NEEDLES 32G X 4 MM MISC USE TWICE DAILY 100 each 0    insulin lispro, 1 Unit Dial, (HUMALOG KWIKPEN) 100 UNIT/ML SOPN USE 9 UNITS SUBCUTANEOUSLY THREE TIMES DAILY BEFORE A MEAL *CALL FOR APPOINTMENT* 15 mL 0    atorvastatin (LIPITOR) 80 MG tablet TAKE 1 TABLET BY MOUTH DAILY 90 tablet 0    omeprazole (PRILOSEC) 20 MG delayed release capsule TAKE ONE CAPSULE BY MOUTH DAILY FOR STOMACH 90 capsule 0    budesonide-formoterol (SYMBICORT) 160-4.5 MCG/ACT AERO INHALE TWO PUFFS BY MOUTH TWICE A DAY FOR LONG TERM CONTROL OF ASTHMA, RINSE MOUTH OUT AFTER USE 10.2 g 2    metFORMIN (GLUCOPHAGE) 1000 MG tablet TAKE 1 TABLET BY MOUTH TWICE DAILY WITH MEALS FOR DIABETES 180 tablet 0    Buchu-Cornsilk-Ch Grass-Hydran (DIURETIC PO) Take 1 capsule by mouth daily      INVOKANA 100 MG TABS tablet TAKE ONE TABLET BY MOUTH EVERY MORNING BEFORE BREAKFAST FOR DIABETES 90 tablet 0    Continuous Blood Gluc Sensor (FREESTYLE JM SENSOR SYSTEM) MISC as needed 2 each 5    Continuous Blood Gluc  (FREESTYLE JM 14 DAY READER) ANYI as needed 1 Device 0    metoprolol succinate (TOPROL XL) 50 MG extended release tablet TAKE ONE TABLET BY MOUTH DAILY FOR FOR BLOOD PRESSURE AND HEART RATE 90 tablet 0    Probiotic Product (PROBIOTIC ADVANCED) CAPS Take 1 capsule by mouth daily      Specialty Vitamins Products (CENTRUM PERFORMANCE) TABS Take 1 tablet by mouth daily      Omega-3 Fatty Acids (FISH OIL) 1200 MG CAPS Take 1 capsule by mouth daily      blood glucose test strips (TRUE METRIX BLOOD GLUCOSE TEST) strip 1 each by In Vitro route 4 times daily 100 each 3    montelukast (SINGULAIR) 10 MG tablet Take 1 tablet by mouth nightly 30 tablet 5    Lancets MISC Use to check BS  each 5    lisinopril (PRINIVIL;ZESTRIL) 20 MG tablet TAKE 1 TABLET BY MOUTH DAILY 90 tablet 0    DULoxetine (CYMBALTA) 30 MG extended release capsule Take 1 capsule by mouth 2 times daily 60 capsule 1    Cyanocobalamin (VITAMIN B 12 PO) Take 1 tablet by mouth daily      omega-3 acid ethyl esters (LOVAZA) 1 g capsule Take 2 capsules by mouth 2 times daily 360 capsule 0    ipratropium-albuterol (DUONEB) 0.5-2.5 (3) MG/3ML SOLN nebulizer solution Inhale 3 mLs into the lungs every 6 hours as needed for Shortness of Breath 360 mL 5    Blood Glucose Monitoring Suppl (TRUE METRIX METER) w/Device KIT Use to check fasting BS and 2 hours after meal BS 1 kit 0    Mepolizumab 100 MG/ML SOAJ Inject 100 mg into the skin every 28 days (Patient not taking: Reported on 9/29/2020) 3 mL 1     No current facility-administered medications for this visit. Allergies   Allergen Reactions    Penicillins Swelling and Anaphylaxis     Swelling of tongue and throat    Fentanyl      Had hives at patch site and nausea    Other reaction(s): Vomiting    Food Swelling     Swelling of throat from Mushrooms    Mushroom Extract Complex      Other reaction(s): Angioedema       Review of Systems   Constitutional: Negative for activity change, appetite change, fatigue, fever and unexpected weight change.    HENT: Negative for congestion, rhinorrhea, sinus pressure and sinus pain. Eyes: Negative for visual disturbance. Respiratory: Negative for cough, chest tightness and shortness of breath. Cardiovascular: Negative for chest pain, palpitations and leg swelling. Gastrointestinal: Negative for abdominal pain, constipation, diarrhea, nausea and vomiting. Endocrine: Negative for cold intolerance, heat intolerance, polydipsia, polyphagia and polyuria. Genitourinary: Negative for difficulty urinating, frequency and urgency. Musculoskeletal: Positive for back pain and myalgias. Neurological: Negative for dizziness, light-headedness and headaches. Psychiatric/Behavioral: Negative for confusion and sleep disturbance. The patient is not nervous/anxious. Vitals:    09/29/20 1148   BP: 126/80   Site: Right Upper Arm   Position: Sitting   Cuff Size: Large Adult   Pulse: 119   Resp: 18   Temp: 97 °F (36.1 °C)   TempSrc: Temporal   SpO2: 94%   Weight: (!) 373 lb 12.8 oz (169.6 kg)   Height: 6' 3\" (1.905 m)       Body mass index is 46.72 kg/m². Wt Readings from Last 3 Encounters:   09/29/20 (!) 373 lb 12.8 oz (169.6 kg)   06/19/20 (!) 347 lb 6.4 oz (157.6 kg)   06/13/20 (!) 302 lb (137 kg)       BP Readings from Last 3 Encounters:   09/29/20 126/80   08/20/20 (!) 147/86   07/23/20 137/78       Physical Exam  Vitals signs and nursing note reviewed. Constitutional:       General: He is not in acute distress. Appearance: Normal appearance. He is obese. HENT:      Head: Normocephalic and atraumatic. Eyes:      General: No scleral icterus. Extraocular Movements: Extraocular movements intact. Conjunctiva/sclera: Conjunctivae normal.   Neck:      Musculoskeletal: Normal range of motion and neck supple. Vascular: No carotid bruit. Cardiovascular:      Rate and Rhythm: Normal rate and regular rhythm. Pulses: Normal pulses. Dorsalis pedis pulses are 2+ on the right side and 2+ on the left side.         Posterior 100 mg daily. Decrease Tresiba to 45 units daily. Continue checking fasting blood sugars and 2 hours post dinner blood sugars  Will check thyroid level today because of recent weight gain. Discussed importance of low carb diet, aerobic exercise and need for weight loss. Continues to follow with podiatrist Dr. Yocasta Goodson for his diabetic neuropathy. He has diabetic shoes that he will wear. Mixed hyperlipidemia  -     Comprehensive Metabolic Panel; Future  -     Lipid Panel; Future  Will check lipid levels and liver/kidney function today  Continue atorvastatin 80 mg daily  Discussed importance of low fat/cholesterol diet, aerobic exercise and need for weight loss. Essential hypertension  -     Comprehensive Metabolic Panel; Future  Will check kidney function today. Continue metoprolol 50 mg daily and lisinopril 20 mg daily. Discussed importance of low salt diet, aerobic exercise and need for weight loss. Obesity, morbid, BMI 40.0-49.9 (HCC)  -     TSH with Reflex; Future  Will check thyroid level today because of recent weight gain. Discussed importance of diet, aerobic exercise and hydration with water. Advised to continue following with bariatrics specialist, Dr. Baljit Orosco. GERD  Stable. Continue omeprazole 20 mg daily    Chronic pain  Encouraged to follow up with pain specialist. He reports that his is trying to get in with a new pain specialist. He previously saw a [facility] pain specialist, but wanted new specialist.     Advised to get flu shot and hep B vaccines at pharmacy. Return in about 6 months (around 3/29/2021), or if symptoms worsen or fail to improve, for chronic conditions. Patient seen along with NP student, Zachary Pacheco.

## 2020-09-30 RX ORDER — CETIRIZINE HYDROCHLORIDE 10 MG/1
10 TABLET ORAL DAILY
Qty: 30 TABLET | Refills: 5 | Status: SHIPPED | OUTPATIENT
Start: 2020-09-30 | End: 2020-10-27 | Stop reason: SDUPTHER

## 2020-09-30 NOTE — TELEPHONE ENCOUNTER
Spoke to [employer] in the [employer]. She assures me that the PA is still being processed and will keep me informed of the status.   Called and LVM with Bela Kayode to let him know the status

## 2020-10-01 RX ORDER — MEPOLIZUMAB 100 MG/ML
1 INJECTION, SOLUTION SUBCUTANEOUS
Qty: 1 SYRINGE | Refills: 5 | Status: SHIPPED | OUTPATIENT
Start: 2020-10-01 | End: 2021-04-01 | Stop reason: ALTCHOICE

## 2020-10-01 NOTE — TELEPHONE ENCOUNTER
Received a fax from [address] [place] [address]  indicating the Rosa [place] has been denied. Messaged with Jessy Tucker.   She said they will do the appeal and in the meantime they have scheduled him back in the infusion lab to get an injection

## 2020-10-05 ENCOUNTER — TELEPHONE (OUTPATIENT)
Dept: PHARMACY | Age: 40
End: 2020-10-05

## 2020-10-05 NOTE — TELEPHONE ENCOUNTER
Called patient to inform him that his insurance denied the prior authorization request for self-administered Nucala. Patient will continue to receive from [facility]. Patient verbalized understanding and will keep infusion appointment.      Shantel MunsonD, BCACP 10/05/20 3:59 PM

## 2020-10-07 ENCOUNTER — HOSPITAL ENCOUNTER (OUTPATIENT)
Dept: INFUSION THERAPY | Age: 40
Setting detail: INFUSION SERIES
Discharge: HOME | End: 2020-10-07
Payer: MEDICARE

## 2020-10-07 VITALS
OXYGEN SATURATION: 96 % | HEART RATE: 106 BPM | RESPIRATION RATE: 16 BRPM | TEMPERATURE: 98.4 F | SYSTOLIC BLOOD PRESSURE: 142 MMHG | DIASTOLIC BLOOD PRESSURE: 92 MMHG

## 2020-10-07 DIAGNOSIS — J45.50 SEVERE PERSISTENT ASTHMA WITHOUT COMPLICATION: Primary | ICD-10-CM

## 2020-10-07 PROCEDURE — 2580000003 HC RX 258

## 2020-10-07 PROCEDURE — 6360000002 HC RX W HCPCS: Performed by: INTERNAL MEDICINE

## 2020-10-07 PROCEDURE — 96372 THER/PROPH/DIAG INJ SC/IM: CPT

## 2020-10-07 RX ORDER — METHYLPREDNISOLONE SODIUM SUCCINATE 125 MG/2ML
125 INJECTION, POWDER, LYOPHILIZED, FOR SOLUTION INTRAMUSCULAR; INTRAVENOUS ONCE
Status: CANCELLED | OUTPATIENT
Start: 2020-10-14

## 2020-10-07 RX ORDER — EPINEPHRINE 1 MG/ML
0.3 INJECTION, SOLUTION, CONCENTRATE INTRAVENOUS PRN
Status: CANCELLED | OUTPATIENT
Start: 2020-10-14

## 2020-10-07 RX ORDER — SODIUM CHLORIDE 9 MG/ML
INJECTION, SOLUTION INTRAVENOUS CONTINUOUS
Status: CANCELLED | OUTPATIENT
Start: 2020-10-14

## 2020-10-07 RX ORDER — DIPHENHYDRAMINE HYDROCHLORIDE 50 MG/ML
50 INJECTION INTRAMUSCULAR; INTRAVENOUS ONCE
Status: CANCELLED | OUTPATIENT
Start: 2020-10-14

## 2020-10-07 RX ADMIN — WATER 1.2 ML: 1 INJECTION INTRAMUSCULAR; INTRAVENOUS; SUBCUTANEOUS at 08:47

## 2020-10-07 RX ADMIN — MEPOLIZUMAB 100 MG: 100 INJECTION, POWDER, FOR SOLUTION SUBCUTANEOUS at 08:47

## 2020-10-07 NOTE — PROGRESS NOTES
S[address] Injection Visit    NAME:  Dany Hammonds  YOB: 1980  MEDICAL RECORD NUMBER:  [id number]  Episode Date:  10/7/2020    Patient arrived to [facility]   [] per wheelchair   [x] ambulatory with cane    Alert and oriented X 4. Denies any side effects of last injection. No new exacerbation of asthma. C/O increased wheezing. Having more difficulty will fall allergies. Using symbicort more regularly with relief. Denies new s/s of infection or increase in respiratory distress. Afebrile. Wearing face mask to prevent the spread of COVID-19. Most recent Eosinophil count? 6/15/20 0.2 (3.5%)    Opportunistic Infections  Does patient have history of Herpes Zoster? No  Does patient have history of Parasitic Helminth infection? No    Patient is aware of potential hypersensitivity reactions: anaphylaxsis, angioedema, bronchospasm, hypotension, urticaria, rash? These can occur within hours of injection but can have a delayed onset of few days. Yes    Does patient have an EPI pen at home? No    Patient is instructed on less severe potential reactions: headache, injection site reaction, back pain, fatigue, influenza, urinary tract infection, upper abdominal pain, eczema, muscle spasms, allergic rhinitis. Yes    Is this the patient's first Nucala Injection? No   Did the patient experience any adverse reactions to previous Nucala Injection?  No    Patient Active Problem List   Diagnosis    Moderate persistent asthma without complication    Hypertension    GERD (gastroesophageal reflux disease)    Tinea cruris    Type 2 diabetes mellitus with diabetic neuropathy, with long-term current use of insulin (HCC)    Mixed hyperlipidemia    Failed back surgical syndrome    Spinal cord injury, C1-C7 ([address]    Chronic pain syndrome    Central spinal stenosis    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 ([address]    Major depressive disorder    Insomnia    Diabetic eye exam ([facility]- 12/16 wnl CEI    Closed nondisplaced fracture of fifth metatarsal bone of right foot    Morbid obesity with body mass index (BMI) of 40.0 to 44.9 in adult (HCC)    Elevated liver enzymes    JULITA (obstructive sleep apnea)    Pressure ulcer of buttock    Abnormal levels of other serum enzymes    Decubitus ulcer of buttock    Chronic back pain    Right hand pain    Diabetic ulcer of toe of right foot associated with type 2 diabetes mellitus (HCC)    Diabetic ulcer of toe (HCC)    Ingrowing nail    Environmental and seasonal allergies    Urinary tract infection, site not specified    Severe persistent asthma without complication        BP (!) 142/92   Pulse 106   Temp 98.4 °F (36.9 °C)   Resp 16   SpO2 96%     Has the patient experienced frequency of exacerbations? No   Maintenance therapies currently using: albuterol inhaler prn, symbicort daily right now, duoneb prn, singulair, zyrtec     Breath Sounds: No increased work of breathing at rest, Breath sounds clear to auscultation bilaterally and Good air exchange. No audible wheezes at this time. States had increased wheezes and started using symbicort daily with relief. Pulse Oximetry: 96 %    Premedicated: no  [] Benadryl 25 mg IV  [] Benadryl 50 mg IV  [] Benadryl 25 mg oral   [] Benadryl 50 mg oral  [] Other    Nucala administered: Yes    Nucala dosage: 100 mg was administered slowly subcutaneously into the right upper arm. Response to treatment:  Well tolerated by patient. Scheduled to return for next dose of Nucala on November 4, 2020 .      Electronically signed by Sarika Lackey RN on 10/7/2020 at 8:51 AM

## 2020-10-16 RX ORDER — LISINOPRIL 20 MG/1
20 TABLET ORAL DAILY
Qty: 90 TABLET | Refills: 1 | Status: SHIPPED | OUTPATIENT
Start: 2020-10-16 | End: 2021-01-18

## 2020-10-16 RX ORDER — OMEPRAZOLE 20 MG/1
CAPSULE, DELAYED RELEASE ORAL
Qty: 90 CAPSULE | Refills: 1 | Status: SHIPPED | OUTPATIENT
Start: 2020-10-16 | End: 2021-01-18

## 2020-10-21 ENCOUNTER — TELEPHONE (OUTPATIENT)
Dept: FAMILY MEDICINE CLINIC | Age: 40
End: 2020-10-21

## 2020-10-21 RX ORDER — ATORVASTATIN CALCIUM 80 MG/1
80 TABLET, FILM COATED ORAL DAILY
Qty: 90 TABLET | Refills: 1 | Status: SHIPPED | OUTPATIENT
Start: 2020-10-21 | End: 2021-01-11

## 2020-10-21 RX ORDER — BUDESONIDE AND FORMOTEROL FUMARATE DIHYDRATE 160; 4.5 UG/1; UG/1
AEROSOL RESPIRATORY (INHALATION)
Qty: 10.2 G | Refills: 5 | Status: SHIPPED | OUTPATIENT
Start: 2020-10-21 | End: 2020-10-27 | Stop reason: SDUPTHER

## 2020-10-21 NOTE — TELEPHONE ENCOUNTER
Pediatric Critical Care Progress Note    Hospital Day: 12  Date: 2017     Time: 11:09 AM      SUBJECTIVE:     24 Hour Review  Afebrile, tolerating PO, no evidence of clinical seizures    Review of Systems: I have reviewed the patent's history and at least 10 organ systems and found them to be unchanged      OBJECTIVE:     Vital Signs Last 24 hours:    Respiration: 38  Pulse Oximetry: 99 %  Pulse: 145  Temp (24hrs), Av.5 °C (97.7 °F), Min:36.1 °C (97 °F), Max:37 °C (98.6 °F)        Fluid balance:   Intake/Output       17 07 - 17 0659 17 07 - 17 0659 17 07 - 17 0659      0145-0153 1748-3370 Total 4978-9174 7237-7672 Total 9907-8848 1541-6186 Total       Intake    P.O.  260  270 530  260  180 440  92  -- 92    Bottle Feeding  Amount (ml) (enfamil ) 260 270 530 260 180 440 92 -- 92    I.V.  13.8  4.6 18.4  9.2  9.2 18.4  4.5  -- 4.5    IV Volume (Ampicillin) 13.8 4.6 18.4 9.2 9.2 18.4 3 -- 3    IV Piggyback Volume (IV Piggyback) -- -- -- -- -- -- 1.5 -- 1.5    Other  1.3  -- 1.3  --  -- --  --  -- --    Medications (P.O./ Enteral Liquids) 1.3 -- 1.3 -- -- -- -- -- --    Total Intake 275.1 274.6 549.7 269.2 189.2 458.4 96.5 -- 96.5       Output    Urine  200  163 363  35  143 178  --  -- --    Wet Diaper Count 2 x 3 x 5 x -- -- -- -- -- --    Void (ml) 200 163 363 35 143 178 -- -- --    Stool/Urine  84  69 153  111  49 160  80  -- 80    Mixed Stool / Urine (ml) 60 69 129 111 49 160 80 -- 80    Measurable Stool (ml) 24 -- 24 -- -- -- -- -- --    Stool  --  -- --  --  -- --  --  -- --    Number of Times Stooled 1 x 0 x 1 x 4 x -- 4 x -- -- --    Total Output 284 232 516 146 192 338 80 -- 80       Net I/O     -8.9 42.6 33.7 123.2 -2.8 120.4 16.5 -- 16.5            Physical Exam  Gen:  Alert, comfortable, non-toxic  HEENT: NC/AT, AFSOF, conjunctiva clear, nares clear, MMM, neck supple  Cardio: RRR, nl S1 S2, no murmur, pulses full and equal  Resp:  CTAB, no wheeze or  atorvastatin (LIPITOR) 80 MG tablet    Please refill pharmacy stated the pt is almost out rales  GI:  Soft, ND/NT, normal bowel sounds, no guarding/rebound  Skin: no rash  Extremities: WWP, ALEX well  Neuro: Non-focal, grossly intact    O2 Delivery: None (Room Air)       Lines/ Tubes / Drains:   PIV    Labs and Imaging:  No results found for this or any previous visit (from the past 24 hour(s)).    CURRENT MEDICATIONS:  Current Facility-Administered Medications   Medication Dose Route Frequency Provider Last Rate Last Dose   • acyclovir (ZOVIRAX) 60.4 mg in NS 12.08 mL IV syringe  20 mg/kg Intravenous Q8HRS Soledad Ma M.D.   Stopped at 17 0720   • PHENobarbital (NICU) 10 mg/mL oral soln 7 mg  7 mg Oral Q12HR Dwight Berger, A.P.N.   7 mg at 17 0623    Followed by   • [START ON 2017] PHENobarbital (NICU) 10 mg/mL oral soln 5 mg  5 mg Oral Q12HR Dwight Berger, A.P.N.        Followed by   • [START ON 2017] PHENobarbital (NICU) 10 mg/mL oral soln 2 mg  2 mg Oral Q12HR Dwight Berger, A.P.N.       • levetiracetam (KEPPRA) 100 MG/ML solution 60 mg  60 mg Oral Q12HR Dwight Berger, A.P.N.   60 mg at 17 0623   • acetaminophen (TYLENOL) oral suspension 41.6 mg  15 mg/kg Oral Q4HRS PRN Yaron Mustafa M.D.              ASSESSMENT:   Karin  is a 5 wk.o.  Female  with current problems:    Patient Active Problem List    Diagnosis Date Noted   •  herpes simplex infection 2017     Priority: High   • Meningoencephalitis 2017     Priority: High   • Seizure (CMS-HCC) 2017     Priority: High         PLAN:     RESP: Continue to monitor saturation and for any respiratory distress.  Provide oxygen as needed to maintain saturations >93%    CV: Monitor hemodynamics.      GI: Diet: Continue formula ad katerina    FEN/Endo/Renal: Follow electrolytes, correct as needed. 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 Will plan to d/c ampicillin as unlikely Listeria/GBS.    HEME: Evaluate CBC and  coags as indicated.     NEURO: Continue plan per primary neurologist, including current keppra dose and phenobarb wean with goal off      DISPO: Patient care and plans reviewed and directed with PICU team on rounds today.  Spoke with mom at bedside, questions addressed.        Patient is overall ready for transfer to floor level care. With planned 21 day course of IV antibiotics, a PICC line will need to be placed. Will discuss whether this can be accomplished by  team or by PICU team.    Time Spent : 35 minutes including bedside evaluation, discussion with healthcare team and family discussions.    The above note was signed by : Mariama Espinoza , PICU Attending

## 2020-10-27 ENCOUNTER — OFFICE VISIT (OUTPATIENT)
Dept: PULMONOLOGY | Age: 40
End: 2020-10-27
Payer: MEDICARE

## 2020-10-27 VITALS
RESPIRATION RATE: 24 BRPM | HEIGHT: 75 IN | OXYGEN SATURATION: 97 % | WEIGHT: 283.4 LBS | HEART RATE: 97 BPM | SYSTOLIC BLOOD PRESSURE: 153 MMHG | BODY MASS INDEX: 35.24 KG/M2 | TEMPERATURE: 98.5 F | DIASTOLIC BLOOD PRESSURE: 103 MMHG

## 2020-10-27 PROBLEM — J30.89 OTHER ALLERGIC RHINITIS: Status: ACTIVE | Noted: 2020-10-27

## 2020-10-27 PROCEDURE — G8427 DOCREV CUR MEDS BY ELIG CLIN: HCPCS | Performed by: INTERNAL MEDICINE

## 2020-10-27 PROCEDURE — 99213 OFFICE O/P EST LOW 20 MIN: CPT | Performed by: INTERNAL MEDICINE

## 2020-10-27 PROCEDURE — G8417 CALC BMI ABV UP PARAM F/U: HCPCS | Performed by: INTERNAL MEDICINE

## 2020-10-27 PROCEDURE — 90686 IIV4 VACC NO PRSV 0.5 ML IM: CPT | Performed by: INTERNAL MEDICINE

## 2020-10-27 PROCEDURE — 1036F TOBACCO NON-USER: CPT | Performed by: INTERNAL MEDICINE

## 2020-10-27 PROCEDURE — G0008 ADMIN INFLUENZA VIRUS VAC: HCPCS | Performed by: INTERNAL MEDICINE

## 2020-10-27 PROCEDURE — G8482 FLU IMMUNIZE ORDER/ADMIN: HCPCS | Performed by: INTERNAL MEDICINE

## 2020-10-27 RX ORDER — ALBUTEROL SULFATE 90 UG/1
2 AEROSOL, METERED RESPIRATORY (INHALATION) EVERY 6 HOURS PRN
Qty: 1 INHALER | Refills: 5 | Status: SHIPPED | OUTPATIENT
Start: 2020-10-27 | End: 2021-04-01 | Stop reason: ALTCHOICE

## 2020-10-27 RX ORDER — IPRATROPIUM BROMIDE AND ALBUTEROL SULFATE 2.5; .5 MG/3ML; MG/3ML
1 SOLUTION RESPIRATORY (INHALATION) EVERY 6 HOURS PRN
Qty: 360 ML | Refills: 5 | Status: SHIPPED | OUTPATIENT
Start: 2020-10-27

## 2020-10-27 RX ORDER — CETIRIZINE HYDROCHLORIDE 10 MG/1
10 TABLET ORAL DAILY
Qty: 30 TABLET | Refills: 5 | Status: SHIPPED | OUTPATIENT
Start: 2020-10-27 | End: 2020-11-26

## 2020-10-27 RX ORDER — MONTELUKAST SODIUM 10 MG/1
10 TABLET ORAL NIGHTLY
Qty: 30 TABLET | Refills: 5 | Status: SHIPPED | OUTPATIENT
Start: 2020-10-27 | End: 2021-04-01 | Stop reason: SDUPTHER

## 2020-10-27 RX ORDER — BUDESONIDE AND FORMOTEROL FUMARATE DIHYDRATE 160; 4.5 UG/1; UG/1
AEROSOL RESPIRATORY (INHALATION)
Qty: 10.2 G | Refills: 5 | Status: SHIPPED | OUTPATIENT
Start: 2020-10-27 | End: 2021-04-01 | Stop reason: SDUPTHER

## 2020-10-27 ASSESSMENT — ASTHMA QUESTIONNAIRES
QUESTION_5 LAST FOUR WEEKS HOW WOULD YOU RATE YOUR ASTHMA CONTROL: 4
ACT_TOTALSCORE: 19
QUESTION_1 LAST FOUR WEEKS HOW MUCH OF THE TIME DID YOUR ASTHMA KEEP YOU FROM GETTING AS MUCH DONE AT WORK, SCHOOL OR AT HOME: 4
QUESTION_3 LAST FOUR WEEKS HOW OFTEN DID YOUR ASTHMA SYMPTOMS (WHEEZING, COUGHING, SHORTNESS OF BREATH, CHEST TIGHTNESS OR PAIN) WAKE YOU UP AT NIGHT OR EARLIER THAN USUAL IN THE MORNING: 3
QUESTION_4 LAST FOUR WEEKS HOW OFTEN HAVE YOU USED YOUR RESCUE INHALER OR NEBULIZER MEDICATION (SUCH AS ALBUTEROL): 3
QUESTION_2 LAST FOUR WEEKS HOW OFTEN HAVE YOU HAD SHORTNESS OF BREATH: 5

## 2020-10-27 NOTE — ASSESSMENT & PLAN NOTE
Asthma symptoms significantly better after starting Nucala. Continue to use Symbicort twice daily. Has albuterol but rarely using this for rescue at this time.

## 2020-10-27 NOTE — PROGRESS NOTES
REASON FOR CONSULTATION/CC:    Chief Complaint   Patient presents with    Follow-up    Asthma        Consult at request of   AYLA Pacheco CNP     PCP: AYLA Pacheco CNP    HISTORY OF PRESENT ILLNESS: Jasvir Owens is a 45y.o. year old male with a history of asthma and JULITA who presents :              ASTHMA  Much better after nucala. Symbicort and albuterol      allergic rhinitis  Much improved after nucala. Continues on Singulair. Zyrtec. ASTHMA CONTROL TEST 10/27/2020 5/7/2020 8/5/2019 2/11/2019 8/7/2018   In the past 4 weeks, how much of the time did your asthma keep you from getting as much done at work, school or at home? 4 3 3 3 5   During the past 4 weeks, how often have you had shortness of breath? 5 3 3 2 4   During the past 4 weeks, how often did your asthma symptoms (wheezing, coughing, shortness of breath, chest tightness or pain) wake you up at night or earlier than usual in the morning? 3 4 4 2 5   During the past 4 weeks, how often have you used your rescue inhaler or nebulizer medication (such as albuterol)? 3 2 3 1 4   How would you rate your asthma control during the past 4 weeks? 4 3 3 3 4   Asthma Control Test Total Score 19 15 16 11 22         JULITA.     Sees Dr. Gavi Welsh HISTORY:  Past Medical History:   Diagnosis Date    Asthma     Chronic back pain     Depression     GERD (gastroesophageal reflux disease)     Hyperlipidemia     Hypertension     Neuropathy     Obesity     Osteoarthritis     Other allergic rhinitis 10/27/2020    Peripheral vascular disease (HCC)     Restless legs syndrome     Severe persistent asthma without complication 1/1/5186    Sleep apnea     Type 2 diabetes mellitus without complication (HCC)     Type II or unspecified type diabetes mellitus without mention of complication, not stated as uncontrolled     Urinary incontinence        PAST SURGICAL HISTORY:  Past Surgical History:   Procedure Laterality Date    BACK SURGERY  2013    four surgeries; rods placed    FRACTURE SURGERY Right     arm- two    FRACTURE SURGERY Right     hand- three    HAND SURGERY  1999    KNEE ARTHROSCOPY Right     PELVIC FRACTURE SURGERY      SPINE SURGERY  2011    SPINE SURGERY  2010    UVULECTOMY         FAMILY HISTORY:  family history includes Asthma in his mother; Cancer in his mother, paternal cousin, paternal grandmother, and paternal uncle; Diabetes in his brother and mother. SOCIAL HISTORY:   reports that he has never smoked. He has never used smokeless tobacco.      ALLERGIES:  Patient is allergic to penicillins; fentanyl; food; and mushroom extract complex. REVIEW OF SYSTEMS:  Constitutional: Negative for fever   HENT: Negative for sore throat  Respiratory: Negative for dyspnea, cough  Cardiovascular: Negative for chest pain  Gastrointestinal: Negative for vomiting, diarrhea   Skin: Negative for rash  Psychiatric/Behavorial: Negative for anxiety     Objective:   PHYSICAL EXAM:  Blood pressure (!) 153/103, pulse 97, temperature 98.5 °F (36.9 °C), temperature source Oral, resp. rate 24, height 6' 3\" (1.905 m), weight 283 lb 6.4 oz (128.5 kg), SpO2 97 %.'  Gen: No distress. ENT:   Resp: No accessory muscle use. No crackles. No wheezes. No rhonchi. CV: Regular rate. Regular rhythm. No murmur or rub. No edema. Skin: Warm, dry, normal texture and turgor. No nodule on exposed extremities. M/S: No cyanosis. No clubbing. No joint deformity. Psych: Oriented x 3. No anxiety. Awake. Alert. Intact judgement and insight. Good Mood / Affect.   Memory appears in tact                Current Outpatient Medications   Medication Sig Dispense Refill    albuterol sulfate HFA (PROAIR HFA) 108 (90 Base) MCG/ACT inhaler Inhale 2 puffs into the lungs every 6 hours as needed for Wheezing or Shortness of Breath 1 Inhaler 5    budesonide-formoterol (SYMBICORT) 160-4.5 MCG/ACT AERO INHALE TWO PUFFS BY MOUTH TWICE A DAY FOR LONG TERM CONTROL OF ASTHMA, RINSE MOUTH OUT AFTER USE 10.2 g 5    cetirizine (ZYRTEC) 10 MG tablet Take 1 tablet by mouth daily 30 tablet 5    montelukast (SINGULAIR) 10 MG tablet Take 1 tablet by mouth nightly 30 tablet 5    ipratropium-albuterol (DUONEB) 0.5-2.5 (3) MG/3ML SOLN nebulizer solution Inhale 3 mLs into the lungs every 6 hours as needed for Shortness of Breath 360 mL 5    Insulin Pen Needle (ULTICARE MICRO PEN NEEDLES) 32G X 4 MM MISC Use  each 2    atorvastatin (LIPITOR) 80 MG tablet Take 1 tablet by mouth daily 90 tablet 1    omeprazole (PRILOSEC) 20 MG delayed release capsule TAKE ONE CAPSULE BY MOUTH DAILY FOR STOMACH 90 capsule 1    lisinopril (PRINIVIL;ZESTRIL) 20 MG tablet TAKE 1 TABLET BY MOUTH DAILY 90 tablet 1    INVOKANA 100 MG TABS tablet TAKE ONE TABLET BY MOUTH EVERY MORNING BEFORE BREAKFAST FOR DIABETES 90 tablet 1    metoprolol succinate (TOPROL XL) 50 MG extended release tablet TAKE ONE TABLET BY MOUTH DAILY FOR FOR BLOOD PRESSURE AND HEART RATE 90 tablet 1    Mepolizumab (NUCALA) 100 MG/ML SOAJ Inject 1 applicator into the skin every 28 days 1 Syringe 5    Insulin Degludec (TRESIBA FLEXTOUCH) 100 UNIT/ML SOPN INJECT 45 UNITS EVERY EVENING AS DIRECTED 15 mL 0    albuterol sulfate  (90 Base) MCG/ACT inhaler INHALE TWO PUFFS BY MOUTH EVERY 6 HOURS AS NEEDED FOR WHEEZING OR FOR SHORTNESS OF BREATH 6.7 g 0    Mepolizumab 100 MG/ML SOAJ Inject 100 mg into the skin every 28 days 3 mL 1    insulin lispro, 1 Unit Dial, (HUMALOG KWIKPEN) 100 UNIT/ML SOPN USE 9 UNITS SUBCUTANEOUSLY THREE TIMES DAILY BEFORE A MEAL *CALL FOR APPOINTMENT* 15 mL 0    metFORMIN (GLUCOPHAGE) 1000 MG tablet TAKE 1 TABLET BY MOUTH TWICE DAILY WITH MEALS FOR DIABETES 180 tablet 0    Buchu-Cornsilk-Ch Grass-Hydran (DIURETIC PO) Take 1 capsule by mouth daily      Continuous Blood Gluc Sensor (SocMetrics JM SENSOR SYSTEM) MISC as needed 2 each 5    Continuous Blood Gluc  (9+STYLE JM 14 DAY READER) ANYI as needed 1 Device 0    Probiotic Product (PROBIOTIC ADVANCED) CAPS Take 1 capsule by mouth daily      Specialty Vitamins Products (CENTRUM PERFORMANCE) TABS Take 1 tablet by mouth daily      blood glucose test strips (TRUE METRIX BLOOD GLUCOSE TEST) strip 1 each by In Vitro route 4 times daily 100 each 3    Lancets MISC Use to check BS  each 5    Cyanocobalamin (VITAMIN B 12 PO) Take 1 tablet by mouth daily      omega-3 acid ethyl esters (LOVAZA) 1 g capsule Take 2 capsules by mouth 2 times daily 360 capsule 0    Blood Glucose Monitoring Suppl (TRUE METRIX METER) w/Device KIT Use to check fasting BS and 2 hours after meal BS 1 kit 0    gabapentin (NEURONTIN) 300 MG capsule Take 300 mg by mouth 3 times daily.  DULoxetine (CYMBALTA) 30 MG extended release capsule Take 1 capsule by mouth 2 times daily (Patient not taking: Reported on 10/27/2020) 60 capsule 1     No current facility-administered medications for this visit. Data Reviewed:   CBC and Renal reviewed  Last CBC  Lab Results   Component Value Date    WBC 6.5 06/15/2020    RBC 4.97 06/15/2020    HGB 14.3 06/15/2020    MCV 87.2 06/15/2020     06/15/2020     Last Renal  Lab Results   Component Value Date     09/29/2020    K 4.4 09/29/2020     09/29/2020    CO2 27 09/29/2020    CO2 30 06/15/2020    CO2 27 06/04/2020    BUN 20 09/29/2020    CREATININE 0.7 09/29/2020    GLUCOSE 111 09/29/2020    CALCIUM 10.2 09/29/2020       Last ABG  POC Blood Gas: No results found for: POCPH, POCPCO2, POCPO2, POCHCO3, NBEA, GPCZ6UHP  No results for input(s): PH, PCO2, PO2, HCO3, BE, O2SAT in the last 72 hours. CXR portable: No results found for this or any previous visit.      Assessment:      Asthma  Sleep apnea - Dr. Chip Bills  Obesity  Depression  GERD      Plan:      Problem List Items Addressed This Visit     Severe persistent asthma without complication     Asthma symptoms significantly better after starting Nucala. Continue to use Symbicort twice daily. Has albuterol but rarely using this for rescue at this time. Relevant Medications    albuterol sulfate HFA (PROAIR HFA) 108 (90 Base) MCG/ACT inhaler    budesonide-formoterol (SYMBICORT) 160-4.5 MCG/ACT AERO    montelukast (SINGULAIR) 10 MG tablet    ipratropium-albuterol (DUONEB) 0.5-2.5 (3) MG/3ML SOLN nebulizer solution    Other allergic rhinitis     Allergic rhinitis symptoms significantly improved after adding Nucala. Continues on Singulair and Zyrtec. Relevant Medications    albuterol sulfate HFA (PROAIR HFA) 108 (90 Base) MCG/ACT inhaler    budesonide-formoterol (SYMBICORT) 160-4.5 MCG/ACT AERO    cetirizine (ZYRTEC) 10 MG tablet    montelukast (SINGULAIR) 10 MG tablet    ipratropium-albuterol (DUONEB) 0.5-2.5 (3) MG/3ML SOLN nebulizer solution    Morbid obesity with body mass index (BMI) of 40.0 to 44.9 in [facility])     Patient currently focus on weight loss. He is planning on weight loss surgery. At this point, his asthma is very well controlled with Nucala and Symbicort and albuterol as needed. Therefore, he is clear for weight loss surgery from pulmonary standpoint.          Moderate persistent asthma without complication    Relevant Medications    albuterol sulfate HFA (PROAIR HFA) 108 (90 Base) MCG/ACT inhaler    budesonide-formoterol (SYMBICORT) 160-4.5 MCG/ACT AERO    cetirizine (ZYRTEC) 10 MG tablet    montelukast (SINGULAIR) 10 MG tablet    ipratropium-albuterol (DUONEB) 0.5-2.5 (3) MG/3ML SOLN nebulizer solution      Other Visit Diagnoses     Needs flu shot    -  Primary    Relevant Orders    INFLUENZA, QUADV, 3 YRS AND OLDER, IM PF, PREFILL SYR OR SDV, 0.5ML (AFLURIA QUADV, PF) (Completed)

## 2020-10-27 NOTE — ASSESSMENT & PLAN NOTE
Allergic rhinitis symptoms significantly improved after adding Nucala. Continues on Singulair and Zyrtec.

## 2020-10-27 NOTE — PROGRESS NOTES
Vaccine Information Sheet, \"Influenza - Inactivated\"  given to Eric Regalado, or parent/legal guardian of  Eric Regalado and verbalized understanding. Patient responses:    Have you ever had a reaction to a flu vaccine? No  Do you have any current illness? No  Have you ever had [place] Syndrome? No  Do you have a serious allergy to any of the follow: Neomycin, Polymyxin, Thimerosal, eggs or egg products? No    Flu vaccine given per order. Please see immunization tab. Risks and benefits explained. Current VIS given.

## 2020-11-04 ENCOUNTER — HOSPITAL ENCOUNTER (OUTPATIENT)
Dept: INFUSION THERAPY | Age: 40
Setting detail: INFUSION SERIES
Discharge: HOME | End: 2020-11-04
Payer: MEDICARE

## 2020-11-04 VITALS — RESPIRATION RATE: 16 BRPM | HEART RATE: 110 BPM | TEMPERATURE: 97.8 F | OXYGEN SATURATION: 99 %

## 2020-11-04 DIAGNOSIS — J45.50 SEVERE PERSISTENT ASTHMA WITHOUT COMPLICATION: Primary | ICD-10-CM

## 2020-11-04 PROCEDURE — 96372 THER/PROPH/DIAG INJ SC/IM: CPT

## 2020-11-04 PROCEDURE — 6360000002 HC RX W HCPCS: Performed by: INTERNAL MEDICINE

## 2020-11-04 PROCEDURE — 2580000003 HC RX 258

## 2020-11-04 RX ORDER — METHYLPREDNISOLONE SODIUM SUCCINATE 125 MG/2ML
125 INJECTION, POWDER, LYOPHILIZED, FOR SOLUTION INTRAMUSCULAR; INTRAVENOUS ONCE
Status: CANCELLED | OUTPATIENT
Start: 2020-12-02

## 2020-11-04 RX ORDER — EPINEPHRINE 1 MG/ML
0.3 INJECTION, SOLUTION, CONCENTRATE INTRAVENOUS PRN
Status: CANCELLED | OUTPATIENT
Start: 2020-12-02

## 2020-11-04 RX ORDER — SODIUM CHLORIDE 9 MG/ML
INJECTION, SOLUTION INTRAVENOUS CONTINUOUS
Status: CANCELLED | OUTPATIENT
Start: 2020-12-02

## 2020-11-04 RX ORDER — DIPHENHYDRAMINE HYDROCHLORIDE 50 MG/ML
50 INJECTION INTRAMUSCULAR; INTRAVENOUS ONCE
Status: CANCELLED | OUTPATIENT
Start: 2020-12-02

## 2020-11-04 RX ADMIN — WATER 1.2 ML: 1 INJECTION INTRAMUSCULAR; INTRAVENOUS; SUBCUTANEOUS at 15:48

## 2020-11-04 RX ADMIN — MEPOLIZUMAB 100 MG: 100 INJECTION, POWDER, FOR SOLUTION SUBCUTANEOUS at 15:48

## 2020-11-04 NOTE — PROGRESS NOTES
[address] Injection Visit    NAME:  Gissell Luo  YOB: 1980  MEDICAL RECORD NUMBER:  [id number]  Episode Date:  11/4/2020    Patient arrived to [facility]   [] per wheelchair   [x] ambulatory with cane    Most recent Eosinophil count? 6/15/20 EOS abs 0.2, Eos %= 3.5    Opportunistic Infections  Does patient have history of Herpes Zoster? No  Does patient have history of Parasitic Helminth infection? No    Patient is aware of potential hypersensitivity reactions: anaphylaxsis, angioedema, bronchospasm, hypotension, urticaria, rash? These can occur within hours of injection but can have a delayed onset of few days. yes    Does patient have an EPI pen at home? No    Patient is instructed on less severe potential reactions: headache, injection site reaction, back pain, fatigue, influenza, urinary tract infection, upper abdominal pain, eczema, muscle spasms, allergic rhinitis. Yes    Is this the patient's first Nucala Injection? No   Did the patient experience any adverse reactions to previous Nucala Injection?  No    Patient Active Problem List   Diagnosis    Moderate persistent asthma without complication    Hypertension    GERD (gastroesophageal reflux disease)    Tinea cruris    Type 2 diabetes mellitus with diabetic neuropathy, with long-term current use of insulin (HCC)    Mixed hyperlipidemia    Failed back surgical syndrome    Spinal cord injury, C1-C7 ([address]    Chronic pain syndrome    Central spinal stenosis    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 ([address]    Major depressive disorder    Insomnia    Diabetic eye exam ([facility] [address]- 12/16 wnl CEI    Closed nondisplaced fracture of fifth metatarsal bone of right foot    Morbid obesity with body mass index (BMI) of 40.0 to 44.9 in adult (HCC)    Elevated liver enzymes    JULITA (obstructive sleep apnea)    Pressure ulcer of buttock    Abnormal levels of other serum enzymes    Decubitus ulcer of buttock    Chronic back pain    Right hand pain    Diabetic ulcer of toe of right foot associated with type 2 diabetes mellitus ([address]    Diabetic ulcer of toe (HCC)    Ingrowing nail    Environmental and seasonal allergies    Urinary tract infection, site not specified    Severe persistent asthma without complication    Other allergic rhinitis        Pulse 110   Temp 97.8 °F (36.6 °C) (Oral)   Resp 16   SpO2 99%     Has the patient experienced frequency of exacerbations? No   Maintenance therapies currently using: Albuterol inhaler prn, Duoneb prn- states has never used. , symbicort, singulair nightly, zyrtec daily,      Breath Sounds: No increased work of breathing at rest, Breath sounds clear to auscultation bilaterally but somewhat diminished in posterior bases. Pulse Oximetry: 99 %    Premedicated: None ordered  [] Benadryl 25 mg IV  [] Benadryl 50 mg IV  [] Benadryl 25 mg oral   [] Benadryl 50 mg oral  [] Other    Nucala administered: Yes    Nucala dosage: 100 mg was administered slowly subcutaneously into the right upper arm. Response to treatment:  Well tolerated by patient. Scheduled to return for next dose of Nucala on December 2, 2020 .      Electronically signed by Margie Cruz RN on 11/4/2020 at 6:22 PM

## 2020-12-03 ENCOUNTER — HOSPITAL ENCOUNTER (OUTPATIENT)
Dept: INFUSION THERAPY | Age: 40
Setting detail: INFUSION SERIES
Discharge: HOME | End: 2020-12-03
Payer: MEDICARE

## 2020-12-03 VITALS
OXYGEN SATURATION: 96 % | HEART RATE: 116 BPM | SYSTOLIC BLOOD PRESSURE: 153 MMHG | DIASTOLIC BLOOD PRESSURE: 90 MMHG | TEMPERATURE: 96.8 F | RESPIRATION RATE: 18 BRPM

## 2020-12-03 DIAGNOSIS — J45.50 SEVERE PERSISTENT ASTHMA WITHOUT COMPLICATION: Primary | ICD-10-CM

## 2020-12-03 PROCEDURE — 96372 THER/PROPH/DIAG INJ SC/IM: CPT

## 2020-12-03 PROCEDURE — 6360000002 HC RX W HCPCS: Performed by: INTERNAL MEDICINE

## 2020-12-03 PROCEDURE — 2580000003 HC RX 258

## 2020-12-03 RX ORDER — EPINEPHRINE 1 MG/ML
0.3 INJECTION, SOLUTION, CONCENTRATE INTRAVENOUS PRN
Status: CANCELLED | OUTPATIENT
Start: 2020-12-30

## 2020-12-03 RX ORDER — DIPHENHYDRAMINE HYDROCHLORIDE 50 MG/ML
50 INJECTION INTRAMUSCULAR; INTRAVENOUS ONCE
Status: CANCELLED | OUTPATIENT
Start: 2020-12-30

## 2020-12-03 RX ORDER — METHYLPREDNISOLONE SODIUM SUCCINATE 125 MG/2ML
125 INJECTION, POWDER, LYOPHILIZED, FOR SOLUTION INTRAMUSCULAR; INTRAVENOUS ONCE
Status: CANCELLED | OUTPATIENT
Start: 2020-12-30

## 2020-12-03 RX ORDER — SODIUM CHLORIDE 9 MG/ML
INJECTION, SOLUTION INTRAVENOUS CONTINUOUS
Status: CANCELLED | OUTPATIENT
Start: 2020-12-30

## 2020-12-03 RX ADMIN — MEPOLIZUMAB 100 MG: 100 INJECTION, POWDER, FOR SOLUTION SUBCUTANEOUS at 16:04

## 2020-12-03 RX ADMIN — WATER 1.2 ML: 1 INJECTION INTRAMUSCULAR; INTRAVENOUS; SUBCUTANEOUS at 16:04

## 2020-12-03 NOTE — PROGRESS NOTES
S[address] 60 Injection Visit    NAME:  Maria E Barrett  YOB: 1980  MEDICAL RECORD NUMBER:  [id number]  Episode Date:  12/3/2020    Patient arrived to [facility] 58   [] per wheelchair   [x] ambulatory       Opportunistic Infections  Does patient have history of Herpes Zoster? NO  Does patient have history of Parasitic Helminth infection? NO    Patient is aware of potential hypersensitivity reactions: anaphylaxsis, angioedema, bronchospasm, hypotension, urticaria, rash? These can occur within hours of injection but can have a delayed onset of few days. YES    Does patient have an EPI pen at home? NO  Patient is instructed on less severe potential reactions: headache, injection site reaction, back pain, fatigue, influenza, urinary tract infection, upper abdominal pain, eczema, muscle spasms, allergic rhinitis YES    Is this the patient's first Nucala Injection? No   Did the patient experience any adverse reactions to previous Nucala Injection?  No    Patient Active Problem List   Diagnosis    Moderate persistent asthma without complication    Hypertension    GERD (gastroesophageal reflux disease)    Tinea cruris    Type 2 diabetes mellitus with diabetic neuropathy, with long-term current use of insulin ([facility])    Mixed hyperlipidemia    Failed back surgical syndrome    Spinal [facility], C1-C7 [facility]    Chronic pain syndrome    Central spinal stenosis    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 ([facility] [facility]    Major depressive disorder    Insomnia    Diabetic eye exam ([facility] [address]- 12/16 wnl CEI    Closed nondisplaced fracture of fifth metatarsal bone of right foot    Morbid obesity with body mass index (BMI) of 40.0 to 44.9 in adult (HCC)    Elevated liver enzymes    JULITA (obstructive sleep apnea)    Pressure ulcer of buttock    Abnormal levels of other serum enzymes    Decubitus ulcer of buttock    Chronic back pain    Right hand pain    Diabetic ulcer of toe of right foot associated with type 2 diabetes mellitus ([address]    Diabetic ulcer of toe ([address]    Ingrowing nail    Environmental and seasonal allergies    Urinary tract infection, site not specified    Severe persistent asthma without complication    Other allergic rhinitis        There were no vitals taken for this visit. Has the patient experienced frequency of exacerbations? No   Maintenance therapies currently using: albuteral inhaler, symbicort inhaler, duonebs, singulair     Breath Sounds: No increased work of breathing, Breath sounds clear to auscultation bilaterally and Good air exchange  Pulse Oximetry: 96 %    Premedicated: NONE ordered  [] Benadryl 25 mg IV  [] Benadryl 50 mg IV  [] Benadryl 25 mg oral   [] Benadryl 50 mg oral  [] Other    Nucala administered: Yes    Nucala dosage: 100 mg was administered slowly subcutaneously into the right upper arm. Response to treatment:  Well tolerated by patient. Scheduled to return for next dose of Nucala on December 30, 2020 .      Electronically signed by Stephanie Garcia RN on 12/3/2020 at 4:11 PM

## 2020-12-30 ENCOUNTER — HOSPITAL ENCOUNTER (OUTPATIENT)
Dept: INFUSION THERAPY | Age: 40
Setting detail: INFUSION SERIES
Discharge: HOME | End: 2020-12-30
Payer: MEDICARE

## 2020-12-30 VITALS
TEMPERATURE: 98.2 F | SYSTOLIC BLOOD PRESSURE: 154 MMHG | HEART RATE: 96 BPM | RESPIRATION RATE: 16 BRPM | OXYGEN SATURATION: 95 % | DIASTOLIC BLOOD PRESSURE: 99 MMHG

## 2020-12-30 DIAGNOSIS — J45.50 SEVERE PERSISTENT ASTHMA WITHOUT COMPLICATION: Primary | ICD-10-CM

## 2020-12-30 PROCEDURE — 6360000002 HC RX W HCPCS: Performed by: INTERNAL MEDICINE

## 2020-12-30 PROCEDURE — 96372 THER/PROPH/DIAG INJ SC/IM: CPT

## 2020-12-30 RX ORDER — EPINEPHRINE 1 MG/ML
0.3 INJECTION, SOLUTION, CONCENTRATE INTRAVENOUS PRN
Status: CANCELLED | OUTPATIENT
Start: 2021-01-27

## 2020-12-30 RX ORDER — METHYLPREDNISOLONE SODIUM SUCCINATE 125 MG/2ML
125 INJECTION, POWDER, LYOPHILIZED, FOR SOLUTION INTRAMUSCULAR; INTRAVENOUS ONCE
Status: CANCELLED | OUTPATIENT
Start: 2021-01-27 | End: 2021-01-27

## 2020-12-30 RX ORDER — DIPHENHYDRAMINE HYDROCHLORIDE 50 MG/ML
50 INJECTION INTRAMUSCULAR; INTRAVENOUS ONCE
Status: CANCELLED | OUTPATIENT
Start: 2021-01-27 | End: 2021-01-27

## 2020-12-30 RX ORDER — SODIUM CHLORIDE 9 MG/ML
INJECTION, SOLUTION INTRAVENOUS CONTINUOUS
Status: CANCELLED | OUTPATIENT
Start: 2021-01-27

## 2020-12-30 RX ADMIN — MEPOLIZUMAB 100 MG: 100 INJECTION, POWDER, FOR SOLUTION SUBCUTANEOUS at 08:40

## 2020-12-30 ASSESSMENT — PAIN DESCRIPTION - ONSET: ONSET: ON-GOING

## 2020-12-30 ASSESSMENT — PAIN SCALES - GENERAL: PAINLEVEL_OUTOF10: 6

## 2020-12-30 ASSESSMENT — PAIN DESCRIPTION - LOCATION: LOCATION: LEG;BACK

## 2020-12-30 ASSESSMENT — PAIN DESCRIPTION - PROGRESSION: CLINICAL_PROGRESSION: NOT CHANGED

## 2020-12-30 ASSESSMENT — PAIN DESCRIPTION - PAIN TYPE: TYPE: CHRONIC PAIN

## 2020-12-30 ASSESSMENT — PAIN DESCRIPTION - ORIENTATION: ORIENTATION: RIGHT

## 2020-12-30 ASSESSMENT — PAIN - FUNCTIONAL ASSESSMENT: PAIN_FUNCTIONAL_ASSESSMENT: PREVENTS OR INTERFERES SOME ACTIVE ACTIVITIES AND ADLS

## 2020-12-30 ASSESSMENT — PAIN DESCRIPTION - DESCRIPTORS: DESCRIPTORS: ACHING;BURNING;TINGLING

## 2020-12-30 ASSESSMENT — PAIN DESCRIPTION - FREQUENCY: FREQUENCY: INTERMITTENT

## 2020-12-30 NOTE — PROGRESS NOTES
S[address] 60 Injection Visit    NAME:  Preethi Morelos  YOB: 1980  MEDICAL RECORD NUMBER:  [id number]  Episode Date:  12/30/2020    Patient arrived to [facility] 58   [] per wheelchair   [x] ambulatory     Most recent Eosinophil count? 6/15/2020  0.2  (3.5% )    Opportunistic Infections  Does patient have history of Herpes Zoster? no  Does patient have history of Parasitic Helminth infection? no    Patient is aware of potential hypersensitivity reactions: anaphylaxsis, angioedema, bronchospasm, hypotension, urticaria, rash? These can occur within hours of injection but can have a delayed onset of few days. YES  Does patient have an EPI pen at home? NO  Patient is instructed on less severe potential reactions: headache, injection site reaction, back pain, fatigue, influenza, urinary tract infection, upper abdominal pain, eczema, muscle spasms, allergic rhinitis  YES  Is this the patient's first Nucala Injection? Yes   Did the patient experience any adverse reactions to previous Nucala Injection?  No    Patient Active Problem List   Diagnosis    Moderate persistent asthma without complication    Hypertension    GERD (gastroesophageal reflux disease)    Tinea cruris    Type 2 diabetes mellitus with diabetic neuropathy, with long-term current use of insulin (HCC)    Mixed hyperlipidemia    Failed back surgical syndrome    Spinal [facility] injury, C1-C7 ([facility] [address]    Chronic pain syndrome    Central spinal stenosis    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 ([facility] [address] [facility]    Major depressive disorder    Insomnia    Diabetic eye exam ([facility] [address]- 12/16 wnl CEI    Closed nondisplaced fracture of fifth metatarsal bone of right foot    Morbid obesity with body mass index (BMI) of 40.0 to 44.9 in adult (HCC)    Elevated liver enzymes    JULITA (obstructive sleep apnea)    Pressure ulcer of buttock    Abnormal levels of other serum enzymes  Decubitus ulcer of buttock    Chronic back pain    Right hand pain    Diabetic ulcer of toe of right foot associated with type 2 diabetes mellitus (HCC)    Diabetic ulcer of toe (HCC)    Ingrowing nail    Environmental and seasonal allergies    Urinary tract infection, site not specified    Severe persistent asthma without complication    Other allergic rhinitis        BP (!) 154/99   Pulse 96   Temp 98.2 °F (36.8 °C) (Infrared)   Resp 16   SpO2 95%     Has the patient experienced frequency of exacerbations? No   Maintenance therapies currently using: DUONEBS, SINGULAIR, Proair inhaler, symbicort inhaler     Breath Sounds: No increased work of breathing, Breath sounds clear to auscultation bilaterally and diminished air exchange throughout. Pulse Oximetry: 95 % on room air    Premedicated: NONE ORDERED  [] Benadryl 25 mg IV  [] Benadryl 50 mg IV  [] Benadryl 25 mg oral   [] Benadryl 50 mg oral  [] Other    Nucala administered: Yes    Nucala dosage: 100 mg was administered slowly subcutaneously into the right upper arm. Response to treatment:  Well tolerated by patient. Scheduled to return for next dose of Nucala on January 27, 2021 .      Electronically signed by Willy Mendes RN on 12/30/2020 at 8:31 AM

## 2021-01-11 ENCOUNTER — TELEPHONE (OUTPATIENT)
Dept: PULMONOLOGY | Age: 41
End: 2021-01-11

## 2021-01-11 NOTE — TELEPHONE ENCOUNTER
Patient called and stated he took the medications that Dr. Abiola Arriaga prescribed for him to take before surgery. He stated he spoke with his PCP who advised him to take them. He stated he took it because he had a cough/cold. He wanted to know if Dr. Abiola Arriaga would re prescribe. I advised him to ask his family doc, since Dr. Abiola Arriaga did not okay the patient to take the medications last. Patient verbalized understanding.

## 2021-01-27 ENCOUNTER — HOSPITAL ENCOUNTER (OUTPATIENT)
Dept: INFUSION THERAPY | Age: 41
Setting detail: INFUSION SERIES
Discharge: HOME | End: 2021-01-27
Payer: COMMERCIAL

## 2021-01-27 ENCOUNTER — OFFICE VISIT (OUTPATIENT)
Dept: FAMILY MEDICINE CLINIC | Age: 41
End: 2021-01-27
Payer: COMMERCIAL

## 2021-01-27 VITALS
HEIGHT: 75 IN | WEIGHT: 315 LBS | HEART RATE: 121 BPM | OXYGEN SATURATION: 97 % | SYSTOLIC BLOOD PRESSURE: 116 MMHG | BODY MASS INDEX: 39.17 KG/M2 | DIASTOLIC BLOOD PRESSURE: 70 MMHG | TEMPERATURE: 97.7 F

## 2021-01-27 VITALS
SYSTOLIC BLOOD PRESSURE: 138 MMHG | TEMPERATURE: 98.8 F | RESPIRATION RATE: 18 BRPM | DIASTOLIC BLOOD PRESSURE: 84 MMHG | HEART RATE: 112 BPM | OXYGEN SATURATION: 95 %

## 2021-01-27 DIAGNOSIS — Z01.818 PREOP EXAMINATION: Primary | ICD-10-CM

## 2021-01-27 DIAGNOSIS — E11.40 TYPE 2 DIABETES MELLITUS WITH DIABETIC NEUROPATHY, WITH LONG-TERM CURRENT USE OF INSULIN (HCC): ICD-10-CM

## 2021-01-27 DIAGNOSIS — Z79.4 TYPE 2 DIABETES MELLITUS WITH DIABETIC NEUROPATHY, WITH LONG-TERM CURRENT USE OF INSULIN (HCC): ICD-10-CM

## 2021-01-27 DIAGNOSIS — J45.50 SEVERE PERSISTENT ASTHMA WITHOUT COMPLICATION: ICD-10-CM

## 2021-01-27 DIAGNOSIS — E66.01 MORBID OBESITY WITH BODY MASS INDEX (BMI) OF 40.0 TO 44.9 IN ADULT (HCC): ICD-10-CM

## 2021-01-27 DIAGNOSIS — J45.50 SEVERE PERSISTENT ASTHMA WITHOUT COMPLICATION: Primary | ICD-10-CM

## 2021-01-27 LAB — HBA1C MFR BLD: 7.1 %

## 2021-01-27 PROCEDURE — 99213 OFFICE O/P EST LOW 20 MIN: CPT | Performed by: FAMILY MEDICINE

## 2021-01-27 PROCEDURE — G8427 DOCREV CUR MEDS BY ELIG CLIN: HCPCS | Performed by: FAMILY MEDICINE

## 2021-01-27 PROCEDURE — 96372 THER/PROPH/DIAG INJ SC/IM: CPT

## 2021-01-27 PROCEDURE — 83036 HEMOGLOBIN GLYCOSYLATED A1C: CPT | Performed by: FAMILY MEDICINE

## 2021-01-27 PROCEDURE — G8482 FLU IMMUNIZE ORDER/ADMIN: HCPCS | Performed by: FAMILY MEDICINE

## 2021-01-27 PROCEDURE — G8417 CALC BMI ABV UP PARAM F/U: HCPCS | Performed by: FAMILY MEDICINE

## 2021-01-27 PROCEDURE — 2022F DILAT RTA XM EVC RTNOPTHY: CPT | Performed by: FAMILY MEDICINE

## 2021-01-27 PROCEDURE — 6360000002 HC RX W HCPCS: Performed by: INTERNAL MEDICINE

## 2021-01-27 PROCEDURE — 2580000003 HC RX 258

## 2021-01-27 RX ORDER — EPINEPHRINE 1 MG/ML
0.3 INJECTION, SOLUTION, CONCENTRATE INTRAVENOUS PRN
Status: CANCELLED | OUTPATIENT
Start: 2021-02-24

## 2021-01-27 RX ORDER — AZITHROMYCIN 250 MG/1
TABLET, FILM COATED ORAL
Qty: 6 TABLET | Refills: 0 | Status: SHIPPED | OUTPATIENT
Start: 2021-01-27 | End: 2021-04-01 | Stop reason: ALTCHOICE

## 2021-01-27 RX ORDER — PREDNISONE 10 MG/1
TABLET ORAL
Qty: 17 TABLET | Refills: 0 | Status: SHIPPED | OUTPATIENT
Start: 2021-01-27 | End: 2021-04-01 | Stop reason: ALTCHOICE

## 2021-01-27 RX ORDER — DIPHENHYDRAMINE HYDROCHLORIDE 50 MG/ML
50 INJECTION INTRAMUSCULAR; INTRAVENOUS ONCE
Status: CANCELLED | OUTPATIENT
Start: 2021-02-24 | End: 2021-02-24

## 2021-01-27 RX ORDER — METHYLPREDNISOLONE SODIUM SUCCINATE 125 MG/2ML
125 INJECTION, POWDER, LYOPHILIZED, FOR SOLUTION INTRAMUSCULAR; INTRAVENOUS ONCE
Status: CANCELLED | OUTPATIENT
Start: 2021-02-24 | End: 2021-02-24

## 2021-01-27 RX ORDER — SODIUM CHLORIDE 9 MG/ML
INJECTION, SOLUTION INTRAVENOUS CONTINUOUS
Status: CANCELLED | OUTPATIENT
Start: 2021-02-24

## 2021-01-27 RX ORDER — INSULIN LISPRO 100 [IU]/ML
INJECTION, SOLUTION INTRAVENOUS; SUBCUTANEOUS
Qty: 15 ML | Refills: 0 | Status: SHIPPED | OUTPATIENT
Start: 2021-01-27 | End: 2021-04-01 | Stop reason: ALTCHOICE

## 2021-01-27 RX ADMIN — WATER 1.2 ML: 1 INJECTION INTRAMUSCULAR; INTRAVENOUS; SUBCUTANEOUS at 08:21

## 2021-01-27 RX ADMIN — MEPOLIZUMAB 100 MG: 100 INJECTION, POWDER, FOR SOLUTION SUBCUTANEOUS at 08:21

## 2021-01-27 ASSESSMENT — ENCOUNTER SYMPTOMS
DIARRHEA: 0
NAUSEA: 0
SHORTNESS OF BREATH: 0
COUGH: 0
VOMITING: 0
SORE THROAT: 0
SINUS PRESSURE: 0
EYE REDNESS: 0
COLOR CHANGE: 0

## 2021-01-27 ASSESSMENT — PAIN DESCRIPTION - ONSET: ONSET: ON-GOING

## 2021-01-27 ASSESSMENT — PAIN - FUNCTIONAL ASSESSMENT: PAIN_FUNCTIONAL_ASSESSMENT: PREVENTS OR INTERFERES SOME ACTIVE ACTIVITIES AND ADLS

## 2021-01-27 ASSESSMENT — PAIN SCALES - GENERAL: PAINLEVEL_OUTOF10: 10

## 2021-01-27 ASSESSMENT — PAIN DESCRIPTION - ORIENTATION: ORIENTATION: RIGHT

## 2021-01-27 ASSESSMENT — PAIN DESCRIPTION - PAIN TYPE: TYPE: CHRONIC PAIN

## 2021-01-27 ASSESSMENT — PAIN DESCRIPTION - DESCRIPTORS: DESCRIPTORS: ACHING;BURNING

## 2021-01-27 ASSESSMENT — PAIN DESCRIPTION - LOCATION: LOCATION: BACK;LEG;PELVIS

## 2021-01-27 ASSESSMENT — PAIN DESCRIPTION - FREQUENCY: FREQUENCY: INTERMITTENT

## 2021-01-27 ASSESSMENT — PAIN DESCRIPTION - PROGRESSION: CLINICAL_PROGRESSION: NOT CHANGED
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
## 2021-01-27 NOTE — PATIENT INSTRUCTIONS
The night before surgery just take 1/2 of your nighttime insulin   The morning of surgery do not use your mealtime insulin    Take metoprolol with small sip of water the AM of surgery    One week before surgery do not take aspirin or NSAIDs (Aleve, ibuprofen)

## 2021-01-27 NOTE — PROGRESS NOTES
complication    Other allergic rhinitis     Past Medical History:   Diagnosis Date    Asthma     Chronic back pain     Depression     GERD (gastroesophageal reflux disease)     Hyperlipidemia     Hypertension     Neuropathy     Obesity     Osteoarthritis     Other allergic rhinitis 10/27/2020    Peripheral vascular disease (HCC)     Restless legs syndrome     Severe persistent asthma without complication 2/9/8526    Sleep apnea     Type 2 diabetes mellitus without complication (HCC)     Type II or unspecified type diabetes mellitus without mention of complication, not stated as uncontrolled     Urinary incontinence      Past Surgical History:   Procedure Laterality Date    BACK SURGERY  2013    four surgeries; rods placed    FRACTURE SURGERY Right     arm- two    FRACTURE SURGERY Right     hand- three    HAND SURGERY  1999    KNEE ARTHROSCOPY Right     PELVIC FRACTURE SURGERY      SPINE SURGERY  2011    SPINE SURGERY  2010    UVULECTOMY       Family History   Problem Relation Age of Onset    Cancer Mother         stomach    Diabetes Mother     Asthma Mother     Diabetes Brother     Cancer Paternal Uncle         testicle    Cancer Paternal Grandmother         breast    Cancer Paternal Cousin         gum     Social History     Socioeconomic History    Marital status:      Spouse name: Jeanne Favre Number of children: 3    Years of education: None    Highest education level: None   Occupational History    Occupation: disabled   Social Needs    Financial resource strain: None    Food insecurity     Worry: None     Inability: None    Transportation needs     Medical: None     Non-medical: None   Tobacco Use    Smoking status: Never Smoker    Smokeless tobacco: Never Used   Substance and Sexual Activity    Alcohol use: No    Drug use: No    Sexual activity: Yes     Partners: Female   Lifestyle    Physical activity     Days per week: None     Minutes per session: None  Stress: None   Relationships    Social connections     Talks on phone: None     Gets together: None     Attends Gnosticism service: None     Active member of club or organization: None     Attends meetings of clubs or organizations: None     Relationship status: None    Intimate partner violence     Fear of current or ex partner: None     Emotionally abused: None     Physically abused: None     Forced sexual activity: None   Other Topics Concern    None   Social History Narrative    Caffeine:        SODA - 0          TEA - 0        COFFEE - 0     Allergies   Allergen Reactions    Penicillins Swelling and Anaphylaxis     Swelling of tongue and throat    Fentanyl      Had hives at patch site and nausea    Other reaction(s): Vomiting    Food Swelling     Swelling of throat from Mushrooms    Mushroom Extract Complex      Other reaction(s): Angioedema     Prior to Visit Medications    Medication Sig Taking?  Authorizing Provider   azithromycin (ZITHROMAX) 250 MG tablet Take 2 tabs by mouth once on day 1, followed by 1 tab by mouth daily for four days Yes Heriberto Healy MD   predniSONE (DELTASONE) 10 MG tablet Take 4 tabs by mouth daily for 2 days, 3 tabs for 2 days, 2 tabs for 1 days, 1 tab for 1 day Yes Heriberto Healy MD   omeprazole (PRILOSEC) 20 MG delayed release capsule TAKE ONE CAPSULE BY MOUTH DAILY FOR STOMACH Yes AYLA Phillip CNP   lisinopril (PRINIVIL;ZESTRIL) 20 MG tablet TAKE 1 TABLET BY MOUTH DAILY Yes AYLA Phillip CNP   metoprolol succinate (TOPROL XL) 50 MG extended release tablet TAKE ONE TABLET BY MOUTH DAILY FOR BLOOD PRESSURE AND HEART RATE Yes AYLA Phillip CNP   atorvastatin (LIPITOR) 80 MG tablet TAKE 1 TABLET BY MOUTH DAILY Yes AYLA Phillip CNP   INVOKANA 100 MG TABS tablet TAKE ONE TABLET BY MOUTH EVERY MORNING BEFORE BREAKFAST FOR DIABETES Yes AYLA Phillip CNP   metFORMIN (GLUCOPHAGE) 1000 MG tablet TAKE 1 TABLET BY MOUTH TWICE DAILY WITH MEALS FOR DIABETES Yes AYLA Dobson CNP   albuterol sulfate HFA (PROAIR HFA) 108 (90 Base) MCG/ACT inhaler Inhale 2 puffs into the lungs every 6 hours as needed for Wheezing or Shortness of Breath Yes Sapphire Walsh MD   budesonide-formoterol (SYMBICORT) 160-4.5 MCG/ACT AERO INHALE TWO PUFFS BY MOUTH TWICE A DAY FOR LONG TERM CONTROL OF ASTHMA, RINSE MOUTH OUT AFTER USE Yes Sapphire Walsh MD   montelukast (SINGULAIR) 10 MG tablet Take 1 tablet by mouth nightly Yes Sapphire Walsh MD   ipratropium-albuterol (DUONEB) 0.5-2.5 (3) MG/3ML SOLN nebulizer solution Inhale 3 mLs into the lungs every 6 hours as needed for Shortness of Breath Yes Sapphire Walsh MD   Insulin Pen Needle (ULTICARE MICRO PEN NEEDLES) 32G X 4 MM MISC Use QID Yes AYLA Dobson CNP   Mepolizumab (NUCALA) 100 MG/ML SOAJ Inject 1 applicator into the skin every 28 days Yes Sapphire Walsh MD   Insulin Degludec (TRESIBA FLEXTOUCH) 100 UNIT/ML SOPN INJECT 45 UNITS EVERY EVENING AS DIRECTED Yes AYLA Dobson CNP   albuterol sulfate  (90 Base) MCG/ACT inhaler INHALE TWO PUFFS BY MOUTH EVERY 6 HOURS AS NEEDED FOR WHEEZING OR FOR SHORTNESS OF BREATH Yes AYLA Dobson CNP   Mepolizumab 100 MG/ML SOAJ Inject 100 mg into the skin every 28 days Yes MD Kami Caseyu-Cornsilk-Ch Grass-Hydran (DIURETIC PO) Take 1 capsule by mouth daily Yes Historical Provider, MD   Continuous Blood Gluc Sensor ([address] [place] [address]) MISC as needed Yes AYLA Dobson CNP   Continuous Blood Gluc  (FREESTYLE JM 14 DAY READER) ANYI as needed Yes AYLA Dobson CNP   Probiotic Product (PROBIOTIC ADVANCED) CAPS Take 1 capsule by mouth daily Yes Historical Provider, MD   Specialty Vitamins Products (CENTRUM PERFORMANCE) TABS Take 1 tablet by mouth daily Yes Historical Provider, MD   blood glucose test strips (TRUE METRIX BLOOD GLUCOSE TEST) strip 1 each by In Vitro route 4 times daily Yes AYLA Dobson CNP   Lancets MISC Use to check BS QID Yes AYLA Dobson CNP   Cyanocobalamin (VITAMIN B 12 PO) Take 1 tablet by mouth daily Yes Historical Provider, MD   omega-3 acid ethyl esters (LOVAZA) 1 g capsule Take 2 capsules by mouth 2 times daily Yes AYLA Dobson CNP   Blood Glucose Monitoring Suppl (TRUE METRIX METER) w/Device KIT Use to check fasting BS and 2 hours after meal BS Yes AYLA Dobson CNP   insulin lispro, 1 Unit Dial, (HUMALOG KWIKPEN) 100 UNIT/ML SOPN USE 9 UNITS SUBCUTANEOUSLY THREE TIMES DAILY BEFORE A MEAL *CALL FOR APPOINTMENT*  Peyton Diggs MD   gabapentin (NEURONTIN) 300 MG capsule Take 300 mg by mouth 3 times daily. Historical Provider, MD     Review of Systems   Constitutional: Negative for chills and fever. HENT: Negative for congestion, sinus pressure and sore throat. Eyes: Negative for redness and visual disturbance. Respiratory: Negative for cough and shortness of breath. Cardiovascular: Negative for chest pain and leg swelling. Gastrointestinal: Negative for diarrhea, nausea and vomiting. Genitourinary: Negative for difficulty urinating. Skin: Negative for color change and rash. Neurological: Negative for dizziness and headaches. Psychiatric/Behavioral: Negative for confusion. Objective:     Vitals:    01/27/21 1118   BP: 116/70   Site: Right Upper Arm   Position: Sitting   Cuff Size: Large Adult   Pulse: 121   Temp: 97.7 °F (36.5 °C)   TempSrc: Temporal   SpO2: 97%   Weight: (!) 393 lb (178.3 kg)   Height: 6' 3\" (1.905 m)     Physical Exam  Constitutional:       Appearance: He is well-developed. HENT:      Head: Normocephalic and atraumatic. Eyes:      Conjunctiva/sclera: Conjunctivae normal.   Neck:      Musculoskeletal: Normal range of motion and neck supple. Thyroid: No thyromegaly.    Cardiovascular:      Rate and Rhythm: Normal rate and regular rhythm. Heart sounds: Normal heart sounds. No murmur. Pulmonary:      Effort: Pulmonary effort is normal.      Breath sounds: Normal breath sounds. No wheezing. Abdominal:      General: Bowel sounds are normal.      Palpations: Abdomen is soft. There is no mass. Tenderness: There is no abdominal tenderness. Musculoskeletal: Normal range of motion. Lymphadenopathy:      Cervical: No cervical adenopathy. Skin:     General: Skin is warm and dry. Findings: No rash. Comments: Normal turgor   Neurological:      Mental Status: He is alert and oriented to person, place, and time. Deep Tendon Reflexes: Reflexes normal.   Psychiatric:         Thought Content: Thought content normal.       Cardiographics  ECG: clearance by Cardiology    Lab Review   No visits with results within 2 Month(s) from this visit. Latest known visit with results is:   Orders Only on 09/29/2020   Component Date Value    Cholesterol, Total 09/29/2020 131     Triglycerides 09/29/2020 234*    HDL 09/29/2020 35*    LDL Calculated 09/29/2020 49     VLDL Cholesterol Calcula* 09/29/2020 47     Sodium 09/29/2020 141     Potassium 09/29/2020 4.4     Chloride 09/29/2020 100     CO2 09/29/2020 27     Anion Gap 09/29/2020 14     Glucose 09/29/2020 111*    BUN 09/29/2020 20     CREATININE 09/29/2020 0.7*    GFR Non- 09/29/2020 >60     GFR  09/29/2020 >60     Calcium 09/29/2020 10.2     Total Protein 09/29/2020 7.1     Albumin 09/29/2020 4.8     Albumin/Globulin Ratio 09/29/2020 2.1     Total Bilirubin 09/29/2020 0.6     Alkaline Phosphatase 09/29/2020 100     ALT 09/29/2020 54*    AST 09/29/2020 35     Globulin 09/29/2020 2.3     TSH 09/29/2020 1.08           Assessment:      Brandie Dubois with planned surgery as above.        Cardiac Risk Estimation: per the Revised Cardiac Risk Index (Circ. 100:1043, 1999), the patient's risk factors for cardiac complications include on insulin, putting the patient in: RCI RISK CLASS II (1 risk factor, risk of major cardiac compl. appr. 1.3%)       Plan:   1. Preop examination    2. Morbid obesity with body mass index (BMI) of 40.0 to 44.9 in adult ([facility] [address]    3. Type 2 diabetes mellitus with diabetic neuropathy, with long-term current use of insulin ([facility])  - POCT glycosylated hemoglobin (Hb A1C)    4. Severe persistent asthma without complication  - azithromycin (ZITHROMAX) 250 MG tablet; Take 2 tabs by mouth once on day 1, followed by 1 tab by mouth daily for four days  Dispense: 6 tablet; Refill: 0    Ok to proceed with the procedure.  -Has already received cardiology and pulmonology clearance  - Has tolerated general anesthesia well in the past  - No history of bleeding disorder or DVT    We discussed taking half dose of his basal insulin the night before surgery and no mealtime insulin the day of surgery. Take metoprolol a small sip of water the morning of surgery otherwise nothing p.o. Patient advised to hold blood thinning medication (including aspirin and NSAIDs) 7 days prior to procedure. Prophylaxis for cardiac events with perioperative beta-blockers: already on BB    Deep vein thrombosis prophylaxis postoperatively:regimen to be chosen by surgicalteam if applicable. Other measures: Postoperative incentive spirometry to prevent pneumonia. Thank you for assisting me in the care of this patient.

## 2021-02-04 LAB
CATARACTS: NEGATIVE
DIABETIC RETINOPATHY: NEGATIVE
GLAUCOMA: NEGATIVE
INTRAOCULAR PRESSURE EYE: NORMAL
VISUAL ACUITY DISTANCE LEFT EYE: NORMAL
VISUAL ACUITY DISTANCE RIGHT EYE: NORMAL

## 2021-02-10 DIAGNOSIS — Z79.4 TYPE 2 DIABETES MELLITUS WITH DIABETIC NEUROPATHY, WITH LONG-TERM CURRENT USE OF INSULIN (HCC): ICD-10-CM

## 2021-02-10 DIAGNOSIS — E11.40 TYPE 2 DIABETES MELLITUS WITH DIABETIC NEUROPATHY, WITH LONG-TERM CURRENT USE OF INSULIN (HCC): ICD-10-CM

## 2021-02-22 ENCOUNTER — HOSPITAL ENCOUNTER (OUTPATIENT)
Dept: INFUSION THERAPY | Age: 41
Setting detail: INFUSION SERIES
Discharge: HOME | End: 2021-02-22
Payer: COMMERCIAL

## 2021-02-22 VITALS
DIASTOLIC BLOOD PRESSURE: 97 MMHG | OXYGEN SATURATION: 97 % | SYSTOLIC BLOOD PRESSURE: 144 MMHG | RESPIRATION RATE: 16 BRPM | HEART RATE: 88 BPM | TEMPERATURE: 97 F

## 2021-02-22 DIAGNOSIS — J45.50 SEVERE PERSISTENT ASTHMA WITHOUT COMPLICATION: Primary | ICD-10-CM

## 2021-02-22 PROCEDURE — 6360000002 HC RX W HCPCS: Performed by: INTERNAL MEDICINE

## 2021-02-22 PROCEDURE — 96372 THER/PROPH/DIAG INJ SC/IM: CPT

## 2021-02-22 PROCEDURE — 2580000003 HC RX 258

## 2021-02-22 RX ORDER — METHYLPREDNISOLONE SODIUM SUCCINATE 125 MG/2ML
125 INJECTION, POWDER, LYOPHILIZED, FOR SOLUTION INTRAMUSCULAR; INTRAVENOUS ONCE
Status: CANCELLED | OUTPATIENT
Start: 2021-03-22 | End: 2021-03-22

## 2021-02-22 RX ORDER — SODIUM CHLORIDE 9 MG/ML
INJECTION, SOLUTION INTRAVENOUS CONTINUOUS
Status: CANCELLED | OUTPATIENT
Start: 2021-03-22

## 2021-02-22 RX ORDER — DIPHENHYDRAMINE HYDROCHLORIDE 50 MG/ML
50 INJECTION INTRAMUSCULAR; INTRAVENOUS ONCE
Status: CANCELLED | OUTPATIENT
Start: 2021-03-22 | End: 2021-03-22

## 2021-02-22 RX ORDER — EPINEPHRINE 1 MG/ML
0.3 INJECTION, SOLUTION, CONCENTRATE INTRAVENOUS PRN
Status: CANCELLED | OUTPATIENT
Start: 2021-03-22

## 2021-02-22 RX ADMIN — WATER 1.2 ML: 1 INJECTION INTRAMUSCULAR; INTRAVENOUS; SUBCUTANEOUS at 09:31

## 2021-02-22 RX ADMIN — MEPOLIZUMAB 100 MG: 100 INJECTION, POWDER, FOR SOLUTION SUBCUTANEOUS at 09:31
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
## 2021-02-24 ENCOUNTER — HOSPITAL ENCOUNTER (OUTPATIENT)
Dept: INFUSION THERAPY | Age: 41
End: 2021-02-24

## 2021-02-24 ENCOUNTER — TELEPHONE (OUTPATIENT)
Dept: PULMONOLOGY | Age: 41
End: 2021-02-24

## 2021-02-24 NOTE — TELEPHONE ENCOUNTER
I am assuming that is supposed to be immunoglubulin, and specificly immunoglubin E (IgE). This was completed in June 2020 part of the respiratory allergen profile, 55.

## 2021-02-24 NOTE — TELEPHONE ENCOUNTER
Mandi with [employer]. She is working on [place] for [employer]. Patient needs to have an amino globulin lab test done to submit with PA. Please place order for globulin test. Inform pt of lab order. Aruna Eldridge is also needing results of FEV1 test.   I called [facility] requesting results of PFT done on 11/12 2020 be faxed. Once test results received, please fax to Aruna Eldridge at [phone].

## 2021-02-24 NOTE — TELEPHONE ENCOUNTER
SARATH Raymond to [facility] and give Dr Keshawn oCbb response

## 2021-03-08 PROBLEM — Z98.84 S/P GASTRIC BYPASS: Status: ACTIVE | Noted: 2021-03-08

## 2021-03-09 ENCOUNTER — TELEPHONE (OUTPATIENT)
Dept: PULMONOLOGY | Age: 41
End: 2021-03-09

## 2021-03-09 NOTE — TELEPHONE ENCOUNTER
Left message for Vikram Kolb in [place] dept regarding waiting on Dr. Bea Pack for further information.

## 2021-03-09 NOTE — TELEPHONE ENCOUNTER
Phone call from [place] in [facility] stating the Chris Gonzalez was denied due to no record of patient being hospitalized and EOS not low enough. I have requested some records from [address] [place] [address] ie his labs and image reports. Any other suggestions to get the Nucala approved?

## 2021-03-16 ENCOUNTER — TELEPHONE (OUTPATIENT)
Dept: FAMILY MEDICINE CLINIC | Age: 41
End: 2021-03-16

## 2021-03-16 NOTE — TELEPHONE ENCOUNTER
Pharmacy called in regards to the invokana 100 MG they want to discuss they want to know why the member cannot use farxiga?     Andres Be [phone]

## 2021-03-16 NOTE — TELEPHONE ENCOUNTER
If Tomas Jones is covered instead of invokana, can switch medication as long as patient has not taken Tomas Jones in the past and had a reaction.

## 2021-03-17 ENCOUNTER — TELEPHONE (OUTPATIENT)
Dept: FAMILY MEDICINE CLINIC | Age: 41
End: 2021-03-17

## 2021-03-17 NOTE — TELEPHONE ENCOUNTER
Sent in [employer] Dates  to replace invokana due to insurance preference. Please let patient know. Thanks.

## 2021-03-17 NOTE — TELEPHONE ENCOUNTER
INVOKANA IS NOT ON [facility] [employer]  THEY WANT TO KNOW IF THE PT HAS TRIED FARXIGA INSTEAD     PLEASE ADVISE

## 2021-03-22 ENCOUNTER — HOSPITAL ENCOUNTER (OUTPATIENT)
Dept: INFUSION THERAPY | Age: 41
End: 2021-03-22

## 2021-04-01 ENCOUNTER — OFFICE VISIT (OUTPATIENT)
Dept: FAMILY MEDICINE CLINIC | Age: 41
End: 2021-04-01
Payer: COMMERCIAL

## 2021-04-01 VITALS
RESPIRATION RATE: 16 BRPM | DIASTOLIC BLOOD PRESSURE: 88 MMHG | BODY MASS INDEX: 39.17 KG/M2 | OXYGEN SATURATION: 96 % | TEMPERATURE: 96.9 F | HEART RATE: 100 BPM | WEIGHT: 315 LBS | SYSTOLIC BLOOD PRESSURE: 132 MMHG | HEIGHT: 75 IN

## 2021-04-01 DIAGNOSIS — E78.2 MIXED HYPERLIPIDEMIA: ICD-10-CM

## 2021-04-01 DIAGNOSIS — J45.40 MODERATE PERSISTENT ASTHMA WITHOUT COMPLICATION: ICD-10-CM

## 2021-04-01 DIAGNOSIS — I10 ESSENTIAL HYPERTENSION: ICD-10-CM

## 2021-04-01 DIAGNOSIS — E11.40 TYPE 2 DIABETES MELLITUS WITH DIABETIC NEUROPATHY, WITH LONG-TERM CURRENT USE OF INSULIN (HCC): ICD-10-CM

## 2021-04-01 DIAGNOSIS — E66.01 MORBID OBESITY WITH BODY MASS INDEX (BMI) OF 40.0 TO 44.9 IN ADULT (HCC): ICD-10-CM

## 2021-04-01 DIAGNOSIS — K21.9 GASTROESOPHAGEAL REFLUX DISEASE, UNSPECIFIED WHETHER ESOPHAGITIS PRESENT: ICD-10-CM

## 2021-04-01 DIAGNOSIS — Z79.4 TYPE 2 DIABETES MELLITUS WITH DIABETIC NEUROPATHY, WITH LONG-TERM CURRENT USE OF INSULIN (HCC): ICD-10-CM

## 2021-04-01 PROCEDURE — 99214 OFFICE O/P EST MOD 30 MIN: CPT | Performed by: NURSE PRACTITIONER

## 2021-04-01 PROCEDURE — 1036F TOBACCO NON-USER: CPT | Performed by: NURSE PRACTITIONER

## 2021-04-01 PROCEDURE — 2022F DILAT RTA XM EVC RTNOPTHY: CPT | Performed by: NURSE PRACTITIONER

## 2021-04-01 PROCEDURE — G8427 DOCREV CUR MEDS BY ELIG CLIN: HCPCS | Performed by: NURSE PRACTITIONER

## 2021-04-01 PROCEDURE — G8417 CALC BMI ABV UP PARAM F/U: HCPCS | Performed by: NURSE PRACTITIONER

## 2021-04-01 PROCEDURE — 3051F HG A1C>EQUAL 7.0%<8.0%: CPT | Performed by: NURSE PRACTITIONER

## 2021-04-01 RX ORDER — INSULIN DEGLUDEC INJECTION 100 U/ML
INJECTION, SOLUTION SUBCUTANEOUS
Qty: 15 ML | Refills: 0 | Status: SHIPPED | OUTPATIENT
Start: 2021-04-01 | End: 2021-06-15

## 2021-04-01 RX ORDER — ATORVASTATIN CALCIUM 80 MG/1
80 TABLET, FILM COATED ORAL DAILY
Qty: 90 TABLET | Refills: 0 | Status: SHIPPED | OUTPATIENT
Start: 2021-04-01 | End: 2021-07-12

## 2021-04-01 RX ORDER — CETIRIZINE HYDROCHLORIDE 10 MG/1
10 TABLET ORAL DAILY
COMMUNITY
End: 2021-05-03

## 2021-04-01 RX ORDER — MONTELUKAST SODIUM 10 MG/1
10 TABLET ORAL NIGHTLY
Qty: 90 TABLET | Refills: 0 | Status: SHIPPED | OUTPATIENT
Start: 2021-04-01 | End: 2021-11-04

## 2021-04-01 RX ORDER — GABAPENTIN 300 MG/1
300 CAPSULE ORAL DAILY
Qty: 90 CAPSULE | Refills: 0 | Status: SHIPPED | OUTPATIENT
Start: 2021-04-01 | End: 2021-08-03 | Stop reason: ALTCHOICE

## 2021-04-01 RX ORDER — BUDESONIDE AND FORMOTEROL FUMARATE DIHYDRATE 160; 4.5 UG/1; UG/1
AEROSOL RESPIRATORY (INHALATION)
Qty: 10.2 G | Refills: 2 | Status: SHIPPED | OUTPATIENT
Start: 2021-04-01 | End: 2021-04-08 | Stop reason: SDUPTHER

## 2021-04-01 RX ORDER — ALBUTEROL SULFATE 90 UG/1
AEROSOL, METERED RESPIRATORY (INHALATION)
Qty: 6.7 G | Refills: 1 | Status: SHIPPED | OUTPATIENT
Start: 2021-04-01 | End: 2021-04-30

## 2021-04-01 RX ORDER — OMEPRAZOLE 20 MG/1
CAPSULE, DELAYED RELEASE ORAL
Qty: 90 CAPSULE | Refills: 0 | Status: SHIPPED | OUTPATIENT
Start: 2021-04-01 | End: 2021-10-18

## 2021-04-01 RX ORDER — IPRATROPIUM BROMIDE AND ALBUTEROL SULFATE 2.5; .5 MG/3ML; MG/3ML
1 SOLUTION RESPIRATORY (INHALATION) EVERY 6 HOURS PRN
Qty: 360 ML | Refills: 5 | Status: CANCELLED | OUTPATIENT
Start: 2021-04-01

## 2021-04-01 ASSESSMENT — ENCOUNTER SYMPTOMS
SHORTNESS OF BREATH: 0
DIARRHEA: 0
RHINORRHEA: 0
CONSTIPATION: 0
SINUS PRESSURE: 0
SINUS PAIN: 0
BACK PAIN: 1
NAUSEA: 0
CHEST TIGHTNESS: 0
ABDOMINAL PAIN: 0
VOMITING: 0
COUGH: 0

## 2021-04-01 NOTE — PROGRESS NOTES
4/1/2021    This is a 45 y.o. male   Chief Complaint   Patient presents with    Diabetes     no issues or concerns. some meds have changed. still hasn't gotten the freestyle karo    Other     qustions about taking gabapentin again   . HPI  Patient reports that he is moving to [facility] this summer. Diabetes Mellitus Type 2: Current symptoms/problems include neuropathy. Home blood sugar records: fasting range: , 2 hours post dinner: 110-115   Taking Tresiba 28 units daily, metformin 1000 mg twice daily with meals and Invokana 100 mg daily (insurance is switching to farxiga 5 mg daily). No longer on humalog since weight has decreased. Any episodes of hypoglycemia? no  Eye exam current (within one year): yes- patient states he went to [facility] within past year but no records have been sent over. Tobacco history: He  reports that he has never smoked. He has never used smokeless tobacco.   Daily Aspirin? No    Patient follows with podiatrist at [facility] for routine foot care. Has diabetic shoes. Would like to restart gabapentin for his neuropathy pain. Hypertension:  Home blood pressure monitoring: No.  He is adherent to a low sodium diet. Patient denies chest pain, shortness of breath, headache, lightheadedness, blurred vision, peripheral edema and palpitations. Antihypertensive medication side effects: no medication side effects noted. Use of agents associated with hypertension: none. He is currently taking metoprolol tartrate 25 mg BID and lisinopril 20 mg daily. Hyperlipidemia:  No new myalgias or GI upset on atorvastatin (Lipitor). Obesity:  Patient is following with bariatric specialist with [facility]. S/p clarisa-en-y gastric bypass surgery 2/23/21. He is already down 50 lbs. He is exercising on his bike daily for 30 minutes.      Lab Results   Component Value Date    LABA1C 7.1 01/27/2021    LABA1C 6.1 09/29/2020    LABA1C 5.9 06/04/2020     Lab Results   Component Value Date    LABMICR YES 05/18/2020    CREATININE 0.7 (L) 09/29/2020     Lab Results   Component Value Date    ALT 54 (H) 09/29/2020    AST 35 09/29/2020     Lab Results   Component Value Date    CHOL 131 09/29/2020    TRIG 234 (H) 09/29/2020    HDL 35 (L) 09/29/2020    LDLCALC 49 09/29/2020    LDLDIRECT 146 (H) 03/19/2019        GERD: stable on omeprazole 20 mg daily. Asthma: currently well controlled on singulair 10 mg daily, zyrtec 10 mg daily and symbicort daily. Rarely needs to use albuterol rescue inhaler. He follows with pulmonologist Dr. Doug Mcgee.          Patient Active Problem List   Diagnosis    Moderate persistent asthma without complication    Hypertension    GERD (gastroesophageal reflux disease)    Tinea cruris    Type 2 diabetes mellitus with diabetic neuropathy, with long-term current use of insulin (HCC)    Mixed hyperlipidemia    Failed back surgical syndrome    [facility]inal [facility] injury, C1-C7 [facility]    Chronic pain syndrome    Central spinal stenosis    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 ([facility]    Major depressive disorder    Insomnia    Diabetic eye exam ([facility]- 12/16 wnl CEI    Closed nondisplaced fracture of fifth metatarsal bone of right foot    Morbid obesity with body mass index (BMI) of 40.0 to 44.9 in adult (HCC)    Elevated liver enzymes    JULITA (obstructive sleep apnea)    Pressure ulcer of buttock    Abnormal levels of other serum enzymes    Decubitus ulcer of buttock    Chronic back pain    Right hand pain    Diabetic ulcer of toe of right foot associated with type 2 diabetes mellitus ([address]    Di[facility] ulcer of toe [facility]    Ingrowing nail    Environmental and seasonal allergies    Urinary tract infection, site not specified    Severe persistent asthma without complication    Other allergic rhinitis    S/P gastric bypass- 2/2021          Current Outpatient Medications   Medication Sig Dispense Refill    cetirizine (ZYRTEC) 10 MG tablet Take 10 mg by mouth daily      metoprolol tartrate (LOPRESSOR) 25 MG tablet Take 25 mg by mouth 2 times daily      dapagliflozin (FARXIGA) 5 MG tablet Take 1 tablet by mouth every morning 90 tablet 0    metFORMIN (GLUCOPHAGE) 1000 MG tablet TAKE 1 TABLET BY MOUTH TWICE DAILY WITH MEALS FOR DIABETES 180 tablet 0    omeprazole (PRILOSEC) 20 MG delayed release capsule TAKE ONE CAPSULE BY MOUTH DAILY FOR STOMACH 90 capsule 1    lisinopril (PRINIVIL;ZESTRIL) 20 MG tablet TAKE 1 TABLET BY MOUTH DAILY 90 tablet 1    atorvastatin (LIPITOR) 80 MG tablet TAKE 1 TABLET BY MOUTH DAILY 90 tablet 1    budesonide-formoterol (SYMBICORT) 160-4.5 MCG/ACT AERO INHALE TWO PUFFS BY MOUTH TWICE A DAY FOR LONG TERM CONTROL OF ASTHMA, RINSE MOUTH OUT AFTER USE 10.2 g 5    montelukast (SINGULAIR) 10 MG tablet Take 1 tablet by mouth nightly 30 tablet 5    ipratropium-albuterol (DUONEB) 0.5-2.5 (3) MG/3ML SOLN nebulizer solution Inhale 3 mLs into the lungs every 6 hours as needed for Shortness of Breath 360 mL 5    Insulin Pen Needle (ULTICARE MICRO PEN NEEDLES) 32G X 4 MM MISC Use  each 2    Insulin Degludec (TRESIBA FLEXTOUCH) 100 UNIT/ML SOPN INJECT 45 UNITS EVERY EVENING AS DIRECTED 15 mL 0    albuterol sulfate  (90 Base) MCG/ACT inhaler INHALE TWO PUFFS BY MOUTH EVERY 6 HOURS AS NEEDED FOR WHEEZING OR FOR SHORTNESS OF BREATH 6.7 g 0    Continuous Blood Gluc Sensor (FREESTYLE JM SENSOR SYSTEM) MISC as needed 2 each 5    Continuous Blood Gluc  (FREESTYLE JM 14 DAY READER) ANYI as needed 1 Device 0    Probiotic Product (PROBIOTIC ADVANCED) CAPS Take 1 capsule by mouth daily      Specialty Vitamins Products (CENTRUM PERFORMANCE) TABS Take 1 tablet by mouth daily      blood glucose test strips (TRUE METRIX BLOOD GLUCOSE TEST) strip 1 each by In Vitro route 4 times daily 100 each 3    Lancets MISC Use to check BS  each 5    Cyanocobalamin (VITAMIN B 12 PO) Take 1 tablet by mouth daily      omega-3 acid BP Readings from Last 3 Encounters:   04/01/21 132/88   02/22/21 (!) 144/97   01/27/21 138/84       Physical Exam  Vitals signs and nursing note reviewed. Constitutional:       General: He is not in acute distress. Appearance: Normal appearance. He is obese. HENT:      Head: Normocephalic and atraumatic. Eyes:      General: No scleral icterus. Extraocular Movements: Extraocular movements intact. Conjunctiva/sclera: Conjunctivae normal.   Neck:      Musculoskeletal: Normal range of motion and neck supple. Vascular: No carotid bruit. Cardiovascular:      Rate and Rhythm: Normal rate and regular rhythm. Pulses: Normal pulses. Dorsalis pedis pulses are 2+ on the right side and 2+ on the left side. Posterior tibial pulses are 2+ on the right side and 2+ on the left side. Heart sounds: Normal heart sounds. No murmur. No friction rub. No gallop. Pulmonary:      Effort: Pulmonary effort is normal.      Breath sounds: Normal breath sounds. No wheezing. Musculoskeletal:      Right lower leg: No edema. Left lower leg: No edema. Right foot: Normal range of motion. Foot drop present. No deformity, bunion, Charcot foot or prominent metatarsal heads. Left foot: Normal range of motion. No deformity, bunion, Charcot foot, foot drop or prominent metatarsal heads. Feet:      Right foot:      Protective Sensation: 10 sites tested. 0 sites sensed. Skin integrity: Callus and dry skin present. No ulcer, blister, skin breakdown, erythema or warmth. Toenail Condition: Right toenails are normal.      Left foot:      Protective Sensation: 10 sites tested. 10 sites sensed. Skin integrity: Callus and dry skin present. No ulcer, blister, skin breakdown, erythema or warmth. Toenail Condition: Left toenails are normal.   Skin:     General: Skin is warm and dry. Coloration: Skin is not jaundiced.    Neurological:      Mental Status: He is alert and oriented to person, place, and time. Psychiatric:         Mood and Affect: Mood normal.         Behavior: Behavior normal.         Thought Content: Thought content normal.         Judgment: Judgment normal.         Assessmentand Plan  Dami Collier was seen today for diabetes. Diagnoses and all orders for this visit:    Type 2 diabetes mellitus with diabetic neuropathy, with long-term current use of insulin (HCC)  -     Insulin Degludec (TRESIBA FLEXTOUCH) 100 UNIT/ML SOPN; INJECT 28 UNITS EVERY EVENING AS DIRECTED  -     Comprehensive Metabolic Panel; Future  To check HgA1c later this month as last was checked 1/27/21. Insulin demand has decreased due to weight loss. Continue metformin 1000 mg twice daily  Due to insurance coverage will switch to farxiga 5 mg daily from [employer]. Continue checking fasting blood sugars and 2 hours post dinner blood sugars  Discussed importance of low carb diet, aerobic exercise and need for weight loss. Continues to follow with podiatrist Dr. Fanny Mcghee for his diabetic neuropathy. He has diabetic shoes that he will wear. Will restart gabapentin 300 mg qhs for diabetic neuropathy pain    Mixed hyperlipidemia  Lipids stable 9/2020  Continue atorvastatin 80 mg daily  Discussed importance of low fat/cholesterol diet, aerobic exercise and need for weight loss. Essential hypertension  -     Comprehensive Metabolic Panel; Future  Will check kidney function today. Continue metoprolol tartrate 25 mg BID and lisinopril 20 mg daily. Discussed importance of low salt diet, aerobic exercise and need for weight loss. Obesity, morbid, BMI 40.0-49.9 ([facility]  S/p gastric bypass surgery 2/2020. Weight is already down 50 lbs  Discussed importance of diet, aerobic exercise and hydration with water. Advised to continue following with bariatrics specialist    GERD  Stable.  Continue omeprazole 20 mg daily    Chronic pain  Encouraged to follow up with pain specialist. He reports that his

## 2021-04-08 DIAGNOSIS — J45.40 MODERATE PERSISTENT ASTHMA WITHOUT COMPLICATION: ICD-10-CM

## 2021-04-08 RX ORDER — BUDESONIDE AND FORMOTEROL FUMARATE DIHYDRATE 160; 4.5 UG/1; UG/1
AEROSOL RESPIRATORY (INHALATION)
Qty: 3 INHALER | Refills: 1 | Status: SHIPPED | OUTPATIENT
Start: 2021-04-08 | End: 2021-06-02

## 2021-04-27 ENCOUNTER — OFFICE VISIT (OUTPATIENT)
Dept: PULMONOLOGY | Age: 41
End: 2021-04-27
Payer: COMMERCIAL

## 2021-04-27 VITALS
OXYGEN SATURATION: 96 % | SYSTOLIC BLOOD PRESSURE: 138 MMHG | DIASTOLIC BLOOD PRESSURE: 88 MMHG | WEIGHT: 315 LBS | HEIGHT: 75 IN | HEART RATE: 90 BPM | TEMPERATURE: 97.5 F | BODY MASS INDEX: 39.17 KG/M2 | RESPIRATION RATE: 16 BRPM

## 2021-04-27 DIAGNOSIS — J30.89 OTHER ALLERGIC RHINITIS: ICD-10-CM

## 2021-04-27 DIAGNOSIS — J45.40 MODERATE PERSISTENT ASTHMA WITHOUT COMPLICATION: ICD-10-CM

## 2021-04-27 PROCEDURE — G8427 DOCREV CUR MEDS BY ELIG CLIN: HCPCS | Performed by: INTERNAL MEDICINE

## 2021-04-27 PROCEDURE — 1036F TOBACCO NON-USER: CPT | Performed by: INTERNAL MEDICINE

## 2021-04-27 PROCEDURE — 99213 OFFICE O/P EST LOW 20 MIN: CPT | Performed by: INTERNAL MEDICINE

## 2021-04-27 PROCEDURE — G8417 CALC BMI ABV UP PARAM F/U: HCPCS | Performed by: INTERNAL MEDICINE

## 2021-04-27 ASSESSMENT — ASTHMA QUESTIONNAIRES
QUESTION_2 LAST FOUR WEEKS HOW OFTEN HAVE YOU HAD SHORTNESS OF BREATH: 4
QUESTION_3 LAST FOUR WEEKS HOW OFTEN DID YOUR ASTHMA SYMPTOMS (WHEEZING, COUGHING, SHORTNESS OF BREATH, CHEST TIGHTNESS OR PAIN) WAKE YOU UP AT NIGHT OR EARLIER THAN USUAL IN THE MORNING: 4
QUESTION_5 LAST FOUR WEEKS HOW WOULD YOU RATE YOUR ASTHMA CONTROL: 4

## 2021-04-27 NOTE — PROGRESS NOTES
diarrhea   Skin: Negative for rash  Psychiatric/Behavorial: Negative for anxiety     Objective:   PHYSICAL EXAM:  Blood pressure 138/88, pulse 90, temperature 97.5 °F (36.4 °C), temperature source Infrared, resp. rate 16, height 6' 3\" (1.905 m), weight (!) 336 lb (152.4 kg), SpO2 96 %.'  Gen: No distress. ENT:   Resp: No accessory muscle use. No crackles. No wheezes. No rhonchi. CV: Regular rate. Regular rhythm. No murmur or rub. No edema. Skin: Warm, dry, normal texture and turgor. No nodule on exposed extremities. M/S: No cyanosis. No clubbing. No joint deformity. Psych: Oriented x 3. No anxiety. Awake. Alert. Intact judgement and insight. Good Mood / Affect. Memory appears in tact            Data Reviewed:       Assessment:      Asthma -[facility] stopped secondary to insurance  Sleep apnea - Dr. Gerardo Herrera  Obesity  Depression  GERD      Plan:      Problem List Items Addressed This Visit     Other allergic rhinitis     Currently controlled with Singulair. Worse around animals such as dogs. Moderate persistent asthma without complication     Patient was on Nucala and this was stopped secondary to insurance. Continues on Symbicort and albuterol. After having weight loss surgery, symptoms have improved.

## 2021-04-27 NOTE — LETTER
[facility]  [address] [place] [address] [address] [facility]. [address]  Phone: [phone]  Fax: [phone]     4/29/2021    Dr. Rudi Siegel, APRN - CNP    Today had the pleasure to see our mutual patient, Michelle Ramon. My office note is attached. Please let me know if you have any questions.         Sincerely,    [facility]  [phone]

## 2021-04-29 ENCOUNTER — PATIENT MESSAGE (OUTPATIENT)
Dept: FAMILY MEDICINE CLINIC | Age: 41
End: 2021-04-29

## 2021-04-29 DIAGNOSIS — M48.00 CENTRAL SPINAL STENOSIS: ICD-10-CM

## 2021-04-29 DIAGNOSIS — M96.1 FAILED BACK SURGICAL SYNDROME: Primary | ICD-10-CM

## 2021-04-29 DIAGNOSIS — G89.4 CHRONIC PAIN SYNDROME: ICD-10-CM

## 2021-04-29 NOTE — TELEPHONE ENCOUNTER
From: Bebeto Dayton  To: Rosendo Evans APRN - CNP  Sent: 4/29/2021 9:06 AM EDT  Subject: Prescription Question    I need a referral Faxed to [phone] for Pain management i have finally found one to take my insurance. So if you can send a referral and info on my injuries i would appreciate it Matt Anglin Thank you so much and have a nice day.

## 2021-04-29 NOTE — TELEPHONE ENCOUNTER
Referral signed. I do not have his previous imaging as has previous back surgeries and imaging was completed prior to when he followed with me. If his new pain management is requiring these images he would need to request his previous records.

## 2021-04-29 NOTE — ASSESSMENT & PLAN NOTE
Patient was on Nucala and this was stopped secondary to insurance. Continues on Symbicort and albuterol. After having weight loss surgery, symptoms have improved.

## 2021-04-30 DIAGNOSIS — J45.40 MODERATE PERSISTENT ASTHMA WITHOUT COMPLICATION: ICD-10-CM

## 2021-04-30 RX ORDER — ALBUTEROL SULFATE 90 UG/1
AEROSOL, METERED RESPIRATORY (INHALATION)
Qty: 18 G | Refills: 1 | Status: SHIPPED | OUTPATIENT
Start: 2021-04-30 | End: 2021-07-02

## 2021-05-14 ENCOUNTER — TELEPHONE (OUTPATIENT)
Dept: FAMILY MEDICINE CLINIC | Age: 41
End: 2021-05-14

## 2021-05-14 NOTE — TELEPHONE ENCOUNTER
I called the office number for this doctor @[phone]. Office is closed and answering service could not take a message .  He is a neurologist specializing in neuropathy

## 2021-05-14 NOTE — TELEPHONE ENCOUNTER
Keyona Flores from Dr. Gardenia Valverde office called stating that Dr. Timoteo Wei wanted to talk to Wen Guaman about this pt    Please Advise

## 2021-05-17 ENCOUNTER — TELEPHONE (OUTPATIENT)
Dept: FAMILY MEDICINE CLINIC | Age: 41
End: 2021-05-17

## 2021-05-17 NOTE — TELEPHONE ENCOUNTER
Faxed last office note, demographic and ins card to [phone]. Reason for visit is on referral that was faxed over.

## 2021-06-10 ASSESSMENT — LIFESTYLE VARIABLES
HOW MANY STANDARD DRINKS CONTAINING ALCOHOL DO YOU HAVE ON A TYPICAL DAY: THREE OR FOUR
HOW OFTEN DO YOU HAVE A DRINK CONTAINING ALCOHOL: MONTHLY OR LESS
HOW OFTEN DURING THE LAST YEAR HAVE YOU FAILED TO DO WHAT WAS NORMALLY EXPECTED FROM YOU BECAUSE OF DRINKING: NEVER
HOW OFTEN DURING THE LAST YEAR HAVE YOU BEEN UNABLE TO REMEMBER WHAT HAPPENED THE NIGHT BEFORE BECAUSE YOU HAD BEEN DRINKING: NEVER
HOW OFTEN DO YOU HAVE SIX OR MORE DRINKS ON ONE OCCASION: NEVER
HOW MANY STANDARD DRINKS CONTAINING ALCOHOL DO YOU HAVE ON A TYPICAL DAY: 1
HOW OFTEN DURING THE LAST YEAR HAVE YOU FOUND THAT YOU WERE NOT ABLE TO STOP DRINKING ONCE YOU HAD STARTED: NEVER
AUDIT TOTAL SCORE: 2
HAVE YOU OR SOMEONE ELSE BEEN INJURED AS A RESULT OF YOUR DRINKING: 0
HAS A RELATIVE, FRIEND, DOCTOR, OR ANOTHER HEALTH PROFESSIONAL EXPRESSED CONCERN ABOUT YOUR DRINKING OR SUGGESTED YOU CUT DOWN: NO
HOW OFTEN DURING THE LAST YEAR HAVE YOU NEEDED AN ALCOHOLIC DRINK FIRST THING IN THE MORNING TO GET YOURSELF GOING AFTER A NIGHT OF HEAVY DRINKING: NEVER
HOW OFTEN DURING THE LAST YEAR HAVE YOU FOUND THAT YOU WERE NOT ABLE TO STOP DRINKING ONCE YOU HAD STARTED: 0
HOW OFTEN DO YOU HAVE SIX OR MORE DRINKS ON ONE OCCASION: 0
HOW OFTEN DURING THE LAST YEAR HAVE YOU BEEN UNABLE TO REMEMBER WHAT HAPPENED THE NIGHT BEFORE BECAUSE YOU HAD BEEN DRINKING: 0
HOW OFTEN DURING THE LAST YEAR HAVE YOU HAD A FEELING OF GUILT OR REMORSE AFTER DRINKING: 0
HOW OFTEN DURING THE LAST YEAR HAVE YOU FAILED TO DO WHAT WAS NORMALLY EXPECTED FROM YOU BECAUSE OF DRINKING: 0
HOW OFTEN DURING THE LAST YEAR HAVE YOU HAD A FEELING OF GUILT OR REMORSE AFTER DRINKING: NEVER
AUDIT TOTAL SCORE: 0
HAS A RELATIVE, FRIEND, DOCTOR, OR ANOTHER HEALTH PROFESSIONAL EXPRESSED CONCERN ABOUT YOUR DRINKING OR SUGGESTED YOU CUT DOWN: 0
HOW OFTEN DURING THE LAST YEAR HAVE YOU NEEDED AN ALCOHOLIC DRINK FIRST THING IN THE MORNING TO GET YOURSELF GOING AFTER A NIGHT OF HEAVY DRINKING: 0
HAVE YOU OR SOMEONE ELSE BEEN INJURED AS A RESULT OF YOUR DRINKING: NO
AUDIT-C TOTAL SCORE: 0
HOW OFTEN DO YOU HAVE A DRINK CONTAINING ALCOHOL: 1
AUDIT-C TOTAL SCORE: 2

## 2021-06-10 ASSESSMENT — PATIENT HEALTH QUESTIONNAIRE - PHQ9
SUM OF ALL RESPONSES TO PHQ QUESTIONS 1-9: 1
SUM OF ALL RESPONSES TO PHQ QUESTIONS 1-9: 1
SUM OF ALL RESPONSES TO PHQ9 QUESTIONS 1 & 2: 1
SUM OF ALL RESPONSES TO PHQ QUESTIONS 1-9: 1
2. FEELING DOWN, DEPRESSED OR HOPELESS: 0
1. LITTLE INTEREST OR PLEASURE IN DOING THINGS: 1

## 2021-06-15 ENCOUNTER — OFFICE VISIT (OUTPATIENT)
Dept: FAMILY MEDICINE CLINIC | Age: 41
End: 2021-06-15
Payer: COMMERCIAL

## 2021-06-15 VITALS
OXYGEN SATURATION: 96 % | WEIGHT: 314.2 LBS | BODY MASS INDEX: 39.07 KG/M2 | SYSTOLIC BLOOD PRESSURE: 124 MMHG | HEART RATE: 87 BPM | HEIGHT: 75 IN | RESPIRATION RATE: 16 BRPM | TEMPERATURE: 98.8 F | DIASTOLIC BLOOD PRESSURE: 82 MMHG

## 2021-06-15 DIAGNOSIS — Z79.4 TYPE 2 DIABETES MELLITUS WITH DIABETIC NEUROPATHY, WITH LONG-TERM CURRENT USE OF INSULIN (HCC): ICD-10-CM

## 2021-06-15 DIAGNOSIS — Z00.00 ROUTINE GENERAL MEDICAL EXAMINATION AT A HEALTH CARE FACILITY: Primary | ICD-10-CM

## 2021-06-15 DIAGNOSIS — E11.40 TYPE 2 DIABETES MELLITUS WITH DIABETIC NEUROPATHY, WITH LONG-TERM CURRENT USE OF INSULIN (HCC): ICD-10-CM

## 2021-06-15 DIAGNOSIS — G47.33 OSA (OBSTRUCTIVE SLEEP APNEA): ICD-10-CM

## 2021-06-15 DIAGNOSIS — R53.82 CHRONIC FATIGUE: ICD-10-CM

## 2021-06-15 LAB
A/G RATIO: 2.1 (ref 1.1–2.2)
ALBUMIN SERPL-MCNC: 4.6 G/DL (ref 3.4–5)
ALP BLD-CCNC: 132 U/L (ref 40–129)
ALT SERPL-CCNC: 35 U/L (ref 10–40)
ANION GAP SERPL CALCULATED.3IONS-SCNC: 15 MMOL/L (ref 3–16)
AST SERPL-CCNC: 28 U/L (ref 15–37)
BASOPHILS ABSOLUTE: 0.1 K/UL (ref 0–0.2)
BASOPHILS RELATIVE PERCENT: 0.9 %
BILIRUB SERPL-MCNC: 0.6 MG/DL (ref 0–1)
BUN BLDV-MCNC: 11 MG/DL (ref 7–20)
CALCIUM SERPL-MCNC: 9.6 MG/DL (ref 8.3–10.6)
CHLORIDE BLD-SCNC: 101 MMOL/L (ref 99–110)
CO2: 28 MMOL/L (ref 21–32)
CREAT SERPL-MCNC: 0.6 MG/DL (ref 0.9–1.3)
CREATININE URINE: 174.6 MG/DL (ref 39–259)
EOSINOPHILS ABSOLUTE: 0.1 K/UL (ref 0–0.6)
EOSINOPHILS RELATIVE PERCENT: 2.3 %
FOLATE: >20 NG/ML (ref 4.78–24.2)
GFR AFRICAN AMERICAN: >60
GFR NON-AFRICAN AMERICAN: >60
GLOBULIN: 2.2 G/DL
GLUCOSE BLD-MCNC: 80 MG/DL (ref 70–99)
HBA1C MFR BLD: 5.5 %
HCT VFR BLD CALC: 45.5 % (ref 40.5–52.5)
HEMOGLOBIN: 15.1 G/DL (ref 13.5–17.5)
LYMPHOCYTES ABSOLUTE: 1.8 K/UL (ref 1–5.1)
LYMPHOCYTES RELATIVE PERCENT: 30 %
MCH RBC QN AUTO: 27.6 PG (ref 26–34)
MCHC RBC AUTO-ENTMCNC: 33.2 G/DL (ref 31–36)
MCV RBC AUTO: 83 FL (ref 80–100)
MICROALBUMIN UR-MCNC: <1.2 MG/DL
MICROALBUMIN/CREAT UR-RTO: NORMAL MG/G (ref 0–30)
MONOCYTES ABSOLUTE: 0.4 K/UL (ref 0–1.3)
MONOCYTES RELATIVE PERCENT: 7.2 %
NEUTROPHILS ABSOLUTE: 3.6 K/UL (ref 1.7–7.7)
NEUTROPHILS RELATIVE PERCENT: 59.6 %
PDW BLD-RTO: 14.8 % (ref 12.4–15.4)
PLATELET # BLD: 319 K/UL (ref 135–450)
PMV BLD AUTO: 9.3 FL (ref 5–10.5)
POTASSIUM SERPL-SCNC: 4.9 MMOL/L (ref 3.5–5.1)
RBC # BLD: 5.49 M/UL (ref 4.2–5.9)
SODIUM BLD-SCNC: 144 MMOL/L (ref 136–145)
TOTAL PROTEIN: 6.8 G/DL (ref 6.4–8.2)
TSH REFLEX: 1.22 UIU/ML (ref 0.27–4.2)
VITAMIN B-12: 1114 PG/ML (ref 211–911)
WBC # BLD: 6 K/UL (ref 4–11)

## 2021-06-15 PROCEDURE — G0438 PPPS, INITIAL VISIT: HCPCS | Performed by: NURSE PRACTITIONER

## 2021-06-15 PROCEDURE — 83036 HEMOGLOBIN GLYCOSYLATED A1C: CPT | Performed by: NURSE PRACTITIONER

## 2021-06-15 PROCEDURE — 3044F HG A1C LEVEL LT 7.0%: CPT | Performed by: NURSE PRACTITIONER

## 2021-06-15 RX ORDER — INSULIN DEGLUDEC INJECTION 100 U/ML
INJECTION, SOLUTION SUBCUTANEOUS
Qty: 15 ML | Refills: 0 | Status: SHIPPED | OUTPATIENT
Start: 2021-06-15 | End: 2021-08-03 | Stop reason: DRUGHIGH

## 2021-06-15 ASSESSMENT — LIFESTYLE VARIABLES
HOW OFTEN DO YOU HAVE SIX OR MORE DRINKS ON ONE OCCASION: 0
HOW OFTEN DURING THE LAST YEAR HAVE YOU HAD A FEELING OF GUILT OR REMORSE AFTER DRINKING: 0
HAVE YOU OR SOMEONE ELSE BEEN INJURED AS A RESULT OF YOUR DRINKING: 0
HAS A RELATIVE, FRIEND, DOCTOR, OR ANOTHER HEALTH PROFESSIONAL EXPRESSED CONCERN ABOUT YOUR DRINKING OR SUGGESTED YOU CUT DOWN: 0
HOW MANY STANDARD DRINKS CONTAINING ALCOHOL DO YOU HAVE ON A TYPICAL DAY: 2
HOW OFTEN DURING THE LAST YEAR HAVE YOU FAILED TO DO WHAT WAS NORMALLY EXPECTED FROM YOU BECAUSE OF DRINKING: 0
HOW OFTEN DURING THE LAST YEAR HAVE YOU BEEN UNABLE TO REMEMBER WHAT HAPPENED THE NIGHT BEFORE BECAUSE YOU HAD BEEN DRINKING: 0
HOW OFTEN DURING THE LAST YEAR HAVE YOU FOUND THAT YOU WERE NOT ABLE TO STOP DRINKING ONCE YOU HAD STARTED: 0
AUDIT-C TOTAL SCORE: 3
HOW OFTEN DO YOU HAVE A DRINK CONTAINING ALCOHOL: 1
AUDIT TOTAL SCORE: 3
HOW OFTEN DURING THE LAST YEAR HAVE YOU NEEDED AN ALCOHOLIC DRINK FIRST THING IN THE MORNING TO GET YOURSELF GOING AFTER A NIGHT OF HEAVY DRINKING: 0

## 2021-06-15 ASSESSMENT — PATIENT HEALTH QUESTIONNAIRE - PHQ9
SUM OF ALL RESPONSES TO PHQ QUESTIONS 1-9: 0
SUM OF ALL RESPONSES TO PHQ QUESTIONS 1-9: 0
SUM OF ALL RESPONSES TO PHQ9 QUESTIONS 1 & 2: 0
1. LITTLE INTEREST OR PLEASURE IN DOING THINGS: 0
2. FEELING DOWN, DEPRESSED OR HOPELESS: 0
SUM OF ALL RESPONSES TO PHQ QUESTIONS 1-9: 0

## 2021-06-15 NOTE — PATIENT INSTRUCTIONS
Personalized Preventive Plan for Bebeto Phillip - 6/15/2021  Medicare offers a range of preventive health benefits. Some of the tests and screenings are paid in full while other may be subject to a deductible, co-insurance, and/or copay. Some of these benefits include a comprehensive review of your medical history including lifestyle, illnesses that may run in your family, and various assessments and screenings as appropriate. After reviewing your medical record and screening and assessments performed today your provider may have ordered immunizations, labs, imaging, and/or referrals for you. A list of these orders (if applicable) as well as your Preventive Care list are included within your After Visit Summary for your review. Other Preventive Recommendations:    · A preventive eye exam performed by an eye specialist is recommended every 1-2 years to screen for glaucoma; cataracts, macular degeneration, and other eye disorders. · A preventive dental visit is recommended every 6 months. · Try to get at least 150 minutes of exercise per week or 10,000 steps per day on a pedometer . · Order or download the FREE \"Exercise & Physical Activity: Your Everyday Guide\" from [employer]. Call 5-494.612.8202 or search [employer] online. · You need 8461-3558 mg of calcium and 2468-2571 IU of vitamin D per day. It is possible to meet your calcium requirement with diet alone, but a vitamin D supplement is usually necessary to meet this goal.  · When exposed to the sun, use a sunscreen that protects against both UVA and UVB radiation with an SPF of 30 or greater. Reapply every 2 to 3 hours or after sweating, drying off with a towel, or swimming. · Always wear a seat belt when traveling in a car. Always wear a helmet when riding a bicycle or motorcycle.

## 2021-06-15 NOTE — PROGRESS NOTES
Medicare Annual Wellness Visit  Name: Jordan Velez Date: 6/15/2021   MRN: <[id number]> Sex: Male   Age: 45 y.o. Ethnicity: Non-/Non    : 1980 Race: Haylie Dougherty is here for Medicare AWV (has questions about his energy level. increased fatigue x1 month)    Screenings for behavioral, psychosocial and functional/safety risks, and cognitive dysfunction are all negative except as indicated below. These results, as well as other patient data from the [address] [facility] [address] form, are documented in Flowsheets linked to this Encounter. Diabetes Mellitus Type 2: Current symptoms/problems include neuropathy. Highest weight was almost 400 lbs. Has gastric bypass surgery 21. Has been working on low carb diet. Exercise- riding bike for 30 minutes per day. Taking gabapentin 300 mg qhs. Helps with neuropathy pain. Follows with podiatrist for foot care. Home blood sugar records: fasting range: 72-80  Any episodes of hypoglycemia? no  Eye exam current (within one year): yes  Tobacco history: He  reports that he has never smoked. He has never used smokeless tobacco.     Hypertension:  Home blood pressure monitoring: No.  He is not adherent to a low sodium diet. Patient denies chest pain, shortness of breath, headache, peripheral edema, palpitations and dry cough. Antihypertensive medication side effects: no medication side effects noted. Hyperlipidemia:  No new myalgias or GI upset on atorvastatin (Lipitor) and Lovaza.        Lab Results   Component Value Date    LABA1C 5.5 06/15/2021    LABA1C 7.1 2021    LABA1C 6.1 2020     Lab Results   Component Value Date    LABMICR YES 2020    CREATININE 0.7 (L) 2020     Lab Results   Component Value Date    ALT 54 (H) 2020    AST 35 2020     Lab Results   Component Value Date    CHOL 131 2020    TRIG 234 (H) 2020    HDL 35 (L) 2020    LDLCALC 49 2020    LDLDIRECT 146 (H) 03/19/2019              Allergies   Allergen Reactions    Penicillins Swelling and Anaphylaxis     Swelling of tongue and throat    Fentanyl      Had hives at patch site and nausea    Other reaction(s): Vomiting    Food Swelling     Swelling of throat from Mushrooms    Mushroom Extract Complex      Other reaction(s): Angioedema         Prior to Visit Medications    Medication Sig Taking? Authorizing Provider   budesonide-formoterol (SYMBICORT) 160-4.5 MCG/ACT AERO INHALE TWO PUFFS BY MOUTH TWICE A DAY FOR LONG TERM CONTROL OF ASTHMA, RINSE MOUTH OUT AFTER USE Yes Magdalena Martinez MD   cetirizine (ZYRTEC) 10 MG tablet TAKE 1 TABLET BY MOUTH DAILY Yes Magdalena Martinez MD   albuterol sulfate  (90 Base) MCG/ACT inhaler INHALE TWO PUFFS BY MOUTH EVERY 6 HOURS AS NEEDED FOR WHEEZING OR FOR SHORTNESS OF BREATH Yes Minus AYLA Rojas CNP   atorvastatin (LIPITOR) 80 MG tablet Take 1 tablet by mouth daily Yes Minus AYLA Roajs CNP   dapagliflozin (FARXIGA) 5 MG tablet Take 1 tablet by mouth every morning Yes Minus AYLA Rojas CNP   Insulin Degludec (TRESIBA FLEXTOUCH) 100 UNIT/ML SOPN INJECT 28 UNITS EVERY EVENING AS DIRECTED Yes Minus AYLA Rojas CNP   metFORMIN (GLUCOPHAGE) 1000 MG tablet TAKE 1 TABLET BY MOUTH TWICE DAILY WITH MEALS FOR DIABETES Yes Minus AYLA Rojas CNP   montelukast (SINGULAIR) 10 MG tablet Take 1 tablet by mouth nightly Yes Minus AYLA oRjas CNP   metoprolol tartrate (LOPRESSOR) 25 MG tablet Take 1 tablet by mouth 2 times daily Yes Minus AYLA Rojas CNP   gabapentin (NEURONTIN) 300 MG capsule Take 1 capsule by mouth daily for 90 days.  Yes Minus AYLA Rojas CNP   lisinopril (PRINIVIL;ZESTRIL) 20 MG tablet TAKE 1 TABLET BY MOUTH DAILY Yes Minus AYLA Rojas CNP   ipratropium-albuterol (DUONEB) 0.5-2.5 (3) MG/3ML SOLN nebulizer solution Inhale 3 mLs into the lungs every 6 hours as needed for Shortness of Breath Yes Frank Padilla MD   Insulin Pen Needle (ULTICARE MICRO PEN NEEDLES) 32G X 4 MM MISC Use QID Yes AYLA Grullon CNP   Probiotic Product (PROBIOTIC ADVANCED) CAPS Take 1 capsule by mouth daily Yes Historical Provider, MD   Specialty Vitamins Products (CENTRUM PERFORMANCE) TABS Take 1 tablet by mouth daily Yes Historical Provider, MD   blood glucose test strips (TRUE METRIX BLOOD GLUCOSE TEST) strip 1 each by In Vitro route 4 times daily Yes AYLA Grullon CNP   Lancets MISC Use to check BS QID Yes AYLA Grullon CNP   Cyanocobalamin (VITAMIN B 12 PO) Take 1 tablet by mouth daily Yes Historical Provider, MD   omega-3 acid ethyl esters (LOVAZA) 1 g capsule Take 2 capsules by mouth 2 times daily Yes AYLA Grullon CNP   Blood Glucose Monitoring Suppl (TRUE METRIX METER) w/Device KIT Use to check fasting BS and 2 hours after meal BS Yes AYLA Grullon CNP   omeprazole (PRILOSEC) 20 MG delayed release capsule TAKE ONE CAPSULE BY MOUTH DAILY FOR STOMACH  AYLA Grullon CNP         Past Medical History:   Diagnosis Date    Asthma     Chronic back pain     Depression     GERD (gastroesophageal reflux disease)     Hyperlipidemia     Hypertension     Neuropathy     Obesity     Osteoarthritis     Other allergic rhinitis 10/27/2020    Peripheral vascular disease ([address]     Restless legs syndrome     Severe persistent asthma without complication 7/7/3868    Sleep apnea     Type 2 diabetes mellitus without complication ([address]     Type II or unspecified type diabetes mellitus without mention of complication, not stated as uncontrolled     Urinary incontinence        Past Surgical History:   Procedure Laterality Date    BACK SURGERY  2013    four surgeries; rods placed    FRACTURE SURGERY Right     arm- two    FRACTURE SURGERY Right     hand- three    HAND SURGERY  1999    KNEE ARTHROSCOPY Right     PELVIC FRACTURE SURGERY  KANNAN-EN-Y GASTRIC BYPASS  02/23/2021    SPINE SURGERY  2011    SPINE SURGERY  2010    UVULECTOMY           Family History   Problem Relation Age of Onset    Cancer Mother         stomach    Diabetes Mother     Asthma Mother     Diabetes Brother     Cancer Paternal Uncle         testicle    Cancer Paternal Grandmother         breast    Cancer Paternal Cousin         gum       CareTeam (Including outside providers/suppliers regularly involved in providing care):   Patient Care Team:  AYLA Graham CNP as PCP - General  AYLA Graham CNP as PCP - [facility] Empaneled Provider    Wt Readings from Last 3 Encounters:   06/15/21 (!) 314 lb 3.2 oz (142.5 kg)   04/27/21 (!) 336 lb (152.4 kg)   04/01/21 (!) 341 lb 3.2 oz (154.8 kg)     Vitals:    06/15/21 0834   BP: 124/82   Site: Right Upper Arm   Position: Sitting   Cuff Size: Large Adult   Pulse: 87   Resp: 16   Temp: 98.8 °F (37.1 °C)   TempSrc: Oral   SpO2: 96%   Weight: (!) 314 lb 3.2 oz (142.5 kg)   Height: 6' 3\" (1.905 m)     Body mass index is 39.27 kg/m². Based upon direct observation of the patient, evaluation of cognition reveals recent and remote memory intact. General Appearance: alert and oriented to person, place and time, well developed and well- nourished, in no acute distress  Skin: warm and dry, no rash or erythema  Head: normocephalic and atraumatic  Pulmonary/Chest: clear to auscultation bilaterally- no wheezes, rales or rhonchi, normal air movement, no respiratory distress  Cardiovascular: normal rate, regular rhythm, normal S1 and S2, no murmurs, rubs, clicks, or gallops, distal pulses intact, no carotid bruits  Extremities: no cyanosis, clubbing or edema    Patient's complete Health Risk Assessment and screening values have been reviewed and are found in Flowsheets. The following problems were reviewed today and where indicated follow up appointments were made and/or referrals ordered.     Positive Risk Factor microalbuminuria test  03/05/2021    Lipid screen  09/29/2021    Potassium monitoring  09/29/2021    Creatinine monitoring  09/29/2021    Diabetic foot exam  04/01/2022    A1C test (Diabetic or Prediabetic)  06/15/2022    Diabetic retinal exam  02/04/2023    DTaP/Tdap/Td vaccine (4 - Td or Tdap) 08/17/2030    Pneumococcal 0-64 years Vaccine (2 of 2) 07/30/2046    Flu vaccine  Completed    Hepatitis C screen  Completed    HIV screen  Completed    Hepatitis A vaccine  Aged Out    Hib vaccine  Aged Out    Meningococcal (ACWY) vaccine  Aged Out     Recommendations for [employer] Due: see orders and patient instructions/AVS.  . Recommended screening schedule for the next 5-10 years is provided to the patient in written form: see Patient Instructions/AVS.    Camila West was seen today for medicare awv. Diagnoses and all orders for this visit:    Routine general medical examination at a health care facility  Healthy lifestyles reviewed: Diet, aerobic exercise, sunscreen, vision and dental exams. Advised to obtain hepatitis B vaccine and Cobin 19 vaccine at his pharmacy. Planning on getting dentures. Type 2 diabetes mellitus with diabetic neuropathy, with long-term current use of insulin (HCC)  -     POCT glycosylated hemoglobin (Hb A1C)- 5.5%  -     Comprehensive Metabolic Panel; Future  -     Microalbumin / Creatinine Urine Ratio; Future  -     Insulin Degludec (TRESIBA FLEXTOUCH) 100 UNIT/ML SOPN; INJECT 10 UNITS EVERY EVENING AS DIRECTED  Diabetes control has improved with weight loss. Will decrease Tresiba down to 10 units nightly. Continue to check fasting blood sugar and 2 hours after dinner blood sugar. Patient is to let me know if fasting blood sugar increases above 120. Would need to increase Tresiba at that time. Continue on Metformin 1000 mg twice daily and Farxiga 5 mg daily. JULITA (obstructive sleep apnea)  Continue on CPAP nightly.  Continue follow-up with sleep specialist  Atilio     Chronic fatigue  -     CBC Auto Differential; Future  -     Vitamin B12 & Folate; Future  -     TSH with Reflex; Future  We will check labs. Advised to continue to work on diet, aerobic exercise, and weight loss. History of gastric bypass surgery 2/2021.  Continue f/u with bariatric team.

## 2021-06-22 DIAGNOSIS — Z79.4 TYPE 2 DIABETES MELLITUS WITH DIABETIC NEUROPATHY, WITH LONG-TERM CURRENT USE OF INSULIN (HCC): Primary | ICD-10-CM

## 2021-06-22 DIAGNOSIS — E11.40 TYPE 2 DIABETES MELLITUS WITH DIABETIC NEUROPATHY, WITH LONG-TERM CURRENT USE OF INSULIN (HCC): Primary | ICD-10-CM

## 2021-06-22 RX ORDER — PEN NEEDLE, DIABETIC,DISP UNIT 32GX 5/32"
NEEDLE, DISPOSABLE MISCELLANEOUS
Qty: 100 EACH | Refills: 2 | Status: SHIPPED | OUTPATIENT
Start: 2021-06-22 | End: 2022-01-06 | Stop reason: ALTCHOICE

## 2021-07-02 DIAGNOSIS — J45.40 MODERATE PERSISTENT ASTHMA WITHOUT COMPLICATION: ICD-10-CM

## 2021-07-02 RX ORDER — ALBUTEROL SULFATE 90 UG/1
AEROSOL, METERED RESPIRATORY (INHALATION)
Qty: 18 G | Refills: 1 | Status: SHIPPED | OUTPATIENT
Start: 2021-07-02 | End: 2021-11-04

## 2021-07-12 DIAGNOSIS — E11.40 TYPE 2 DIABETES MELLITUS WITH DIABETIC NEUROPATHY, WITH LONG-TERM CURRENT USE OF INSULIN (HCC): ICD-10-CM

## 2021-07-12 DIAGNOSIS — Z79.4 TYPE 2 DIABETES MELLITUS WITH DIABETIC NEUROPATHY, WITH LONG-TERM CURRENT USE OF INSULIN (HCC): ICD-10-CM

## 2021-07-12 DIAGNOSIS — I10 ESSENTIAL HYPERTENSION: ICD-10-CM

## 2021-07-12 DIAGNOSIS — E78.2 MIXED HYPERLIPIDEMIA: ICD-10-CM

## 2021-07-12 RX ORDER — ATORVASTATIN CALCIUM 80 MG/1
80 TABLET, FILM COATED ORAL DAILY
Qty: 90 TABLET | Refills: 0 | Status: SHIPPED | OUTPATIENT
Start: 2021-07-12 | End: 2021-08-03

## 2021-07-12 RX ORDER — DAPAGLIFLOZIN 5 MG/1
TABLET, FILM COATED ORAL
Qty: 90 TABLET | Refills: 0 | Status: SHIPPED | OUTPATIENT
Start: 2021-07-12 | End: 2022-01-06 | Stop reason: ALTCHOICE

## 2021-07-27 DIAGNOSIS — Z79.4 TYPE 2 DIABETES MELLITUS WITH DIABETIC NEUROPATHY, WITH LONG-TERM CURRENT USE OF INSULIN (HCC): ICD-10-CM

## 2021-07-27 DIAGNOSIS — E11.40 TYPE 2 DIABETES MELLITUS WITH DIABETIC NEUROPATHY, WITH LONG-TERM CURRENT USE OF INSULIN (HCC): ICD-10-CM

## 2021-07-27 RX ORDER — INSULIN DEGLUDEC INJECTION 100 U/ML
INJECTION, SOLUTION SUBCUTANEOUS
Qty: 45 ML | Refills: 0 | OUTPATIENT
Start: 2021-07-27

## 2021-08-03 ENCOUNTER — OFFICE VISIT (OUTPATIENT)
Dept: FAMILY MEDICINE CLINIC | Age: 41
End: 2021-08-03
Payer: COMMERCIAL

## 2021-08-03 VITALS
RESPIRATION RATE: 16 BRPM | TEMPERATURE: 98.1 F | HEART RATE: 80 BPM | BODY MASS INDEX: 39.17 KG/M2 | HEIGHT: 75 IN | OXYGEN SATURATION: 97 % | WEIGHT: 315 LBS | SYSTOLIC BLOOD PRESSURE: 138 MMHG | DIASTOLIC BLOOD PRESSURE: 88 MMHG

## 2021-08-03 DIAGNOSIS — Z79.4 TYPE 2 DIABETES MELLITUS WITH DIABETIC NEUROPATHY, WITH LONG-TERM CURRENT USE OF INSULIN (HCC): ICD-10-CM

## 2021-08-03 DIAGNOSIS — E78.2 MIXED HYPERLIPIDEMIA: ICD-10-CM

## 2021-08-03 DIAGNOSIS — I10 ESSENTIAL HYPERTENSION: ICD-10-CM

## 2021-08-03 DIAGNOSIS — K21.9 GASTROESOPHAGEAL REFLUX DISEASE, UNSPECIFIED WHETHER ESOPHAGITIS PRESENT: ICD-10-CM

## 2021-08-03 DIAGNOSIS — E11.40 TYPE 2 DIABETES MELLITUS WITH DIABETIC NEUROPATHY, WITH LONG-TERM CURRENT USE OF INSULIN (HCC): Primary | ICD-10-CM

## 2021-08-03 DIAGNOSIS — Z79.4 TYPE 2 DIABETES MELLITUS WITH DIABETIC NEUROPATHY, WITH LONG-TERM CURRENT USE OF INSULIN (HCC): Primary | ICD-10-CM

## 2021-08-03 DIAGNOSIS — E11.40 TYPE 2 DIABETES MELLITUS WITH DIABETIC NEUROPATHY, WITH LONG-TERM CURRENT USE OF INSULIN (HCC): ICD-10-CM

## 2021-08-03 DIAGNOSIS — E66.01 SEVERE OBESITY (BMI 35.0-35.9 WITH COMORBIDITY) (HCC): ICD-10-CM

## 2021-08-03 PROBLEM — L89.309 DECUBITUS ULCER OF BUTTOCK: Status: RESOLVED | Noted: 2018-09-12 | Resolved: 2021-08-03

## 2021-08-03 PROBLEM — N39.0 URINARY TRACT INFECTION, SITE NOT SPECIFIED: Status: RESOLVED | Noted: 2020-06-02 | Resolved: 2021-08-03

## 2021-08-03 LAB
A/G RATIO: 2.2 (ref 1.1–2.2)
ALBUMIN SERPL-MCNC: 4.7 G/DL (ref 3.4–5)
ALP BLD-CCNC: 122 U/L (ref 40–129)
ALT SERPL-CCNC: 45 U/L (ref 10–40)
ANION GAP SERPL CALCULATED.3IONS-SCNC: 12 MMOL/L (ref 3–16)
AST SERPL-CCNC: 39 U/L (ref 15–37)
BILIRUB SERPL-MCNC: 0.8 MG/DL (ref 0–1)
BUN BLDV-MCNC: 12 MG/DL (ref 7–20)
CALCIUM SERPL-MCNC: 9.4 MG/DL (ref 8.3–10.6)
CHLORIDE BLD-SCNC: 101 MMOL/L (ref 99–110)
CHOLESTEROL, FASTING: 107 MG/DL (ref 0–199)
CO2: 29 MMOL/L (ref 21–32)
CREAT SERPL-MCNC: 0.6 MG/DL (ref 0.9–1.3)
ESTIMATED AVERAGE GLUCOSE: 114 MG/DL
GFR AFRICAN AMERICAN: >60
GFR NON-AFRICAN AMERICAN: >60
GLOBULIN: 2.1 G/DL
GLUCOSE BLD-MCNC: 118 MG/DL (ref 70–99)
GLUCOSE FASTING: 118 MG/DL (ref 70–99)
HBA1C MFR BLD: 5.6 %
HDLC SERPL-MCNC: 53 MG/DL (ref 40–60)
LDL CHOLESTEROL CALCULATED: 33 MG/DL
POTASSIUM SERPL-SCNC: 4.3 MMOL/L (ref 3.5–5.1)
SODIUM BLD-SCNC: 142 MMOL/L (ref 136–145)
TOTAL PROTEIN: 6.8 G/DL (ref 6.4–8.2)
TRIGLYCERIDE, FASTING: 105 MG/DL (ref 0–150)
VLDLC SERPL CALC-MCNC: 21 MG/DL

## 2021-08-03 PROCEDURE — 1036F TOBACCO NON-USER: CPT | Performed by: NURSE PRACTITIONER

## 2021-08-03 PROCEDURE — G8417 CALC BMI ABV UP PARAM F/U: HCPCS | Performed by: NURSE PRACTITIONER

## 2021-08-03 PROCEDURE — 99214 OFFICE O/P EST MOD 30 MIN: CPT | Performed by: NURSE PRACTITIONER

## 2021-08-03 PROCEDURE — G8427 DOCREV CUR MEDS BY ELIG CLIN: HCPCS | Performed by: NURSE PRACTITIONER

## 2021-08-03 PROCEDURE — 3044F HG A1C LEVEL LT 7.0%: CPT | Performed by: NURSE PRACTITIONER

## 2021-08-03 PROCEDURE — 2022F DILAT RTA XM EVC RTNOPTHY: CPT | Performed by: NURSE PRACTITIONER

## 2021-08-03 RX ORDER — GABAPENTIN 400 MG/1
1 CAPSULE ORAL 3 TIMES DAILY
COMMUNITY
Start: 2021-07-02 | End: 2022-06-01 | Stop reason: SDUPTHER

## 2021-08-03 RX ORDER — ATORVASTATIN CALCIUM 80 MG/1
40 TABLET, FILM COATED ORAL DAILY
Qty: 90 TABLET | Refills: 0
Start: 2021-08-03 | End: 2021-10-06

## 2021-08-03 RX ORDER — INSULIN DEGLUDEC INJECTION 100 U/ML
INJECTION, SOLUTION SUBCUTANEOUS
Qty: 15 ML | Refills: 0 | Status: SHIPPED
Start: 2021-08-03 | End: 2021-10-05 | Stop reason: ALTCHOICE

## 2021-08-03 ASSESSMENT — ENCOUNTER SYMPTOMS
VOMITING: 0
SINUS PAIN: 0
BACK PAIN: 1
SHORTNESS OF BREATH: 0
DIARRHEA: 0
CHEST TIGHTNESS: 0
ABDOMINAL PAIN: 0
COUGH: 0
SINUS PRESSURE: 0
RHINORRHEA: 0
CONSTIPATION: 0
NAUSEA: 0

## 2021-08-03 NOTE — LETTER
[address] [facility] [address] [facility]  Phone: [phone]  Fax: [phone]    AYLA Lundy CNP         August 3, 2021     Patient: Jorge Miles   YOB: 1980   Date of Visit: 8/3/2021       To Whom It May Concern: It is my medical opinion that Bryan Abreu requires a disability parking placard for the following reasons:  He cannot walk without assistance from another person or the use of an assistance device (cane, crutch, prosthetic device, wheelchair, etc.). Duration of need: permanent    If you have any questions or concerns, please don't hesitate to call.     Sincerely,    AYLA Lundy CNP

## 2021-08-03 NOTE — PROGRESS NOTES
8/3/2021    This is a 37 y.o. male   Chief Complaint   Patient presents with    Diabetes     numbers have been good. has been riding exercise bike every other day for 1 hour   . HPI     Diabetes Mellitus Type 2: Current symptoms/problems include neuropathy. Home blood sugar records: fasting range: 78-82, 2 hours post dinner:    Taking Tresiba 10 units daily, metformin 1000 mg twice daily with meals and farxiga 5 mg daily. No longer on humalog since weight has decreased. Any episodes of hypoglycemia? no  Eye exam current (within one year): yes- patient states he went to [facility] within past year but no records have been sent over. Tobacco history: He  reports that he has never smoked. He has never used smokeless tobacco.   Daily Aspirin? No    Patient follows with podiatrist at [facility] for routine foot care. Has diabetic shoes. Reports that gabapentin is helping better to control neuropathy. Hypertension:  Home blood pressure monitoring: No.  He is adherent to a low sodium diet. Patient denies chest pain, shortness of breath, headache, lightheadedness, blurred vision, peripheral edema and palpitations. Antihypertensive medication side effects: no medication side effects noted. Use of agents associated with hypertension: none. He is currently taking metoprolol tartrate 25 mg BID and lisinopril 20 mg daily. Hyperlipidemia:  No new myalgias or GI upset on atorvastatin (Lipitor). Obesity:  Patient is following with bariatric specialist with [facility]. S/p clarisa-en-y gastric bypass surgery 2/23/21. He is already down 50 lbs. He is exercising on his bike daily for 60 minutes daily or every other day.      Lab Results   Component Value Date    LABA1C 5.5 06/15/2021    LABA1C 7.1 01/27/2021    LABA1C 6.1 09/29/2020     Lab Results   Component Value Date    LABMICR <1.20 06/15/2021    CREATININE 0.6 (L) 06/15/2021     Lab Results   Component Value Date    ALT 35 06/15/2021 AST 28 06/15/2021     Lab Results   Component Value Date    CHOL 131 09/29/2020    TRIG 234 (H) 09/29/2020    HDL 35 (L) 09/29/2020    LDLCALC 49 09/29/2020    LDLDIRECT 146 (H) 03/19/2019        GERD: stable on omeprazole 20 mg daily. Asthma: currently well controlled on singulair 10 mg daily, zyrtec 10 mg daily and symbicort daily. Rarely needs to use albuterol rescue inhaler. He follows with pulmonologist Dr. Viji Dyson. JULITA- on cpap nightly. Chronic back pain. Follows with Barbie Lopez. Giving gabapentin 400 mg TID. Talking about spine stimulator for pain. He is not sure if he would want to do this. Patient reports that his left knee is cracking when he is bending. Not having pain. Has full ROM.       Patient Active Problem List   Diagnosis    Moderate persistent asthma without complication    Hypertension    GERD (gastroesophageal reflux disease)    Tinea cruris    Type 2 diabetes mellitus with diabetic neuropathy, with long-term current use of insulin (HCC)    Mixed hyperlipidemia    Failed back surgical syndrome    Spinal cord injury, C1-C7 ([address]    Chronic pain syndrome    Central spinal stenosis    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 ([address]    Major depressive disorder    Insomnia    Diabetic eye exam ([address]- 12/16 wnl CEI    Closed nondisplaced fracture of fifth metatarsal bone of right foot    Morbid obesity with body mass index (BMI) of 40.0 to 44.9 in adult (HCC)    Elevated liver enzymes    JULITA (obstructive sleep apnea)    Pressure ulcer of buttock    Abnormal levels of other serum enzymes    Chronic back pain    Right hand pain    Diabetic ulcer of toe of right foot associated with type 2 diabetes mellitus ([address]    Diabetic ulcer of toe (HCC)    Ingrowing nail    Environmental and seasonal allergies    Severe persistent asthma without complication    Other allergic rhinitis    S/P gastric bypass- 2/2021          Current tablet by mouth daily      omega-3 acid ethyl esters (LOVAZA) 1 g capsule Take 2 capsules by mouth 2 times daily 360 capsule 0    Blood Glucose Monitoring Suppl (TRUE METRIX METER) w/Device KIT Use to check fasting BS and 2 hours after meal BS 1 kit 0     No current facility-administered medications for this visit. Allergies   Allergen Reactions    Penicillins Swelling and Anaphylaxis     Swelling of tongue and throat    Fentanyl      Had hives at patch site and nausea    Other reaction(s): Vomiting    Food Swelling     Swelling of throat from Mushrooms    Mushroom Extract Complex      Other reaction(s): Angioedema       Review of Systems   Constitutional: Negative for activity change, appetite change, fatigue, fever and unexpected weight change. HENT: Negative for congestion, rhinorrhea, sinus pressure and sinus pain. Eyes: Negative for visual disturbance. Respiratory: Negative for cough, chest tightness and shortness of breath. Cardiovascular: Negative for chest pain, palpitations and leg swelling. Gastrointestinal: Negative for abdominal pain, constipation, diarrhea, nausea and vomiting. Endocrine: Negative for cold intolerance, heat intolerance, polydipsia, polyphagia and polyuria. Genitourinary: Negative for difficulty urinating, frequency and urgency. Musculoskeletal: Positive for back pain and myalgias. Neurological: Negative for dizziness, light-headedness and headaches. Psychiatric/Behavioral: Negative for confusion and sleep disturbance. The patient is not nervous/anxious. Vitals:    08/03/21 0807   BP: 138/88   Site: Right Upper Arm   Position: Sitting   Cuff Size: Large Adult   Pulse: 80   Resp: 16   Temp: 98.1 °F (36.7 °C)   TempSrc: Oral   SpO2: 97%   Weight: (!) 319 lb 6.4 oz (144.9 kg)   Height: 6' 3\" (1.905 m)       Body mass index is 39.92 kg/m².      Wt Readings from Last 3 Encounters:   08/03/21 (!) 319 lb 6.4 oz (144.9 kg)   06/15/21 (!) 314 lb 3.2 oz (142.5 kg)   04/27/21 (!) 336 lb (152.4 kg)       BP Readings from Last 3 Encounters:   08/03/21 138/88   06/15/21 124/82   04/27/21 138/88       Physical Exam  Vitals and nursing note reviewed. Constitutional:       General: He is not in acute distress. Appearance: He is well-developed. HENT:      Head: Normocephalic and atraumatic. Cardiovascular:      Rate and Rhythm: Normal rate and regular rhythm. Heart sounds: Normal heart sounds. No murmur heard. No friction rub. No gallop. Pulmonary:      Effort: Pulmonary effort is normal. No respiratory distress. Breath sounds: Normal breath sounds. Musculoskeletal:      Cervical back: Neck supple. Left knee: No swelling or bony tenderness. Normal range of motion. No tenderness. Right lower leg: No edema. Left lower leg: No edema. Skin:     General: Skin is warm and dry. Neurological:      Mental Status: He is alert and oriented to person, place, and time. Psychiatric:         Behavior: Behavior normal.         Thought Content: Thought content normal.         Judgment: Judgment normal.         Assessmentand Plan  Huang Aceves was seen today for diabetes. Diagnoses and all orders for this visit:    Type 2 diabetes mellitus with diabetic neuropathy, with long-term current use of insulin (HCC)  -     Insulin Degludec (TRESIBA FLEXTOUCH) 100 UNIT/ML SOPN; INJECT 5 UNITS EVERY EVENING AS DIRECTED  -     Comprehensive Metabolic Panel; Future  Insulin demand has decreased due to weight loss. Decrease down to tresiba 5 units qhs. Continue metformin 1000 mg twice daily and farxiga 5 mg daily  Continue checking fasting blood sugars and 2 hours post dinner blood sugars  Discussed importance of low carb diet, aerobic exercise and need for weight loss. Continues to follow with podiatrist Dr. Chun Florentino for his diabetic neuropathy. He has diabetic shoes that he will wear. Neuropathy pain has improved with gabapentin use.      Mixed hyperlipidemia  Lipids stable 9/2020. Repeat lipids   Continue atorvastatin 80 mg daily and lovaza 2 g BID  Discussed importance of low fat/cholesterol diet, aerobic exercise and need for weight loss. Essential hypertension  -     Comprehensive Metabolic Panel; Future  Continue metoprolol tartrate 25 mg BID and lisinopril 20 mg daily. Discussed importance of low salt diet, aerobic exercise and need for weight loss. Obesity  S/p gastric bypass surgery 2/2020. Weight is already down 50 lbs  Discussed importance of diet, aerobic exercise and hydration with water. Advised to continue following with bariatrics specialist    GERD  Stable. Continue omeprazole 20 mg daily    Chronic pain  Encouraged to continue to follow up with pain specialist Dr. Dina Stallworth- stable current medications. Continue f/u with pulmonologist Dr. Scot Hill to get hep B vaccines at pharmacy. Return in about 2 months (around 10/3/2021), or if symptoms worsen or fail to improve, for diabetes.

## 2021-08-16 DIAGNOSIS — Z79.4 TYPE 2 DIABETES MELLITUS WITH DIABETIC NEUROPATHY, WITH LONG-TERM CURRENT USE OF INSULIN (HCC): ICD-10-CM

## 2021-08-16 DIAGNOSIS — E11.40 TYPE 2 DIABETES MELLITUS WITH DIABETIC NEUROPATHY, WITH LONG-TERM CURRENT USE OF INSULIN (HCC): ICD-10-CM

## 2021-08-17 RX ORDER — URSODIOL 300 MG/1
CAPSULE ORAL
Qty: 60 CAPSULE | Refills: 5 | OUTPATIENT
Start: 2021-08-17

## 2021-08-17 NOTE — TELEPHONE ENCOUNTER
Gabriella corona is prescribed by his bariatric doctor. Patient asked does he still need to be on this.   I  Advised  he needs to call the bariatric doctor to ask that question

## 2021-08-30 ENCOUNTER — OFFICE VISIT (OUTPATIENT)
Dept: PULMONOLOGY | Age: 41
End: 2021-08-30
Payer: COMMERCIAL

## 2021-08-30 VITALS
DIASTOLIC BLOOD PRESSURE: 88 MMHG | RESPIRATION RATE: 16 BRPM | BODY MASS INDEX: 38.67 KG/M2 | WEIGHT: 311 LBS | HEIGHT: 75 IN | TEMPERATURE: 98.2 F | HEART RATE: 70 BPM | OXYGEN SATURATION: 97 % | SYSTOLIC BLOOD PRESSURE: 136 MMHG

## 2021-08-30 DIAGNOSIS — J30.89 OTHER ALLERGIC RHINITIS: ICD-10-CM

## 2021-08-30 DIAGNOSIS — E66.9 OBESITY (BMI 30-39.9): ICD-10-CM

## 2021-08-30 DIAGNOSIS — G47.33 OSA (OBSTRUCTIVE SLEEP APNEA): ICD-10-CM

## 2021-08-30 DIAGNOSIS — J45.40 MODERATE PERSISTENT ASTHMA WITHOUT COMPLICATION: ICD-10-CM

## 2021-08-30 PROCEDURE — 1036F TOBACCO NON-USER: CPT | Performed by: INTERNAL MEDICINE

## 2021-08-30 PROCEDURE — 99214 OFFICE O/P EST MOD 30 MIN: CPT | Performed by: INTERNAL MEDICINE

## 2021-08-30 PROCEDURE — G8427 DOCREV CUR MEDS BY ELIG CLIN: HCPCS | Performed by: INTERNAL MEDICINE

## 2021-08-30 PROCEDURE — G8417 CALC BMI ABV UP PARAM F/U: HCPCS | Performed by: INTERNAL MEDICINE

## 2021-08-30 ASSESSMENT — SLEEP AND FATIGUE QUESTIONNAIRES
HOW LIKELY ARE YOU TO NOD OFF OR FALL ASLEEP WHILE SITTING QUIETLY AFTER LUNCH WITHOUT ALCOHOL: 1
HOW LIKELY ARE YOU TO NOD OFF OR FALL ASLEEP WHILE SITTING INACTIVE IN A PUBLIC PLACE: 1
HOW LIKELY ARE YOU TO NOD OFF OR FALL ASLEEP WHILE LYING DOWN TO REST IN THE AFTERNOON WHEN CIRCUMSTANCES PERMIT: 1
HOW LIKELY ARE YOU TO NOD OFF OR FALL ASLEEP WHEN YOU ARE A PASSENGER IN A CAR FOR AN HOUR WITHOUT A BREAK: 0
ESS TOTAL SCORE: 5
HOW LIKELY ARE YOU TO NOD OFF OR FALL ASLEEP WHILE SITTING AND READING: 1
HOW LIKELY ARE YOU TO NOD OFF OR FALL ASLEEP IN A CAR, WHILE STOPPED FOR A FEW MINUTES IN TRAFFIC: 0
HOW LIKELY ARE YOU TO NOD OFF OR FALL ASLEEP WHILE SITTING AND TALKING TO SOMEONE: 0
HOW LIKELY ARE YOU TO NOD OFF OR FALL ASLEEP WHILE WATCHING TV: 1

## 2021-08-30 ASSESSMENT — ASTHMA QUESTIONNAIRES
QUESTION_3 LAST FOUR WEEKS HOW OFTEN DID YOUR ASTHMA SYMPTOMS (WHEEZING, COUGHING, SHORTNESS OF BREATH, CHEST TIGHTNESS OR PAIN) WAKE YOU UP AT NIGHT OR EARLIER THAN USUAL IN THE MORNING: 4
QUESTION_2 LAST FOUR WEEKS HOW OFTEN HAVE YOU HAD SHORTNESS OF BREATH: 4
QUESTION_5 LAST FOUR WEEKS HOW WOULD YOU RATE YOUR ASTHMA CONTROL: 4
QUESTION_4 LAST FOUR WEEKS HOW OFTEN HAVE YOU USED YOUR RESCUE INHALER OR NEBULIZER MEDICATION (SUCH AS ALBUTEROL): 4
QUESTION_1 LAST FOUR WEEKS HOW MUCH OF THE TIME DID YOUR ASTHMA KEEP YOU FROM GETTING AS MUCH DONE AT WORK, SCHOOL OR AT HOME: 4
ACT_TOTALSCORE: 20

## 2021-08-30 NOTE — ASSESSMENT & PLAN NOTE
No download available. I believe he has a [employer] device. Information about the recall given. Advised to submit information soon.

## 2021-08-30 NOTE — PROGRESS NOTES
REASON FOR CONSULTATION/CC:    Chief Complaint   Patient presents with    Asthma     f/u Asthma        Consult at request of   AYLA Juna CNP     PCP: AYLA Juan CNP    HISTORY OF PRESENT ILLNESS: Nadja Dallas is a 37y.o. year old male with a history of asthma and JULITA who presents :      History  Patient has a history of moderate asthma that was treated with Helene Clause in the past.  This is stopped secondary to insurance. Helene Clause did significantly improve his asthma and allergic rhinitis symptoms. Status post gastric bypass surgery. After this, asthma symptoms improved without Nucala. Interval history     Asthma  ACT 20  Using Symbicort and as needed albuterol   Using Symbicort 1 bid. allergic rhinitis  Singulair. JULITA   Using device nightly. Obesity  status post bipass  Lost ~ 90 lbs. Working out as wel. ASTHMA CONTROL TEST 8/30/2021 4/27/2021 10/27/2020 5/7/2020 8/5/2019 2/11/2019 8/7/2018   In the past 4 weeks, how much of the time did your asthma keep you from getting as much done at work, school or at home? 4 4 4 3 3 3 5   During the past 4 weeks, how often have you had shortness of breath? 4 4 5 3 3 2 4   During the past 4 weeks, how often did your asthma symptoms (wheezing, coughing, shortness of breath, chest tightness or pain) wake you up at night or earlier than usual in the morning? 4 4 3 4 4 2 5   During the past 4 weeks, how often have you used your rescue inhaler or nebulizer medication (such as albuterol)? 4 3 3 2 3 1 4   How would you rate your asthma control during the past 4 weeks? 4 4 4 3 3 3 4   Asthma Control Test Total Score 20 19 19 15 16 11 22         JULITA.     Sees Dr. Jenny Friend:  Constitutional: Negative for fever   HENT: Negative for sore throat  Respiratory: Negative for dyspnea, cough  Cardiovascular: Negative for chest pain  Gastrointestinal: Negative for vomiting, diarrhea   Skin: Negative for rash  Psychiatric/Behavorial: Negative for anxiety     Objective:   PHYSICAL EXAM:  Blood pressure 136/88, pulse 70, temperature 98.2 °F (36.8 °C), temperature source Infrared, resp. rate 16, height 6' 3\" (1.905 m), weight (!) 311 lb (141.1 kg), SpO2 97 %.'  Gen: No distress. ENT:   Resp: No accessory muscle use. No crackles. No wheezes. No rhonchi. CV: Regular rate. Regular rhythm. No murmur or rub. No edema. Skin: Warm, dry, normal texture and turgor. No nodule on exposed extremities. M/S: No cyanosis. No clubbing. No joint deformity. Psych: Oriented x 3. No anxiety. Awake. Alert. Intact judgement and insight. Good Mood / Affect. Memory appears in tact            Data Reviewed:       Assessment:      Asthma -Sadia Pachecolk stopped secondary to insurance  Sleep apnea - Dr. Fanny Shah  Obesity Body mass index is 38.87 kg/m². Depression  GERD      Plan:      Problem List Items Addressed This Visit     Other allergic rhinitis      Singulair. controlled         JULITA (obstructive sleep apnea)      No download available. I believe he has a [employer] device. Information about the recall given. Advised to submit information soon. Obesity (BMI 30-39. 9)     Still uncontrolled but working hard to loss weight. Decreased to BMI of 38. Moderate persistent asthma without complication      Continues to be controlled. Symbicort and decreasing use. Discussed step up and down therapy.       Weight loss is helping with dyspnea and asthma

## 2021-08-30 NOTE — ASSESSMENT & PLAN NOTE
Continues to be controlled. Symbicort and decreasing use. Discussed step up and down therapy.       Weight loss is helping with dyspnea and asthma

## 2021-08-30 NOTE — PATIENT INSTRUCTIONS
he  was advised of the [employer] recall and discussed the risk of untreated sleep apnea vs risk from device. he  will call DME and work to get device repaired or replaced. [email or web address]    [phone]

## 2021-09-08 ENCOUNTER — OFFICE VISIT (OUTPATIENT)
Dept: FAMILY MEDICINE CLINIC | Age: 41
End: 2021-09-08
Payer: COMMERCIAL

## 2021-09-08 VITALS
OXYGEN SATURATION: 98 % | HEIGHT: 75 IN | SYSTOLIC BLOOD PRESSURE: 136 MMHG | BODY MASS INDEX: 38.79 KG/M2 | TEMPERATURE: 98 F | WEIGHT: 312 LBS | DIASTOLIC BLOOD PRESSURE: 84 MMHG | HEART RATE: 77 BPM

## 2021-09-08 DIAGNOSIS — Z20.2 EXPOSURE TO CHLAMYDIA: ICD-10-CM

## 2021-09-08 DIAGNOSIS — R30.0 DYSURIA: Primary | ICD-10-CM

## 2021-09-08 DIAGNOSIS — Z11.3 SCREENING FOR STD (SEXUALLY TRANSMITTED DISEASE): ICD-10-CM

## 2021-09-08 LAB
BILIRUBIN, POC: NEGATIVE
BLOOD URINE, POC: NEGATIVE
CLARITY, POC: CLEAR
COLOR, POC: YELLOW
GLUCOSE URINE, POC: ABNORMAL
KETONES, POC: NEGATIVE
LEUKOCYTE EST, POC: NEGATIVE
NITRITE, POC: POSITIVE
PH, POC: 6.5
PROTEIN, POC: NEGATIVE
SPECIFIC GRAVITY, POC: >=1.03
UROBILINOGEN, POC: ABNORMAL

## 2021-09-08 PROCEDURE — G8417 CALC BMI ABV UP PARAM F/U: HCPCS | Performed by: NURSE PRACTITIONER

## 2021-09-08 PROCEDURE — 99213 OFFICE O/P EST LOW 20 MIN: CPT | Performed by: NURSE PRACTITIONER

## 2021-09-08 PROCEDURE — 1036F TOBACCO NON-USER: CPT | Performed by: NURSE PRACTITIONER

## 2021-09-08 PROCEDURE — G8427 DOCREV CUR MEDS BY ELIG CLIN: HCPCS | Performed by: NURSE PRACTITIONER

## 2021-09-08 PROCEDURE — 81002 URINALYSIS NONAUTO W/O SCOPE: CPT | Performed by: NURSE PRACTITIONER

## 2021-09-08 RX ORDER — AZITHROMYCIN 250 MG/1
1000 TABLET, FILM COATED ORAL ONCE
Qty: 4 TABLET | Refills: 0 | Status: SHIPPED | OUTPATIENT
Start: 2021-09-08 | End: 2021-09-08

## 2021-09-08 ASSESSMENT — ENCOUNTER SYMPTOMS
VOMITING: 0
DIARRHEA: 0
ABDOMINAL PAIN: 0
NAUSEA: 0
CONSTIPATION: 0

## 2021-09-08 NOTE — PROGRESS NOTES
without complication    Other allergic rhinitis    S/P gastric bypass- 2/2021          Current Outpatient Medications   Medication Sig Dispense Refill    metFORMIN (GLUCOPHAGE) 1000 MG tablet TAKE 1 TABLET BY MOUTH TWICE DAILY WITH MEALS FOR DIABETES 180 tablet 0    gabapentin (NEURONTIN) 400 MG capsule Take 1 capsule by mouth 3 times daily.       Insulin Degludec (TRESIBA FLEXTOUCH) 100 UNIT/ML SOPN INJECT 5 UNITS EVERY EVENING AS DIRECTED 15 mL 0    atorvastatin (LIPITOR) 80 MG tablet Take 0.5 tablets by mouth daily 90 tablet 0    FARXIGA 5 MG tablet TAKE 1 TABLET BY MOUTH EVERY MORNING 90 tablet 0    metoprolol tartrate (LOPRESSOR) 25 MG tablet TAKE 1 TABLET BY MOUTH 2 TIMES DAILY 180 tablet 0    albuterol sulfate  (90 Base) MCG/ACT inhaler INHALE TWO PUFFS BY MOUTH EVERY 6 HOURS AS NEEDED FOR WHEEZING OR FOR SHORTNESS OF BREATH 18 g 1    Insulin Pen Needle ([employer] SAFEPACK PEN NEEDLE) 32G X 4 MM MISC USE FOUR TIMES DAILY 100 each 2    budesonide-formoterol (SYMBICORT) 160-4.5 MCG/ACT AERO INHALE TWO PUFFS BY MOUTH TWICE A DAY FOR LONG TERM CONTROL OF ASTHMA, RINSE MOUTH OUT AFTER USE (Patient taking differently: 2 puffs INHALE TWO PUFFS BY MOUTH TWICE A DAY FOR LONG TERM CONTROL OF ASTHMA, RINSE MOUTH OUT AFTER USE  Usually takes once daily unless he's wheezing) 1 Inhaler 11    cetirizine (ZYRTEC) 10 MG tablet TAKE 1 TABLET BY MOUTH DAILY 30 tablet 11    montelukast (SINGULAIR) 10 MG tablet Take 1 tablet by mouth nightly 90 tablet 0    omeprazole (PRILOSEC) 20 MG delayed release capsule TAKE ONE CAPSULE BY MOUTH DAILY FOR STOMACH 90 capsule 0    lisinopril (PRINIVIL;ZESTRIL) 20 MG tablet TAKE 1 TABLET BY MOUTH DAILY 90 tablet 1    ipratropium-albuterol (DUONEB) 0.5-2.5 (3) MG/3ML SOLN nebulizer solution Inhale 3 mLs into the lungs every 6 hours as needed for Shortness of Breath 360 mL 5    Probiotic Product (PROBIOTIC ADVANCED) CAPS Take 1 capsule by mouth daily      Specialty Vitamins Products (CENTRUM PERFORMANCE) TABS Take 1 tablet by mouth daily      blood glucose test strips (TRUE METRIX BLOOD GLUCOSE TEST) strip 1 each by In Vitro route 4 times daily 100 each 3    Lancets MISC Use to check BS  each 5    Cyanocobalamin (VITAMIN B 12 PO) Take 1 tablet by mouth daily      omega-3 acid ethyl esters (LOVAZA) 1 g capsule Take 2 capsules by mouth 2 times daily 360 capsule 0    Blood Glucose Monitoring Suppl (TRUE METRIX METER) w/Device KIT Use to check fasting BS and 2 hours after meal BS 1 kit 0     No current facility-administered medications for this visit. Allergies   Allergen Reactions    Penicillins Swelling and Anaphylaxis     Swelling of tongue and throat    Fentanyl      Had hives at patch site and nausea    Other reaction(s): Vomiting    Food Swelling     Swelling of throat from Mushrooms    Mushroom Extract Complex      Other reaction(s): Angioedema       Review of Systems   Gastrointestinal: Negative for abdominal pain, constipation, diarrhea, nausea and vomiting. Genitourinary: Positive for dysuria and frequency. Negative for difficulty urinating, discharge, flank pain, hematuria, hesitancy, penile swelling, scrotal swelling, testicular pain and urgency. Vitals:    09/08/21 1045   BP: 136/84   Pulse: 77   Temp: 98 °F (36.7 °C)   TempSrc: Oral   SpO2: 98%   Weight: (!) 312 lb (141.5 kg)   Height: 6' 3\" (1.905 m)       Body mass index is 39 kg/m². Wt Readings from Last 3 Encounters:   09/08/21 (!) 312 lb (141.5 kg)   08/30/21 (!) 311 lb (141.1 kg)   08/03/21 (!) 319 lb 6.4 oz (144.9 kg)       BP Readings from Last 3 Encounters:   09/08/21 136/84   08/30/21 136/88   08/03/21 138/88       Physical Exam  Vitals and nursing note reviewed. Constitutional:       General: He is not in acute distress. Appearance: He is well-developed. He is obese. HENT:      Head: Normocephalic and atraumatic.    Cardiovascular:      Rate and Rhythm: Normal rate and

## 2021-09-09 LAB — URINE CULTURE, ROUTINE: NORMAL

## 2021-09-10 LAB
C. TRACHOMATIS DNA ,URINE: NEGATIVE
N. GONORRHOEAE DNA, URINE: NEGATIVE

## 2021-10-05 ENCOUNTER — OFFICE VISIT (OUTPATIENT)
Dept: FAMILY MEDICINE CLINIC | Age: 41
End: 2021-10-05
Payer: COMMERCIAL

## 2021-10-05 VITALS
BODY MASS INDEX: 37.7 KG/M2 | HEIGHT: 75 IN | HEART RATE: 78 BPM | WEIGHT: 303.2 LBS | TEMPERATURE: 98.3 F | SYSTOLIC BLOOD PRESSURE: 134 MMHG | DIASTOLIC BLOOD PRESSURE: 86 MMHG | RESPIRATION RATE: 16 BRPM | OXYGEN SATURATION: 97 %

## 2021-10-05 DIAGNOSIS — E78.2 MIXED HYPERLIPIDEMIA: ICD-10-CM

## 2021-10-05 DIAGNOSIS — K21.9 GASTROESOPHAGEAL REFLUX DISEASE, UNSPECIFIED WHETHER ESOPHAGITIS PRESENT: ICD-10-CM

## 2021-10-05 DIAGNOSIS — E11.40 TYPE 2 DIABETES MELLITUS WITH DIABETIC NEUROPATHY, WITH LONG-TERM CURRENT USE OF INSULIN (HCC): Primary | ICD-10-CM

## 2021-10-05 DIAGNOSIS — Z79.4 TYPE 2 DIABETES MELLITUS WITH DIABETIC NEUROPATHY, WITH LONG-TERM CURRENT USE OF INSULIN (HCC): Primary | ICD-10-CM

## 2021-10-05 DIAGNOSIS — I10 ESSENTIAL HYPERTENSION: ICD-10-CM

## 2021-10-05 DIAGNOSIS — E66.9 OBESITY, CLASS II, BMI 35-39.9: ICD-10-CM

## 2021-10-05 PROCEDURE — 2022F DILAT RTA XM EVC RTNOPTHY: CPT | Performed by: NURSE PRACTITIONER

## 2021-10-05 PROCEDURE — G8484 FLU IMMUNIZE NO ADMIN: HCPCS | Performed by: NURSE PRACTITIONER

## 2021-10-05 PROCEDURE — 1036F TOBACCO NON-USER: CPT | Performed by: NURSE PRACTITIONER

## 2021-10-05 PROCEDURE — G8417 CALC BMI ABV UP PARAM F/U: HCPCS | Performed by: NURSE PRACTITIONER

## 2021-10-05 PROCEDURE — 3044F HG A1C LEVEL LT 7.0%: CPT | Performed by: NURSE PRACTITIONER

## 2021-10-05 PROCEDURE — 99214 OFFICE O/P EST MOD 30 MIN: CPT | Performed by: NURSE PRACTITIONER

## 2021-10-05 PROCEDURE — G8427 DOCREV CUR MEDS BY ELIG CLIN: HCPCS | Performed by: NURSE PRACTITIONER

## 2021-10-05 ASSESSMENT — ENCOUNTER SYMPTOMS
DIARRHEA: 0
ABDOMINAL PAIN: 0
COUGH: 0
SINUS PRESSURE: 0
RHINORRHEA: 0
CONSTIPATION: 0
CHEST TIGHTNESS: 0
NAUSEA: 0
BACK PAIN: 1
SHORTNESS OF BREATH: 0
VOMITING: 0
SINUS PAIN: 0

## 2021-10-05 NOTE — PROGRESS NOTES
10/5/2021    This is a 37 y.o. male   Chief Complaint   Patient presents with    Diabetes     sugar has been good   . HPI   Diabetes Mellitus Type 2: Current symptoms/problems include neuropathy. Home blood sugar records: fasting range: 80s, 2 hours post dinner: 80s   Taking Tresiba 5 units daily, metformin 1000 mg twice daily with meals and farxiga 5 mg daily. No longer on humalog since weight has decreased. Any episodes of hypoglycemia? no  Eye exam current (within one year): yes- patient states he went to [facility] within past year but no records have been sent over. Tobacco history: He  reports that he has never smoked. He has never used smokeless tobacco.   Daily Aspirin? No    Patient follows with podiatrist at [facility] for routine foot care. Has diabetic shoes. Reports that gabapentin is helping better to control neuropathy. Hypertension:  Home blood pressure monitoring: No.  He is adherent to a low sodium diet. Patient denies chest pain, shortness of breath, headache, lightheadedness, blurred vision, peripheral edema and palpitations. Antihypertensive medication side effects: no medication side effects noted. Use of agents associated with hypertension: none. He is currently taking metoprolol tartrate 25 mg BID and lisinopril 20 mg daily. Hyperlipidemia:  No new myalgias or GI upset on atorvastatin (Lipitor). Obesity:  Patient is following with bariatric specialist with [facility]. S/p clarisa-en-y gastric bypass surgery 2/23/21. Weight is decreasing. He is exercising on his bike daily for 60 minutes daily or every other day.      Lab Results   Component Value Date    LABA1C 5.6 08/03/2021    LABA1C 5.5 06/15/2021    LABA1C 7.1 01/27/2021     Lab Results   Component Value Date    LABMICR <1.20 06/15/2021    CREATININE 0.6 (L) 08/03/2021     Lab Results   Component Value Date    ALT 45 (H) 08/03/2021    AST 39 (H) 08/03/2021     Lab Results   Component Value Date    CHOL 131 09/29/2020    TRIG 234 (H) 09/29/2020    HDL 53 08/03/2021    LDLCALC 33 08/03/2021    LDLDIRECT 146 (H) 03/19/2019        GERD: stable on omeprazole 20 mg daily. Asthma: currently well controlled on singulair 10 mg daily, zyrtec 10 mg daily and symbicort daily. Rarely needs to use albuterol rescue inhaler. He follows with pulmonologist Dr. Laine Guerra. JULITA- his cpap was recalled and waiting on getting new one. Chronic back pain. Follows with [facility] Dr. Apoorva Connor. Giving gabapentin 400 mg TID. Talking about spine stimulator for pain. He is not sure if he would want to do this. Patient Active Problem List   Diagnosis    Moderate persistent asthma without complication    Hypertension    GERD (gastroesophageal reflux disease)    Tinea cruris    Type 2 diabetes mellitus with diabetic neuropathy, with long-term current use of insulin (HCC)    Mixed hyperlipidemia    Failed back surgical syndrome    [facility] C1-C7 [facility] [address]    Chronic pain syndrome    Central spinal stenosis    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 ([facility] [address]    Major depressive disorder    Insomnia    Diabetic eye exam ([facility] [address]- 12/16 wnl CEI    Closed nondisplaced fracture of fifth metatarsal bone of right foot    Obesity (BMI 30-39. 9)    Elevated liver enzymes    JULITA (obstructive sleep apnea)    Pressure ulcer of buttock    Abnormal levels of other serum enzymes    Chronic back pain    Right hand pain    Diabetic ulcer of toe of right foot associated with type 2 diabetes mellitus (HCC)    Diabetic ulcer of toe (HCC)    Ingrowing nail    Environmental and seasonal allergies    Severe persistent asthma without complication    Other allergic rhinitis    S/P gastric bypass- 2/2021          Current Outpatient Medications   Medication Sig Dispense Refill    metFORMIN (GLUCOPHAGE) 1000 MG tablet TAKE 1 TABLET BY MOUTH TWICE DAILY WITH MEALS FOR DIABETES 180 tablet 0    gabapentin tablet by mouth daily      omega-3 acid ethyl esters (LOVAZA) 1 g capsule Take 2 capsules by mouth 2 times daily 360 capsule 0    Blood Glucose Monitoring Suppl (TRUE METRIX METER) w/Device KIT Use to check fasting BS and 2 hours after meal BS 1 kit 0     No current facility-administered medications for this visit. Allergies   Allergen Reactions    Penicillins Swelling and Anaphylaxis     Swelling of tongue and throat    Fentanyl      Had hives at patch site and nausea    Other reaction(s): Vomiting    Food Swelling     Swelling of throat from Mushrooms    Mushroom Extract Complex      Other reaction(s): Angioedema       Review of Systems   Constitutional: Negative for activity change, appetite change, fatigue, fever and unexpected weight change. HENT: Negative for congestion, rhinorrhea, sinus pressure and sinus pain. Eyes: Negative for visual disturbance. Respiratory: Negative for cough, chest tightness and shortness of breath. Cardiovascular: Negative for chest pain, palpitations and leg swelling. Gastrointestinal: Negative for abdominal pain, constipation, diarrhea, nausea and vomiting. Endocrine: Negative for cold intolerance, heat intolerance, polydipsia, polyphagia and polyuria. Genitourinary: Negative for difficulty urinating, frequency and urgency. Musculoskeletal: Positive for back pain and myalgias. Neurological: Negative for dizziness, light-headedness and headaches. Psychiatric/Behavioral: Negative for confusion and sleep disturbance. The patient is not nervous/anxious. Vitals:    10/05/21 0816 10/05/21 0849   BP: (!) 148/92 134/86   Site: Right Upper Arm Left Upper Arm   Position: Sitting Sitting   Cuff Size: Large Adult Large Adult   Pulse: 78    Resp: 16    Temp: 98.3 °F (36.8 °C)    TempSrc: Oral    SpO2: 97%    Weight: (!) 303 lb 3.2 oz (137.5 kg)    Height: 6' 3\" (1.905 m)        Body mass index is 37.9 kg/m².      Wt Readings from Last 3 Encounters: 10/05/21 (!) 303 lb 3.2 oz (137.5 kg)   09/08/21 (!) 312 lb (141.5 kg)   08/30/21 (!) 311 lb (141.1 kg)       BP Readings from Last 3 Encounters:   10/05/21 134/86   09/08/21 136/84   08/30/21 136/88       Physical Exam  Vitals and nursing note reviewed. Constitutional:       General: He is not in acute distress. Appearance: He is well-developed. He is obese. HENT:      Head: Normocephalic and atraumatic. Eyes:      Extraocular Movements: Extraocular movements intact. Conjunctiva/sclera: Conjunctivae normal.   Cardiovascular:      Rate and Rhythm: Normal rate and regular rhythm. Heart sounds: Normal heart sounds. No murmur heard. No friction rub. No gallop. Pulmonary:      Effort: Pulmonary effort is normal. No respiratory distress. Breath sounds: Normal breath sounds. Musculoskeletal:      Cervical back: Neck supple. Left knee: No swelling or bony tenderness. Normal range of motion. No tenderness. Right lower leg: No edema. Left lower leg: No edema. Skin:     General: Skin is warm and dry. Neurological:      Mental Status: He is alert and oriented to person, place, and time. Psychiatric:         Behavior: Behavior normal.         Thought Content: Thought content normal.         Judgment: Judgment normal.         Assessmentand Plan  Tamiko Ha was seen today for diabetes. Diagnoses and all orders for this visit:    Type 2 diabetes mellitus with diabetic neuropathy, with long-term current use of insulin (HCC)  Insulin demand has decreased due to weight loss. D/c tresiba. Continue metformin 1000 mg twice daily and farxiga 5 mg daily  Continue checking fasting blood sugars and 2 hours post dinner blood sugars  Discussed importance of low carb diet, aerobic exercise and need for weight loss. Continues to follow with podiatrist Dr. Estuardo Lofton for his diabetic neuropathy. He has diabetic shoes that he will wear. Neuropathy pain has improved with gabapentin use. Mixed hyperlipidemia  Lipids controlled 8/2021  Continue atorvastatin 40 mg daily and lovaza 2 g BID  Discussed importance of low fat/cholesterol diet, aerobic exercise and need for weight loss. Essential hypertension  Controlled   Continue metoprolol tartrate 25 mg BID and lisinopril 20 mg daily. Discussed importance of low salt diet, aerobic exercise and need for weight loss. Obesity  S/p gastric bypass surgery 2/2021. Discussed importance of diet, aerobic exercise and hydration with water. Advised to continue following with bariatrics specialist    GERD  Stable. Continue omeprazole 20 mg daily    Chronic pain  Encouraged to continue to follow up with pain specialist Dr. Merced Javier- stable current medications. Continue f/u with pulmonologist Dr. Ciarra Granger to get hep B vaccines and flu vaccine at pharmacy. Return in about 3 months (around 1/5/2022), or if symptoms worsen or fail to improve, for chronic conditions.

## 2021-10-06 DIAGNOSIS — I10 ESSENTIAL HYPERTENSION: ICD-10-CM

## 2021-10-06 DIAGNOSIS — Z79.4 TYPE 2 DIABETES MELLITUS WITH DIABETIC NEUROPATHY, WITH LONG-TERM CURRENT USE OF INSULIN (HCC): ICD-10-CM

## 2021-10-06 DIAGNOSIS — E11.40 TYPE 2 DIABETES MELLITUS WITH DIABETIC NEUROPATHY, WITH LONG-TERM CURRENT USE OF INSULIN (HCC): ICD-10-CM

## 2021-10-06 DIAGNOSIS — E78.2 MIXED HYPERLIPIDEMIA: ICD-10-CM

## 2021-10-06 RX ORDER — ATORVASTATIN CALCIUM 80 MG/1
TABLET, FILM COATED ORAL
Qty: 90 TABLET | Refills: 0 | Status: SHIPPED | OUTPATIENT
Start: 2021-10-06 | End: 2021-10-07 | Stop reason: SDUPTHER

## 2021-10-06 NOTE — TELEPHONE ENCOUNTER
Patient is now only on atorvastatin 40 mg. He can either take 1/2 80 mg tab or can prescribe 40 mg tab.

## 2021-10-07 RX ORDER — ATORVASTATIN CALCIUM 40 MG/1
TABLET, FILM COATED ORAL
Qty: 90 TABLET | Refills: 1 | OUTPATIENT
Start: 2021-10-07 | End: 2022-01-17

## 2021-10-21 ENCOUNTER — PATIENT MESSAGE (OUTPATIENT)
Dept: FAMILY MEDICINE CLINIC | Age: 41
End: 2021-10-21

## 2021-10-21 NOTE — TELEPHONE ENCOUNTER
Please have patient make an appt for evaluation. Could see Dr. Parveen Park today since I am at [address] [place] [address] this afternoon.

## 2021-10-21 NOTE — TELEPHONE ENCOUNTER
From: Scott Mckinney  To: AYLA Hawkins CNP  Sent: 10/21/2021 7:09 AM EDT  Subject: Non-Urgent Medical Question    This is my right thumb and i have a foot and Ankle doctor also not a hand doctor. So not sure what to do so asking you?  Thank you

## 2021-10-22 ENCOUNTER — OFFICE VISIT (OUTPATIENT)
Dept: FAMILY MEDICINE CLINIC | Age: 41
End: 2021-10-22
Payer: COMMERCIAL

## 2021-10-22 VITALS
HEIGHT: 75 IN | BODY MASS INDEX: 37.19 KG/M2 | WEIGHT: 299.13 LBS | SYSTOLIC BLOOD PRESSURE: 138 MMHG | HEART RATE: 81 BPM | DIASTOLIC BLOOD PRESSURE: 86 MMHG | OXYGEN SATURATION: 98 %

## 2021-10-22 DIAGNOSIS — S61.309A AVULSION OF FINGERNAIL, INITIAL ENCOUNTER: Primary | ICD-10-CM

## 2021-10-22 PROCEDURE — G8484 FLU IMMUNIZE NO ADMIN: HCPCS | Performed by: NURSE PRACTITIONER

## 2021-10-22 PROCEDURE — 99213 OFFICE O/P EST LOW 20 MIN: CPT | Performed by: NURSE PRACTITIONER

## 2021-10-22 PROCEDURE — G8427 DOCREV CUR MEDS BY ELIG CLIN: HCPCS | Performed by: NURSE PRACTITIONER

## 2021-10-22 PROCEDURE — G8417 CALC BMI ABV UP PARAM F/U: HCPCS | Performed by: NURSE PRACTITIONER

## 2021-10-22 PROCEDURE — 1036F TOBACCO NON-USER: CPT | Performed by: NURSE PRACTITIONER

## 2021-10-22 RX ORDER — MUPIROCIN CALCIUM 20 MG/G
CREAM TOPICAL
Qty: 1 EACH | Refills: 0 | Status: SHIPPED | OUTPATIENT
Start: 2021-10-22 | End: 2021-11-21

## 2021-10-22 NOTE — PROGRESS NOTES
10/22/2021    This is a 37 y.o. male   Chief Complaint   Patient presents with    Other     Patient right thbyronail had a bump in it so he used a file to try and sand it down and it caused the nail to split and skin has pushed up through it   . HPI  Patient reports that a couple of days ago he was filing down the top of his right thumb nail and then the next day the top of nail broke open and now the tissue under the nail is open. He reports that there is soreness. Denies drainage. Patient Active Problem List   Diagnosis    Moderate persistent asthma without complication    Hypertension    GERD (gastroesophageal reflux disease)    Tinea cruris    Type 2 diabetes mellitus with diabetic neuropathy, with long-term current use of insulin (HCC)    Mixed hyperlipidemia    Failed back surgical syndrome    Spinal cord injury, C1-C7 ([facility] [address]    Chronic pain syndrome    Central spinal stenosis    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 ([facility] [address] [facility]    Major depressive disorder    Insomnia    Diabetic eye exam ([facility] [address]- 12/16 wnl CEI    Closed nondisplaced fracture of fifth metatarsal bone of right foot    Obesity (BMI 30-39. 9)    Elevated liver enzymes    JULITA (obstructive sleep apnea)    Pressure ulcer of buttock    Abnormal levels of other serum enzymes    Chronic back pain    Right hand pain    Diabetic ulcer of toe of right foot associated with type 2 diabetes mellitus (HCC)    Diabetic ulcer of toe (HCC)    Ingrowing nail    Environmental and seasonal allergies    Severe persistent asthma without complication    Other allergic rhinitis    S/P gastric bypass- 2/2021          Current Outpatient Medications   Medication Sig Dispense Refill    omeprazole (PRILOSEC) 20 MG delayed release capsule TAKE ONE CAPSULE BY MOUTH DAILY FOR STOMACH 90 capsule 1    atorvastatin (LIPITOR) 40 MG tablet TAKE 1 TABLET BY MOUTH DAILY 90 tablet 1    metFORMIN (GLUCOPHAGE) 1000 MG tablet TAKE 1 TABLET BY MOUTH TWICE DAILY WITH MEALS FOR DIABETES 180 tablet 0    metoprolol tartrate (LOPRESSOR) 25 MG tablet TAKE 1 TABLET BY MOUTH 2 TIMES DAILY 180 tablet 0    gabapentin (NEURONTIN) 400 MG capsule Take 1 capsule by mouth 3 times daily.       FARXIGA 5 MG tablet TAKE 1 TABLET BY MOUTH EVERY MORNING 90 tablet 0    albuterol sulfate  (90 Base) MCG/ACT inhaler INHALE TWO PUFFS BY MOUTH EVERY 6 HOURS AS NEEDED FOR WHEEZING OR FOR SHORTNESS OF BREATH 18 g 1    Insulin Pen Needle ([employer] SAFEPACK PEN NEEDLE) 32G X 4 MM MISC USE FOUR TIMES DAILY 100 each 2    budesonide-formoterol (SYMBICORT) 160-4.5 MCG/ACT AERO INHALE TWO PUFFS BY MOUTH TWICE A DAY FOR LONG TERM CONTROL OF ASTHMA, RINSE MOUTH OUT AFTER USE (Patient taking differently: 2 puffs INHALE TWO PUFFS BY MOUTH TWICE A DAY FOR LONG TERM CONTROL OF ASTHMA, RINSE MOUTH OUT AFTER USE  Usually takes once daily unless he's wheezing) 1 Inhaler 11    cetirizine (ZYRTEC) 10 MG tablet TAKE 1 TABLET BY MOUTH DAILY 30 tablet 11    montelukast (SINGULAIR) 10 MG tablet Take 1 tablet by mouth nightly 90 tablet 0    lisinopril (PRINIVIL;ZESTRIL) 20 MG tablet TAKE 1 TABLET BY MOUTH DAILY 90 tablet 1    ipratropium-albuterol (DUONEB) 0.5-2.5 (3) MG/3ML SOLN nebulizer solution Inhale 3 mLs into the lungs every 6 hours as needed for Shortness of Breath 360 mL 5    Probiotic Product (PROBIOTIC ADVANCED) CAPS Take 1 capsule by mouth daily      Specialty Vitamins Products (CENTRUM PERFORMANCE) TABS Take 1 tablet by mouth daily      blood glucose test strips (TRUE METRIX BLOOD GLUCOSE TEST) strip 1 each by In Vitro route 4 times daily 100 each 3    Lancets MISC Use to check BS  each 5    Cyanocobalamin (VITAMIN B 12 PO) Take 1 tablet by mouth daily      omega-3 acid ethyl esters (LOVAZA) 1 g capsule Take 2 capsules by mouth 2 times daily 360 capsule 0    Blood Glucose Monitoring Suppl (TRUE METRIX METER) w/Device KIT Use to check fasting BS and 2 hours after meal BS 1 kit 0     No current facility-administered medications for this visit. Allergies   Allergen Reactions    Penicillins Swelling and Anaphylaxis     Swelling of tongue and throat    Fentanyl      Had hives at patch site and nausea    Other reaction(s): Vomiting    Food Swelling     Swelling of throat from Mushrooms    Mushroom Extract Complex      Other reaction(s): Angioedema       Review of Systems   Constitutional: Negative for activity change and fever. Skin: Positive for wound. Vitals:    10/22/21 1143   BP: 138/86   Site: Right Upper Arm   Position: Sitting   Cuff Size: Large Adult   Pulse: 81   SpO2: 98%   Weight: 299 lb 2 oz (135.7 kg)   Height: 6' 3\" (1.905 m)       Body mass index is 37.39 kg/m². Wt Readings from Last 3 Encounters:   10/22/21 299 lb 2 oz (135.7 kg)   10/05/21 (!) 303 lb 3.2 oz (137.5 kg)   09/08/21 (!) 312 lb (141.5 kg)       BP Readings from Last 3 Encounters:   10/22/21 138/86   10/05/21 134/86   09/08/21 136/84       Physical Exam  Vitals and nursing note reviewed. Constitutional:       Appearance: He is well-developed. HENT:      Head: Normocephalic and atraumatic. Pulmonary:      Effort: Pulmonary effort is normal.   Skin:     General: Skin is warm and dry. Comments: See picture of right nail avulsion. No surrounding erythema or drainage noted. Slightly tender to palpation. Neurological:      Mental Status: He is alert and oriented to person, place, and time. Psychiatric:         Behavior: Behavior normal.         Thought Content: Thought content normal.         Judgment: Judgment normal.             Assessmentand Plan  Yun Renner was seen today. Diagnoses and all orders for this visit:    Avulsion of fingernail, initial encounter  -     Erin Lorenzana MD, Hand Surgery (Hand, Wrist, Upper Extremity), [facility]  -     mupirocin (BACTROBAN) 2 % cream; Apply 3 times daily for a week  Refer to hand specialist for evaluation.   Advised

## 2021-10-25 ENCOUNTER — PATIENT MESSAGE (OUTPATIENT)
Dept: FAMILY MEDICINE CLINIC | Age: 41
End: 2021-10-25

## 2021-10-25 NOTE — TELEPHONE ENCOUNTER
From: Julisa Schaffer  To: AYLA Calero - CNP  Sent: 10/25/2021 2:59 PM EDT  Subject: Prescription Question    The pharmacy said we have to wait till Monday so andressa ot os Monday and they said the cream still hasnt been approved. So dont know what to do Thank you and sorry for bothering you.  Ttyl

## 2021-10-25 NOTE — TELEPHONE ENCOUNTER
I called the pharmacy and insurance will not cover the mupirocin cream but will cover ointment.  Verbal order given to change

## 2021-10-28 ENCOUNTER — OFFICE VISIT (OUTPATIENT)
Dept: ORTHOPEDIC SURGERY | Age: 41
End: 2021-10-28
Payer: COMMERCIAL

## 2021-10-28 VITALS — HEIGHT: 75 IN | RESPIRATION RATE: 16 BRPM | WEIGHT: 299 LBS | BODY MASS INDEX: 37.18 KG/M2

## 2021-10-28 DIAGNOSIS — S61.101A: Primary | ICD-10-CM

## 2021-10-28 PROCEDURE — 1036F TOBACCO NON-USER: CPT | Performed by: ORTHOPAEDIC SURGERY

## 2021-10-28 PROCEDURE — G8427 DOCREV CUR MEDS BY ELIG CLIN: HCPCS | Performed by: ORTHOPAEDIC SURGERY

## 2021-10-28 PROCEDURE — G8417 CALC BMI ABV UP PARAM F/U: HCPCS | Performed by: ORTHOPAEDIC SURGERY

## 2021-10-28 PROCEDURE — 99203 OFFICE O/P NEW LOW 30 MIN: CPT | Performed by: ORTHOPAEDIC SURGERY

## 2021-10-28 PROCEDURE — G8484 FLU IMMUNIZE NO ADMIN: HCPCS | Performed by: ORTHOPAEDIC SURGERY

## 2021-10-28 NOTE — PROGRESS NOTES
This 37 y.o. right hand dominant disabled man is seen in referral for AYLA Matias CNP with a chief complaint of a raw area of their right thumb. This has been present for 5 days. There is history of injury(he became concerned regarding an irregular area of the thumb nail and filed is down with a nail file). There was associated pain, and mild bleeding. Treatment has been recommended(antibiotic cream and band aid). There is no similar involvement of other areas of the extremities. Symptoms have improved recently. The pain assessment has been reviewed and is correct. The patient's social history, past medical history, family history, medications, allergies and review of systems, entered 10/28/21, have been reviewed, and dated and are recorded in the chart. On physical examination the patient is Height: 6' 3\" (190.5 cm) tall and weighs Weight: 299 lb (135.6 kg). Respirations ane 18 per minute. The patient is well nourished, is oriented to time and place, demonstrates appropriate mood and affect as well as normal gait (with an ambulation aid) and station. There is an area measuring 5mm X 5mm of missing thumb nail plate involving the dorsal aspect of the right thumb. There is associated exposure of the underlying nail bed, without evidence of infection or tenderness. Range of motion of the fingers, thumbs, wrists and elbows is full and painless bilaterally. Distal sensation and circulation are normal.  All joints are stable. There are no subcutaneous masses or enlarged epitrochlear lymph nodes. I have personally reviewed and interpreted all previous external imaging studies, laboratory tests, diagnostic proceedures and medical encounters pertinent to this patient's visit today. Impression: Self inflicted injury to the right thumb nail plate with exposed nail bed. The nature of this medical problem is fully discussed with the patient, including all treatment options.  All questions are answered. He is advised to continue the same treatment for an additional 5 days. At that time the nail bed should be dry and not require bandaging. The usual course of events in the resolution of the symptoms associated with this condition is fully discussed with the patient. As long as they progress as expected, they do not need to return for further follow up. They are, however, urged to call or return if they have questions or concerns.

## 2021-11-29 DIAGNOSIS — J45.40 MODERATE PERSISTENT ASTHMA WITHOUT COMPLICATION: ICD-10-CM

## 2021-11-29 RX ORDER — BUDESONIDE AND FORMOTEROL FUMARATE DIHYDRATE 160; 4.5 UG/1; UG/1
2 AEROSOL RESPIRATORY (INHALATION) 2 TIMES DAILY
Qty: 1 EACH | Refills: 11 | Status: SHIPPED | OUTPATIENT
Start: 2021-11-29 | End: 2022-08-25 | Stop reason: SDUPTHER

## 2021-12-02 ENCOUNTER — PATIENT MESSAGE (OUTPATIENT)
Dept: FAMILY MEDICINE CLINIC | Age: 41
End: 2021-12-02

## 2021-12-02 DIAGNOSIS — Z79.4 TYPE 2 DIABETES MELLITUS WITH DIABETIC NEUROPATHY, WITH LONG-TERM CURRENT USE OF INSULIN (HCC): ICD-10-CM

## 2021-12-02 DIAGNOSIS — E11.40 TYPE 2 DIABETES MELLITUS WITH DIABETIC NEUROPATHY, WITH LONG-TERM CURRENT USE OF INSULIN (HCC): ICD-10-CM

## 2021-12-02 NOTE — TELEPHONE ENCOUNTER
From: Donna Shaver  To: Herlinda Robert  Sent: 12/2/2021 4:09 PM EST  Subject: Non-Urgent Medical Question    They said i didnt have refills on my Metformin so they said you might not want me taking that anymore? Jeffery Johnson thank you and if i do i will need more refills.  Thank you Roberto Hurd have a [place] day

## 2021-12-08 ENCOUNTER — PATIENT MESSAGE (OUTPATIENT)
Dept: FAMILY MEDICINE CLINIC | Age: 41
End: 2021-12-08

## 2021-12-08 NOTE — TELEPHONE ENCOUNTER
From: Yaya Lucas  To: Logan Fields  Sent: 12/8/2021 1:08 PM EST  Subject: Prescription Question    Do i still take Metformin? And if i do can i get refills Please? Thank you if you allready have.  Have a Bless day

## 2022-01-05 ENCOUNTER — PATIENT MESSAGE (OUTPATIENT)
Dept: FAMILY MEDICINE CLINIC | Age: 42
End: 2022-01-05

## 2022-01-05 NOTE — TELEPHONE ENCOUNTER
From: Mu Hays  To: Caty Luis  Sent: 1/5/2022 4:19 PM EST  Subject: Metformin    I been seeing these recalls for Metformin for all kinds of side effect and saying it's toxic with a recall. Should I keep taking this?

## 2022-01-06 ENCOUNTER — OFFICE VISIT (OUTPATIENT)
Dept: FAMILY MEDICINE CLINIC | Age: 42
End: 2022-01-06
Payer: COMMERCIAL

## 2022-01-06 VITALS
OXYGEN SATURATION: 97 % | TEMPERATURE: 98.4 F | RESPIRATION RATE: 16 BRPM | BODY MASS INDEX: 35.06 KG/M2 | SYSTOLIC BLOOD PRESSURE: 134 MMHG | DIASTOLIC BLOOD PRESSURE: 84 MMHG | HEART RATE: 78 BPM | WEIGHT: 282 LBS | HEIGHT: 75 IN

## 2022-01-06 DIAGNOSIS — E11.40 TYPE 2 DIABETES MELLITUS WITH DIABETIC NEUROPATHY, WITH LONG-TERM CURRENT USE OF INSULIN (HCC): ICD-10-CM

## 2022-01-06 DIAGNOSIS — Z79.4 TYPE 2 DIABETES MELLITUS WITH DIABETIC NEUROPATHY, WITH LONG-TERM CURRENT USE OF INSULIN (HCC): Primary | ICD-10-CM

## 2022-01-06 DIAGNOSIS — I10 ESSENTIAL HYPERTENSION: ICD-10-CM

## 2022-01-06 DIAGNOSIS — K21.9 GASTROESOPHAGEAL REFLUX DISEASE, UNSPECIFIED WHETHER ESOPHAGITIS PRESENT: ICD-10-CM

## 2022-01-06 DIAGNOSIS — E11.40 TYPE 2 DIABETES MELLITUS WITH DIABETIC NEUROPATHY, WITH LONG-TERM CURRENT USE OF INSULIN (HCC): Primary | ICD-10-CM

## 2022-01-06 DIAGNOSIS — E78.2 MIXED HYPERLIPIDEMIA: ICD-10-CM

## 2022-01-06 DIAGNOSIS — Z79.4 TYPE 2 DIABETES MELLITUS WITH DIABETIC NEUROPATHY, WITH LONG-TERM CURRENT USE OF INSULIN (HCC): ICD-10-CM

## 2022-01-06 DIAGNOSIS — E66.9 OBESITY, CLASS II, BMI 35-39.9: ICD-10-CM

## 2022-01-06 LAB
A/G RATIO: 2.1 (ref 1.1–2.2)
ALBUMIN SERPL-MCNC: 4.7 G/DL (ref 3.4–5)
ALP BLD-CCNC: 206 U/L (ref 40–129)
ALT SERPL-CCNC: 72 U/L (ref 10–40)
ANION GAP SERPL CALCULATED.3IONS-SCNC: 13 MMOL/L (ref 3–16)
AST SERPL-CCNC: 44 U/L (ref 15–37)
BILIRUB SERPL-MCNC: 0.5 MG/DL (ref 0–1)
BUN BLDV-MCNC: 11 MG/DL (ref 7–20)
CALCIUM SERPL-MCNC: 9.5 MG/DL (ref 8.3–10.6)
CHLORIDE BLD-SCNC: 98 MMOL/L (ref 99–110)
CO2: 29 MMOL/L (ref 21–32)
CREAT SERPL-MCNC: 0.8 MG/DL (ref 0.9–1.3)
GFR AFRICAN AMERICAN: >60
GFR NON-AFRICAN AMERICAN: >60
GLUCOSE BLD-MCNC: 106 MG/DL (ref 70–99)
HBA1C MFR BLD: 5.7 %
POTASSIUM SERPL-SCNC: 4.5 MMOL/L (ref 3.5–5.1)
SODIUM BLD-SCNC: 140 MMOL/L (ref 136–145)
TOTAL PROTEIN: 6.9 G/DL (ref 6.4–8.2)

## 2022-01-06 PROCEDURE — G8417 CALC BMI ABV UP PARAM F/U: HCPCS | Performed by: NURSE PRACTITIONER

## 2022-01-06 PROCEDURE — 2022F DILAT RTA XM EVC RTNOPTHY: CPT | Performed by: NURSE PRACTITIONER

## 2022-01-06 PROCEDURE — G8484 FLU IMMUNIZE NO ADMIN: HCPCS | Performed by: NURSE PRACTITIONER

## 2022-01-06 PROCEDURE — 3044F HG A1C LEVEL LT 7.0%: CPT | Performed by: NURSE PRACTITIONER

## 2022-01-06 PROCEDURE — G8427 DOCREV CUR MEDS BY ELIG CLIN: HCPCS | Performed by: NURSE PRACTITIONER

## 2022-01-06 PROCEDURE — 83036 HEMOGLOBIN GLYCOSYLATED A1C: CPT | Performed by: NURSE PRACTITIONER

## 2022-01-06 PROCEDURE — 99214 OFFICE O/P EST MOD 30 MIN: CPT | Performed by: NURSE PRACTITIONER

## 2022-01-06 PROCEDURE — 1036F TOBACCO NON-USER: CPT | Performed by: NURSE PRACTITIONER

## 2022-01-06 ASSESSMENT — ENCOUNTER SYMPTOMS
SINUS PAIN: 0
CHEST TIGHTNESS: 0
CONSTIPATION: 0
SINUS PRESSURE: 0
VOMITING: 0
SHORTNESS OF BREATH: 0
COUGH: 0
NAUSEA: 0
ABDOMINAL PAIN: 0
DIARRHEA: 0
BACK PAIN: 1
RHINORRHEA: 0

## 2022-01-06 NOTE — PROGRESS NOTES
1/6/2022    This is a 37 y.o. male   Chief Complaint   Patient presents with    Diabetes     sugar has been good   . HPI   Diabetes Mellitus Type 2: Current symptoms/problems include neuropathy. Home blood sugar records: fasting range: 90s  Taking metformin 1000 mg twice daily with meals and farxiga 5 mg daily. No longer on insulin since weight has decreased. Any episodes of hypoglycemia? no  Eye exam current (within one year): yes- patient states he went to [facility] within past year but no records have been sent over. Tobacco history: He  reports that he has never smoked. He has never used smokeless tobacco.     Patient follows with podiatrist at [facility] for routine foot care. Has diabetic shoes. Reports that gabapentin is helping better to control neuropathy. Hypertension:  Home blood pressure monitoring: No.  He is adherent to a low sodium diet. Patient denies chest pain, shortness of breath, headache, lightheadedness, blurred vision, peripheral edema and palpitations. Antihypertensive medication side effects: no medication side effects noted. Use of agents associated with hypertension: none. He is currently taking metoprolol tartrate 25 mg BID and lisinopril 20 mg daily. Hyperlipidemia:  No new myalgias or GI upset on atorvastatin (Lipitor). Obesity:  Patient is following with bariatric specialist with [facility]. S/p clarisa-en-y gastric bypass surgery 2/23/21. Weight is decreasing. He is exercising on his bike daily for 60 minutes daily or every other day.      Lab Results   Component Value Date    LABA1C 5.6 08/03/2021    LABA1C 5.5 06/15/2021    LABA1C 7.1 01/27/2021     Lab Results   Component Value Date    LABMICR <1.20 06/15/2021    CREATININE 0.6 (L) 08/03/2021     Lab Results   Component Value Date    ALT 45 (H) 08/03/2021    AST 39 (H) 08/03/2021     Lab Results   Component Value Date    CHOL 131 09/29/2020    TRIG 234 (H) 09/29/2020    HDL 53 08/03/2021 LDLCALC 33 08/03/2021    LDLDIRECT 146 (H) 03/19/2019        GERD: stable on omeprazole 20 mg daily. Asthma: currently well controlled on singulair 10 mg daily, zyrtec 10 mg daily and symbicort daily. Rarely needs to use albuterol rescue inhaler. He follows with pulmonologist Dr. Luann Beyer. JULITA- his cpap was recalled and waiting on getting new one. Chronic back pain. Follows with Loren Clemons. Giving gabapentin 400 mg TID. Talking about spine stimulator for pain. He has not wanted to do this. Patient Active Problem List   Diagnosis    Moderate persistent asthma without complication    Hypertension    GERD (gastroesophageal reflux disease)    Tinea cruris    Type 2 diabetes mellitus with diabetic neuropathy, with long-term current use of insulin (HCC)    Mixed hyperlipidemia    Failed back surgical syndrome    Spinal cord injury, C1-C7 ([address]    Chronic pain syndrome    Central spinal stenosis    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 ([address]    Major depressive disorder    Insomnia    Diabetic eye exam ([address]- 12/16 wnl CEI    Closed nondisplaced fracture of fifth metatarsal bone of right foot    Obesity (BMI 30-39. 9)    Elevated liver enzymes    JULITA (obstructive sleep apnea)    Pressure ulcer of buttock    Abnormal levels of other serum enzymes    Chronic back pain    Right hand pain    Diabetic ulcer of toe of right foot associated with type 2 diabetes mellitus ([address]    Diabetic ulcer of toe (HCC)    Ingrowing nail    Environmental and seasonal allergies    Severe persistent asthma without complication    Other allergic rhinitis    S/P gastric bypass- 2/2021 weight was 393 lbs     Open wound of right thumb with damage to nail          Current Outpatient Medications   Medication Sig Dispense Refill    metFORMIN (GLUCOPHAGE) 1000 MG tablet TAKE 1 TABLET BY MOUTH TWICE DAILY WITH MEALS FOR DIABETES 180 tablet 0    budesonide-formoterol (SYMBICORT) 160-4.5 MCG/ACT AERO Inhale 2 puffs into the lungs 2 times daily INHALE TWO PUFFS BY MOUTH TWICE A DAY FOR LONG TERM CONTROL OF ASTHMA, RINSE MOUTH OUT AFTER USE Usually takes once daily unless he's wheezing 1 each 11    albuterol sulfate  (90 Base) MCG/ACT inhaler INHALE TWO PUFFS BY MOUTH EVERY 6 HOURS AS NEEDED FOR WHEEZING OR FOR SHORTNESS OF BREATH 18 g 0    montelukast (SINGULAIR) 10 MG tablet TAKE 1 TABLET BY MOUTH NIGHTLY 90 tablet 0    omeprazole (PRILOSEC) 20 MG delayed release capsule TAKE ONE CAPSULE BY MOUTH DAILY FOR STOMACH 90 capsule 1    atorvastatin (LIPITOR) 40 MG tablet TAKE 1 TABLET BY MOUTH DAILY 90 tablet 1    metoprolol tartrate (LOPRESSOR) 25 MG tablet TAKE 1 TABLET BY MOUTH 2 TIMES DAILY 180 tablet 0    gabapentin (NEURONTIN) 400 MG capsule Take 1 capsule by mouth 3 times daily.       FARXIGA 5 MG tablet TAKE 1 TABLET BY MOUTH EVERY MORNING 90 tablet 0    cetirizine (ZYRTEC) 10 MG tablet TAKE 1 TABLET BY MOUTH DAILY 30 tablet 11    lisinopril (PRINIVIL;ZESTRIL) 20 MG tablet TAKE 1 TABLET BY MOUTH DAILY 90 tablet 1    ipratropium-albuterol (DUONEB) 0.5-2.5 (3) MG/3ML SOLN nebulizer solution Inhale 3 mLs into the lungs every 6 hours as needed for Shortness of Breath 360 mL 5    Probiotic Product (PROBIOTIC ADVANCED) CAPS Take 1 capsule by mouth daily      Specialty Vitamins Products (CENTRUM PERFORMANCE) TABS Take 1 tablet by mouth daily      blood glucose test strips (TRUE METRIX BLOOD GLUCOSE TEST) strip 1 each by In Vitro route 4 times daily 100 each 3    Lancets MISC Use to check BS  each 5    Cyanocobalamin (VITAMIN B 12 PO) Take 1 tablet by mouth daily      omega-3 acid ethyl esters (LOVAZA) 1 g capsule Take 2 capsules by mouth 2 times daily 360 capsule 0    Blood Glucose Monitoring Suppl (TRUE METRIX METER) w/Device KIT Use to check fasting BS and 2 hours after meal BS 1 kit 0    Insulin Pen Needle (burrp! SAFEPACK PEN NEEDLE) 32G X 4 MM MISC USE FOUR TIMES DAILY 100 each 2     No current facility-administered medications for this visit. Allergies   Allergen Reactions    Penicillins Swelling and Anaphylaxis     Swelling of tongue and throat    Fentanyl      Had hives at patch site and nausea    Other reaction(s): Vomiting    Food Swelling     Swelling of throat from Mushrooms    Mushroom Extract Complex      Other reaction(s): Angioedema       Review of Systems   Constitutional: Negative for activity change, appetite change, fatigue, fever and unexpected weight change. HENT: Negative for congestion, rhinorrhea, sinus pressure and sinus pain. Eyes: Negative for visual disturbance. Respiratory: Negative for cough, chest tightness and shortness of breath. Cardiovascular: Negative for chest pain, palpitations and leg swelling. Gastrointestinal: Negative for abdominal pain, constipation, diarrhea, nausea and vomiting. Endocrine: Negative for cold intolerance, heat intolerance, polydipsia, polyphagia and polyuria. Genitourinary: Negative for difficulty urinating, frequency and urgency. Musculoskeletal: Positive for back pain and myalgias. Neurological: Negative for dizziness, light-headedness and headaches. Psychiatric/Behavioral: Negative for confusion and sleep disturbance. The patient is not nervous/anxious. Vitals:    01/06/22 0757   BP: 134/84   Site: Right Upper Arm   Position: Sitting   Cuff Size: Large Adult   Pulse: 78   Resp: 16   Temp: 98.4 °F (36.9 °C)   TempSrc: Oral   SpO2: 97%   Weight: 282 lb (127.9 kg)   Height: 6' 3\" (1.905 m)       Body mass index is 35.25 kg/m². Wt Readings from Last 3 Encounters:   01/06/22 282 lb (127.9 kg)   10/28/21 299 lb (135.6 kg)   10/22/21 299 lb 2 oz (135.7 kg)       BP Readings from Last 3 Encounters:   01/06/22 134/84   10/22/21 138/86   10/05/21 134/86       Physical Exam  Vitals and nursing note reviewed.    Constitutional:       General: He is not in acute distress. Appearance: He is well-developed. He is obese. HENT:      Head: Normocephalic and atraumatic. Eyes:      Extraocular Movements: Extraocular movements intact. Conjunctiva/sclera: Conjunctivae normal.   Cardiovascular:      Rate and Rhythm: Normal rate and regular rhythm. Heart sounds: Normal heart sounds. No murmur heard. No friction rub. No gallop. Pulmonary:      Effort: Pulmonary effort is normal. No respiratory distress. Breath sounds: Normal breath sounds. Musculoskeletal:      Cervical back: Neck supple. Left knee: No swelling or bony tenderness. Normal range of motion. No tenderness. Right lower leg: No edema. Left lower leg: No edema. Skin:     General: Skin is warm and dry. Neurological:      Mental Status: He is alert and oriented to person, place, and time. Psychiatric:         Behavior: Behavior normal.         Thought Content: Thought content normal.         Judgment: Judgment normal.         Assessmentand Plan  Johana Young was seen today for diabetes. Diagnoses and all orders for this visit:    Type 2 diabetes mellitus with diabetic neuropathy, with long-term current use of insulin ([facility])  HgA1c- 5.7%  Well controlled. D/c farxiga. Continue metformin 1000 mg twice daily  Continue checking fasting blood sugars and 2 hours post dinner blood sugars  Discussed importance of low carb diet, aerobic exercise and need for weight loss. Continues to follow with podiatrist Dr. Gael Braxton for his diabetic neuropathy. He has diabetic shoes that he will wear. Neuropathy pain has improved with gabapentin use. Mixed hyperlipidemia  Lipids controlled 8/2021  Continue atorvastatin 40 mg daily and lovaza 2 g BID  Discussed importance of low fat/cholesterol diet, aerobic exercise and need for weight loss. Essential hypertension  Controlled   Continue metoprolol tartrate 25 mg BID and lisinopril 20 mg daily.   Discussed importance of low salt diet, aerobic exercise and need for weight loss. Obesity  S/p gastric bypass surgery 2/2021. Discussed importance of diet, aerobic exercise and hydration with water. Advised to continue following with bariatrics specialist    GERD  Stable. Continue omeprazole 20 mg daily    Chronic pain  Encouraged to continue to follow up with pain specialist Dr. Sidhu Necessary- stable current medications. Continue f/u with pulmonologist Dr. Bhavya Waterman to get hep B vaccines and flu vaccine and COVID-19 at his pharmacy. Return in about 3 months (around 4/6/2022), or if symptoms worsen or fail to improve, for chronic conditions.

## 2022-01-09 DIAGNOSIS — Z79.4 TYPE 2 DIABETES MELLITUS WITH DIABETIC NEUROPATHY, WITH LONG-TERM CURRENT USE OF INSULIN (HCC): ICD-10-CM

## 2022-01-09 DIAGNOSIS — E11.40 TYPE 2 DIABETES MELLITUS WITH DIABETIC NEUROPATHY, WITH LONG-TERM CURRENT USE OF INSULIN (HCC): ICD-10-CM

## 2022-01-12 ENCOUNTER — TELEPHONE (OUTPATIENT)
Dept: PULMONOLOGY | Age: 42
End: 2022-01-12

## 2022-01-13 LAB
BASOPHILS ABSOLUTE: 0 K/UL (ref 0–0.2)
BASOPHILS RELATIVE PERCENT: 0.8 %
EOSINOPHILS ABSOLUTE: 0.1 K/UL (ref 0–0.6)
EOSINOPHILS RELATIVE PERCENT: 2.6 %
HCT VFR BLD CALC: 47.4 % (ref 40.5–52.5)
HEMOGLOBIN: 15.9 G/DL (ref 13.5–17.5)
INR BLD: 1.07 (ref 0.88–1.12)
LYMPHOCYTES ABSOLUTE: 1.6 K/UL (ref 1–5.1)
LYMPHOCYTES RELATIVE PERCENT: 37.2 %
MCH RBC QN AUTO: 27.9 PG (ref 26–34)
MCHC RBC AUTO-ENTMCNC: 33.6 G/DL (ref 31–36)
MCV RBC AUTO: 83.1 FL (ref 80–100)
MONOCYTES ABSOLUTE: 0.3 K/UL (ref 0–1.3)
MONOCYTES RELATIVE PERCENT: 7.3 %
NEUTROPHILS ABSOLUTE: 2.2 K/UL (ref 1.7–7.7)
NEUTROPHILS RELATIVE PERCENT: 52.1 %
PDW BLD-RTO: 14.8 % (ref 12.4–15.4)
PLATELET # BLD: 345 K/UL (ref 135–450)
PMV BLD AUTO: 8.5 FL (ref 5–10.5)
PROTHROMBIN TIME: 12.1 SEC (ref 9.9–12.7)
RBC # BLD: 5.7 M/UL (ref 4.2–5.9)
WBC # BLD: 4.3 K/UL (ref 4–11)

## 2022-01-17 DIAGNOSIS — E78.2 MIXED HYPERLIPIDEMIA: ICD-10-CM

## 2022-01-17 RX ORDER — ATORVASTATIN CALCIUM 40 MG/1
TABLET, FILM COATED ORAL
Qty: 90 TABLET | Refills: 1 | Status: SHIPPED | OUTPATIENT
Start: 2022-01-17 | End: 2022-06-27

## 2022-01-17 RX ORDER — ATORVASTATIN CALCIUM 80 MG/1
TABLET, FILM COATED ORAL
Qty: 90 TABLET | Refills: 1 | OUTPATIENT
Start: 2022-01-17

## 2022-01-27 DIAGNOSIS — I10 ESSENTIAL HYPERTENSION: ICD-10-CM

## 2022-01-27 DIAGNOSIS — J45.40 MODERATE PERSISTENT ASTHMA WITHOUT COMPLICATION: ICD-10-CM

## 2022-01-28 RX ORDER — MONTELUKAST SODIUM 10 MG/1
10 TABLET ORAL NIGHTLY
Qty: 90 TABLET | Refills: 0 | Status: SHIPPED | OUTPATIENT
Start: 2022-01-28 | End: 2022-04-22

## 2022-03-01 ENCOUNTER — OFFICE VISIT (OUTPATIENT)
Dept: PULMONOLOGY | Age: 42
End: 2022-03-01
Payer: COMMERCIAL

## 2022-03-01 VITALS
OXYGEN SATURATION: 98 % | DIASTOLIC BLOOD PRESSURE: 86 MMHG | WEIGHT: 287 LBS | TEMPERATURE: 97.1 F | HEIGHT: 75 IN | HEART RATE: 88 BPM | SYSTOLIC BLOOD PRESSURE: 132 MMHG | BODY MASS INDEX: 35.68 KG/M2

## 2022-03-01 DIAGNOSIS — G47.33 OSA (OBSTRUCTIVE SLEEP APNEA): ICD-10-CM

## 2022-03-01 DIAGNOSIS — J30.89 OTHER ALLERGIC RHINITIS: ICD-10-CM

## 2022-03-01 DIAGNOSIS — J45.40 MODERATE PERSISTENT ASTHMA WITHOUT COMPLICATION: ICD-10-CM

## 2022-03-01 PROCEDURE — G8427 DOCREV CUR MEDS BY ELIG CLIN: HCPCS | Performed by: INTERNAL MEDICINE

## 2022-03-01 PROCEDURE — G8484 FLU IMMUNIZE NO ADMIN: HCPCS | Performed by: INTERNAL MEDICINE

## 2022-03-01 PROCEDURE — 99214 OFFICE O/P EST MOD 30 MIN: CPT | Performed by: INTERNAL MEDICINE

## 2022-03-01 PROCEDURE — G8417 CALC BMI ABV UP PARAM F/U: HCPCS | Performed by: INTERNAL MEDICINE

## 2022-03-01 PROCEDURE — 1036F TOBACCO NON-USER: CPT | Performed by: INTERNAL MEDICINE

## 2022-03-01 ASSESSMENT — ASTHMA QUESTIONNAIRES
QUESTION_2 LAST FOUR WEEKS HOW OFTEN HAVE YOU HAD SHORTNESS OF BREATH: 1
QUESTION_4 LAST FOUR WEEKS HOW OFTEN HAVE YOU USED YOUR RESCUE INHALER OR NEBULIZER MEDICATION (SUCH AS ALBUTEROL): 4
QUESTION_1 LAST FOUR WEEKS HOW MUCH OF THE TIME DID YOUR ASTHMA KEEP YOU FROM GETTING AS MUCH DONE AT WORK, SCHOOL OR AT HOME: 5
QUESTION_5 LAST FOUR WEEKS HOW WOULD YOU RATE YOUR ASTHMA CONTROL: 5
ACT_TOTALSCORE: 20
QUESTION_3 LAST FOUR WEEKS HOW OFTEN DID YOUR ASTHMA SYMPTOMS (WHEEZING, COUGHING, SHORTNESS OF BREATH, CHEST TIGHTNESS OR PAIN) WAKE YOU UP AT NIGHT OR EARLIER THAN USUAL IN THE MORNING: 5

## 2022-03-01 NOTE — PROGRESS NOTES
REASON FOR CONSULTATION/CC:    Chief Complaint   Patient presents with    Asthma    Follow-up        Consult at request of   AYLA Paredes CNP     PCP: AYLA Paredes CNP    HISTORY OF PRESENT ILLNESS: Tanvi Garcia is a 37y.o. year old male with a history of asthma and JULITA who presents :      History  Patient has a history of moderate asthma that was treated with Rosie Peers in the past.  This is stopped secondary to insurance. Rosie Peers did significantly improve his asthma and allergic rhinitis symptoms. Status post gastric bypass surgery. After this, asthma symptoms improved without Nucala. Interval history        Asthma  Stated on Allegra. Working well. Symbicort and albuterol     allergic rhinitis  Allegra    JULITA  Seeing sleep doc  Has not has used since recall. ASTHMA CONTROL TEST 3/1/2022 8/30/2021 4/27/2021 10/27/2020 5/7/2020 8/5/2019 2/11/2019   In the past 4 weeks, how much of the time did your asthma keep you from getting as much done at work, school or at home? 5 4 4 4 3 3 3   During the past 4 weeks, how often have you had shortness of breath? 1 4 4 5 3 3 2   During the past 4 weeks, how often did your asthma symptoms (wheezing, coughing, shortness of breath, chest tightness or pain) wake you up at night or earlier than usual in the morning? 5 4 4 3 4 4 2   During the past 4 weeks, how often have you used your rescue inhaler or nebulizer medication (such as albuterol)? 4 4 3 3 2 3 1   How would you rate your asthma control during the past 4 weeks? 5 4 4 4 3 3 3   Asthma Control Test Total Score 20 20 19 19 15 16 11          Objective:   PHYSICAL EXAM:  Blood pressure 132/86, pulse 88, temperature 97.1 °F (36.2 °C), temperature source Infrared, height 6' 3\" (1.905 m), weight 287 lb (130.2 kg), SpO2 98 %.'  Gen: No distress. ENT:   Resp: No accessory muscle use. No crackles. No wheezes. No rhonchi. CV: Regular rate. Regular rhythm.  No murmur or rub. No edema. Skin: Warm, dry, normal texture and turgor. No nodule on exposed extremities. M/S: No cyanosis. No clubbing. No joint deformity. Psych: Oriented x 3. No anxiety. Awake. Alert. Intact judgement and insight. Good Mood / Affect. Memory appears in tact        Data Reviewed:       Assessment:      Asthma -[place] [facility] stopped secondary to insurance  Sleep apnea - Dr. Tanisha Frenandez  Obesity Body mass index is 35.87 kg/m². Depression  GERD      Plan:      Problem List Items Addressed This Visit     Other allergic rhinitis      Controlled with Allegra         JULITA (obstructive sleep apnea)     Stop using with recall. Moderate persistent asthma without complication      Controlled with Symbicort and albuterol with Allegra.

## 2022-03-16 DIAGNOSIS — J45.40 MODERATE PERSISTENT ASTHMA WITHOUT COMPLICATION: ICD-10-CM

## 2022-03-16 RX ORDER — FEXOFENADINE HCL 180 MG/1
180 TABLET ORAL DAILY
Qty: 30 TABLET | Refills: 11 | Status: SHIPPED | OUTPATIENT
Start: 2022-03-16 | End: 2022-06-01 | Stop reason: SDUPTHER

## 2022-03-31 DIAGNOSIS — K21.9 GASTROESOPHAGEAL REFLUX DISEASE, UNSPECIFIED WHETHER ESOPHAGITIS PRESENT: ICD-10-CM

## 2022-03-31 DIAGNOSIS — I10 ESSENTIAL HYPERTENSION: ICD-10-CM

## 2022-03-31 RX ORDER — LISINOPRIL 20 MG/1
20 TABLET ORAL DAILY
Qty: 90 TABLET | Refills: 1 | Status: SHIPPED | OUTPATIENT
Start: 2022-03-31 | End: 2022-09-15

## 2022-03-31 RX ORDER — OMEPRAZOLE 20 MG/1
CAPSULE, DELAYED RELEASE ORAL
Qty: 90 CAPSULE | Refills: 1 | Status: SHIPPED | OUTPATIENT
Start: 2022-03-31 | End: 2022-09-15

## 2022-04-05 DIAGNOSIS — J45.40 MODERATE PERSISTENT ASTHMA WITHOUT COMPLICATION: ICD-10-CM

## 2022-04-05 RX ORDER — ALBUTEROL SULFATE 90 UG/1
AEROSOL, METERED RESPIRATORY (INHALATION)
Qty: 18 G | Refills: 1 | Status: SHIPPED | OUTPATIENT
Start: 2022-04-05 | End: 2022-06-13

## 2022-04-07 ENCOUNTER — PATIENT MESSAGE (OUTPATIENT)
Dept: FAMILY MEDICINE CLINIC | Age: 42
End: 2022-04-07

## 2022-04-07 ENCOUNTER — OFFICE VISIT (OUTPATIENT)
Dept: FAMILY MEDICINE CLINIC | Age: 42
End: 2022-04-07
Payer: COMMERCIAL

## 2022-04-07 VITALS
SYSTOLIC BLOOD PRESSURE: 130 MMHG | OXYGEN SATURATION: 97 % | BODY MASS INDEX: 35.01 KG/M2 | TEMPERATURE: 97.9 F | HEART RATE: 76 BPM | RESPIRATION RATE: 16 BRPM | WEIGHT: 281.6 LBS | HEIGHT: 75 IN | DIASTOLIC BLOOD PRESSURE: 84 MMHG

## 2022-04-07 DIAGNOSIS — I10 ESSENTIAL HYPERTENSION: ICD-10-CM

## 2022-04-07 DIAGNOSIS — E11.40 TYPE 2 DIABETES MELLITUS WITH DIABETIC NEUROPATHY, WITH LONG-TERM CURRENT USE OF INSULIN (HCC): ICD-10-CM

## 2022-04-07 DIAGNOSIS — R35.0 URINARY FREQUENCY: ICD-10-CM

## 2022-04-07 DIAGNOSIS — E66.9 OBESITY, CLASS II, BMI 35-39.9: ICD-10-CM

## 2022-04-07 DIAGNOSIS — N30.01 ACUTE CYSTITIS WITH HEMATURIA: ICD-10-CM

## 2022-04-07 DIAGNOSIS — Z79.4 TYPE 2 DIABETES MELLITUS WITH DIABETIC NEUROPATHY, WITH LONG-TERM CURRENT USE OF INSULIN (HCC): Primary | ICD-10-CM

## 2022-04-07 DIAGNOSIS — E78.2 MIXED HYPERLIPIDEMIA: ICD-10-CM

## 2022-04-07 DIAGNOSIS — Z79.4 TYPE 2 DIABETES MELLITUS WITH DIABETIC NEUROPATHY, WITH LONG-TERM CURRENT USE OF INSULIN (HCC): ICD-10-CM

## 2022-04-07 DIAGNOSIS — E11.40 TYPE 2 DIABETES MELLITUS WITH DIABETIC NEUROPATHY, WITH LONG-TERM CURRENT USE OF INSULIN (HCC): Primary | ICD-10-CM

## 2022-04-07 DIAGNOSIS — K21.9 GASTROESOPHAGEAL REFLUX DISEASE, UNSPECIFIED WHETHER ESOPHAGITIS PRESENT: ICD-10-CM

## 2022-04-07 LAB
A/G RATIO: 2 (ref 1.1–2.2)
ALBUMIN SERPL-MCNC: 4.5 G/DL (ref 3.4–5)
ALP BLD-CCNC: 128 U/L (ref 40–129)
ALT SERPL-CCNC: 68 U/L (ref 10–40)
ANION GAP SERPL CALCULATED.3IONS-SCNC: 15 MMOL/L (ref 3–16)
AST SERPL-CCNC: 42 U/L (ref 15–37)
BILIRUB SERPL-MCNC: 0.6 MG/DL (ref 0–1)
BILIRUBIN, POC: NORMAL
BLOOD URINE, POC: NORMAL
BUN BLDV-MCNC: 13 MG/DL (ref 7–20)
CALCIUM SERPL-MCNC: 9.4 MG/DL (ref 8.3–10.6)
CHLORIDE BLD-SCNC: 101 MMOL/L (ref 99–110)
CLARITY, POC: YELLOW
CO2: 23 MMOL/L (ref 21–32)
COLOR, POC: YELLOW
CREAT SERPL-MCNC: 0.8 MG/DL (ref 0.9–1.3)
GFR AFRICAN AMERICAN: >60
GFR NON-AFRICAN AMERICAN: >60
GLUCOSE BLD-MCNC: 111 MG/DL (ref 70–99)
GLUCOSE URINE, POC: NORMAL
HBA1C MFR BLD: 5.5 %
KETONES, POC: NORMAL
LEUKOCYTE EST, POC: NORMAL
NITRITE, POC: POSITIVE
PH, POC: 6
POTASSIUM SERPL-SCNC: 4.4 MMOL/L (ref 3.5–5.1)
PROTEIN, POC: 30
SODIUM BLD-SCNC: 139 MMOL/L (ref 136–145)
SPECIFIC GRAVITY, POC: 1.03
TOTAL PROTEIN: 6.7 G/DL (ref 6.4–8.2)
UROBILINOGEN, POC: 2

## 2022-04-07 PROCEDURE — G8427 DOCREV CUR MEDS BY ELIG CLIN: HCPCS | Performed by: NURSE PRACTITIONER

## 2022-04-07 PROCEDURE — 81002 URINALYSIS NONAUTO W/O SCOPE: CPT | Performed by: NURSE PRACTITIONER

## 2022-04-07 PROCEDURE — 3044F HG A1C LEVEL LT 7.0%: CPT | Performed by: NURSE PRACTITIONER

## 2022-04-07 PROCEDURE — 1036F TOBACCO NON-USER: CPT | Performed by: NURSE PRACTITIONER

## 2022-04-07 PROCEDURE — 99214 OFFICE O/P EST MOD 30 MIN: CPT | Performed by: NURSE PRACTITIONER

## 2022-04-07 PROCEDURE — 83036 HEMOGLOBIN GLYCOSYLATED A1C: CPT | Performed by: NURSE PRACTITIONER

## 2022-04-07 PROCEDURE — 2022F DILAT RTA XM EVC RTNOPTHY: CPT | Performed by: NURSE PRACTITIONER

## 2022-04-07 PROCEDURE — G8417 CALC BMI ABV UP PARAM F/U: HCPCS | Performed by: NURSE PRACTITIONER

## 2022-04-07 RX ORDER — CIPROFLOXACIN 500 MG/1
500 TABLET, FILM COATED ORAL 2 TIMES DAILY
Qty: 20 TABLET | Refills: 0 | Status: SHIPPED | OUTPATIENT
Start: 2022-04-07 | End: 2022-04-17

## 2022-04-07 ASSESSMENT — ENCOUNTER SYMPTOMS
SHORTNESS OF BREATH: 0
SINUS PAIN: 0
CHEST TIGHTNESS: 0
COUGH: 0
BACK PAIN: 1
DIARRHEA: 0
ABDOMINAL PAIN: 0
SINUS PRESSURE: 0
NAUSEA: 0
VOMITING: 0
RHINORRHEA: 0
CONSTIPATION: 0

## 2022-04-07 ASSESSMENT — PATIENT HEALTH QUESTIONNAIRE - PHQ9
SUM OF ALL RESPONSES TO PHQ QUESTIONS 1-9: 0
6. FEELING BAD ABOUT YOURSELF - OR THAT YOU ARE A FAILURE OR HAVE LET YOURSELF OR YOUR FAMILY DOWN: 0
4. FEELING TIRED OR HAVING LITTLE ENERGY: 0
SUM OF ALL RESPONSES TO PHQ QUESTIONS 1-9: 0
3. TROUBLE FALLING OR STAYING ASLEEP: 0
10. IF YOU CHECKED OFF ANY PROBLEMS, HOW DIFFICULT HAVE THESE PROBLEMS MADE IT FOR YOU TO DO YOUR WORK, TAKE CARE OF THINGS AT HOME, OR GET ALONG WITH OTHER PEOPLE: 0
9. THOUGHTS THAT YOU WOULD BE BETTER OFF DEAD, OR OF HURTING YOURSELF: 0
2. FEELING DOWN, DEPRESSED OR HOPELESS: 0
SUM OF ALL RESPONSES TO PHQ9 QUESTIONS 1 & 2: 0
1. LITTLE INTEREST OR PLEASURE IN DOING THINGS: 0
SUM OF ALL RESPONSES TO PHQ QUESTIONS 1-9: 0
8. MOVING OR SPEAKING SO SLOWLY THAT OTHER PEOPLE COULD HAVE NOTICED. OR THE OPPOSITE, BEING SO FIGETY OR RESTLESS THAT YOU HAVE BEEN MOVING AROUND A LOT MORE THAN USUAL: 0
7. TROUBLE CONCENTRATING ON THINGS, SUCH AS READING THE NEWSPAPER OR WATCHING TELEVISION: 0
SUM OF ALL RESPONSES TO PHQ QUESTIONS 1-9: 0
5. POOR APPETITE OR OVEREATING: 0

## 2022-04-07 NOTE — TELEPHONE ENCOUNTER
From: Jose Bravo  To: Jaymie Nurse  Sent: 4/7/2022 8:45 AM EDT  Subject: Question regarding POCT PERFORM URINE DIPSTICK    So is the urine Good like no infections ect. Attila Farley

## 2022-04-07 NOTE — TELEPHONE ENCOUNTER
Spoke to patient and let him know that ua was positive and rx was sent to pharmacy. We will call with culture results when they are in.

## 2022-04-07 NOTE — PROGRESS NOTES
4/7/2022    This is a 37 y.o. male   Chief Complaint   Patient presents with    Diabetes     hasn't been checking sugar    Urinary Frequency     x1 week. doesn't feel like he's emptying his bladder and has to go again. [place] HPI   Diabetes Mellitus Type 2: Current symptoms/problems include neuropathy. Home blood sugar records: fasting range: 80-90s  Taking metformin 1000 mg twice daily with meals  Any episodes of hypoglycemia? no  Eye exam current (within one year): yes- 2/2022  Tobacco history: He  reports that he has never smoked. He has never used smokeless tobacco.     Patient follows with podiatrist at [facility] for routine foot care. Has diabetic shoes. Reports that gabapentin is helping better to control neuropathy. Hypertension:  Home blood pressure monitoring: No.  He is adherent to a low sodium diet. Patient denies chest pain, shortness of breath, headache, lightheadedness, blurred vision, peripheral edema and palpitations. Antihypertensive medication side effects: no medication side effects noted. Use of agents associated with hypertension: none. He is currently taking metoprolol tartrate 25 mg BID and lisinopril 20 mg daily. Hyperlipidemia:  No new myalgias or GI upset on atorvastatin (Lipitor). Obesity:  Patient is following with bariatric specialist with [facility]. S/p clarisa-en-y gastric bypass surgery 2/23/21. Weight is decreasing. He is exercising on his bike daily for 60 minutes daily or every other day.      Lab Results   Component Value Date    LABA1C 5.7 01/06/2022    LABA1C 5.6 08/03/2021    LABA1C 5.5 06/15/2021     Lab Results   Component Value Date    LABMICR <1.20 06/15/2021    CREATININE 0.8 (L) 01/06/2022     Lab Results   Component Value Date    ALT 72 (H) 01/06/2022    AST 44 (H) 01/06/2022     Lab Results   Component Value Date    CHOL 131 09/29/2020    TRIG 234 (H) 09/29/2020    HDL 53 08/03/2021    LDLCALC 33 08/03/2021    LDLDIRECT 146 (H) 03/19/2019        GERD: stable on omeprazole 20 mg daily. Asthma: currently well controlled on singulair 10 mg daily, zyrtec 10 mg daily and symbicort daily. Rarely needs to use albuterol rescue inhaler. He follows with pulmonologist Dr. Yoni Piper. JULITA- his cpap was recalled and waiting on getting new one. Chronic back pain. Follows with Carie De Jesus. Giving gabapentin 400 mg TID. Talking about spine stimulator for pain. He has not wanted to do this. Patient reports that for the past week he has been having urinary frequency and feeling that he is not able to empty his bladder completely. Denies hematuria, dysuria, fever. Does have pressure in pelvis before urinating. Has seen Dr. Jenniffer Ross in the past and has needed a cysto with urethral dilation. Last was 6/2020. Patient Active Problem List   Diagnosis    Moderate persistent asthma without complication    Hypertension    GERD (gastroesophageal reflux disease)    Tinea cruris    Type 2 diabetes mellitus with diabetic neuropathy, with long-term current use of insulin (HCC)    Mixed hyperlipidemia    Failed back surgical syndrome    Spinal cord injury, C1-C7 ([address]    Chronic pain syndrome    Central spinal stenosis    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 ([address]    Major depressive disorder    Insomnia    Diabetic eye exam ([address]- 12/16 wnl CEI    Closed nondisplaced fracture of fifth metatarsal bone of right foot    Obesity (BMI 30-39. 9)    Elevated liver enzymes    JULITA (obstructive sleep apnea)    Pressure ulcer of buttock    Abnormal levels of other serum enzymes    Chronic back pain    Right hand pain    Diabetic ulcer of toe of right foot associated with type 2 diabetes mellitus ([address]    Diabetic ulcer of toe (HCC)    Ingrowing nail    Environmental and seasonal allergies    Severe persistent asthma without complication    Other allergic rhinitis    S/P gastric bypass- 2/2021 weight was 393 lbs     Open wound of right thumb with damage to nail          Current Outpatient Medications   Medication Sig Dispense Refill    albuterol sulfate  (90 Base) MCG/ACT inhaler INHALE TWO PUFFS BY MOUTH EVERY 6 HOURS AS NEEDED FOR WHEEZING OR FOR SHORTNESS OF BREATH 18 g 1    lisinopril (PRINIVIL;ZESTRIL) 20 MG tablet TAKE 1 TABLET BY MOUTH DAILY 90 tablet 1    omeprazole (PRILOSEC) 20 MG delayed release capsule TAKE ONE CAPSULE BY MOUTH DAILY FOR STOMACH 90 capsule 1    fexofenadine (ALLEGRA) 180 MG tablet Take 1 tablet by mouth daily 30 tablet 11    montelukast (SINGULAIR) 10 MG tablet TAKE 1 TABLET BY MOUTH NIGHTLY 90 tablet 0    metoprolol tartrate (LOPRESSOR) 25 MG tablet TAKE 1 TABLET BY MOUTH 2 TIMES DAILY 180 tablet 0    atorvastatin (LIPITOR) 40 MG tablet TAKE 1 TABLET BY MOUTH DAILY 90 tablet 1    metFORMIN (GLUCOPHAGE) 1000 MG tablet TAKE 1 TABLET BY MOUTH TWICE DAILY WITH MEALS FOR DIABETES 180 tablet 0    budesonide-formoterol (SYMBICORT) 160-4.5 MCG/ACT AERO Inhale 2 puffs into the lungs 2 times daily INHALE TWO PUFFS BY MOUTH TWICE A DAY FOR LONG TERM CONTROL OF ASTHMA, RINSE MOUTH OUT AFTER USE Usually takes once daily unless he's wheezing 1 each 11    gabapentin (NEURONTIN) 400 MG capsule Take 1 capsule by mouth 3 times daily.        cetirizine (ZYRTEC) 10 MG tablet TAKE 1 TABLET BY MOUTH DAILY 30 tablet 11    ipratropium-albuterol (DUONEB) 0.5-2.5 (3) MG/3ML SOLN nebulizer solution Inhale 3 mLs into the lungs every 6 hours as needed for Shortness of Breath 360 mL 5    Probiotic Product (PROBIOTIC ADVANCED) CAPS Take 1 capsule by mouth daily      Specialty Vitamins Products (CENTRUM PERFORMANCE) TABS Take 1 tablet by mouth daily      blood glucose test strips (TRUE METRIX BLOOD GLUCOSE TEST) strip 1 each by In Vitro route 4 times daily 100 each 3    Lancets MISC Use to check BS  each 5    Cyanocobalamin (VITAMIN B 12 PO) Take 1 tablet by mouth daily      Blood Glucose Monitoring Suppl (TRUE METRIX METER) w/Device KIT Use to check fasting BS and 2 hours after meal BS 1 kit 0     No current facility-administered medications for this visit. Allergies   Allergen Reactions    Penicillins Swelling and Anaphylaxis     Swelling of tongue and throat    Fentanyl      Had hives at patch site and nausea    Other reaction(s): Vomiting    Food Swelling     Swelling of throat from Mushrooms    Mushroom Extract Complex      Other reaction(s): Angioedema       Review of Systems   Constitutional: Negative for activity change, appetite change, fatigue, fever and unexpected weight change. HENT: Negative for congestion, rhinorrhea, sinus pressure and sinus pain. Eyes: Negative for visual disturbance. Respiratory: Negative for cough, chest tightness and shortness of breath. Cardiovascular: Negative for chest pain, palpitations and leg swelling. Gastrointestinal: Negative for abdominal pain, constipation, diarrhea, nausea and vomiting. Endocrine: Negative for cold intolerance, heat intolerance, polydipsia, polyphagia and polyuria. Genitourinary: Positive for frequency. Negative for difficulty urinating and urgency. Musculoskeletal: Positive for back pain and myalgias. Neurological: Negative for dizziness, light-headedness and headaches. Psychiatric/Behavioral: Negative for confusion and sleep disturbance. The patient is not nervous/anxious. Vitals:    04/07/22 0753   BP: 130/84   Site: Right Upper Arm   Position: Sitting   Cuff Size: Large Adult   Pulse: 76   Resp: 16   Temp: 97.9 °F (36.6 °C)   TempSrc: Oral   SpO2: 97%   Weight: 281 lb 9.6 oz (127.7 kg)   Height: 6' 3\" (1.905 m)       Body mass index is 35.2 kg/m².      Wt Readings from Last 3 Encounters:   04/07/22 281 lb 9.6 oz (127.7 kg)   03/01/22 287 lb (130.2 kg)   01/06/22 282 lb (127.9 kg)       BP Readings from Last 3 Encounters:   04/07/22 130/84   03/01/22 132/86   01/06/22 134/84 Physical Exam  Vitals and nursing note reviewed. Constitutional:       General: He is not in acute distress. Appearance: He is well-developed. He is obese. HENT:      Head: Normocephalic and atraumatic. Eyes:      Extraocular Movements: Extraocular movements intact. Conjunctiva/sclera: Conjunctivae normal.   Cardiovascular:      Rate and Rhythm: Normal rate and regular rhythm. Pulses:           Dorsalis pedis pulses are 2+ on the right side and 2+ on the left side. Posterior tibial pulses are 2+ on the right side and 2+ on the left side. Heart sounds: Normal heart sounds. No murmur heard. No friction rub. No gallop. Pulmonary:      Effort: Pulmonary effort is normal. No respiratory distress. Breath sounds: Normal breath sounds. Musculoskeletal:      Cervical back: Neck supple. Left knee: No swelling or bony tenderness. Normal range of motion. No tenderness. Right lower leg: No edema. Left lower leg: No edema. Feet:      Right foot:      Protective Sensation: 10 sites tested. 3 sites sensed. Skin integrity: Dry skin present. Toenail Condition: Right toenails are normal.      Left foot:      Protective Sensation: 10 sites tested. 10 sites sensed. Skin integrity: Dry skin present. Toenail Condition: Left toenails are normal.   Skin:     General: Skin is warm and dry. Neurological:      Mental Status: He is alert and oriented to person, place, and time. Psychiatric:         Behavior: Behavior normal.         Thought Content: Thought content normal.         Judgment: Judgment normal.         Assessmentand Plan  Paulino Ma was seen today for diabetes. Diagnoses and all orders for this visit:    Type 2 diabetes mellitus with diabetic neuropathy, with long-term current use of insulin ([facility])  HgA1c- 5.5%  Well controlled.    Decrease metformin to 1000 mg daily  Continue checking fasting blood sugars and 2 hours post dinner blood sugars  Discussed importance of low carb diet, aerobic exercise and need for weight loss. Continues to follow with podiatrist Dr. Nicanor Terry for his diabetic neuropathy. He has diabetic shoes that he will wear. Neuropathy pain has improved with gabapentin use. Mixed hyperlipidemia  Lipids controlled 8/2021  Continue atorvastatin 40 mg daily   Discussed importance of low fat/cholesterol diet, aerobic exercise and need for weight loss. Essential hypertension  Controlled   Continue metoprolol tartrate 25 mg BID and lisinopril 20 mg daily. Discussed importance of low salt diet, aerobic exercise and need for weight loss. Obesity  S/p gastric bypass surgery 2/2021. Discussed importance of diet, aerobic exercise and hydration with water. Advised to continue following with bariatrics specialist    GERD  Stable. Continue omeprazole 20 mg daily    Chronic pain  Encouraged to continue to follow up with pain specialist Dr. Marivel Ovalles- stable current medications. Continue f/u with pulmonologist Dr. Princess Young     Urinary frequency  -     POCT Urinalysis no Micro- small amount of blood, large amount of leukocytes, positive for nitrites   Advised to f/u with urologist Dr. Virginia Fajardo     Acute cystitis with hematuria  -     Culture, Urine  -     ciprofloxacin (CIPRO) 500 MG tablet; Take 1 tablet by mouth 2 times daily for 10 days For treatment of urinary tract infection. Patient is to call if symptoms worsen or fail to improve within a few days. Return in about 3 months (around 7/7/2022), or if symptoms worsen or fail to improve, for chronic conditions.

## 2022-04-08 LAB — URINE CULTURE, ROUTINE: NORMAL

## 2022-04-22 DIAGNOSIS — J45.40 MODERATE PERSISTENT ASTHMA WITHOUT COMPLICATION: ICD-10-CM

## 2022-04-22 DIAGNOSIS — I10 ESSENTIAL HYPERTENSION: ICD-10-CM

## 2022-04-22 RX ORDER — MONTELUKAST SODIUM 10 MG/1
10 TABLET ORAL NIGHTLY
Qty: 90 TABLET | Refills: 0 | Status: SHIPPED | OUTPATIENT
Start: 2022-04-22 | End: 2022-08-01

## 2022-05-03 ENCOUNTER — HOSPITAL ENCOUNTER (OUTPATIENT)
Dept: MRI IMAGING | Age: 42
Discharge: HOME | End: 2022-05-03
Payer: COMMERCIAL

## 2022-05-03 DIAGNOSIS — R10.9 ABDOMINAL PAIN, UNSPECIFIED ABDOMINAL LOCATION: ICD-10-CM

## 2022-05-03 PROCEDURE — 74183 MRI ABD W/O CNTR FLWD CNTR: CPT

## 2022-05-03 PROCEDURE — A9577 INJ MULTIHANCE: HCPCS | Performed by: INTERNAL MEDICINE

## 2022-05-03 PROCEDURE — 6360000004 HC RX CONTRAST MEDICATION: Performed by: INTERNAL MEDICINE

## 2022-05-03 RX ADMIN — GADOBENATE DIMEGLUMINE 20 ML: 529 INJECTION, SOLUTION INTRAVENOUS at 10:14

## 2022-05-05 ENCOUNTER — OFFICE VISIT (OUTPATIENT)
Dept: FAMILY MEDICINE CLINIC | Age: 42
End: 2022-05-05
Payer: COMMERCIAL

## 2022-05-05 VITALS
SYSTOLIC BLOOD PRESSURE: 126 MMHG | RESPIRATION RATE: 18 BRPM | TEMPERATURE: 98.4 F | OXYGEN SATURATION: 97 % | HEART RATE: 95 BPM | DIASTOLIC BLOOD PRESSURE: 82 MMHG

## 2022-05-05 DIAGNOSIS — J45.21 MILD INTERMITTENT ASTHMATIC BRONCHITIS WITH ACUTE EXACERBATION: ICD-10-CM

## 2022-05-05 DIAGNOSIS — J45.21 MILD INTERMITTENT ASTHMATIC BRONCHITIS WITH ACUTE EXACERBATION: Primary | ICD-10-CM

## 2022-05-05 PROCEDURE — 99213 OFFICE O/P EST LOW 20 MIN: CPT | Performed by: NURSE PRACTITIONER

## 2022-05-05 PROCEDURE — G8417 CALC BMI ABV UP PARAM F/U: HCPCS | Performed by: NURSE PRACTITIONER

## 2022-05-05 PROCEDURE — 1036F TOBACCO NON-USER: CPT | Performed by: NURSE PRACTITIONER

## 2022-05-05 PROCEDURE — G8427 DOCREV CUR MEDS BY ELIG CLIN: HCPCS | Performed by: NURSE PRACTITIONER

## 2022-05-05 RX ORDER — AZITHROMYCIN 250 MG/1
TABLET, FILM COATED ORAL
Qty: 1 PACKET | Refills: 0 | Status: SHIPPED | OUTPATIENT
Start: 2022-05-05 | End: 2022-05-15

## 2022-05-05 RX ORDER — METHYLPREDNISOLONE 4 MG/1
TABLET ORAL
Qty: 1 KIT | Refills: 0 | Status: SHIPPED | OUTPATIENT
Start: 2022-05-05 | End: 2022-05-11

## 2022-05-05 ASSESSMENT — ENCOUNTER SYMPTOMS
SINUS PRESSURE: 0
VOMITING: 0
RHINORRHEA: 1
SHORTNESS OF BREATH: 0
SINUS PAIN: 0
DIARRHEA: 0
WHEEZING: 1
SORE THROAT: 1
NAUSEA: 0
ABDOMINAL PAIN: 0
COUGH: 1

## 2022-05-05 NOTE — PROGRESS NOTES
5/5/2022    This is a 37 y.o. male   Chief Complaint   Patient presents with    Congestion     cough, wheezing, brownish, yellow mucous, all in chest. no sob. no fever. headache. scratchy throat the first day. not now. started 3 days ago. .    Cough  This is a new problem. The current episode started in the past 7 days. The problem has been gradually worsening. The problem occurs every few minutes. The cough is productive of sputum. Associated symptoms include rhinorrhea, a sore throat and wheezing. Pertinent negatives include no chest pain, fever, headaches, postnasal drip or shortness of breath. Treatments tried: albuterol inhaler  The treatment provided mild relief. His past medical history is significant for asthma. Has history of asthma. Been taking his routine medications. Been using albuterol inhaler more. Using 1-2 times per day. History of well controlled DM  Lab Results   Component Value Date    LABA1C 5.5 04/07/2022     Lab Results   Component Value Date    .0 08/03/2021        Patient Active Problem List   Diagnosis    Moderate persistent asthma without complication    Hypertension    GERD (gastroesophageal reflux disease)    Tinea cruris    Type 2 diabetes mellitus with diabetic neuropathy, with long-term current use of insulin ([facility])    Mixed hyperlipidemia    Failed back surgical syndrome    [facility] C1-C7 [facility]    Chronic pain syndrome    Central spinal stenosis    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 ([facility])    Major depressive disorder    Insomnia    Diabetic eye exam ([facility])- 12/16 wnl CEI    Closed nondisplaced fracture of fifth metatarsal bone of right foot    Obesity (BMI 30-39. 9)    Elevated liver enzymes    JULITA (obstructive sleep apnea)    Pressure ulcer of buttock    Abnormal levels of other serum enzymes    Chronic back pain    Right hand pain    Diabetic ulcer of toe of right foot associated with type 2 diabetes mellitus ([facility])    [facility]abetic ulcer of toe [facility]    Ingrowing nail    Environmental and seasonal allergies    Severe persistent asthma without complication    Other allergic rhinitis    S/P gastric bypass- 2/2021 weight was 393 lbs     Open wound of right thumb with damage to nail          Current Outpatient Medications   Medication Sig Dispense Refill    metoprolol tartrate (LOPRESSOR) 25 MG tablet TAKE 1 TABLET BY MOUTH 2 TIMES DAILY 180 tablet 0    montelukast (SINGULAIR) 10 MG tablet TAKE 1 TABLET BY MOUTH NIGHTLY 90 tablet 0    metFORMIN (GLUCOPHAGE) 1000 MG tablet Take 1 tablet by mouth daily (with breakfast) 90 tablet 1    albuterol sulfate  (90 Base) MCG/ACT inhaler INHALE TWO PUFFS BY MOUTH EVERY 6 HOURS AS NEEDED FOR WHEEZING OR FOR SHORTNESS OF BREATH 18 g 1    lisinopril (PRINIVIL;ZESTRIL) 20 MG tablet TAKE 1 TABLET BY MOUTH DAILY 90 tablet 1    omeprazole (PRILOSEC) 20 MG delayed release capsule TAKE ONE CAPSULE BY MOUTH DAILY FOR STOMACH 90 capsule 1    atorvastatin (LIPITOR) 40 MG tablet TAKE 1 TABLET BY MOUTH DAILY 90 tablet 1    budesonide-formoterol (SYMBICORT) 160-4.5 MCG/ACT AERO Inhale 2 puffs into the lungs 2 times daily INHALE TWO PUFFS BY MOUTH TWICE A DAY FOR LONG TERM CONTROL OF ASTHMA, RINSE MOUTH OUT AFTER USE Usually takes once daily unless he's wheezing 1 each 11    gabapentin (NEURONTIN) 400 MG capsule Take 1 capsule by mouth 3 times daily.        ipratropium-albuterol (DUONEB) 0.5-2.5 (3) MG/3ML SOLN nebulizer solution Inhale 3 mLs into the lungs every 6 hours as needed for Shortness of Breath 360 mL 5    Probiotic Product (PROBIOTIC ADVANCED) CAPS Take 1 capsule by mouth daily      Specialty Vitamins Products (CENTRUM PERFORMANCE) TABS Take 1 tablet by mouth daily      blood glucose test strips (TRUE METRIX BLOOD GLUCOSE TEST) strip 1 each by In Vitro route 4 times daily 100 each 3    Lancets MISC Use to check BS  each 5    Cyanocobalamin (VITAMIN B 12 PO) Take 1 tablet by mouth daily      Blood Glucose Monitoring Suppl (TRUE METRIX METER) w/Device KIT Use to check fasting BS and 2 hours after meal BS 1 kit 0    cetirizine (ZYRTEC) 10 MG tablet TAKE 1 TABLET BY MOUTH DAILY 30 tablet 11     No current facility-administered medications for this visit. Allergies   Allergen Reactions    Penicillins Swelling and Anaphylaxis     Swelling of tongue and throat    Fentanyl      Had hives at patch site and nausea    Other reaction(s): Vomiting    Food Swelling     Swelling of throat from Mushrooms    Mushroom Extract Complex      Other reaction(s): Angioedema       Review of Systems   Constitutional: Positive for activity change and fatigue. Negative for fever. HENT: Positive for rhinorrhea and sore throat. Negative for congestion, postnasal drip, sinus pressure and sinus pain. Respiratory: Positive for cough and wheezing. Negative for shortness of breath. Cardiovascular: Negative for chest pain and leg swelling. Gastrointestinal: Negative for abdominal pain, diarrhea, nausea and vomiting. Neurological: Negative for dizziness and headaches. Vitals:    05/05/22 1353   BP: 126/82   Site: Right Upper Arm   Position: Sitting   Cuff Size: Large Adult   Pulse: 95   Resp: 18   Temp: 98.4 °F (36.9 °C)   TempSrc: Oral   SpO2: 97%       There is no height or weight on file to calculate BMI. Wt Readings from Last 3 Encounters:   05/03/22 268 lb (121.6 kg)   04/07/22 281 lb 9.6 oz (127.7 kg)   03/01/22 287 lb (130.2 kg)       BP Readings from Last 3 Encounters:   05/05/22 126/82   04/07/22 130/84   03/01/22 132/86       Physical Exam  Vitals and nursing note reviewed. Constitutional:       General: He is not in acute distress. Appearance: He is well-developed. He is obese. HENT:      Head: Normocephalic and atraumatic. Right Ear: Tympanic membrane, ear canal and external ear normal. Tympanic membrane is not erythematous or bulging.       Left Ear: Tympanic membrane, ear canal and external ear normal. Tympanic membrane is not erythematous or bulging. Nose:      Right Sinus: No maxillary sinus tenderness or frontal sinus tenderness. Left Sinus: No maxillary sinus tenderness or frontal sinus tenderness. Mouth/Throat:      Pharynx: Uvula midline. Posterior oropharyngeal erythema present. No oropharyngeal exudate. Cardiovascular:      Rate and Rhythm: Normal rate and regular rhythm. Heart sounds: Normal heart sounds. No murmur heard. No friction rub. No gallop. Pulmonary:      Effort: Pulmonary effort is normal. No respiratory distress. Breath sounds: Examination of the right-middle field reveals wheezing. Examination of the left-middle field reveals wheezing. Examination of the right-lower field reveals wheezing. Examination of the left-lower field reveals wheezing. Wheezing present. Musculoskeletal:      Cervical back: Neck supple. Lymphadenopathy:      Head:      Right side of head: No submandibular adenopathy. Left side of head: No submandibular adenopathy. Cervical:      Right cervical: No superficial cervical adenopathy. Left cervical: No superficial cervical adenopathy. Skin:     General: Skin is warm and dry. Neurological:      Mental Status: He is alert and oriented to person, place, and time. Psychiatric:         Behavior: Behavior normal.         Assessmentand Plan  Bettina Tafoya was seen today for congestion. Diagnoses and all orders for this visit:    Mild intermittent asthmatic bronchitis with acute exacerbation  -     azithromycin (ZITHROMAX Z-ADRI) 250 MG tablet; Take 2 tablets on day 1, then 1 tablet daily for the next 4 days for treatment of bacterial infection. -     methylPREDNISolone (MEDROL DOSEPACK) 4 MG tablet; Take as directed for 6 days.   -     COVID-19; Future  Increase fluids  Rest  Continue symbicort BID and albuterol inhaler PRN  Advised to follow strict low carb diet while on the medrol dose pack  Patient is to call if symptoms worsen or fail to improve within the week. Return if symptoms worsen or fail to improve.

## 2022-05-06 LAB — SARS-COV-2: NOT DETECTED

## 2022-06-01 ENCOUNTER — PATIENT MESSAGE (OUTPATIENT)
Dept: PULMONOLOGY | Age: 42
End: 2022-06-01

## 2022-06-01 DIAGNOSIS — Z79.4 TYPE 2 DIABETES MELLITUS WITH DIABETIC NEUROPATHY, WITH LONG-TERM CURRENT USE OF INSULIN (HCC): ICD-10-CM

## 2022-06-01 DIAGNOSIS — E11.40 TYPE 2 DIABETES MELLITUS WITH DIABETIC NEUROPATHY, WITH LONG-TERM CURRENT USE OF INSULIN (HCC): ICD-10-CM

## 2022-06-01 DIAGNOSIS — J45.40 MODERATE PERSISTENT ASTHMA WITHOUT COMPLICATION: ICD-10-CM

## 2022-06-01 RX ORDER — FEXOFENADINE HCL 180 MG/1
180 TABLET ORAL DAILY
Qty: 30 TABLET | Refills: 11 | Status: SHIPPED | OUTPATIENT
Start: 2022-06-01 | End: 2022-07-01

## 2022-06-01 RX ORDER — GABAPENTIN 400 MG/1
CAPSULE ORAL
Qty: 90 CAPSULE | Refills: 0 | OUTPATIENT
Start: 2022-06-01

## 2022-06-01 NOTE — TELEPHONE ENCOUNTER
From: Theone Cargo  To: Dr. Evelyn Reilly: 6/1/2022 6:53 AM EDT  Subject: Oris Rough    I have been out of this medicine for 4 days now [facility] in [place] said they haven't got approved for refills.

## 2022-06-13 DIAGNOSIS — J45.40 MODERATE PERSISTENT ASTHMA WITHOUT COMPLICATION: ICD-10-CM

## 2022-06-13 RX ORDER — ALBUTEROL SULFATE 90 UG/1
AEROSOL, METERED RESPIRATORY (INHALATION)
Qty: 6.7 G | Refills: 1 | Status: SHIPPED | OUTPATIENT
Start: 2022-06-13 | End: 2022-08-25 | Stop reason: SDUPTHER

## 2022-06-24 DIAGNOSIS — E78.2 MIXED HYPERLIPIDEMIA: ICD-10-CM

## 2022-06-27 RX ORDER — ATORVASTATIN CALCIUM 40 MG/1
TABLET, FILM COATED ORAL
Qty: 90 TABLET | Refills: 0 | Status: SHIPPED | OUTPATIENT
Start: 2022-06-27

## 2022-07-08 ENCOUNTER — OFFICE VISIT (OUTPATIENT)
Dept: FAMILY MEDICINE CLINIC | Age: 42
End: 2022-07-08
Payer: COMMERCIAL

## 2022-07-08 VITALS
HEART RATE: 69 BPM | RESPIRATION RATE: 18 BRPM | WEIGHT: 284 LBS | HEIGHT: 75 IN | SYSTOLIC BLOOD PRESSURE: 128 MMHG | DIASTOLIC BLOOD PRESSURE: 80 MMHG | BODY MASS INDEX: 35.31 KG/M2 | OXYGEN SATURATION: 98 %

## 2022-07-08 DIAGNOSIS — K21.9 GASTROESOPHAGEAL REFLUX DISEASE, UNSPECIFIED WHETHER ESOPHAGITIS PRESENT: ICD-10-CM

## 2022-07-08 DIAGNOSIS — E66.9 OBESITY, CLASS II, BMI 35-39.9: ICD-10-CM

## 2022-07-08 DIAGNOSIS — J45.40 MODERATE PERSISTENT ASTHMA WITHOUT COMPLICATION: ICD-10-CM

## 2022-07-08 DIAGNOSIS — E78.2 MIXED HYPERLIPIDEMIA: ICD-10-CM

## 2022-07-08 DIAGNOSIS — E11.40 TYPE 2 DIABETES MELLITUS WITH DIABETIC NEUROPATHY, WITH LONG-TERM CURRENT USE OF INSULIN (HCC): Primary | ICD-10-CM

## 2022-07-08 DIAGNOSIS — Z79.4 TYPE 2 DIABETES MELLITUS WITH DIABETIC NEUROPATHY, WITH LONG-TERM CURRENT USE OF INSULIN (HCC): ICD-10-CM

## 2022-07-08 DIAGNOSIS — R74.8 ELEVATED LIVER ENZYMES: ICD-10-CM

## 2022-07-08 DIAGNOSIS — Z79.4 TYPE 2 DIABETES MELLITUS WITH DIABETIC NEUROPATHY, WITH LONG-TERM CURRENT USE OF INSULIN (HCC): Primary | ICD-10-CM

## 2022-07-08 DIAGNOSIS — I10 ESSENTIAL HYPERTENSION: ICD-10-CM

## 2022-07-08 DIAGNOSIS — E11.40 TYPE 2 DIABETES MELLITUS WITH DIABETIC NEUROPATHY, WITH LONG-TERM CURRENT USE OF INSULIN (HCC): ICD-10-CM

## 2022-07-08 PROBLEM — S61.101A: Status: RESOLVED | Noted: 2021-10-28 | Resolved: 2022-07-08

## 2022-07-08 LAB
A/G RATIO: 2.3 (ref 1.1–2.2)
ALBUMIN SERPL-MCNC: 4.6 G/DL (ref 3.4–5)
ALP BLD-CCNC: 106 U/L (ref 40–129)
ALT SERPL-CCNC: 59 U/L (ref 10–40)
ANION GAP SERPL CALCULATED.3IONS-SCNC: 12 MMOL/L (ref 3–16)
AST SERPL-CCNC: 33 U/L (ref 15–37)
BASOPHILS ABSOLUTE: 0 K/UL (ref 0–0.2)
BASOPHILS RELATIVE PERCENT: 1 %
BILIRUB SERPL-MCNC: 0.6 MG/DL (ref 0–1)
BUN BLDV-MCNC: 11 MG/DL (ref 7–20)
CALCIUM SERPL-MCNC: 9.5 MG/DL (ref 8.3–10.6)
CHLORIDE BLD-SCNC: 103 MMOL/L (ref 99–110)
CHOLESTEROL, TOTAL: 98 MG/DL (ref 0–199)
CO2: 28 MMOL/L (ref 21–32)
CREAT SERPL-MCNC: 0.9 MG/DL (ref 0.9–1.3)
CREATININE URINE: 137.6 MG/DL (ref 39–259)
EOSINOPHILS ABSOLUTE: 0.1 K/UL (ref 0–0.6)
EOSINOPHILS RELATIVE PERCENT: 2.4 %
GFR AFRICAN AMERICAN: >60
GFR NON-AFRICAN AMERICAN: >60
GLUCOSE BLD-MCNC: 114 MG/DL (ref 70–99)
HBA1C MFR BLD: 5.8 %
HCT VFR BLD CALC: 41.6 % (ref 40.5–52.5)
HDLC SERPL-MCNC: 45 MG/DL (ref 40–60)
HEMOGLOBIN: 13.9 G/DL (ref 13.5–17.5)
LDL CHOLESTEROL CALCULATED: 43 MG/DL
LYMPHOCYTES ABSOLUTE: 1.2 K/UL (ref 1–5.1)
LYMPHOCYTES RELATIVE PERCENT: 27 %
MCH RBC QN AUTO: 27.5 PG (ref 26–34)
MCHC RBC AUTO-ENTMCNC: 33.3 G/DL (ref 31–36)
MCV RBC AUTO: 82.4 FL (ref 80–100)
MICROALBUMIN UR-MCNC: <1.2 MG/DL
MICROALBUMIN/CREAT UR-RTO: NORMAL MG/G (ref 0–30)
MONOCYTES ABSOLUTE: 0.3 K/UL (ref 0–1.3)
MONOCYTES RELATIVE PERCENT: 6.8 %
NEUTROPHILS ABSOLUTE: 2.7 K/UL (ref 1.7–7.7)
NEUTROPHILS RELATIVE PERCENT: 62.8 %
PDW BLD-RTO: 13.7 % (ref 12.4–15.4)
PLATELET # BLD: 266 K/UL (ref 135–450)
PMV BLD AUTO: 9 FL (ref 5–10.5)
POTASSIUM SERPL-SCNC: 4.2 MMOL/L (ref 3.5–5.1)
RBC # BLD: 5.05 M/UL (ref 4.2–5.9)
SODIUM BLD-SCNC: 143 MMOL/L (ref 136–145)
TOTAL PROTEIN: 6.6 G/DL (ref 6.4–8.2)
TRIGL SERPL-MCNC: 52 MG/DL (ref 0–150)
TSH REFLEX: 1.29 UIU/ML (ref 0.27–4.2)
VLDLC SERPL CALC-MCNC: 10 MG/DL
WBC # BLD: 4.3 K/UL (ref 4–11)

## 2022-07-08 PROCEDURE — G8427 DOCREV CUR MEDS BY ELIG CLIN: HCPCS | Performed by: NURSE PRACTITIONER

## 2022-07-08 PROCEDURE — 83036 HEMOGLOBIN GLYCOSYLATED A1C: CPT | Performed by: NURSE PRACTITIONER

## 2022-07-08 PROCEDURE — 2022F DILAT RTA XM EVC RTNOPTHY: CPT | Performed by: NURSE PRACTITIONER

## 2022-07-08 PROCEDURE — G8417 CALC BMI ABV UP PARAM F/U: HCPCS | Performed by: NURSE PRACTITIONER

## 2022-07-08 PROCEDURE — 3044F HG A1C LEVEL LT 7.0%: CPT | Performed by: NURSE PRACTITIONER

## 2022-07-08 PROCEDURE — 99214 OFFICE O/P EST MOD 30 MIN: CPT | Performed by: NURSE PRACTITIONER

## 2022-07-08 PROCEDURE — 1036F TOBACCO NON-USER: CPT | Performed by: NURSE PRACTITIONER

## 2022-07-08 ASSESSMENT — ENCOUNTER SYMPTOMS
DIARRHEA: 0
CONSTIPATION: 0
BACK PAIN: 1
COUGH: 0
CHEST TIGHTNESS: 0
NAUSEA: 0
VOMITING: 0
SHORTNESS OF BREATH: 0

## 2022-07-08 NOTE — PROGRESS NOTES
7/8/2022    This is a 37 y.o. male   Chief Complaint   Patient presents with    3 Month Follow-Up     Pt is here for a 3 month follow up on diabetes. Mc MANCUSO   Diabetes Mellitus Type 2: Current symptoms/problems include neuropathy. Home blood sugar records: fasting range: 80-90s  Taking metformin 1000 mg daily with meal  Any episodes of hypoglycemia? no  Eye exam current (within one year): yes- 2/2022  Tobacco history: He  reports that he has never smoked. He has never used smokeless tobacco.     Patient follows with podiatrist at [facility] for routine foot care. Has diabetic shoes. Reports that gabapentin is helping better to control neuropathy. Hypertension:  Home blood pressure monitoring: No.  He is adherent to a low sodium diet. Patient denies chest pain, shortness of breath, headache, lightheadedness, blurred vision, peripheral edema and palpitations. Antihypertensive medication side effects: no medication side effects noted. Use of agents associated with hypertension: none. He is currently taking metoprolol tartrate 25 mg BID and lisinopril 20 mg daily. Hyperlipidemia:  No new myalgias or GI upset on atorvastatin (Lipitor). Obesity:  Patient is following with bariatric specialist with [facility]. S/p clarisa-en-y gastric bypass surgery 2/23/21. Weight is stable. He is exercising on his bike daily for 60 minutes daily or every other day. Lab Results   Component Value Date    LABA1C 5.5 04/07/2022    LABA1C 5.7 01/06/2022    LABA1C 5.6 08/03/2021     Lab Results   Component Value Date    LABMICR <1.20 06/15/2021    CREATININE 0.8 (L) 04/07/2022     Lab Results   Component Value Date    ALT 68 (H) 04/07/2022    AST 42 (H) 04/07/2022     Lab Results   Component Value Date    CHOL 131 09/29/2020    TRIG 234 (H) 09/29/2020    HDL 53 08/03/2021    LDLCALC 33 08/03/2021    LDLDIRECT 146 (H) 03/19/2019        GERD: stable on omeprazole 20 mg daily.     Asthma: currently well controlled on singulair 10 mg daily, zyrtec 10 mg daily and symbicort BID. Rarely needs to use albuterol rescue inhaler. He follows with pulmonologist Dr. Ondina Elena. JULITA- his cpap was recalled and waiting on getting new one. Chronic back pain. Waiting to get into a new pain specialist. Taking gabapentin 400 mg TID. In a study currently for fatty liver. This is through GI Dr. Ken Lora. Patient Active Problem List   Diagnosis    Moderate persistent asthma without complication    Hypertension    GERD (gastroesophageal reflux disease)    Tinea cruris    Type 2 diabetes mellitus with diabetic neuropathy, with long-term current use of insulin (HCC)    Mixed hyperlipidemia    Failed back surgical syndrome    Spinal [facility], C1-C7 [facility]    Chronic pain syndrome    Central spinal stenosis    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 ([facility] [facility]    Major depressive disorder    Insomnia    Diabetic eye exam ([facility] [address]- 12/16 wnl CEI    Closed nondisplaced fracture of fifth metatarsal bone of right foot    Obesity (BMI 30-39. 9)    Elevated liver enzymes    JULITA (obstructive sleep apnea)    Pressure ulcer of buttock    Abnormal levels of other serum enzymes    Chronic back pain    Right hand pain    Diabetic ulcer of toe of right foot associated with type 2 diabetes mellitus (HCC)    Diabetic ulcer of toe (HCC)    Ingrowing nail    Environmental and seasonal allergies    Severe persistent asthma without complication    Other allergic rhinitis    S/P gastric bypass- 2/2021 weight was 393 lbs     Open wound of right thumb with damage to nail          Current Outpatient Medications   Medication Sig Dispense Refill    atorvastatin (LIPITOR) 40 MG tablet TAKE 1 TABLET BY MOUTH DAILY 90 tablet 0    albuterol sulfate HFA (PROVENTIL;VENTOLIN;PROAIR) 108 (90 Base) MCG/ACT inhaler INHALE TWO PUFFS BY MOUTH EVERY 6 HOURS AS NEEDED FOR WHEEZING OR FOR SHORTNESS OF BREATH 6.7 g 1    gabapentin (NEURONTIN) 400 MG capsule Take 1 capsule by mouth 3 times daily for 90 days. 90 capsule 2    metoprolol tartrate (LOPRESSOR) 25 MG tablet TAKE 1 TABLET BY MOUTH 2 TIMES DAILY 180 tablet 0    montelukast (SINGULAIR) 10 MG tablet TAKE 1 TABLET BY MOUTH NIGHTLY 90 tablet 0    metFORMIN (GLUCOPHAGE) 1000 MG tablet Take 1 tablet by mouth daily (with breakfast) 90 tablet 1    lisinopril (PRINIVIL;ZESTRIL) 20 MG tablet TAKE 1 TABLET BY MOUTH DAILY 90 tablet 1    omeprazole (PRILOSEC) 20 MG delayed release capsule TAKE ONE CAPSULE BY MOUTH DAILY FOR STOMACH 90 capsule 1    budesonide-formoterol (SYMBICORT) 160-4.5 MCG/ACT AERO Inhale 2 puffs into the lungs 2 times daily INHALE TWO PUFFS BY MOUTH TWICE A DAY FOR LONG TERM CONTROL OF ASTHMA, RINSE MOUTH OUT AFTER USE Usually takes once daily unless he's wheezing 1 each 11    ipratropium-albuterol (DUONEB) 0.5-2.5 (3) MG/3ML SOLN nebulizer solution Inhale 3 mLs into the lungs every 6 hours as needed for Shortness of Breath 360 mL 5    Probiotic Product (PROBIOTIC ADVANCED) CAPS Take 1 capsule by mouth daily      Specialty Vitamins Products (CENTRUM PERFORMANCE) TABS Take 1 tablet by mouth daily      blood glucose test strips (TRUE METRIX BLOOD GLUCOSE TEST) strip 1 each by In Vitro route 4 times daily 100 each 3    Lancets MISC Use to check BS  each 5    Cyanocobalamin (VITAMIN B 12 PO) Take 1 tablet by mouth daily      Blood Glucose Monitoring Suppl (TRUE METRIX METER) w/Device KIT Use to check fasting BS and 2 hours after meal BS 1 kit 0    cetirizine (ZYRTEC) 10 MG tablet TAKE 1 TABLET BY MOUTH DAILY 30 tablet 11     No current facility-administered medications for this visit.        Allergies   Allergen Reactions    Penicillins Swelling and Anaphylaxis     Swelling of tongue and throat    Fentanyl      Had hives at patch site and nausea    Other reaction(s): Vomiting    Food Swelling     Swelling of throat from Mushrooms    Mushroom Extract Complex Other reaction(s): Angioedema       Review of Systems   Constitutional: Negative for activity change. Respiratory: Negative for cough, chest tightness and shortness of breath. Cardiovascular: Negative for chest pain. Gastrointestinal: Negative for constipation, diarrhea, nausea and vomiting. Musculoskeletal: Positive for arthralgias, back pain and myalgias. Neurological: Negative for dizziness, syncope, weakness and headaches. Psychiatric/Behavioral: Negative for confusion. Vitals:    07/08/22 0738   BP: 128/80   Site: Right Upper Arm   Position: Sitting   Cuff Size: Large Adult   Pulse: 69   Resp: 18   SpO2: 98%   Weight: 284 lb (128.8 kg)   Height: 6' 3\" (1.905 m)       Body mass index is 35.5 kg/m². Wt Readings from Last 3 Encounters:   07/08/22 284 lb (128.8 kg)   05/03/22 268 lb (121.6 kg)   04/07/22 281 lb 9.6 oz (127.7 kg)       BP Readings from Last 3 Encounters:   07/08/22 128/80   05/05/22 126/82   04/07/22 130/84       Physical Exam  Vitals and nursing note reviewed. Constitutional:       General: He is not in acute distress. Appearance: He is well-developed. He is obese. Comments: Ambulates with a cane    HENT:      Head: Normocephalic and atraumatic. Cardiovascular:      Rate and Rhythm: Normal rate and regular rhythm. Heart sounds: Normal heart sounds. No murmur heard. No friction rub. No gallop. Pulmonary:      Effort: Pulmonary effort is normal. No respiratory distress. Breath sounds: Normal breath sounds. Musculoskeletal:      Cervical back: Neck supple. Right lower leg: No edema. Left lower leg: No edema. Skin:     General: Skin is warm and dry. Neurological:      Mental Status: He is alert and oriented to person, place, and time. Psychiatric:         Behavior: Behavior normal.         Thought Content:  Thought content normal.         Judgment: Judgment normal.         Merry Escalante was seen today for diabetes. Diagnoses and all orders for this visit:    Type 2 diabetes mellitus with diabetic neuropathy, with long-term current use of insulin ([facility])  HgA1c- 5.8%  Well controlled. Continue metformin 1000 mg daily  Continue checking fasting blood sugars and 2 hours post dinner blood sugars  Discussed importance of low carb diet, aerobic exercise and need for weight loss. Continues to follow with podiatrist Dr. Mago Gallegos for his diabetic neuropathy. He has diabetic shoes that he will wear. Neuropathy pain has improved with gabapentin use. Mixed hyperlipidemia  Lipids controlled 8/2021- repeat   Continue atorvastatin 40 mg daily   Discussed importance of low fat/cholesterol diet, aerobic exercise and need for weight loss. Essential hypertension  Controlled   Continue metoprolol tartrate 25 mg BID and lisinopril 20 mg daily. Discussed importance of low salt diet, aerobic exercise and need for weight loss. Obesity  S/p gastric bypass surgery 2/2021. Discussed importance of diet, aerobic exercise and hydration with water. Advised to continue following with bariatrics specialist    GERD  Stable. Continue omeprazole 20 mg daily    Chronic pain  Currently on gabapentin 400 mg TID. Waiting to get into new pain specialist.     Asthma- stable current medications. Continue f/u with pulmonologist Dr. Marcel Xavier     Elevated liver enzymes- reports that he is in a study for fatty liver through GI Dr. Tona Fabry. Check labs per orders. Return in about 3 months (around 10/8/2022), or if symptoms worsen or fail to improve, for chronic conditions.

## 2022-07-08 NOTE — TELEPHONE ENCOUNTER
Mckenzie Velez calls back. Zyrtec is not covered. Joselin on file. Pharmacy was not sure what would be covered until new script was sent. Please advise. risks/benefits discussed with patient

## 2022-07-26 NOTE — PROGRESS NOTES
oriented to person, place and time , normal sensation , short and long term memory intact list below for diabetes, blood pressure or OTC meds and whether taking aspirin  ·   Patient Active Problem List   Diagnosis    Asthma    Hypertension    GERD (gastroesophageal reflux disease)    Tinea cruris    Chronic back pain    Type 2 diabetes mellitus with diabetic neuropathy, with long-term current use of insulin (HCC)    Mixed hyperlipidemia    Failed back surgical syndrome    Sp[facility] injury, C1-C7 [facility] [address]    Chronic pain syndrome    Central spinal stenosis    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 ([facility]    Major depressive disorder    Insomnia    Diabetic eye exam ([facility]- 12/16 wnl CEI    Closed nondisplaced fracture of fifth metatarsal bone of right foot    Morbid obesity with BMI of 45.0-49.9, adult ([facility]    Elevated liver enzymes    JULITA (obstructive sleep apnea)    Pressure ulcer of buttock    Abnormal levels of other serum enzymes       HEALTH MAINTENANCE DUE  Health Maintenance   Topic Date Due    DTaP/Tdap/Td vaccine (1 - Tdap) 07/30/2000    Diabetic retinal exam  12/19/2017    A1C test (Diabetic or Prediabetic)  08/21/2018    Flu vaccine (1) 09/01/2018    Diabetic foot exam  02/26/2019    Diabetic microalbuminuria test  02/26/2019    Lipid screen  02/26/2019    Potassium monitoring  02/26/2019    Creatinine monitoring  02/26/2019    Pneumococcal med risk  Completed    HIV screen  Completed      Lab Review  Lab Results   Component Value Date    LABA1C 10.1 05/21/2018    LABA1C 9.3 04/09/2018    LABA1C 9.6 02/26/2018    LABA1C 8.8 01/15/2018    LABA1C 9.6 12/01/2017     LDL Calculated   Date Value Ref Range Status   02/26/2018 see below <100 mg/dL Final     Comment:     When the triglyceride is <30 mg/dL or >300 mg/dL the  calculated LDL and  VLDL are not valid and a measured  LDL is performed.        LDL Direct   Date Value Ref Range Status   02/26/2018 91 <100 mg/dL Final     HDL   Date Value Ref Range Status   02/26/2018 19 (L) 40 - 60 mg/dL Final APRN - CNP        Objective:   PHYSICAL EXAM  /86 (Site: Right Arm, Position: Sitting, Cuff Size: Large Adult)   Pulse 86   Resp 16   Wt (!) 333 lb (151 kg)   BMI 41.62 kg/m²   Blood pressure is Good. BP Readings from Last 5 Encounters:   07/02/18 120/86   06/13/18 130/83   05/21/18 110/80   05/18/18 (!) 147/85   04/18/18 126/87     Weight is decreased. Wt Readings from Last 5 Encounters:   07/02/18 (!) 333 lb (151 kg)   06/13/18 (!) 347 lb (157.4 kg)   05/21/18 (!) 347 lb (157.4 kg)   05/18/18 (!) 358 lb (162.4 kg)   04/18/18 (!) 363 lb (164.7 kg)      GENERAL: well-developed, well-nourished, alert, no distress, calm, assistive device: cane    EYES:  negative findings: lids and lashes normal  LUNGS:  Breathing unlabored  · clear to auscultation bilaterally and good air movement  CARDIOVASC: regular rate and rhythm, S1, S2 normal, no murmur, click, rub or gallop  · Apical impulse normal  LEGS:  Lower extremity edema: trace aime ankles   · Pedal pulses palpable  Aime buttocks at previous site of ulceration is now closed. Skin is pink. Has not scaring. Redness and irritation under chest fold. Assessment and Plan:     1. Type 2 diabetes mellitus with diabetic neuropathy, with long-term current use of insulin ([facility])  Hemoglobin A1C- 8.0%. Improving diabetes control. Goal HgA1c is less than 7.0%. metformin to 1000 mg BID. victoza 1.8 mg SQ daily  invokana 100 mg daily   Tresiba 60 units qhs. Novolog to 10 units before meals. He did not bring his BS log. Advised patient to check fasting BS, before meal BS and 2 hour after meal BS. He is to call me in 2 weeks with BS readings. Patient is to bring BS log in at visits. Advised again to have evaluation with endocrinologist Dr. Milagros Guy. Low carb diet, aerobic exercise, and weight loss discussed and needed.   On gabapentin 300 mg TID for neuropathy from pain specialist.    2. Morbid obesity with BMI of 40.0-45.9, adult (HCC)  Seen Dr. Lisa Mckeon and is

## 2022-07-30 DIAGNOSIS — I10 ESSENTIAL HYPERTENSION: ICD-10-CM

## 2022-07-30 DIAGNOSIS — J45.40 MODERATE PERSISTENT ASTHMA WITHOUT COMPLICATION: ICD-10-CM

## 2022-08-01 RX ORDER — MONTELUKAST SODIUM 10 MG/1
10 TABLET ORAL NIGHTLY
Qty: 90 TABLET | Refills: 0 | Status: SHIPPED | OUTPATIENT
Start: 2022-08-01

## 2022-08-19 DIAGNOSIS — E11.40 TYPE 2 DIABETES MELLITUS WITH DIABETIC NEUROPATHY, WITH LONG-TERM CURRENT USE OF INSULIN (HCC): ICD-10-CM

## 2022-08-19 DIAGNOSIS — Z79.4 TYPE 2 DIABETES MELLITUS WITH DIABETIC NEUROPATHY, WITH LONG-TERM CURRENT USE OF INSULIN (HCC): ICD-10-CM

## 2022-08-19 RX ORDER — GABAPENTIN 400 MG/1
CAPSULE ORAL
Qty: 90 CAPSULE | Refills: 2 | OUTPATIENT
Start: 2022-08-19

## 2022-08-25 DIAGNOSIS — J45.40 MODERATE PERSISTENT ASTHMA WITHOUT COMPLICATION: ICD-10-CM

## 2022-08-25 RX ORDER — BUDESONIDE AND FORMOTEROL FUMARATE DIHYDRATE 160; 4.5 UG/1; UG/1
2 AEROSOL RESPIRATORY (INHALATION) 2 TIMES DAILY
Qty: 1 EACH | Refills: 11 | Status: SHIPPED | OUTPATIENT
Start: 2022-08-25

## 2022-08-25 RX ORDER — ALBUTEROL SULFATE 90 UG/1
AEROSOL, METERED RESPIRATORY (INHALATION)
Qty: 6.7 G | Refills: 1 | Status: SHIPPED | OUTPATIENT
Start: 2022-08-25 | End: 2022-10-27

## 2022-08-25 RX ORDER — BUDESONIDE AND FORMOTEROL FUMARATE DIHYDRATE 160; 4.5 UG/1; UG/1
AEROSOL RESPIRATORY (INHALATION)
Qty: 10.2 G | Refills: 0 | Status: SHIPPED | OUTPATIENT
Start: 2022-08-25

## 2022-09-01 ENCOUNTER — OFFICE VISIT (OUTPATIENT)
Dept: PULMONOLOGY | Age: 42
End: 2022-09-01
Payer: COMMERCIAL

## 2022-09-01 VITALS
HEART RATE: 80 BPM | TEMPERATURE: 98.7 F | BODY MASS INDEX: 34.91 KG/M2 | OXYGEN SATURATION: 98 % | RESPIRATION RATE: 21 BRPM | DIASTOLIC BLOOD PRESSURE: 90 MMHG | WEIGHT: 280.8 LBS | SYSTOLIC BLOOD PRESSURE: 140 MMHG | HEIGHT: 75 IN

## 2022-09-01 DIAGNOSIS — G47.33 OSA (OBSTRUCTIVE SLEEP APNEA): ICD-10-CM

## 2022-09-01 DIAGNOSIS — E66.9 OBESITY (BMI 30-39.9): ICD-10-CM

## 2022-09-01 DIAGNOSIS — J45.50 SEVERE PERSISTENT ASTHMA WITHOUT COMPLICATION: ICD-10-CM

## 2022-09-01 DIAGNOSIS — J30.89 OTHER ALLERGIC RHINITIS: ICD-10-CM

## 2022-09-01 PROCEDURE — 1036F TOBACCO NON-USER: CPT | Performed by: INTERNAL MEDICINE

## 2022-09-01 PROCEDURE — 99214 OFFICE O/P EST MOD 30 MIN: CPT | Performed by: INTERNAL MEDICINE

## 2022-09-01 PROCEDURE — G8427 DOCREV CUR MEDS BY ELIG CLIN: HCPCS | Performed by: INTERNAL MEDICINE

## 2022-09-01 PROCEDURE — G8417 CALC BMI ABV UP PARAM F/U: HCPCS | Performed by: INTERNAL MEDICINE

## 2022-09-01 ASSESSMENT — ASTHMA QUESTIONNAIRES
ACT_TOTALSCORE: 23
QUESTION_4 LAST FOUR WEEKS HOW OFTEN HAVE YOU USED YOUR RESCUE INHALER OR NEBULIZER MEDICATION (SUCH AS ALBUTEROL): 4
QUESTION_3 LAST FOUR WEEKS HOW OFTEN DID YOUR ASTHMA SYMPTOMS (WHEEZING, COUGHING, SHORTNESS OF BREATH, CHEST TIGHTNESS OR PAIN) WAKE YOU UP AT NIGHT OR EARLIER THAN USUAL IN THE MORNING: 4
QUESTION_2 LAST FOUR WEEKS HOW OFTEN HAVE YOU HAD SHORTNESS OF BREATH: 5
QUESTION_1 LAST FOUR WEEKS HOW MUCH OF THE TIME DID YOUR ASTHMA KEEP YOU FROM GETTING AS MUCH DONE AT WORK, SCHOOL OR AT HOME: 5
QUESTION_5 LAST FOUR WEEKS HOW WOULD YOU RATE YOUR ASTHMA CONTROL: 5

## 2022-09-01 NOTE — PROGRESS NOTES
rhythm. No murmur or rub. No edema. Skin: Warm, dry, normal texture and turgor. No nodule on exposed extremities. M/S: No cyanosis. No clubbing. No joint deformity. Psych: Oriented x 3. No anxiety. Awake. Alert. Intact judgement and insight. Good Mood / Affect. Memory appears in tact        Data Reviewed:       Assessment:      Asthma -Etelvina Kemar stopped secondary to insurance  Sleep apnea - Dr. Tracie Ryder  Obesity Body mass index is 35.1 kg/m². Depression  GERD      Plan:      Problem List Items Addressed This Visit       Severe persistent asthma without complication      Well controlled on Symbicort . Rare albuterol. Weight loss has likely improved symptoms as well. Other allergic rhinitis     Controlled with Allegra (he believes), Singulair is on his list.  .  Using occasional OTC. JULITA (obstructive sleep apnea)     Stopped using cpap with elizabeth recall but has not received his new device. Unfortunately, it appears he never registered the device. This was completed in office. Obesity (BMI 30-39. 9)     Congratulated on losing ~ 100#.

## 2022-09-01 NOTE — PATIENT INSTRUCTIONS
Thank you for registering. For future reference, please save the following information so that we may be able to service you more effectively: Your confirmation code is: [id number]  What to expect next:  We regret that it may take some time to replace affected devices. The planned repair for the affected devices involves certain design changes, which in some markets may include review and/or authorization by the relevant regulatory agencies. We understand the impact of this issue and we sincerely regret this disruption. For additional questions: For more information and updates, please visit [email or web address] where we will be updating answers to frequently asked questions (FAQ) as more information becomes available or call [phone] in the [facility] and [facility] or [phone] outside the [facility]. Thank you.

## 2022-09-01 NOTE — ASSESSMENT & PLAN NOTE
Stopped using cpap with elizabeth recall but has not received his new device. Unfortunately, it appears he never registered the device. This was completed in office.

## 2022-09-15 DIAGNOSIS — I10 ESSENTIAL HYPERTENSION: ICD-10-CM

## 2022-09-15 DIAGNOSIS — K21.9 GASTROESOPHAGEAL REFLUX DISEASE, UNSPECIFIED WHETHER ESOPHAGITIS PRESENT: ICD-10-CM

## 2022-09-15 RX ORDER — OMEPRAZOLE 20 MG/1
CAPSULE, DELAYED RELEASE ORAL
Qty: 90 CAPSULE | Refills: 1 | Status: SHIPPED | OUTPATIENT
Start: 2022-09-15

## 2022-09-15 RX ORDER — LISINOPRIL 20 MG/1
20 TABLET ORAL DAILY
Qty: 90 TABLET | Refills: 1 | Status: SHIPPED | OUTPATIENT
Start: 2022-09-15 | End: 2022-10-07

## 2022-10-07 ENCOUNTER — OFFICE VISIT (OUTPATIENT)
Dept: FAMILY MEDICINE CLINIC | Age: 42
End: 2022-10-07
Payer: COMMERCIAL

## 2022-10-07 VITALS
HEART RATE: 80 BPM | OXYGEN SATURATION: 98 % | WEIGHT: 289 LBS | BODY MASS INDEX: 35.93 KG/M2 | DIASTOLIC BLOOD PRESSURE: 96 MMHG | HEIGHT: 75 IN | SYSTOLIC BLOOD PRESSURE: 150 MMHG

## 2022-10-07 DIAGNOSIS — Z79.4 TYPE 2 DIABETES MELLITUS WITH DIABETIC NEUROPATHY, WITH LONG-TERM CURRENT USE OF INSULIN (HCC): ICD-10-CM

## 2022-10-07 DIAGNOSIS — E11.40 TYPE 2 DIABETES MELLITUS WITH DIABETIC NEUROPATHY, WITH LONG-TERM CURRENT USE OF INSULIN (HCC): Primary | ICD-10-CM

## 2022-10-07 DIAGNOSIS — I10 ESSENTIAL HYPERTENSION: ICD-10-CM

## 2022-10-07 DIAGNOSIS — K21.9 GASTROESOPHAGEAL REFLUX DISEASE, UNSPECIFIED WHETHER ESOPHAGITIS PRESENT: ICD-10-CM

## 2022-10-07 DIAGNOSIS — E66.9 OBESITY, CLASS II, BMI 35-39.9: ICD-10-CM

## 2022-10-07 DIAGNOSIS — E78.2 MIXED HYPERLIPIDEMIA: ICD-10-CM

## 2022-10-07 DIAGNOSIS — Z23 NEED FOR INFLUENZA VACCINATION: ICD-10-CM

## 2022-10-07 DIAGNOSIS — E11.40 TYPE 2 DIABETES MELLITUS WITH DIABETIC NEUROPATHY, WITH LONG-TERM CURRENT USE OF INSULIN (HCC): ICD-10-CM

## 2022-10-07 DIAGNOSIS — Z79.4 TYPE 2 DIABETES MELLITUS WITH DIABETIC NEUROPATHY, WITH LONG-TERM CURRENT USE OF INSULIN (HCC): Primary | ICD-10-CM

## 2022-10-07 LAB
A/G RATIO: 2.5 (ref 1.1–2.2)
ALBUMIN SERPL-MCNC: 4.9 G/DL (ref 3.4–5)
ALP BLD-CCNC: 97 U/L (ref 40–129)
ALT SERPL-CCNC: 76 U/L (ref 10–40)
ANION GAP SERPL CALCULATED.3IONS-SCNC: 13 MMOL/L (ref 3–16)
AST SERPL-CCNC: 55 U/L (ref 15–37)
BILIRUB SERPL-MCNC: 0.6 MG/DL (ref 0–1)
BUN BLDV-MCNC: 14 MG/DL (ref 7–20)
CALCIUM SERPL-MCNC: 9.7 MG/DL (ref 8.3–10.6)
CHLORIDE BLD-SCNC: 99 MMOL/L (ref 99–110)
CO2: 28 MMOL/L (ref 21–32)
CREAT SERPL-MCNC: 0.8 MG/DL (ref 0.9–1.3)
GFR AFRICAN AMERICAN: >60
GFR NON-AFRICAN AMERICAN: >60
GLUCOSE BLD-MCNC: 122 MG/DL (ref 70–99)
HBA1C MFR BLD: 5.7 %
POTASSIUM SERPL-SCNC: 4 MMOL/L (ref 3.5–5.1)
SODIUM BLD-SCNC: 140 MMOL/L (ref 136–145)
TOTAL PROTEIN: 6.9 G/DL (ref 6.4–8.2)

## 2022-10-07 PROCEDURE — G0008 ADMIN INFLUENZA VIRUS VAC: HCPCS | Performed by: NURSE PRACTITIONER

## 2022-10-07 PROCEDURE — G8427 DOCREV CUR MEDS BY ELIG CLIN: HCPCS | Performed by: NURSE PRACTITIONER

## 2022-10-07 PROCEDURE — 3044F HG A1C LEVEL LT 7.0%: CPT | Performed by: NURSE PRACTITIONER

## 2022-10-07 PROCEDURE — 99214 OFFICE O/P EST MOD 30 MIN: CPT | Performed by: NURSE PRACTITIONER

## 2022-10-07 PROCEDURE — 2022F DILAT RTA XM EVC RTNOPTHY: CPT | Performed by: NURSE PRACTITIONER

## 2022-10-07 PROCEDURE — G8482 FLU IMMUNIZE ORDER/ADMIN: HCPCS | Performed by: NURSE PRACTITIONER

## 2022-10-07 PROCEDURE — 1036F TOBACCO NON-USER: CPT | Performed by: NURSE PRACTITIONER

## 2022-10-07 PROCEDURE — G8417 CALC BMI ABV UP PARAM F/U: HCPCS | Performed by: NURSE PRACTITIONER

## 2022-10-07 PROCEDURE — 83036 HEMOGLOBIN GLYCOSYLATED A1C: CPT | Performed by: NURSE PRACTITIONER

## 2022-10-07 PROCEDURE — 90674 CCIIV4 VAC NO PRSV 0.5 ML IM: CPT | Performed by: NURSE PRACTITIONER

## 2022-10-07 RX ORDER — LISINOPRIL 40 MG/1
40 TABLET ORAL DAILY
Qty: 90 TABLET | Refills: 0 | Status: SHIPPED | OUTPATIENT
Start: 2022-10-07

## 2022-10-07 ASSESSMENT — ENCOUNTER SYMPTOMS
COUGH: 0
SHORTNESS OF BREATH: 0
VOMITING: 0
NAUSEA: 0
CHEST TIGHTNESS: 0
BACK PAIN: 1
DIARRHEA: 0
CONSTIPATION: 0

## 2022-10-07 NOTE — PROGRESS NOTES
currently well controlled on singulair 10 mg daily, zyrtec 10 mg daily and symbicort BID. Rarely needs to use albuterol rescue inhaler. He follows with pulmonologist Dr. Wendy Vasquez. JULITA- his cpap was recalled and waiting on getting new one. Chronic back pain. Waiting to get into a new pain specialist. Taking gabapentin 400 mg TID. In a study currently for fatty liver. This is through GI Dr. Laurence Barber. He reports that he had a liver biopsy completed 8/2022 that was good. Results are not currently available to me. Patient Active Problem List   Diagnosis    Moderate persistent asthma without complication    Hypertension    GERD (gastroesophageal reflux disease)    Tinea cruris    Type 2 diabetes mellitus with diabetic neuropathy, with long-term current use of insulin (HCC)    Mixed hyperlipidemia    Failed back surgical syndrome    Spinal cord injury, C1-C7 ([address]    Chronic pain syndrome    Central spinal stenosis    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 ([address]    Major depressive disorder    Insomnia    Diabetic eye exam ([address]- 12/16 wnl CEI    Closed nondisplaced fracture of fifth metatarsal bone of right foot    Obesity (BMI 30-39. 9)    Elevated liver enzymes    JULITA (obstructive sleep apnea)    Pressure ulcer of buttock    Abnormal levels of other serum enzymes    Chronic back pain    Right hand pain    Diabetic ulcer of toe of right foot associated with type 2 diabetes mellitus ([address]    Diabetic ulcer of toe (HCC)    Ingrowing nail    Environmental and seasonal allergies    Severe persistent asthma without complication    Other allergic rhinitis    S/P gastric bypass- 2/2021 weight was 393 lbs           Current Outpatient Medications   Medication Sig Dispense Refill    omeprazole (PRILOSEC) 20 MG delayed release capsule TAKE ONE CAPSULE BY MOUTH DAILY FOR STOMACH 90 capsule 1    lisinopril (PRINIVIL;ZESTRIL) 20 MG tablet TAKE 1 TABLET BY MOUTH DAILY 90 tablet 1    SYMBICORT 160-4.5 MCG/ACT AERO INHALE TWO PUFFS BY MOUTH TWICE A DAY FOR LONG TERM CONTROL OF ASTHMA, RINSE MOUTH OUT AFTER USE 10.2 g 0    albuterol sulfate HFA (PROVENTIL;VENTOLIN;PROAIR) 108 (90 Base) MCG/ACT inhaler INHALE TWO PUFFS BY MOUTH EVERY 6 HOURS AS NEEDED FOR WHEEZING OR FOR SHORTNESS OF BREATH 6.7 g 1    budesonide-formoterol (SYMBICORT) 160-4.5 MCG/ACT AERO Inhale 2 puffs into the lungs 2 times daily INHALE TWO PUFFS BY MOUTH TWICE A DAY FOR LONG TERM CONTROL OF ASTHMA, RINSE MOUTH OUT AFTER USE Usually takes once daily unless he's wheezing 1 each 11    metoprolol tartrate (LOPRESSOR) 25 MG tablet TAKE 1 TABLET BY MOUTH 2 TIMES DAILY 180 tablet 0    montelukast (SINGULAIR) 10 MG tablet TAKE 1 TABLET BY MOUTH NIGHTLY 90 tablet 0    atorvastatin (LIPITOR) 40 MG tablet TAKE 1 TABLET BY MOUTH DAILY 90 tablet 0    metFORMIN (GLUCOPHAGE) 1000 MG tablet Take 1 tablet by mouth daily (with breakfast) 90 tablet 1    ipratropium-albuterol (DUONEB) 0.5-2.5 (3) MG/3ML SOLN nebulizer solution Inhale 3 mLs into the lungs every 6 hours as needed for Shortness of Breath 360 mL 5    Probiotic Product (PROBIOTIC ADVANCED) CAPS Take 1 capsule by mouth daily      Specialty Vitamins Products (CENTRUM PERFORMANCE) TABS Take 1 tablet by mouth daily      blood glucose test strips (TRUE METRIX BLOOD GLUCOSE TEST) strip 1 each by In Vitro route 4 times daily 100 each 3    Lancets MISC Use to check BS  each 5    Cyanocobalamin (VITAMIN B 12 PO) Take 1 tablet by mouth daily      Blood Glucose Monitoring Suppl (TRUE METRIX METER) w/Device KIT Use to check fasting BS and 2 hours after meal BS 1 kit 0    gabapentin (NEURONTIN) 400 MG capsule Take 1 capsule by mouth 3 times daily for 90 days. 90 capsule 2    cetirizine (ZYRTEC) 10 MG tablet TAKE 1 TABLET BY MOUTH DAILY 30 tablet 11     No current facility-administered medications for this visit.        Allergies   Allergen Reactions    Penicillins Swelling and Anaphylaxis     Swelling of tongue and throat Fentanyl      Had hives at patch site and nausea    Other reaction(s): Vomiting    Food Swelling     Swelling of throat from Mushrooms    Mushroom Extract Complex      Other reaction(s): Angioedema       Review of Systems   Constitutional:  Negative for activity change. Respiratory:  Negative for cough, chest tightness and shortness of breath. Cardiovascular:  Negative for chest pain. Gastrointestinal:  Negative for constipation, diarrhea, nausea and vomiting. Musculoskeletal:  Positive for arthralgias, back pain and myalgias. Neurological:  Negative for dizziness, syncope, weakness and headaches. Psychiatric/Behavioral:  Negative for confusion. Vitals:    10/07/22 0801 10/07/22 0833   BP: (!) 142/96 (!) 150/96   Site: Right Upper Arm Right Upper Arm   Position: Sitting Sitting   Cuff Size: Large Adult Medium Adult   Pulse: 80    SpO2: 98%    Weight: 289 lb (131.1 kg)    Height: 6' 3\" (1.905 m)        Body mass index is 36.12 kg/m². Wt Readings from Last 3 Encounters:   10/07/22 289 lb (131.1 kg)   09/01/22 280 lb 12.8 oz (127.4 kg)   07/08/22 284 lb (128.8 kg)       BP Readings from Last 3 Encounters:   10/07/22 (!) 142/96   09/01/22 (!) 140/90   07/08/22 128/80       Physical Exam  Vitals and nursing note reviewed. Constitutional:       General: He is not in acute distress. Appearance: He is well-developed. He is obese. Comments: Ambulates with a cane    HENT:      Head: Normocephalic and atraumatic. Cardiovascular:      Rate and Rhythm: Normal rate and regular rhythm. Heart sounds: Normal heart sounds. No murmur heard. No friction rub. No gallop. Pulmonary:      Effort: Pulmonary effort is normal. No respiratory distress. Breath sounds: Normal breath sounds. Musculoskeletal:      Cervical back: Neck supple. Right lower leg: No edema. Left lower leg: No edema. Skin:     General: Skin is warm and dry.    Neurological:      Mental Status: He is alert and oriented to person, place, and time. Psychiatric:         Behavior: Behavior normal.         Thought Content: Thought content normal.         Judgment: Judgment normal.       Assessmentand Plan  Andrew Muniz was seen today for diabetes. Diagnoses and all orders for this visit:    Type 2 diabetes mellitus with diabetic neuropathy, with long-term current use of insulin ([facility])  HgA1c- 5.7%  Well controlled. Continue metformin 1000 mg daily  Continue checking fasting blood sugars   Discussed importance of low carb diet, aerobic exercise and need for weight loss. Continues to follow with podiatrist Dr. Kulwinder Cortes for his diabetic neuropathy. He has diabetic shoes that he will wear. Neuropathy pain has improved with gabapentin use. Mixed hyperlipidemia  Lipids controlled 7/2022  Continue atorvastatin 40 mg daily   Discussed importance of low fat/cholesterol diet, aerobic exercise and need for weight loss. Essential hypertension  Uncontrolled   Continue metoprolol tartrate 25 mg BID and increase lisinopril to 40 mg daily. Discussed importance of low salt diet, aerobic exercise and need for weight loss. Obesity  S/p gastric bypass surgery 2/2021. Discussed importance of diet, aerobic exercise and hydration with water. Advised to continue following with bariatrics specialist    GERD  Stable. Continue omeprazole 20 mg daily    Chronic pain  Currently on gabapentin 400 mg TID. Waiting to get into new pain specialist.     Asthma- stable current medications. Continue f/u with pulmonologist Dr. Shreyas Monroe     Elevated liver enzymes- reports that he is in a study for fatty liver through GI Dr. Chelsea Naqvi. Need for influenza vaccination  -     Influenza, FLUCELVAX, (age 10 mo+), IM, PF, 0.5 mL    Check labs per orders. Return in about 3 months (around 1/7/2023), or if symptoms worsen or fail to improve, for chronic conditions.

## 2022-10-18 DIAGNOSIS — E11.40 TYPE 2 DIABETES MELLITUS WITH DIABETIC NEUROPATHY, WITH LONG-TERM CURRENT USE OF INSULIN (HCC): ICD-10-CM

## 2022-10-18 DIAGNOSIS — Z79.4 TYPE 2 DIABETES MELLITUS WITH DIABETIC NEUROPATHY, WITH LONG-TERM CURRENT USE OF INSULIN (HCC): ICD-10-CM

## 2022-10-26 ENCOUNTER — HOSPITAL ENCOUNTER (EMERGENCY)
Age: 42
Discharge: HOME | End: 2022-10-26
Attending: EMERGENCY MEDICINE
Payer: COMMERCIAL

## 2022-10-26 ENCOUNTER — APPOINTMENT (OUTPATIENT)
Dept: GENERAL RADIOLOGY | Age: 42
End: 2022-10-26
Payer: COMMERCIAL

## 2022-10-26 ENCOUNTER — TELEPHONE (OUTPATIENT)
Dept: ORTHOPEDIC SURGERY | Age: 42
End: 2022-10-26

## 2022-10-26 VITALS
HEART RATE: 80 BPM | BODY MASS INDEX: 35.66 KG/M2 | TEMPERATURE: 98.8 F | WEIGHT: 286.82 LBS | SYSTOLIC BLOOD PRESSURE: 129 MMHG | RESPIRATION RATE: 18 BRPM | HEIGHT: 75 IN | OXYGEN SATURATION: 96 % | DIASTOLIC BLOOD PRESSURE: 88 MMHG

## 2022-10-26 DIAGNOSIS — S92.355A CLOSED NONDISPLACED FRACTURE OF FIFTH METATARSAL BONE OF LEFT FOOT, INITIAL ENCOUNTER: Primary | ICD-10-CM

## 2022-10-26 PROCEDURE — 99283 EMERGENCY DEPT VISIT LOW MDM: CPT

## 2022-10-26 PROCEDURE — 73630 X-RAY EXAM OF FOOT: CPT

## 2022-10-26 RX ORDER — FEXOFENADINE HYDROCHLORIDE 180 MG/1
TABLET, FILM COATED ORAL
COMMUNITY
Start: 2022-10-19

## 2022-10-26 ASSESSMENT — PAIN DESCRIPTION - PAIN TYPE
TYPE: ACUTE PAIN
TYPE: ACUTE PAIN

## 2022-10-26 ASSESSMENT — PAIN SCALES - GENERAL
PAINLEVEL_OUTOF10: 2
PAINLEVEL_OUTOF10: 1
PAINLEVEL_OUTOF10: 1

## 2022-10-26 ASSESSMENT — PAIN DESCRIPTION - DESCRIPTORS
DESCRIPTORS: SHARP

## 2022-10-26 ASSESSMENT — PAIN DESCRIPTION - LOCATION
LOCATION: FOOT

## 2022-10-26 ASSESSMENT — PAIN - FUNCTIONAL ASSESSMENT
PAIN_FUNCTIONAL_ASSESSMENT: PREVENTS OR INTERFERES SOME ACTIVE ACTIVITIES AND ADLS
PAIN_FUNCTIONAL_ASSESSMENT: 0-10
PAIN_FUNCTIONAL_ASSESSMENT: PREVENTS OR INTERFERES SOME ACTIVE ACTIVITIES AND ADLS
PAIN_FUNCTIONAL_ASSESSMENT: 0-10

## 2022-10-26 ASSESSMENT — PAIN DESCRIPTION - FREQUENCY: FREQUENCY: CONTINUOUS

## 2022-10-26 ASSESSMENT — PAIN DESCRIPTION - ORIENTATION
ORIENTATION: LEFT

## 2022-10-26 NOTE — TELEPHONE ENCOUNTER
Did speak with patient regarding ED referral. Per patient, he does have a foot & ankle provider and has an appointment. Did give call back number should he need further treatment. Upon return call please schedule with Dr. Bereket Hills.

## 2022-10-26 NOTE — ED TRIAGE NOTES
Around 0730 he was taking out the garbage. Felt a pop in his lateral aspect of his left foot. He was wearing slides/sandals. No fall. Has not taken anything for the pain. Has not applied ice. No obvious swelling, but they are seeing a faint bruise at this time.

## 2022-10-26 NOTE — ED PROVIDER NOTES
[facility]  eMERGENCY dEPARTMENT eNCOUnter      Pt Name: Karla Peterson  MRN: [id number]  [place] 1980  Date of evaluation: 10/26/2022  Provider: Edilberto Chang MD    [address] [place] [address]       Chief Complaint   Patient presents with    Foot Pain     Around 0730 he was taking out the garbage. Felt a pop in his lateral aspect of his left foot. He was wearing slides/sandals. No fall. Has not taken anything for the pain. Has not applied ice. HISTORY OF PRESENT ILLNESS  (Location/Symptom, Timing/Onset, Context/Setting, Quality, Duration, Modifying Factors, Severity.)   Karla Peterson is a 40 y.o. male who presents to the emergency department complaining of left foot pain. He states around 7:30 AM this morning he was taking out the garbage. He was wearing sandals. While walking he felt a pop in his lateral foot. He came back and sat down. When he stood up he had sharp pains in his lateral foot. Less pain when he is not weightbearing. No ankle pain. No swelling. He does remember twisting his foot or ankle. Nursing Notes were reviewed and I agree. REVIEW OF SYSTEMS    (2-9 systems for level 4, 10 or more for level 5)     Musculoskeletal: Left foot pain on the lateral foot. Worse with weightbearing. No swelling. Skin: No bruising or erythema left lower extremity. Neuro: No extremity weakness numbness or tingling. Except as noted above the remainder of the review of systems was reviewed and negative.        PAST MEDICAL HISTORY         Diagnosis Date    Asthma     Chronic back pain     Depression     GERD (gastroesophageal reflux disease)     Hyperlipidemia     Hypertension     Neuropathy     Obesity     Osteoarthritis     Other allergic rhinitis 10/27/2020    Peripheral vascular disease (HCC)     Restless legs syndrome     Severe persistent asthma without complication 6/4/0896    Sleep apnea     Type 2 diabetes mellitus without complication ([facility] Type II or unspecified type diabetes mellitus without mention of complication, not stated as uncontrolled     Urinary incontinence        SURGICAL HISTORY           Procedure Laterality Date    BACK SURGERY  2013    four surgeries; rods placed    FRACTURE SURGERY Right     arm- two    FRACTURE SURGERY Right     hand- three    HAND SURGERY  1999    KNEE ARTHROSCOPY Right     PELVIC FRACTURE SURGERY      KANNAN-EN-Y GASTRIC BYPASS  02/23/2021    SPINE SURGERY  2011    SPINE SURGERY  2010    UVULECTOMY         CURRENT MEDICATIONS       Previous Medications    ALBUTEROL SULFATE HFA (PROVENTIL;VENTOLIN;PROAIR) 108 (90 BASE) MCG/ACT INHALER    INHALE TWO PUFFS BY MOUTH EVERY 6 HOURS AS NEEDED FOR WHEEZING OR FOR SHORTNESS OF BREATH    ALLERGY RELIEF 180 MG TABLET    TAKE ONE TABLET BY MOUTH EVERY DAY    ATORVASTATIN (LIPITOR) 40 MG TABLET    TAKE 1 TABLET BY MOUTH DAILY    BLOOD GLUCOSE MONITORING SUPPL (TRUE METRIX METER) W/DEVICE KIT    Use to check fasting BS and 2 hours after meal BS    BLOOD GLUCOSE TEST STRIPS (TRUE METRIX BLOOD GLUCOSE TEST) STRIP    1 each by In Vitro route 4 times daily    BUDESONIDE-FORMOTEROL (SYMBICORT) 160-4.5 MCG/ACT AERO    Inhale 2 puffs into the lungs 2 times daily INHALE TWO PUFFS BY MOUTH TWICE A DAY FOR LONG TERM CONTROL OF ASTHMA, RINSE MOUTH OUT AFTER USE Usually takes once daily unless he's wheezing    CYANOCOBALAMIN (VITAMIN B 12 PO)    Take 1 tablet by mouth daily    GABAPENTIN (NEURONTIN) 400 MG CAPSULE    Take 1 capsule by mouth 3 times daily for 90 days.     IPRATROPIUM-ALBUTEROL (DUONEB) 0.5-2.5 (3) MG/3ML SOLN NEBULIZER SOLUTION    Inhale 3 mLs into the lungs every 6 hours as needed for Shortness of Breath    LANCETS MISC    Use to check BS QID    LISINOPRIL (PRINIVIL;ZESTRIL) 40 MG TABLET    Take 1 tablet by mouth daily    METFORMIN (GLUCOPHAGE) 1000 MG TABLET    Take 1 tablet by mouth daily (with breakfast)    METOPROLOL TARTRATE (LOPRESSOR) 25 MG TABLET    TAKE 1 TABLET BY MOUTH 2 TIMES DAILY    MONTELUKAST (SINGULAIR) 10 MG TABLET    TAKE 1 TABLET BY MOUTH NIGHTLY    OMEPRAZOLE (PRILOSEC) 20 MG DELAYED RELEASE CAPSULE    TAKE ONE CAPSULE BY MOUTH DAILY FOR STOMACH    PROBIOTIC PRODUCT (PROBIOTIC ADVANCED) CAPS    Take 1 capsule by mouth daily    SPECIALTY VITAMINS PRODUCTS (CENTRUM PERFORMANCE) TABS    Take 1 tablet by mouth daily    SYMBICORT 160-4.5 MCG/ACT AERO    INHALE TWO PUFFS BY MOUTH TWICE A DAY FOR LONG TERM CONTROL OF ASTHMA, RINSE MOUTH OUT AFTER USE       ALLERGIES     Penicillins, Fentanyl, Food, and Mushroom extract complex    FAMILY HISTORY           Problem Relation Age of Onset    Cancer Mother         stomach    Diabetes Mother     Asthma Mother     Diabetes Brother     Cancer Paternal Uncle         testicle    Cancer Paternal Grandmother         breast    Cancer Paternal Cousin         gum     Family Status   Relation Name Status    Mother  Alive    Brother  (Not Specified)    PUnc  (Not Specified)    PGM  (Not Specified)    PCousin  (Not Specified)    Father          SOCIAL HISTORY      reports that he has never smoked. He has never used smokeless tobacco. He reports current alcohol use. He reports that he does not use drugs. PHYSICAL EXAM    (up to 7 for level 4, 8 or more for level 5)     ED Triage Vitals [10/26/22 1113]   BP Temp Temp Source Heart Rate Resp SpO2 Height Weight   129/88 98.8 °F (37.1 °C) Oral 80 18 96 % 6' 3\" (1.905 m) 286 lb 13.1 oz (130.1 kg)       General: Alert white male no acute distress. Musculoskeletal: Left tib-fib and ankle are nontender without swelling. Intact range of motion the ankle without pain. There is some localized tenderness at the base of the fifth metatarsal on the lateral foot. There is no obvious swelling. The hindfoot is nontender. The medial foot and distal foot are nontender. The plantar arch is nontender. He is able to move his toes without difficulty. Intact distal pulses. Skin: No erythema.   No bruising to the left lower extremity. Neuro: Intact motor function sensation left lower extremity. DIAGNOSTIC RESULTS     RADIOLOGY:   Non-plain film images such as CT, Ultrasound and MRI are read by the radiologist. Plain radiographic images are visualized and preliminarily interpreted by Zacarias Yang MD with the below findings:      Interpretation per the Radiologist below, if available at the time of this note:    XR FOOT LEFT (MIN 3 VIEWS)   Final Result   Transverse nondisplaced fracture at the base of the 5th metatarsal.      Mild degenerative changes of the 1st metatarsophalangeal joint. LABS:  Labs Reviewed - No data to display    All other labs were within normal range or not returned as of this dictation. EMERGENCY DEPARTMENT COURSE and DIFFERENTIAL DIAGNOSIS/MDM:   Vitals:    Vitals:    10/26/22 1113   BP: 129/88   Pulse: 80   Resp: 18   Temp: 98.8 °F (37.1 °C)   TempSrc: Oral   SpO2: 96%   Weight: 286 lb 13.1 oz (130.1 kg)   Height: 6' 3\" (1.905 m)       This patient presents with foot pain as above. He was just walking when he felt a pop. He is point tender at the base of his fifth metatarsal.  He has a nondisplaced transverse fracture at the base of the fifth metatarsal.  He was put in a fracture boot, crutches, partial weightbearing. Ice and elevate to help with pain. We discussed pain control. He will take Tylenol or ibuprofen. He was given orthopedic referral for follow-up. He has a podiatrist as well, I told him he could call his podiatrist who would also be able to take care of this fracture. X-ray results, diagnosis, and treatment plan were discussed the patient. He understands the treatment plan follow-up as discussed. PROCEDURES:  None    FINAL IMPRESSION      1.  Closed nondisplaced fracture of fifth metatarsal bone of left foot, initial encounter          DISPOSITION/PLAN   DISPOSITION Decision To Discharge 10/26/2022 12:02:46 PM      PATIENT REFERRED TO:  Chanelle Lin, [facility] [address]  [address] [place] [address]  [phone]    Schedule an appointment as soon as possible for a visit in 3 days      DISCHARGE MEDICATIONS:  New Prescriptions    No medications on file       (Please note that portions of this note were completed with a voice recognition program.  Efforts were made to edit the dictations but occasionally words are mis-transcribed.)    Arneta Ormond, MD  Attending Emergency Physician        Stan Machuca MD  10/26/22 5480 Sharron Figueredo MD  10/26/22 [id number]

## 2022-10-26 NOTE — DISCHARGE INSTRUCTIONS
Elevate is much as possible to help with pain and swelling. Ice to the injured area every 2-3 hours for 30 minutes for the next 2 days to help with pain and swelling. Tylenol or ibuprofen as needed for pain. Call orthopedic referral or your podiatrist for follow-up in the next 3 to 5 days. Wear fracture boot. Crutches, you may partial weight-bear.

## 2022-10-27 DIAGNOSIS — J45.40 MODERATE PERSISTENT ASTHMA WITHOUT COMPLICATION: ICD-10-CM

## 2022-10-27 RX ORDER — ALBUTEROL SULFATE 90 UG/1
AEROSOL, METERED RESPIRATORY (INHALATION)
Qty: 6.7 G | Refills: 1 | Status: SHIPPED | OUTPATIENT
Start: 2022-10-27

## 2022-11-08 DIAGNOSIS — J45.40 MODERATE PERSISTENT ASTHMA WITHOUT COMPLICATION: ICD-10-CM

## 2022-11-08 DIAGNOSIS — E11.40 TYPE 2 DIABETES MELLITUS WITH DIABETIC NEUROPATHY, WITH LONG-TERM CURRENT USE OF INSULIN (HCC): ICD-10-CM

## 2022-11-08 DIAGNOSIS — Z79.4 TYPE 2 DIABETES MELLITUS WITH DIABETIC NEUROPATHY, WITH LONG-TERM CURRENT USE OF INSULIN (HCC): ICD-10-CM

## 2022-11-08 DIAGNOSIS — E78.2 MIXED HYPERLIPIDEMIA: ICD-10-CM

## 2022-11-08 DIAGNOSIS — I10 ESSENTIAL HYPERTENSION: ICD-10-CM

## 2022-11-08 RX ORDER — MONTELUKAST SODIUM 10 MG/1
TABLET ORAL
Qty: 90 TABLET | Refills: 0 | Status: SHIPPED | OUTPATIENT
Start: 2022-11-08 | End: 2022-12-16 | Stop reason: SDUPTHER

## 2022-11-08 RX ORDER — ATORVASTATIN CALCIUM 40 MG/1
TABLET, FILM COATED ORAL
Qty: 90 TABLET | Refills: 0 | Status: SHIPPED | OUTPATIENT
Start: 2022-11-08

## 2022-11-08 RX ORDER — GABAPENTIN 400 MG/1
CAPSULE ORAL
Qty: 90 CAPSULE | Refills: 2 | Status: SHIPPED | OUTPATIENT
Start: 2022-11-08 | End: 2023-02-06

## 2022-12-16 ENCOUNTER — OFFICE VISIT (OUTPATIENT)
Dept: PULMONOLOGY | Age: 42
End: 2022-12-16
Payer: COMMERCIAL

## 2022-12-16 VITALS
TEMPERATURE: 98.6 F | HEART RATE: 84 BPM | HEIGHT: 75 IN | SYSTOLIC BLOOD PRESSURE: 130 MMHG | WEIGHT: 290 LBS | DIASTOLIC BLOOD PRESSURE: 88 MMHG | OXYGEN SATURATION: 97 % | BODY MASS INDEX: 36.06 KG/M2 | RESPIRATION RATE: 16 BRPM

## 2022-12-16 DIAGNOSIS — Z23 NEEDS FLU SHOT: Primary | ICD-10-CM

## 2022-12-16 DIAGNOSIS — J30.89 OTHER ALLERGIC RHINITIS: ICD-10-CM

## 2022-12-16 DIAGNOSIS — G47.33 OSA (OBSTRUCTIVE SLEEP APNEA): ICD-10-CM

## 2022-12-16 DIAGNOSIS — J45.50 SEVERE PERSISTENT ASTHMA WITHOUT COMPLICATION: ICD-10-CM

## 2022-12-16 DIAGNOSIS — J45.40 MODERATE PERSISTENT ASTHMA WITHOUT COMPLICATION: ICD-10-CM

## 2022-12-16 DIAGNOSIS — E66.9 OBESITY (BMI 30-39.9): ICD-10-CM

## 2022-12-16 PROCEDURE — 90674 CCIIV4 VAC NO PRSV 0.5 ML IM: CPT | Performed by: INTERNAL MEDICINE

## 2022-12-16 PROCEDURE — G8427 DOCREV CUR MEDS BY ELIG CLIN: HCPCS | Performed by: INTERNAL MEDICINE

## 2022-12-16 PROCEDURE — 3078F DIAST BP <80 MM HG: CPT | Performed by: INTERNAL MEDICINE

## 2022-12-16 PROCEDURE — 99214 OFFICE O/P EST MOD 30 MIN: CPT | Performed by: INTERNAL MEDICINE

## 2022-12-16 PROCEDURE — 3074F SYST BP LT 130 MM HG: CPT | Performed by: INTERNAL MEDICINE

## 2022-12-16 PROCEDURE — G0008 ADMIN INFLUENZA VIRUS VAC: HCPCS | Performed by: INTERNAL MEDICINE

## 2022-12-16 PROCEDURE — 1036F TOBACCO NON-USER: CPT | Performed by: INTERNAL MEDICINE

## 2022-12-16 PROCEDURE — G8482 FLU IMMUNIZE ORDER/ADMIN: HCPCS | Performed by: INTERNAL MEDICINE

## 2022-12-16 PROCEDURE — G8417 CALC BMI ABV UP PARAM F/U: HCPCS | Performed by: INTERNAL MEDICINE

## 2022-12-16 RX ORDER — ALBUTEROL SULFATE 90 UG/1
2 AEROSOL, METERED RESPIRATORY (INHALATION) EVERY 6 HOURS PRN
Qty: 6.7 G | Refills: 11 | Status: SHIPPED | OUTPATIENT
Start: 2022-12-16

## 2022-12-16 RX ORDER — MONTELUKAST SODIUM 10 MG/1
10 TABLET ORAL NIGHTLY
Qty: 90 TABLET | Refills: 3 | Status: SHIPPED | OUTPATIENT
Start: 2022-12-16

## 2022-12-16 RX ORDER — BUDESONIDE AND FORMOTEROL FUMARATE DIHYDRATE 160; 4.5 UG/1; UG/1
2 AEROSOL RESPIRATORY (INHALATION) 2 TIMES DAILY
Qty: 1 EACH | Refills: 11 | Status: SHIPPED | OUTPATIENT
Start: 2022-12-16

## 2022-12-16 ASSESSMENT — ASTHMA QUESTIONNAIRES
QUESTION_2 LAST FOUR WEEKS HOW OFTEN HAVE YOU HAD SHORTNESS OF BREATH: 4
ACT_TOTALSCORE: 22
QUESTION_5 LAST FOUR WEEKS HOW WOULD YOU RATE YOUR ASTHMA CONTROL: 4
QUESTION_1 LAST FOUR WEEKS HOW MUCH OF THE TIME DID YOUR ASTHMA KEEP YOU FROM GETTING AS MUCH DONE AT WORK, SCHOOL OR AT HOME: 5
QUESTION_4 LAST FOUR WEEKS HOW OFTEN HAVE YOU USED YOUR RESCUE INHALER OR NEBULIZER MEDICATION (SUCH AS ALBUTEROL): 4
QUESTION_3 LAST FOUR WEEKS HOW OFTEN DID YOUR ASTHMA SYMPTOMS (WHEEZING, COUGHING, SHORTNESS OF BREATH, CHEST TIGHTNESS OR PAIN) WAKE YOU UP AT NIGHT OR EARLIER THAN USUAL IN THE MORNING: 5

## 2022-12-16 NOTE — PROGRESS NOTES
REASON FOR CONSULTATION/CC:    Chief Complaint   Patient presents with    Asthma        Consult at request of   AYLA Marquez CNP     PCP: AYLA Marquez CNP    HISTORY OF PRESENT ILLNESS: Nydia Anderson is a 40y.o. year old male with a history of asthma and JULITA who presents :      History  Patient has a history of moderate asthma that was treated with Ronita  in the past.  This is stopped secondary to insurance. Ronita  did significantly improve his asthma and allergic rhinitis symptoms. Status post gastric bypass surgery. After this, asthma symptoms improved without Nucala. Interval history           Obeisty    Asthma  ACT 21  Symbicort     albuterol      allergic rhinitis  Controlled. JULITA  Dr. Veronica Kraft TEST 12/16/2022 9/1/2022 3/1/2022 8/30/2021 4/27/2021 10/27/2020 5/7/2020   In the past 4 weeks, how much of the time did your asthma keep you from getting as much done at work, school or at home? 5 5 5 4 4 4 3   During the past 4 weeks, how often have you had shortness of breath? 4 5 1 4 4 5 3   During the past 4 weeks, how often did your asthma symptoms (wheezing, coughing, shortness of breath, chest tightness or pain) wake you up at night or earlier than usual in the morning? 5 4 5 4 4 3 4   During the past 4 weeks, how often have you used your rescue inhaler or nebulizer medication (such as albuterol)? 4 4 4 4 3 3 2   How would you rate your asthma control during the past 4 weeks? 4 5 5 4 4 4 3   Asthma Control Test Total Score 22 23 20 20 19 19 15          Objective:   PHYSICAL EXAM:  Blood pressure 130/88, pulse 84, temperature 98.6 °F (37 °C), temperature source Infrared, resp. rate 16, height 6' 3\" (1.905 m), weight 290 lb (131.5 kg), SpO2 97 %.'  Gen: No distress. ENT:   Resp: No accessory muscle use. No crackles. No wheezes. No rhonchi. CV: Regular rate. Regular rhythm. No murmur or rub. No edema.    Skin: Warm, dry, normal texture and turgor. No nodule on exposed extremities. M/S: No cyanosis. No clubbing. No joint deformity. Psych: Oriented x 3. No anxiety. Awake. Alert. Intact judgement and insight. Good Mood / Affect. Memory appears in tact        Data Reviewed:       Assessment:      Asthma -Marcelo Levels stopped secondary to insurance  Sleep apnea - Dr. Rafat Gomez  Obesity Body mass index is 36.25 kg/m². Depression  GERD      Plan:      Problem List Items Addressed This Visit       Severe persistent asthma without complication      Controlled with Symbicort and albuterol as needed. Relevant Medications    montelukast (SINGULAIR) 10 MG tablet    albuterol sulfate HFA (PROVENTIL;VENTOLIN;PROAIR) 108 (90 Base) MCG/ACT inhaler    budesonide-formoterol (SYMBICORT) 160-4.5 MCG/ACT AERO    Other allergic rhinitis      Controlled with Singulair. Relevant Medications    montelukast (SINGULAIR) 10 MG tablet    albuterol sulfate HFA (PROVENTIL;VENTOLIN;PROAIR) 108 (90 Base) MCG/ACT inhaler    budesonide-formoterol (SYMBICORT) 160-4.5 MCG/ACT AERO    JULITA (obstructive sleep apnea)     Waiting on recall. Obesity (BMI 30-39. 9)     Uncontrolled.           Moderate persistent asthma without complication    Relevant Medications    montelukast (SINGULAIR) 10 MG tablet    albuterol sulfate HFA (PROVENTIL;VENTOLIN;PROAIR) 108 (90 Base) MCG/ACT inhaler    budesonide-formoterol (SYMBICORT) 160-4.5 MCG/ACT AERO     Other Visit Diagnoses       Needs flu shot    -  Primary    Relevant Orders    Influenza, FLUCELVAX, (age 10 mo+), IM, PF, 0.5 mL (Completed)

## 2023-01-09 ENCOUNTER — OFFICE VISIT (OUTPATIENT)
Dept: FAMILY MEDICINE CLINIC | Age: 43
End: 2023-01-09
Payer: COMMERCIAL

## 2023-01-09 VITALS
HEIGHT: 75 IN | SYSTOLIC BLOOD PRESSURE: 132 MMHG | BODY MASS INDEX: 36.13 KG/M2 | WEIGHT: 290.6 LBS | TEMPERATURE: 97.7 F | HEART RATE: 75 BPM | DIASTOLIC BLOOD PRESSURE: 80 MMHG | RESPIRATION RATE: 16 BRPM | OXYGEN SATURATION: 98 %

## 2023-01-09 DIAGNOSIS — I10 ESSENTIAL HYPERTENSION: ICD-10-CM

## 2023-01-09 DIAGNOSIS — E11.40 TYPE 2 DIABETES MELLITUS WITH DIABETIC NEUROPATHY, WITH LONG-TERM CURRENT USE OF INSULIN (HCC): Primary | ICD-10-CM

## 2023-01-09 DIAGNOSIS — Z79.4 TYPE 2 DIABETES MELLITUS WITH DIABETIC NEUROPATHY, WITH LONG-TERM CURRENT USE OF INSULIN (HCC): Primary | ICD-10-CM

## 2023-01-09 DIAGNOSIS — J45.40 MODERATE PERSISTENT ASTHMA WITHOUT COMPLICATION: ICD-10-CM

## 2023-01-09 DIAGNOSIS — K21.9 GASTROESOPHAGEAL REFLUX DISEASE, UNSPECIFIED WHETHER ESOPHAGITIS PRESENT: ICD-10-CM

## 2023-01-09 DIAGNOSIS — E78.2 MIXED HYPERLIPIDEMIA: ICD-10-CM

## 2023-01-09 LAB — HBA1C MFR BLD: 6 %

## 2023-01-09 PROCEDURE — 3075F SYST BP GE 130 - 139MM HG: CPT | Performed by: FAMILY MEDICINE

## 2023-01-09 PROCEDURE — G8482 FLU IMMUNIZE ORDER/ADMIN: HCPCS | Performed by: FAMILY MEDICINE

## 2023-01-09 PROCEDURE — G8417 CALC BMI ABV UP PARAM F/U: HCPCS | Performed by: FAMILY MEDICINE

## 2023-01-09 PROCEDURE — 83036 HEMOGLOBIN GLYCOSYLATED A1C: CPT | Performed by: FAMILY MEDICINE

## 2023-01-09 PROCEDURE — 3079F DIAST BP 80-89 MM HG: CPT | Performed by: FAMILY MEDICINE

## 2023-01-09 PROCEDURE — 2022F DILAT RTA XM EVC RTNOPTHY: CPT | Performed by: FAMILY MEDICINE

## 2023-01-09 PROCEDURE — 99214 OFFICE O/P EST MOD 30 MIN: CPT | Performed by: FAMILY MEDICINE

## 2023-01-09 PROCEDURE — 3044F HG A1C LEVEL LT 7.0%: CPT | Performed by: FAMILY MEDICINE

## 2023-01-09 PROCEDURE — G8427 DOCREV CUR MEDS BY ELIG CLIN: HCPCS | Performed by: FAMILY MEDICINE

## 2023-01-09 PROCEDURE — 1036F TOBACCO NON-USER: CPT | Performed by: FAMILY MEDICINE

## 2023-01-09 RX ORDER — GABAPENTIN 400 MG/1
CAPSULE ORAL
Qty: 90 CAPSULE | Refills: 2 | Status: SHIPPED | OUTPATIENT
Start: 2023-01-09 | End: 2023-04-09

## 2023-01-09 RX ORDER — ATORVASTATIN CALCIUM 40 MG/1
TABLET, FILM COATED ORAL
Qty: 90 TABLET | Refills: 0 | Status: SHIPPED | OUTPATIENT
Start: 2023-01-09

## 2023-01-09 RX ORDER — OMEPRAZOLE 20 MG/1
CAPSULE, DELAYED RELEASE ORAL
Qty: 90 CAPSULE | Refills: 1 | Status: SHIPPED | OUTPATIENT
Start: 2023-01-09

## 2023-01-09 RX ORDER — LISINOPRIL 40 MG/1
40 TABLET ORAL DAILY
Qty: 90 TABLET | Refills: 0 | Status: SHIPPED | OUTPATIENT
Start: 2023-01-09

## 2023-01-09 RX ORDER — ALBUTEROL SULFATE 90 UG/1
2 AEROSOL, METERED RESPIRATORY (INHALATION) EVERY 6 HOURS PRN
Qty: 6.7 G | Refills: 11 | Status: SHIPPED | OUTPATIENT
Start: 2023-01-09

## 2023-01-09 RX ORDER — DULOXETIN HYDROCHLORIDE 30 MG/1
30 CAPSULE, DELAYED RELEASE ORAL DAILY
Qty: 30 CAPSULE | Refills: 3 | Status: SHIPPED | OUTPATIENT
Start: 2023-01-09

## 2023-01-09 SDOH — ECONOMIC STABILITY: FOOD INSECURITY: WITHIN THE PAST 12 MONTHS, YOU WORRIED THAT YOUR FOOD WOULD RUN OUT BEFORE YOU GOT MONEY TO BUY MORE.: NEVER TRUE

## 2023-01-09 SDOH — ECONOMIC STABILITY: FOOD INSECURITY: WITHIN THE PAST 12 MONTHS, THE FOOD YOU BOUGHT JUST DIDN'T LAST AND YOU DIDN'T HAVE MONEY TO GET MORE.: NEVER TRUE

## 2023-01-09 ASSESSMENT — PATIENT HEALTH QUESTIONNAIRE - PHQ9
1. LITTLE INTEREST OR PLEASURE IN DOING THINGS: 0
7. TROUBLE CONCENTRATING ON THINGS, SUCH AS READING THE NEWSPAPER OR WATCHING TELEVISION: 0
10. IF YOU CHECKED OFF ANY PROBLEMS, HOW DIFFICULT HAVE THESE PROBLEMS MADE IT FOR YOU TO DO YOUR WORK, TAKE CARE OF THINGS AT HOME, OR GET ALONG WITH OTHER PEOPLE: 0
9. THOUGHTS THAT YOU WOULD BE BETTER OFF DEAD, OR OF HURTING YOURSELF: 0
2. FEELING DOWN, DEPRESSED OR HOPELESS: 0
5. POOR APPETITE OR OVEREATING: 0
SUM OF ALL RESPONSES TO PHQ9 QUESTIONS 1 & 2: 0
6. FEELING BAD ABOUT YOURSELF - OR THAT YOU ARE A FAILURE OR HAVE LET YOURSELF OR YOUR FAMILY DOWN: 0
SUM OF ALL RESPONSES TO PHQ QUESTIONS 1-9: 0
4. FEELING TIRED OR HAVING LITTLE ENERGY: 0
SUM OF ALL RESPONSES TO PHQ QUESTIONS 1-9: 0
SUM OF ALL RESPONSES TO PHQ QUESTIONS 1-9: 0
3. TROUBLE FALLING OR STAYING ASLEEP: 0
8. MOVING OR SPEAKING SO SLOWLY THAT OTHER PEOPLE COULD HAVE NOTICED. OR THE OPPOSITE, BEING SO FIGETY OR RESTLESS THAT YOU HAVE BEEN MOVING AROUND A LOT MORE THAN USUAL: 0
SUM OF ALL RESPONSES TO PHQ QUESTIONS 1-9: 0

## 2023-01-09 ASSESSMENT — SOCIAL DETERMINANTS OF HEALTH (SDOH): HOW HARD IS IT FOR YOU TO PAY FOR THE VERY BASICS LIKE FOOD, HOUSING, MEDICAL CARE, AND HEATING?: NOT HARD AT ALL
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
## 2023-01-09 NOTE — Clinical Note
ALEE Started cymbalta for DPN pain. He will need new rx sent in with each dose increase to target of 60 BID.

## 2023-01-09 NOTE — PATIENT INSTRUCTIONS
Cymbalta: we are starting at 30 mg/d dose. This should be increased month by month to target dose of 60 mg twice a day (120 mg/mo). Please call for new dose each month.   (30 mg month 1, 60 mg month 2, 120 mg month 3)

## 2023-01-28 DIAGNOSIS — E11.40 TYPE 2 DIABETES MELLITUS WITH DIABETIC NEUROPATHY, WITH LONG-TERM CURRENT USE OF INSULIN (HCC): ICD-10-CM

## 2023-01-28 DIAGNOSIS — Z79.4 TYPE 2 DIABETES MELLITUS WITH DIABETIC NEUROPATHY, WITH LONG-TERM CURRENT USE OF INSULIN (HCC): ICD-10-CM

## 2023-01-30 RX ORDER — GABAPENTIN 400 MG/1
CAPSULE ORAL
Qty: 90 CAPSULE | Refills: 2 | Status: SHIPPED | OUTPATIENT
Start: 2023-01-30 | End: 2023-04-30

## 2023-04-10 DIAGNOSIS — I10 ESSENTIAL HYPERTENSION: ICD-10-CM

## 2023-04-10 DIAGNOSIS — Z79.4 TYPE 2 DIABETES MELLITUS WITH DIABETIC NEUROPATHY, WITH LONG-TERM CURRENT USE OF INSULIN (HCC): ICD-10-CM

## 2023-04-10 DIAGNOSIS — E78.2 MIXED HYPERLIPIDEMIA: ICD-10-CM

## 2023-04-10 DIAGNOSIS — E11.40 TYPE 2 DIABETES MELLITUS WITH DIABETIC NEUROPATHY, WITH LONG-TERM CURRENT USE OF INSULIN (HCC): ICD-10-CM

## 2023-04-10 LAB
ALBUMIN SERPL-MCNC: 4.6 G/DL (ref 3.4–5)
ALBUMIN/GLOB SERPL: 2.1 {RATIO} (ref 1.1–2.2)
ALP SERPL-CCNC: 94 U/L (ref 40–129)
ALT SERPL-CCNC: 61 U/L (ref 10–40)
ANION GAP SERPL CALCULATED.3IONS-SCNC: 12 MMOL/L (ref 3–16)
AST SERPL-CCNC: 37 U/L (ref 15–37)
BILIRUB SERPL-MCNC: 0.5 MG/DL (ref 0–1)
BUN SERPL-MCNC: 10 MG/DL (ref 7–20)
CALCIUM SERPL-MCNC: 9.9 MG/DL (ref 8.3–10.6)
CHLORIDE SERPL-SCNC: 99 MMOL/L (ref 99–110)
CO2 SERPL-SCNC: 30 MMOL/L (ref 21–32)
CREAT SERPL-MCNC: 0.9 MG/DL (ref 0.9–1.3)
EST. AVERAGE GLUCOSE BLD GHB EST-MCNC: 131.2 MG/DL
GFR SERPLBLD CREATININE-BSD FMLA CKD-EPI: >60 ML/MIN/{1.73_M2}
GLUCOSE SERPL-MCNC: 142 MG/DL (ref 70–99)
HBA1C MFR BLD: 6.2 %
POTASSIUM SERPL-SCNC: 4.7 MMOL/L (ref 3.5–5.1)
PROT SERPL-MCNC: 6.8 G/DL (ref 6.4–8.2)
SODIUM SERPL-SCNC: 141 MMOL/L (ref 136–145)

## 2023-04-27 RX ORDER — DULOXETIN HYDROCHLORIDE 30 MG/1
30 CAPSULE, DELAYED RELEASE ORAL DAILY
Qty: 30 CAPSULE | Refills: 3 | Status: SHIPPED | OUTPATIENT
Start: 2023-04-27

## 2023-04-30 DIAGNOSIS — E11.40 TYPE 2 DIABETES MELLITUS WITH DIABETIC NEUROPATHY, WITH LONG-TERM CURRENT USE OF INSULIN (HCC): ICD-10-CM

## 2023-04-30 DIAGNOSIS — Z79.4 TYPE 2 DIABETES MELLITUS WITH DIABETIC NEUROPATHY, WITH LONG-TERM CURRENT USE OF INSULIN (HCC): ICD-10-CM

## 2023-05-01 ENCOUNTER — OFFICE VISIT (OUTPATIENT)
Dept: PULMONOLOGY | Age: 43
End: 2023-05-01
Payer: COMMERCIAL

## 2023-05-01 VITALS
WEIGHT: 286 LBS | HEART RATE: 99 BPM | HEIGHT: 75 IN | RESPIRATION RATE: 18 BRPM | OXYGEN SATURATION: 98 % | SYSTOLIC BLOOD PRESSURE: 132 MMHG | TEMPERATURE: 98 F | BODY MASS INDEX: 35.56 KG/M2 | DIASTOLIC BLOOD PRESSURE: 84 MMHG

## 2023-05-01 DIAGNOSIS — E66.9 OBESITY (BMI 30-39.9): ICD-10-CM

## 2023-05-01 DIAGNOSIS — J45.40 MODERATE PERSISTENT ASTHMA WITHOUT COMPLICATION: ICD-10-CM

## 2023-05-01 DIAGNOSIS — G47.33 OSA (OBSTRUCTIVE SLEEP APNEA): ICD-10-CM

## 2023-05-01 DIAGNOSIS — J30.89 OTHER ALLERGIC RHINITIS: ICD-10-CM

## 2023-05-01 PROCEDURE — G8427 DOCREV CUR MEDS BY ELIG CLIN: HCPCS | Performed by: INTERNAL MEDICINE

## 2023-05-01 PROCEDURE — 1036F TOBACCO NON-USER: CPT | Performed by: INTERNAL MEDICINE

## 2023-05-01 PROCEDURE — 3079F DIAST BP 80-89 MM HG: CPT | Performed by: INTERNAL MEDICINE

## 2023-05-01 PROCEDURE — 99214 OFFICE O/P EST MOD 30 MIN: CPT | Performed by: INTERNAL MEDICINE

## 2023-05-01 PROCEDURE — 3075F SYST BP GE 130 - 139MM HG: CPT | Performed by: INTERNAL MEDICINE

## 2023-05-01 PROCEDURE — G8417 CALC BMI ABV UP PARAM F/U: HCPCS | Performed by: INTERNAL MEDICINE

## 2023-05-01 RX ORDER — GABAPENTIN 400 MG/1
CAPSULE ORAL
Qty: 90 CAPSULE | Refills: 2 | Status: SHIPPED | OUTPATIENT
Start: 2023-05-01 | End: 2023-08-01

## 2023-05-01 ASSESSMENT — ASTHMA QUESTIONNAIRES
QUESTION_4 LAST FOUR WEEKS HOW OFTEN HAVE YOU USED YOUR RESCUE INHALER OR NEBULIZER MEDICATION (SUCH AS ALBUTEROL): 4
ACT_TOTALSCORE: 19
QUESTION_2 LAST FOUR WEEKS HOW OFTEN HAVE YOU HAD SHORTNESS OF BREATH: 4
QUESTION_3 LAST FOUR WEEKS HOW OFTEN DID YOUR ASTHMA SYMPTOMS (WHEEZING, COUGHING, SHORTNESS OF BREATH, CHEST TIGHTNESS OR PAIN) WAKE YOU UP AT NIGHT OR EARLIER THAN USUAL IN THE MORNING: 3
QUESTION_5 LAST FOUR WEEKS HOW WOULD YOU RATE YOUR ASTHMA CONTROL: 4
QUESTION_1 LAST FOUR WEEKS HOW MUCH OF THE TIME DID YOUR ASTHMA KEEP YOU FROM GETTING AS MUCH DONE AT WORK, SCHOOL OR AT HOME: 4

## 2023-05-01 NOTE — PROGRESS NOTES
REASON FOR CONSULTATION/CC:    Chief Complaint   Patient presents with    Asthma        Consult at request of   AYLA Rivera CNP     PCP: AYLA Rivera CNP    HISTORY OF PRESENT ILLNESS: Adalberto Valdovinos is a 40y.o. year old male with a history of asthma and JULITA who presents :      History  Patient has a history of moderate asthma that was treated with Andre [place] in the past.  This is stopped secondary to insurance. Andre [place] did significantly improve his asthma and allergic rhinitis symptoms. Status post gastric bypass surgery. After this, asthma symptoms improved without Nucala. Interval history           Obeisty    Asthma  ACT 19  Symbicort   using bid. Albuterol. Rare usage prn.     allergic rhinitis  Controlled. No change. JULITA  Dr. Rony Luke waiting recalled device. ASTHMA CONTROL TEST 5/1/2023 12/16/2022 9/1/2022 3/1/2022 8/30/2021 4/27/2021 10/27/2020   In the past 4 weeks, how much of the time did your asthma keep you from getting as much done at work, school or at home? 4 5 5 5 4 4 4   During the past 4 weeks, how often have you had shortness of breath? 4 4 5 1 4 4 5   During the past 4 weeks, how often did your asthma symptoms (wheezing, coughing, shortness of breath, chest tightness or pain) wake you up at night or earlier than usual in the morning? 3 5 4 5 4 4 3   During the past 4 weeks, how often have you used your rescue inhaler or nebulizer medication (such as albuterol)? 4 4 4 4 4 3 3   How would you rate your asthma control during the past 4 weeks? 4 4 5 5 4 4 4   Asthma Control Test Total Score 19 22 23 20 20 19 19          Objective:   PHYSICAL EXAM:  Blood pressure 132/84, pulse 99, temperature 98 °F (36.7 °C), temperature source Infrared, resp. rate 18, height 6' 3\" (1.905 m), weight 286 lb (129.7 kg), SpO2 98 %.'  Gen: No distress. ENT:   Resp: No accessory muscle use. No crackles. No wheezes. No rhonchi.     CV: Regular

## 2023-05-09 DIAGNOSIS — I10 ESSENTIAL HYPERTENSION: ICD-10-CM

## 2023-05-09 DIAGNOSIS — E78.2 MIXED HYPERLIPIDEMIA: ICD-10-CM

## 2023-05-09 DIAGNOSIS — E11.40 TYPE 2 DIABETES MELLITUS WITH DIABETIC NEUROPATHY, WITH LONG-TERM CURRENT USE OF INSULIN (HCC): ICD-10-CM

## 2023-05-09 DIAGNOSIS — Z79.4 TYPE 2 DIABETES MELLITUS WITH DIABETIC NEUROPATHY, WITH LONG-TERM CURRENT USE OF INSULIN (HCC): ICD-10-CM

## 2023-05-09 RX ORDER — LISINOPRIL 40 MG/1
TABLET ORAL
Qty: 90 TABLET | Refills: 0 | Status: SHIPPED | OUTPATIENT
Start: 2023-05-09

## 2023-05-09 RX ORDER — ATORVASTATIN CALCIUM 40 MG/1
TABLET, FILM COATED ORAL
Qty: 90 TABLET | Refills: 0 | Status: SHIPPED | OUTPATIENT
Start: 2023-05-09

## 2023-06-22 DIAGNOSIS — J45.40 MODERATE PERSISTENT ASTHMA, UNCOMPLICATED: ICD-10-CM

## 2023-06-22 RX ORDER — FEXOFENADINE HYDROCHLORIDE 180 MG/1
TABLET, FILM COATED ORAL
Qty: 30 TABLET | Refills: 11 | Status: SHIPPED | OUTPATIENT
Start: 2023-06-22

## 2023-07-07 SDOH — HEALTH STABILITY: PHYSICAL HEALTH: ON AVERAGE, HOW MANY DAYS PER WEEK DO YOU ENGAGE IN MODERATE TO STRENUOUS EXERCISE (LIKE A BRISK WALK)?: 2 DAYS

## 2023-07-07 SDOH — HEALTH STABILITY: PHYSICAL HEALTH: ON AVERAGE, HOW MANY MINUTES DO YOU ENGAGE IN EXERCISE AT THIS LEVEL?: 60 MIN

## 2023-07-07 ASSESSMENT — LIFESTYLE VARIABLES
HOW MANY STANDARD DRINKS CONTAINING ALCOHOL DO YOU HAVE ON A TYPICAL DAY: 1
HOW OFTEN DO YOU HAVE A DRINK CONTAINING ALCOHOL: MONTHLY OR LESS
HOW OFTEN DO YOU HAVE SIX OR MORE DRINKS ON ONE OCCASION: 1
HOW MANY STANDARD DRINKS CONTAINING ALCOHOL DO YOU HAVE ON A TYPICAL DAY: 1 OR 2
HOW OFTEN DO YOU HAVE A DRINK CONTAINING ALCOHOL: 2

## 2023-07-07 ASSESSMENT — PATIENT HEALTH QUESTIONNAIRE - PHQ9
SUM OF ALL RESPONSES TO PHQ QUESTIONS 1-9: 0
2. FEELING DOWN, DEPRESSED OR HOPELESS: 0
1. LITTLE INTEREST OR PLEASURE IN DOING THINGS: 0
SUM OF ALL RESPONSES TO PHQ9 QUESTIONS 1 & 2: 0
SUM OF ALL RESPONSES TO PHQ QUESTIONS 1-9: 0

## 2023-07-10 ENCOUNTER — OFFICE VISIT (OUTPATIENT)
Dept: FAMILY MEDICINE CLINIC | Age: 43
End: 2023-07-10
Payer: COMMERCIAL

## 2023-07-10 VITALS
WEIGHT: 288 LBS | HEART RATE: 78 BPM | DIASTOLIC BLOOD PRESSURE: 98 MMHG | RESPIRATION RATE: 16 BRPM | HEIGHT: 75 IN | OXYGEN SATURATION: 98 % | SYSTOLIC BLOOD PRESSURE: 146 MMHG | BODY MASS INDEX: 35.81 KG/M2

## 2023-07-10 DIAGNOSIS — E78.2 MIXED HYPERLIPIDEMIA: ICD-10-CM

## 2023-07-10 DIAGNOSIS — E11.40 TYPE 2 DIABETES MELLITUS WITH DIABETIC NEUROPATHY, WITH LONG-TERM CURRENT USE OF INSULIN (HCC): ICD-10-CM

## 2023-07-10 DIAGNOSIS — Z23 NEED FOR PNEUMOCOCCAL VACCINATION: ICD-10-CM

## 2023-07-10 DIAGNOSIS — E55.9 VITAMIN D DEFICIENCY: ICD-10-CM

## 2023-07-10 DIAGNOSIS — I10 ESSENTIAL HYPERTENSION: ICD-10-CM

## 2023-07-10 DIAGNOSIS — Z79.4 TYPE 2 DIABETES MELLITUS WITH DIABETIC NEUROPATHY, WITH LONG-TERM CURRENT USE OF INSULIN (HCC): ICD-10-CM

## 2023-07-10 DIAGNOSIS — J45.40 MODERATE PERSISTENT ASTHMA WITHOUT COMPLICATION: ICD-10-CM

## 2023-07-10 DIAGNOSIS — K21.9 GASTROESOPHAGEAL REFLUX DISEASE, UNSPECIFIED WHETHER ESOPHAGITIS PRESENT: ICD-10-CM

## 2023-07-10 DIAGNOSIS — Z00.00 MEDICARE ANNUAL WELLNESS VISIT, SUBSEQUENT: ICD-10-CM

## 2023-07-10 LAB
25(OH)D3 SERPL-MCNC: 53.4 NG/ML
ALBUMIN SERPL-MCNC: 4.5 G/DL (ref 3.4–5)
ALBUMIN/GLOB SERPL: 2.4 {RATIO} (ref 1.1–2.2)
ALP SERPL-CCNC: 72 U/L (ref 40–129)
ALT SERPL-CCNC: 37 U/L (ref 10–40)
ANION GAP SERPL CALCULATED.3IONS-SCNC: 12 MMOL/L (ref 3–16)
AST SERPL-CCNC: 20 U/L (ref 15–37)
BASOPHILS # BLD: 0.1 K/UL (ref 0–0.2)
BASOPHILS NFR BLD: 0.9 %
BILIRUB SERPL-MCNC: 0.5 MG/DL (ref 0–1)
BUN SERPL-MCNC: 15 MG/DL (ref 7–20)
CALCIUM SERPL-MCNC: 9.2 MG/DL (ref 8.3–10.6)
CHLORIDE SERPL-SCNC: 101 MMOL/L (ref 99–110)
CHOLEST SERPL-MCNC: 108 MG/DL (ref 0–199)
CO2 SERPL-SCNC: 27 MMOL/L (ref 21–32)
CREAT SERPL-MCNC: 0.9 MG/DL (ref 0.9–1.3)
CREAT UR-MCNC: 148.8 MG/DL (ref 39–259)
DEPRECATED RDW RBC AUTO: 14.9 % (ref 12.4–15.4)
EOSINOPHIL # BLD: 0.1 K/UL (ref 0–0.6)
EOSINOPHIL NFR BLD: 1.7 %
FOLATE SERPL-MCNC: >20 NG/ML (ref 4.78–24.2)
GFR SERPLBLD CREATININE-BSD FMLA CKD-EPI: >60 ML/MIN/{1.73_M2}
GLUCOSE SERPL-MCNC: 125 MG/DL (ref 70–99)
HBA1C MFR BLD: 6.1 %
HCT VFR BLD AUTO: 42 % (ref 40.5–52.5)
HDLC SERPL-MCNC: 48 MG/DL (ref 40–60)
HGB BLD-MCNC: 14.4 G/DL (ref 13.5–17.5)
LDLC SERPL CALC-MCNC: 46 MG/DL
LYMPHOCYTES # BLD: 1.7 K/UL (ref 1–5.1)
LYMPHOCYTES NFR BLD: 26.3 %
MCH RBC QN AUTO: 28.3 PG (ref 26–34)
MCHC RBC AUTO-ENTMCNC: 34.2 G/DL (ref 31–36)
MCV RBC AUTO: 82.7 FL (ref 80–100)
MICROALBUMIN UR DL<=1MG/L-MCNC: <1.2 MG/DL
MICROALBUMIN/CREAT UR: NORMAL MG/G (ref 0–30)
MONOCYTES # BLD: 0.5 K/UL (ref 0–1.3)
MONOCYTES NFR BLD: 7.3 %
NEUTROPHILS # BLD: 4.2 K/UL (ref 1.7–7.7)
NEUTROPHILS NFR BLD: 63.8 %
PLATELET # BLD AUTO: 301 K/UL (ref 135–450)
PMV BLD AUTO: 9.1 FL (ref 5–10.5)
POTASSIUM SERPL-SCNC: 4.4 MMOL/L (ref 3.5–5.1)
PROT SERPL-MCNC: 6.4 G/DL (ref 6.4–8.2)
RBC # BLD AUTO: 5.08 M/UL (ref 4.2–5.9)
SODIUM SERPL-SCNC: 140 MMOL/L (ref 136–145)
TRIGL SERPL-MCNC: 68 MG/DL (ref 0–150)
TSH SERPL DL<=0.005 MIU/L-ACNC: 1.95 UIU/ML (ref 0.27–4.2)
VIT B12 SERPL-MCNC: 962 PG/ML (ref 211–911)
VLDLC SERPL CALC-MCNC: 14 MG/DL
WBC # BLD AUTO: 6.5 K/UL (ref 4–11)

## 2023-07-10 PROCEDURE — 3077F SYST BP >= 140 MM HG: CPT | Performed by: NURSE PRACTITIONER

## 2023-07-10 PROCEDURE — 99213 OFFICE O/P EST LOW 20 MIN: CPT | Performed by: NURSE PRACTITIONER

## 2023-07-10 PROCEDURE — 83037 HB GLYCOSYLATED A1C HOME DEV: CPT | Performed by: NURSE PRACTITIONER

## 2023-07-10 PROCEDURE — 2022F DILAT RTA XM EVC RTNOPTHY: CPT | Performed by: NURSE PRACTITIONER

## 2023-07-10 PROCEDURE — 3044F HG A1C LEVEL LT 7.0%: CPT | Performed by: NURSE PRACTITIONER

## 2023-07-10 PROCEDURE — G8417 CALC BMI ABV UP PARAM F/U: HCPCS | Performed by: NURSE PRACTITIONER

## 2023-07-10 PROCEDURE — G0009 ADMIN PNEUMOCOCCAL VACCINE: HCPCS | Performed by: NURSE PRACTITIONER

## 2023-07-10 PROCEDURE — 90677 PCV20 VACCINE IM: CPT | Performed by: NURSE PRACTITIONER

## 2023-07-10 PROCEDURE — 1036F TOBACCO NON-USER: CPT | Performed by: NURSE PRACTITIONER

## 2023-07-10 PROCEDURE — G0439 PPPS, SUBSEQ VISIT: HCPCS | Performed by: NURSE PRACTITIONER

## 2023-07-10 PROCEDURE — G8427 DOCREV CUR MEDS BY ELIG CLIN: HCPCS | Performed by: NURSE PRACTITIONER

## 2023-07-10 PROCEDURE — 3080F DIAST BP >= 90 MM HG: CPT | Performed by: NURSE PRACTITIONER

## 2023-07-10 RX ORDER — GABAPENTIN 400 MG/1
400 CAPSULE ORAL 3 TIMES DAILY
Qty: 270 CAPSULE | Refills: 0 | Status: SHIPPED | OUTPATIENT
Start: 2023-07-10 | End: 2023-10-08

## 2023-07-10 RX ORDER — ALBUTEROL SULFATE 90 UG/1
2 AEROSOL, METERED RESPIRATORY (INHALATION) EVERY 6 HOURS PRN
Qty: 6.7 G | Refills: 1 | Status: SHIPPED | OUTPATIENT
Start: 2023-07-10

## 2023-07-10 RX ORDER — MONTELUKAST SODIUM 10 MG/1
10 TABLET ORAL NIGHTLY
Qty: 90 TABLET | Refills: 1 | Status: SHIPPED | OUTPATIENT
Start: 2023-07-10

## 2023-07-10 RX ORDER — OMEPRAZOLE 20 MG/1
CAPSULE, DELAYED RELEASE ORAL
Qty: 90 CAPSULE | Refills: 1 | Status: SHIPPED | OUTPATIENT
Start: 2023-07-10

## 2023-07-10 RX ORDER — ATORVASTATIN CALCIUM 40 MG/1
TABLET, FILM COATED ORAL
Qty: 90 TABLET | Refills: 1 | Status: SHIPPED | OUTPATIENT
Start: 2023-07-10

## 2023-07-10 RX ORDER — DULOXETIN HYDROCHLORIDE 30 MG/1
30 CAPSULE, DELAYED RELEASE ORAL DAILY
Qty: 90 CAPSULE | Refills: 1 | Status: SHIPPED | OUTPATIENT
Start: 2023-07-10

## 2023-07-10 RX ORDER — AMLODIPINE BESYLATE 10 MG/1
10 TABLET ORAL DAILY
Qty: 90 TABLET | Refills: 0 | Status: SHIPPED | OUTPATIENT
Start: 2023-07-10

## 2023-07-10 RX ORDER — BUDESONIDE AND FORMOTEROL FUMARATE DIHYDRATE 160; 4.5 UG/1; UG/1
2 AEROSOL RESPIRATORY (INHALATION) 2 TIMES DAILY
Qty: 1 EACH | Refills: 5 | Status: SHIPPED | OUTPATIENT
Start: 2023-07-10

## 2023-07-10 RX ORDER — LISINOPRIL 40 MG/1
40 TABLET ORAL DAILY
Qty: 90 TABLET | Refills: 1 | Status: SHIPPED | OUTPATIENT
Start: 2023-07-10

## 2023-07-10 NOTE — PATIENT INSTRUCTIONS
Advance Directives: Care Instructions  Overview  An advance directive is a legal way to state your wishes at the end of your life. It tells your family and your doctor what to do if you can't say what you want. There are two main types of advance directives. You can change them any time your wishes change. Living will. This form tells your family and your doctor your wishes about life support and other treatment. The form is also called a declaration. Medical power of . This form lets you name a person to make treatment decisions for you when you can't speak for yourself. This person is called a health care agent (health care proxy, health care surrogate). The form is also called a durable power of  for health care. If you do not have an advance directive, decisions about your medical care may be made by a family member, or by a doctor or a  who doesn't know you. It may help to think of an advance directive as a gift to the people who care for you. If you have one, they won't have to make tough decisions by themselves. For more information, including forms for your state, see the [address] [place] [address] website (www.caringinfo.org/planning/advance-directives/). Follow-up care is a key part of your treatment and safety. Be sure to make and go to all appointments, and call your doctor if you are having problems. It's also a good idea to know your test results and keep a list of the medicines you take. What should you include in an advance directive? Many states have a unique advance directive form. (It may ask you to address specific issues.) Or you might use a universal form that's approved by many states. If your form doesn't tell you what to address, it may be hard to know what to include in your advance directive. Use the questions below to help you get started. Who do you want to make decisions about your medical care if you are not able to?   What life-support measures do you want if you

## 2023-07-10 NOTE — PROGRESS NOTES
Medicare Annual Wellness Visit    Hector Anand is here for Medicare AWV    Assessment & Plan   Medicare annual wellness visit, subsequent  Patient declined COVID-19 vaccines    Type 2 diabetes mellitus with diabetic neuropathy, with long-term current use of insulin (HCC)  -     POCT glycosylated hemoglobin (Hb A1C)- 6.1%- controlled   -     metFORMIN (GLUCOPHAGE) 1000 MG tablet; TAKE ONE TABLET EVERY DAY with breakfast, Disp-90 tablet, R-1Normal  -     gabapentin (NEURONTIN) 400 MG capsule; Take 1 capsule by mouth 3 times daily for 90 days. , Disp-270 capsule, R-0Normal  -     Comprehensive Metabolic Panel; Future  -     TSH with Reflex; Future  -     CBC with Auto Differential; Future  -     Vitamin B12 & Folate; Future  -     Microalbumin / Creatinine Urine Ratio; Future    Continue to work on low carb diet, aerobic exercise, and weight loss. Mixed hyperlipidemia  -     atorvastatin (LIPITOR) 40 MG tablet; TAKE ONE TABLET AT BEDTIME, Disp-90 tablet, R-1Normal  -     Lipid Panel; Future    Essential hypertension  -     metoprolol tartrate (LOPRESSOR) 25 MG tablet; Take 1 tablet by mouth 2 times daily, Disp-180 tablet, R-1Normal  -     lisinopril (PRINIVIL;ZESTRIL) 40 MG tablet; Take 1 tablet by mouth daily, Disp-90 tablet, R-1Normal  -     amLODIPine (NORVASC) 10 MG tablet; Take 1 tablet by mouth daily, Disp-90 tablet, R-0Normal  Uncontrolled. Increase amlodipine to 10 mg daily. F/u in 1 month to repeat BP    Gastroesophageal reflux disease, unspecified whether esophagitis present  -     omeprazole (PRILOSEC) 20 MG delayed release capsule; TAKE ONE CAPSULE BY MOUTH DAILY FOR STOMACH, Disp-90 capsule, R-1This prescription was filled on 6/27/2022. Any refills authorized will be placed on file. Normal  Stable. Moderate persistent asthma without complication  -     montelukast (SINGULAIR) 10 MG tablet;  Take 1 tablet by mouth nightly, Disp-90 tablet, R-1Normal  -     albuterol sulfate HFA

## 2023-07-28 DIAGNOSIS — Z79.4 TYPE 2 DIABETES MELLITUS WITH DIABETIC NEUROPATHY, WITH LONG-TERM CURRENT USE OF INSULIN (HCC): ICD-10-CM

## 2023-07-28 DIAGNOSIS — K21.9 GASTROESOPHAGEAL REFLUX DISEASE, UNSPECIFIED WHETHER ESOPHAGITIS PRESENT: ICD-10-CM

## 2023-07-28 DIAGNOSIS — E11.40 TYPE 2 DIABETES MELLITUS WITH DIABETIC NEUROPATHY, WITH LONG-TERM CURRENT USE OF INSULIN (HCC): ICD-10-CM

## 2023-07-28 RX ORDER — OMEPRAZOLE 20 MG/1
CAPSULE, DELAYED RELEASE ORAL
Qty: 90 CAPSULE | Refills: 1 | OUTPATIENT
Start: 2023-07-28

## 2023-07-28 RX ORDER — DULOXETIN HYDROCHLORIDE 30 MG/1
CAPSULE, DELAYED RELEASE ORAL
Qty: 30 CAPSULE | Refills: 3 | OUTPATIENT
Start: 2023-07-28

## 2023-07-28 RX ORDER — GABAPENTIN 400 MG/1
CAPSULE ORAL
Qty: 90 CAPSULE | Refills: 2 | OUTPATIENT
Start: 2023-07-28

## 2023-08-03 ENCOUNTER — TELEPHONE (OUTPATIENT)
Dept: FAMILY MEDICINE CLINIC | Age: 43
End: 2023-08-03

## 2023-08-03 DIAGNOSIS — I10 ESSENTIAL HYPERTENSION: Primary | ICD-10-CM

## 2023-08-04 DIAGNOSIS — I10 ESSENTIAL HYPERTENSION: ICD-10-CM

## 2023-08-04 RX ORDER — BLOOD PRESSURE TEST KIT
KIT MISCELLANEOUS
Qty: 1 KIT | Refills: 0 | Status: SHIPPED | OUTPATIENT
Start: 2023-08-04

## 2023-08-04 RX ORDER — AMLODIPINE BESYLATE 10 MG/1
10 TABLET ORAL DAILY
Qty: 90 TABLET | Refills: 1 | Status: SHIPPED | OUTPATIENT
Start: 2023-08-04

## 2023-08-10 ENCOUNTER — OFFICE VISIT (OUTPATIENT)
Dept: FAMILY MEDICINE CLINIC | Age: 43
End: 2023-08-10
Payer: COMMERCIAL

## 2023-08-10 VITALS
DIASTOLIC BLOOD PRESSURE: 78 MMHG | HEART RATE: 83 BPM | RESPIRATION RATE: 18 BRPM | BODY MASS INDEX: 36.41 KG/M2 | TEMPERATURE: 98.3 F | HEIGHT: 75 IN | OXYGEN SATURATION: 97 % | SYSTOLIC BLOOD PRESSURE: 120 MMHG | WEIGHT: 292.8 LBS

## 2023-08-10 DIAGNOSIS — I10 ESSENTIAL HYPERTENSION: Primary | ICD-10-CM

## 2023-08-10 PROCEDURE — G8427 DOCREV CUR MEDS BY ELIG CLIN: HCPCS | Performed by: NURSE PRACTITIONER

## 2023-08-10 PROCEDURE — G8417 CALC BMI ABV UP PARAM F/U: HCPCS | Performed by: NURSE PRACTITIONER

## 2023-08-10 PROCEDURE — 3078F DIAST BP <80 MM HG: CPT | Performed by: NURSE PRACTITIONER

## 2023-08-10 PROCEDURE — 3074F SYST BP LT 130 MM HG: CPT | Performed by: NURSE PRACTITIONER

## 2023-08-10 PROCEDURE — 99212 OFFICE O/P EST SF 10 MIN: CPT | Performed by: NURSE PRACTITIONER

## 2023-08-10 PROCEDURE — 1036F TOBACCO NON-USER: CPT | Performed by: NURSE PRACTITIONER

## 2023-08-10 RX ORDER — DULOXETIN HYDROCHLORIDE 60 MG/1
60 CAPSULE, DELAYED RELEASE ORAL DAILY
COMMUNITY
Start: 2023-08-07

## 2023-08-10 ASSESSMENT — ENCOUNTER SYMPTOMS
ABDOMINAL PAIN: 0
COUGH: 0
SHORTNESS OF BREATH: 0
CHEST TIGHTNESS: 0

## 2023-08-10 NOTE — PROGRESS NOTES
8/10/2023    This is a 44 y.o. male   Chief Complaint   Patient presents with    Hypertension     Has not been checking bp. He found out the amlodipine had not been in his pill pack. He has been taking it again. Rob Avila HPI    Hypertension:  Home blood pressure monitoring: No.  He is adherent to a low sodium diet. Patient denies chest pain, shortness of breath, headache, peripheral edema, palpitations, and dry cough. Antihypertensive medication side effects: no medication side effects noted. On amlodipine 10 mg daily, metoprolol tartrate 25 mg BID, lisinopril 40 mg daily     He reports that he found out after last visit his pharmacy was not filling his amlodipine in his pill pack so he was not taking medication. BP was elevated last visit at 142/96 and 146/98                                      Sodium (mmol/L)   Date Value   07/10/2023 140    BUN (mg/dL)   Date Value   07/10/2023 15    Glucose (mg/dL)   Date Value   07/10/2023 125 (H)      Potassium (mmol/L)   Date Value   07/10/2023 4.4    Creatinine (mg/dL)   Date Value   07/10/2023 0.9            Patient Active Problem List   Diagnosis    Moderate persistent asthma without complication    Hypertension    GERD (gastroesophageal reflux disease)    Tinea cruris    Type 2 diabetes mellitus with diabetic neuropathy, with long-term current use of insulin ([facility])    Mixed hyperlipidemia    Failed back surgical syndrome    Spinal cord injury, C1-C7 ([address])    Chronic pain syndrome    Central spinal stenosis    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 ([address])    Major depressive disorder    Insomnia    Diabetic eye exam ([address])- 12/16 wnl CEI    Closed nondisplaced fracture of fifth metatarsal bone of right foot    Obesity (BMI 30-39. 9)    Elevated liver enzymes    JULITA (obstructive sleep apnea)    Pressure ulcer of buttock    Abnormal levels of other serum enzymes    Chronic back pain    Right hand pain    Diabetic ulcer of toe of right foot associated with type 2

## 2023-09-05 ENCOUNTER — TELEPHONE (OUTPATIENT)
Dept: FAMILY MEDICINE CLINIC | Age: 43
End: 2023-09-05

## 2023-09-05 DIAGNOSIS — I10 ESSENTIAL HYPERTENSION: ICD-10-CM

## 2023-09-05 RX ORDER — BLOOD PRESSURE TEST KIT
KIT MISCELLANEOUS
Qty: 1 KIT | Refills: 0 | Status: SHIPPED | OUTPATIENT
Start: 2023-09-05

## 2023-09-05 NOTE — TELEPHONE ENCOUNTER
Festus Forman from [place] called on behalf of [facility] requesting a BP Kit for this patient so he can check his BP    Festus Forman can be reached at [address] said this order can be faxed to [address]    Please Advise

## 2023-10-14 DIAGNOSIS — J45.40 MODERATE PERSISTENT ASTHMA WITHOUT COMPLICATION: ICD-10-CM

## 2023-10-16 RX ORDER — ALBUTEROL SULFATE 90 UG/1
2 AEROSOL, METERED RESPIRATORY (INHALATION) EVERY 6 HOURS PRN
Qty: 6.7 G | Refills: 0 | Status: SHIPPED | OUTPATIENT
Start: 2023-10-16 | End: 2023-11-13 | Stop reason: SDUPTHER

## 2023-11-02 DIAGNOSIS — Z79.4 TYPE 2 DIABETES MELLITUS WITH DIABETIC NEUROPATHY, WITH LONG-TERM CURRENT USE OF INSULIN (HCC): ICD-10-CM

## 2023-11-02 DIAGNOSIS — E11.40 TYPE 2 DIABETES MELLITUS WITH DIABETIC NEUROPATHY, WITH LONG-TERM CURRENT USE OF INSULIN (HCC): ICD-10-CM

## 2023-11-03 RX ORDER — GABAPENTIN 400 MG/1
CAPSULE ORAL
Qty: 270 CAPSULE | Refills: 0 | Status: SHIPPED | OUTPATIENT
Start: 2023-11-03 | End: 2024-02-01

## 2023-11-13 ENCOUNTER — OFFICE VISIT (OUTPATIENT)
Dept: PULMONOLOGY | Age: 43
End: 2023-11-13
Payer: MEDICARE

## 2023-11-13 ENCOUNTER — OFFICE VISIT (OUTPATIENT)
Dept: FAMILY MEDICINE CLINIC | Age: 43
End: 2023-11-13
Payer: MEDICARE

## 2023-11-13 VITALS
DIASTOLIC BLOOD PRESSURE: 80 MMHG | HEIGHT: 70 IN | SYSTOLIC BLOOD PRESSURE: 134 MMHG | HEART RATE: 98 BPM | OXYGEN SATURATION: 99 % | WEIGHT: 298 LBS | BODY MASS INDEX: 42.66 KG/M2

## 2023-11-13 VITALS
RESPIRATION RATE: 18 BRPM | HEART RATE: 96 BPM | OXYGEN SATURATION: 98 % | SYSTOLIC BLOOD PRESSURE: 136 MMHG | TEMPERATURE: 98.5 F | WEIGHT: 298.5 LBS | DIASTOLIC BLOOD PRESSURE: 86 MMHG | BODY MASS INDEX: 37.12 KG/M2 | HEIGHT: 75 IN

## 2023-11-13 DIAGNOSIS — E11.40 TYPE 2 DIABETES MELLITUS WITH DIABETIC NEUROPATHY, WITH LONG-TERM CURRENT USE OF INSULIN (HCC): Primary | ICD-10-CM

## 2023-11-13 DIAGNOSIS — Z79.4 TYPE 2 DIABETES MELLITUS WITH DIABETIC NEUROPATHY, WITH LONG-TERM CURRENT USE OF INSULIN (HCC): Primary | ICD-10-CM

## 2023-11-13 DIAGNOSIS — E66.01 MORBID OBESITY WITH BMI OF 40.0-44.9, ADULT (HCC): ICD-10-CM

## 2023-11-13 DIAGNOSIS — Z23 NEEDS FLU SHOT: Primary | ICD-10-CM

## 2023-11-13 DIAGNOSIS — I10 ESSENTIAL HYPERTENSION: ICD-10-CM

## 2023-11-13 DIAGNOSIS — J45.40 MODERATE PERSISTENT ASTHMA WITHOUT COMPLICATION: ICD-10-CM

## 2023-11-13 DIAGNOSIS — Z79.4 TYPE 2 DIABETES MELLITUS WITH DIABETIC NEUROPATHY, WITH LONG-TERM CURRENT USE OF INSULIN (HCC): ICD-10-CM

## 2023-11-13 DIAGNOSIS — E66.9 OBESITY (BMI 30-39.9): ICD-10-CM

## 2023-11-13 DIAGNOSIS — E78.2 MIXED HYPERLIPIDEMIA: ICD-10-CM

## 2023-11-13 DIAGNOSIS — G47.33 OSA (OBSTRUCTIVE SLEEP APNEA): ICD-10-CM

## 2023-11-13 DIAGNOSIS — J30.89 OTHER ALLERGIC RHINITIS: ICD-10-CM

## 2023-11-13 DIAGNOSIS — E11.40 TYPE 2 DIABETES MELLITUS WITH DIABETIC NEUROPATHY, WITH LONG-TERM CURRENT USE OF INSULIN (HCC): ICD-10-CM

## 2023-11-13 LAB
ALBUMIN SERPL-MCNC: 4.8 G/DL (ref 3.4–5)
ALBUMIN/GLOB SERPL: 2.5 {RATIO} (ref 1.1–2.2)
ALP SERPL-CCNC: 97 U/L (ref 40–129)
ALT SERPL-CCNC: 43 U/L (ref 10–40)
ANION GAP SERPL CALCULATED.3IONS-SCNC: 9 MMOL/L (ref 3–16)
AST SERPL-CCNC: 31 U/L (ref 15–37)
BILIRUB SERPL-MCNC: 0.7 MG/DL (ref 0–1)
BUN SERPL-MCNC: 18 MG/DL (ref 7–20)
CALCIUM SERPL-MCNC: 9.6 MG/DL (ref 8.3–10.6)
CHLORIDE SERPL-SCNC: 100 MMOL/L (ref 99–110)
CO2 SERPL-SCNC: 32 MMOL/L (ref 21–32)
CREAT SERPL-MCNC: 0.9 MG/DL (ref 0.9–1.3)
GFR SERPLBLD CREATININE-BSD FMLA CKD-EPI: >60 ML/MIN/{1.73_M2}
GLUCOSE SERPL-MCNC: 121 MG/DL (ref 70–99)
HBA1C MFR BLD: 6.2 %
POTASSIUM SERPL-SCNC: 4.5 MMOL/L (ref 3.5–5.1)
PROT SERPL-MCNC: 6.7 G/DL (ref 6.4–8.2)
SODIUM SERPL-SCNC: 141 MMOL/L (ref 136–145)

## 2023-11-13 PROCEDURE — G8482 FLU IMMUNIZE ORDER/ADMIN: HCPCS | Performed by: INTERNAL MEDICINE

## 2023-11-13 PROCEDURE — G0008 ADMIN INFLUENZA VIRUS VAC: HCPCS | Performed by: INTERNAL MEDICINE

## 2023-11-13 PROCEDURE — 99214 OFFICE O/P EST MOD 30 MIN: CPT | Performed by: INTERNAL MEDICINE

## 2023-11-13 PROCEDURE — 3075F SYST BP GE 130 - 139MM HG: CPT | Performed by: INTERNAL MEDICINE

## 2023-11-13 PROCEDURE — G8427 DOCREV CUR MEDS BY ELIG CLIN: HCPCS | Performed by: NURSE PRACTITIONER

## 2023-11-13 PROCEDURE — 99214 OFFICE O/P EST MOD 30 MIN: CPT | Performed by: NURSE PRACTITIONER

## 2023-11-13 PROCEDURE — 83036 HEMOGLOBIN GLYCOSYLATED A1C: CPT | Performed by: NURSE PRACTITIONER

## 2023-11-13 PROCEDURE — 1036F TOBACCO NON-USER: CPT | Performed by: INTERNAL MEDICINE

## 2023-11-13 PROCEDURE — G8482 FLU IMMUNIZE ORDER/ADMIN: HCPCS | Performed by: NURSE PRACTITIONER

## 2023-11-13 PROCEDURE — G8417 CALC BMI ABV UP PARAM F/U: HCPCS | Performed by: INTERNAL MEDICINE

## 2023-11-13 PROCEDURE — G8427 DOCREV CUR MEDS BY ELIG CLIN: HCPCS | Performed by: INTERNAL MEDICINE

## 2023-11-13 PROCEDURE — G8417 CALC BMI ABV UP PARAM F/U: HCPCS | Performed by: NURSE PRACTITIONER

## 2023-11-13 PROCEDURE — 90674 CCIIV4 VAC NO PRSV 0.5 ML IM: CPT | Performed by: INTERNAL MEDICINE

## 2023-11-13 PROCEDURE — 2022F DILAT RTA XM EVC RTNOPTHY: CPT | Performed by: NURSE PRACTITIONER

## 2023-11-13 PROCEDURE — 3079F DIAST BP 80-89 MM HG: CPT | Performed by: INTERNAL MEDICINE

## 2023-11-13 PROCEDURE — 1036F TOBACCO NON-USER: CPT | Performed by: NURSE PRACTITIONER

## 2023-11-13 PROCEDURE — 3075F SYST BP GE 130 - 139MM HG: CPT | Performed by: NURSE PRACTITIONER

## 2023-11-13 PROCEDURE — 3044F HG A1C LEVEL LT 7.0%: CPT | Performed by: NURSE PRACTITIONER

## 2023-11-13 PROCEDURE — 3079F DIAST BP 80-89 MM HG: CPT | Performed by: NURSE PRACTITIONER

## 2023-11-13 RX ORDER — BUDESONIDE AND FORMOTEROL FUMARATE DIHYDRATE 160; 4.5 UG/1; UG/1
2 AEROSOL RESPIRATORY (INHALATION) 2 TIMES DAILY
Qty: 1 EACH | Refills: 5 | Status: SHIPPED | OUTPATIENT
Start: 2023-11-13

## 2023-11-13 RX ORDER — MONTELUKAST SODIUM 10 MG/1
10 TABLET ORAL NIGHTLY
Qty: 90 TABLET | Refills: 5 | Status: SHIPPED | OUTPATIENT
Start: 2023-11-13

## 2023-11-13 RX ORDER — ALBUTEROL SULFATE 90 UG/1
2 AEROSOL, METERED RESPIRATORY (INHALATION) EVERY 6 HOURS PRN
Qty: 6.7 G | Refills: 5 | Status: SHIPPED | OUTPATIENT
Start: 2023-11-13

## 2023-11-13 RX ORDER — IPRATROPIUM BROMIDE AND ALBUTEROL SULFATE 2.5; .5 MG/3ML; MG/3ML
1 SOLUTION RESPIRATORY (INHALATION) EVERY 6 HOURS PRN
Qty: 360 ML | Refills: 5 | Status: SHIPPED | OUTPATIENT
Start: 2023-11-13

## 2023-11-13 ASSESSMENT — ENCOUNTER SYMPTOMS
CONSTIPATION: 0
VOMITING: 0
NAUSEA: 0
SHORTNESS OF BREATH: 0
CHEST TIGHTNESS: 0
COUGH: 0
BACK PAIN: 1
DIARRHEA: 0

## 2023-11-13 ASSESSMENT — ASTHMA QUESTIONNAIRES
QUESTION_3 LAST FOUR WEEKS HOW OFTEN DID YOUR ASTHMA SYMPTOMS (WHEEZING, COUGHING, SHORTNESS OF BREATH, CHEST TIGHTNESS OR PAIN) WAKE YOU UP AT NIGHT OR EARLIER THAN USUAL IN THE MORNING: 4
QUESTION_2 LAST FOUR WEEKS HOW OFTEN HAVE YOU HAD SHORTNESS OF BREATH: 4
QUESTION_1 LAST FOUR WEEKS HOW MUCH OF THE TIME DID YOUR ASTHMA KEEP YOU FROM GETTING AS MUCH DONE AT WORK, SCHOOL OR AT HOME: 3
QUESTION_5 LAST FOUR WEEKS HOW WOULD YOU RATE YOUR ASTHMA CONTROL: 3
QUESTION_4 LAST FOUR WEEKS HOW OFTEN HAVE YOU USED YOUR RESCUE INHALER OR NEBULIZER MEDICATION (SUCH AS ALBUTEROL): 4
ACT_TOTALSCORE: 18

## 2023-11-14 ENCOUNTER — TELEPHONE (OUTPATIENT)
Dept: PULMONOLOGY | Age: 43
End: 2023-11-14

## 2024-01-21 DIAGNOSIS — K21.9 GASTROESOPHAGEAL REFLUX DISEASE, UNSPECIFIED WHETHER ESOPHAGITIS PRESENT: ICD-10-CM

## 2024-01-21 DIAGNOSIS — I10 ESSENTIAL HYPERTENSION: ICD-10-CM

## 2024-01-21 DIAGNOSIS — Z79.4 TYPE 2 DIABETES MELLITUS WITH DIABETIC NEUROPATHY, WITH LONG-TERM CURRENT USE OF INSULIN (HCC): ICD-10-CM

## 2024-01-21 DIAGNOSIS — J45.40 MODERATE PERSISTENT ASTHMA WITHOUT COMPLICATION: ICD-10-CM

## 2024-01-21 DIAGNOSIS — E11.40 TYPE 2 DIABETES MELLITUS WITH DIABETIC NEUROPATHY, WITH LONG-TERM CURRENT USE OF INSULIN (HCC): ICD-10-CM

## 2024-01-21 DIAGNOSIS — E78.2 MIXED HYPERLIPIDEMIA: ICD-10-CM

## 2024-01-22 RX ORDER — OMEPRAZOLE 20 MG/1
CAPSULE, DELAYED RELEASE ORAL
Qty: 90 CAPSULE | Refills: 0 | Status: SHIPPED | OUTPATIENT
Start: 2024-01-22

## 2024-01-22 RX ORDER — MONTELUKAST SODIUM 10 MG/1
TABLET ORAL
Qty: 90 TABLET | Refills: 0 | Status: SHIPPED | OUTPATIENT
Start: 2024-01-22

## 2024-01-22 RX ORDER — AMLODIPINE BESYLATE 10 MG/1
10 TABLET ORAL DAILY
Qty: 90 TABLET | Refills: 0 | Status: SHIPPED | OUTPATIENT
Start: 2024-01-22

## 2024-01-22 RX ORDER — LISINOPRIL 40 MG/1
40 TABLET ORAL DAILY
Qty: 90 TABLET | Refills: 0 | Status: SHIPPED | OUTPATIENT
Start: 2024-01-22

## 2024-01-22 RX ORDER — ATORVASTATIN CALCIUM 40 MG/1
TABLET, FILM COATED ORAL
Qty: 90 TABLET | Refills: 0 | Status: SHIPPED | OUTPATIENT
Start: 2024-01-22

## 2024-02-01 DIAGNOSIS — E11.40 TYPE 2 DIABETES MELLITUS WITH DIABETIC NEUROPATHY, WITH LONG-TERM CURRENT USE OF INSULIN (HCC): ICD-10-CM

## 2024-02-01 DIAGNOSIS — Z79.4 TYPE 2 DIABETES MELLITUS WITH DIABETIC NEUROPATHY, WITH LONG-TERM CURRENT USE OF INSULIN (HCC): ICD-10-CM

## 2024-02-02 RX ORDER — GABAPENTIN 400 MG/1
CAPSULE ORAL
Qty: 270 CAPSULE | Refills: 0 | Status: SHIPPED | OUTPATIENT
Start: 2024-02-02 | End: 2024-05-02

## 2024-02-12 DIAGNOSIS — Z79.4 TYPE 2 DIABETES MELLITUS WITH DIABETIC NEUROPATHY, WITH LONG-TERM CURRENT USE OF INSULIN (HCC): ICD-10-CM

## 2024-02-12 DIAGNOSIS — E11.40 TYPE 2 DIABETES MELLITUS WITH DIABETIC NEUROPATHY, WITH LONG-TERM CURRENT USE OF INSULIN (HCC): ICD-10-CM

## 2024-02-12 RX ORDER — GABAPENTIN 400 MG/1
400 CAPSULE ORAL 3 TIMES DAILY
Qty: 270 CAPSULE | Refills: 0 | OUTPATIENT
Start: 2024-02-12

## 2024-03-05 ENCOUNTER — TELEPHONE (OUTPATIENT)
Dept: FAMILY MEDICINE CLINIC | Age: 44
End: 2024-03-05

## 2024-03-05 NOTE — TELEPHONE ENCOUNTER
Pt is needing a new handicap placard  He went on vacation and his car broke down and he didn't get the placard our of the car  Can Pretty Reyes write the letter   Call pt once it has been written

## 2024-03-05 NOTE — TELEPHONE ENCOUNTER
Pt is needing a new handicap placard  He went on vacation and his car broke down and he didn't get the placard our of the car  Can Pretty Reyes write the letter   Call pt once it has been written        Can letter for handicapped placard be written?  Thanks

## 2024-03-05 NOTE — TELEPHONE ENCOUNTER
I have not given him a handicap placard in the past.  I believe that he is able to walk 100 feet without having to stop for rest.  I do not think that he qualifies at this point.

## 2024-03-06 NOTE — TELEPHONE ENCOUNTER
Talked to pt and let him know he does not qualify for handicap place card, pt stated he would like to discuss it further at future appointment.

## 2024-03-20 ENCOUNTER — OFFICE VISIT (OUTPATIENT)
Dept: FAMILY MEDICINE CLINIC | Age: 44
End: 2024-03-20
Payer: MEDICARE

## 2024-03-20 VITALS
RESPIRATION RATE: 18 BRPM | WEIGHT: 299.4 LBS | HEIGHT: 70 IN | OXYGEN SATURATION: 97 % | HEART RATE: 80 BPM | DIASTOLIC BLOOD PRESSURE: 82 MMHG | BODY MASS INDEX: 42.86 KG/M2 | TEMPERATURE: 98.3 F | SYSTOLIC BLOOD PRESSURE: 126 MMHG

## 2024-03-20 DIAGNOSIS — K21.9 GASTROESOPHAGEAL REFLUX DISEASE, UNSPECIFIED WHETHER ESOPHAGITIS PRESENT: ICD-10-CM

## 2024-03-20 DIAGNOSIS — E11.40 TYPE 2 DIABETES MELLITUS WITH DIABETIC NEUROPATHY, WITH LONG-TERM CURRENT USE OF INSULIN (HCC): ICD-10-CM

## 2024-03-20 DIAGNOSIS — I10 ESSENTIAL HYPERTENSION: ICD-10-CM

## 2024-03-20 DIAGNOSIS — Z79.4 TYPE 2 DIABETES MELLITUS WITH DIABETIC NEUROPATHY, WITH LONG-TERM CURRENT USE OF INSULIN (HCC): Primary | ICD-10-CM

## 2024-03-20 DIAGNOSIS — E78.2 MIXED HYPERLIPIDEMIA: ICD-10-CM

## 2024-03-20 DIAGNOSIS — E11.40 TYPE 2 DIABETES MELLITUS WITH DIABETIC NEUROPATHY, WITH LONG-TERM CURRENT USE OF INSULIN (HCC): Primary | ICD-10-CM

## 2024-03-20 DIAGNOSIS — E66.01 MORBID OBESITY WITH BMI OF 40.0-44.9, ADULT (HCC): ICD-10-CM

## 2024-03-20 DIAGNOSIS — J45.40 MODERATE PERSISTENT ASTHMA WITHOUT COMPLICATION: ICD-10-CM

## 2024-03-20 DIAGNOSIS — J06.9 URI WITH COUGH AND CONGESTION: ICD-10-CM

## 2024-03-20 DIAGNOSIS — Z79.4 TYPE 2 DIABETES MELLITUS WITH DIABETIC NEUROPATHY, WITH LONG-TERM CURRENT USE OF INSULIN (HCC): ICD-10-CM

## 2024-03-20 LAB
ALBUMIN SERPL-MCNC: 4.6 G/DL (ref 3.4–5)
ALBUMIN/GLOB SERPL: 2.1 {RATIO} (ref 1.1–2.2)
ALP SERPL-CCNC: 147 U/L (ref 40–129)
ALT SERPL-CCNC: 52 U/L (ref 10–40)
ANION GAP SERPL CALCULATED.3IONS-SCNC: 11 MMOL/L (ref 3–16)
AST SERPL-CCNC: 38 U/L (ref 15–37)
BILIRUB SERPL-MCNC: 0.3 MG/DL (ref 0–1)
BUN SERPL-MCNC: 14 MG/DL (ref 7–20)
CALCIUM SERPL-MCNC: 9.6 MG/DL (ref 8.3–10.6)
CHLORIDE SERPL-SCNC: 100 MMOL/L (ref 99–110)
CO2 SERPL-SCNC: 31 MMOL/L (ref 21–32)
CREAT SERPL-MCNC: 0.8 MG/DL (ref 0.9–1.3)
GFR SERPLBLD CREATININE-BSD FMLA CKD-EPI: >60 ML/MIN/{1.73_M2}
GLUCOSE SERPL-MCNC: 122 MG/DL (ref 70–99)
HBA1C MFR BLD: 6 %
Lab: NORMAL
PERFORMING INSTRUMENT: NORMAL
POTASSIUM SERPL-SCNC: 4.4 MMOL/L (ref 3.5–5.1)
PROT SERPL-MCNC: 6.8 G/DL (ref 6.4–8.2)
QC PASS/FAIL: NORMAL
SARS-COV-2, POC: NORMAL
SODIUM SERPL-SCNC: 142 MMOL/L (ref 136–145)

## 2024-03-20 PROCEDURE — 2022F DILAT RTA XM EVC RTNOPTHY: CPT | Performed by: NURSE PRACTITIONER

## 2024-03-20 PROCEDURE — G8427 DOCREV CUR MEDS BY ELIG CLIN: HCPCS | Performed by: NURSE PRACTITIONER

## 2024-03-20 PROCEDURE — 3044F HG A1C LEVEL LT 7.0%: CPT | Performed by: NURSE PRACTITIONER

## 2024-03-20 PROCEDURE — 99214 OFFICE O/P EST MOD 30 MIN: CPT | Performed by: NURSE PRACTITIONER

## 2024-03-20 PROCEDURE — 1036F TOBACCO NON-USER: CPT | Performed by: NURSE PRACTITIONER

## 2024-03-20 PROCEDURE — G8417 CALC BMI ABV UP PARAM F/U: HCPCS | Performed by: NURSE PRACTITIONER

## 2024-03-20 PROCEDURE — 3079F DIAST BP 80-89 MM HG: CPT | Performed by: NURSE PRACTITIONER

## 2024-03-20 PROCEDURE — 3074F SYST BP LT 130 MM HG: CPT | Performed by: NURSE PRACTITIONER

## 2024-03-20 PROCEDURE — 87426 SARSCOV CORONAVIRUS AG IA: CPT | Performed by: NURSE PRACTITIONER

## 2024-03-20 PROCEDURE — G8482 FLU IMMUNIZE ORDER/ADMIN: HCPCS | Performed by: NURSE PRACTITIONER

## 2024-03-20 PROCEDURE — 83036 HEMOGLOBIN GLYCOSYLATED A1C: CPT | Performed by: NURSE PRACTITIONER

## 2024-03-20 RX ORDER — FEXOFENADINE HCL 180 MG/1
180 TABLET ORAL DAILY
Qty: 30 TABLET | Refills: 11 | Status: CANCELLED | OUTPATIENT
Start: 2024-03-20

## 2024-03-20 RX ORDER — MONTELUKAST SODIUM 10 MG/1
TABLET ORAL
Qty: 90 TABLET | Refills: 1 | Status: SHIPPED | OUTPATIENT
Start: 2024-03-20

## 2024-03-20 ASSESSMENT — ENCOUNTER SYMPTOMS
SORE THROAT: 0
CONSTIPATION: 0
DIARRHEA: 0
VOMITING: 0
SHORTNESS OF BREATH: 0
WHEEZING: 0
COUGH: 1
CHEST TIGHTNESS: 0
NAUSEA: 0

## 2024-03-20 ASSESSMENT — PATIENT HEALTH QUESTIONNAIRE - PHQ9
7. TROUBLE CONCENTRATING ON THINGS, SUCH AS READING THE NEWSPAPER OR WATCHING TELEVISION: NOT AT ALL
SUM OF ALL RESPONSES TO PHQ QUESTIONS 1-9: 0
4. FEELING TIRED OR HAVING LITTLE ENERGY: NOT AT ALL
SUM OF ALL RESPONSES TO PHQ QUESTIONS 1-9: 0
8. MOVING OR SPEAKING SO SLOWLY THAT OTHER PEOPLE COULD HAVE NOTICED. OR THE OPPOSITE, BEING SO FIGETY OR RESTLESS THAT YOU HAVE BEEN MOVING AROUND A LOT MORE THAN USUAL: NOT AT ALL
10. IF YOU CHECKED OFF ANY PROBLEMS, HOW DIFFICULT HAVE THESE PROBLEMS MADE IT FOR YOU TO DO YOUR WORK, TAKE CARE OF THINGS AT HOME, OR GET ALONG WITH OTHER PEOPLE: NOT DIFFICULT AT ALL
9. THOUGHTS THAT YOU WOULD BE BETTER OFF DEAD, OR OF HURTING YOURSELF: NOT AT ALL
SUM OF ALL RESPONSES TO PHQ9 QUESTIONS 1 & 2: 0
3. TROUBLE FALLING OR STAYING ASLEEP: NOT AT ALL
1. LITTLE INTEREST OR PLEASURE IN DOING THINGS: NOT AT ALL
5. POOR APPETITE OR OVEREATING: NOT AT ALL
6. FEELING BAD ABOUT YOURSELF - OR THAT YOU ARE A FAILURE OR HAVE LET YOURSELF OR YOUR FAMILY DOWN: NOT AT ALL
2. FEELING DOWN, DEPRESSED OR HOPELESS: NOT AT ALL

## 2024-03-20 NOTE — PROGRESS NOTES
metformin down to 500 mg BID  Advised to check fasting BS   Discussed importance of low carb diet, aerobic exercise and need for weight loss.    Continues to follow with podiatrist Dr. Perez Vasques for his diabetic neuropathy. He has diabetic shoes that he will wear.   Neuropathy pain has improved with gabapentin use.     Mixed hyperlipidemia  Lipids controlled 7/2023  Continue atorvastatin 40 mg daily   Discussed importance of low fat/cholesterol diet, aerobic exercise and need for weight loss.    Essential hypertension  Controlled   Continue metoprolol tartrate 25 mg BID, lisinopril to 40 mg daily, and amlodipine 10 mg daily.   Discussed importance of low salt diet, aerobic exercise and need for weight loss.    Obesity  S/p gastric bypass surgery 2/2021.   Discussed importance of diet, aerobic exercise and hydration with water.  Advised to continue following with bariatrics specialist    GERD  Stable. Continue omeprazole 20 mg daily    Chronic pain  Currently on gabapentin 400 mg TID. Following with pain specialist Dr. Cole    Asthma- stable current medications. Continue f/u with pulmonologist Dr. Bain     Elevated liver enzymes- reports that he was in a study for fatty liver through GI Dr. Prince. Has not had a recent f/u. Advised to f/u with GI       URI with cough and congestion  -     POCT COVID-19, Antigen- negative   Discussed viral in nature   Advised to rest and hydrate with water   Continue with supportive therapies   Mucinex BID PRN  Patient is to call if symptoms worsen or fail to improve within the week.     Return in about 3 months (around 6/20/2024), or if symptoms worsen or fail to improve, for chronic conditions.

## 2024-04-02 DIAGNOSIS — J45.40 MODERATE PERSISTENT ASTHMA WITHOUT COMPLICATION: ICD-10-CM

## 2024-04-03 RX ORDER — BUDESONIDE AND FORMOTEROL FUMARATE DIHYDRATE 160; 4.5 UG/1; UG/1
2 AEROSOL RESPIRATORY (INHALATION) 2 TIMES DAILY
Qty: 10.2 G | Refills: 5 | Status: SHIPPED | OUTPATIENT
Start: 2024-04-03

## 2024-05-01 DIAGNOSIS — I10 ESSENTIAL HYPERTENSION: ICD-10-CM

## 2024-05-01 DIAGNOSIS — Z79.4 TYPE 2 DIABETES MELLITUS WITH DIABETIC NEUROPATHY, WITH LONG-TERM CURRENT USE OF INSULIN (HCC): ICD-10-CM

## 2024-05-01 DIAGNOSIS — J45.40 MODERATE PERSISTENT ASTHMA WITHOUT COMPLICATION: ICD-10-CM

## 2024-05-01 DIAGNOSIS — E11.40 TYPE 2 DIABETES MELLITUS WITH DIABETIC NEUROPATHY, WITH LONG-TERM CURRENT USE OF INSULIN (HCC): ICD-10-CM

## 2024-05-01 DIAGNOSIS — K21.9 GASTROESOPHAGEAL REFLUX DISEASE, UNSPECIFIED WHETHER ESOPHAGITIS PRESENT: ICD-10-CM

## 2024-05-01 DIAGNOSIS — J45.40 MODERATE PERSISTENT ASTHMA, UNCOMPLICATED: ICD-10-CM

## 2024-05-01 DIAGNOSIS — E78.2 MIXED HYPERLIPIDEMIA: ICD-10-CM

## 2024-05-02 RX ORDER — OMEPRAZOLE 20 MG/1
CAPSULE, DELAYED RELEASE ORAL
Qty: 90 CAPSULE | Refills: 0 | Status: SHIPPED | OUTPATIENT
Start: 2024-05-02

## 2024-05-02 RX ORDER — LISINOPRIL 40 MG/1
40 TABLET ORAL DAILY
Qty: 90 TABLET | Refills: 0 | OUTPATIENT
Start: 2024-05-02

## 2024-05-02 RX ORDER — GABAPENTIN 400 MG/1
CAPSULE ORAL
Qty: 270 CAPSULE | Refills: 0 | OUTPATIENT
Start: 2024-05-02 | End: 2024-07-31

## 2024-05-02 RX ORDER — MONTELUKAST SODIUM 10 MG/1
TABLET ORAL
Qty: 90 TABLET | Refills: 0 | Status: SHIPPED | OUTPATIENT
Start: 2024-05-02

## 2024-05-02 RX ORDER — GABAPENTIN 400 MG/1
400 CAPSULE ORAL 3 TIMES DAILY
Qty: 270 CAPSULE | Refills: 0 | Status: SHIPPED | OUTPATIENT
Start: 2024-05-02 | End: 2024-07-31

## 2024-05-02 RX ORDER — BUDESONIDE AND FORMOTEROL FUMARATE DIHYDRATE 160; 4.5 UG/1; UG/1
2 AEROSOL RESPIRATORY (INHALATION) 2 TIMES DAILY
Qty: 10.2 G | Refills: 5 | OUTPATIENT
Start: 2024-05-02

## 2024-05-02 RX ORDER — FEXOFENADINE HCL 180 MG/1
180 TABLET ORAL DAILY
Qty: 30 TABLET | Refills: 11 | Status: SHIPPED | OUTPATIENT
Start: 2024-05-02

## 2024-05-02 RX ORDER — ATORVASTATIN CALCIUM 40 MG/1
TABLET, FILM COATED ORAL
Qty: 90 TABLET | Refills: 0 | Status: SHIPPED | OUTPATIENT
Start: 2024-05-02

## 2024-05-02 RX ORDER — ATORVASTATIN CALCIUM 40 MG/1
TABLET, FILM COATED ORAL
Qty: 90 TABLET | Refills: 0 | OUTPATIENT
Start: 2024-05-02

## 2024-05-02 RX ORDER — IPRATROPIUM BROMIDE AND ALBUTEROL SULFATE 2.5; .5 MG/3ML; MG/3ML
1 SOLUTION RESPIRATORY (INHALATION) EVERY 6 HOURS PRN
Qty: 360 ML | Refills: 5 | Status: SHIPPED | OUTPATIENT
Start: 2024-05-02

## 2024-05-02 RX ORDER — AMLODIPINE BESYLATE 10 MG/1
10 TABLET ORAL DAILY
Qty: 90 TABLET | Refills: 0 | OUTPATIENT
Start: 2024-05-02

## 2024-05-02 RX ORDER — LISINOPRIL 40 MG/1
40 TABLET ORAL DAILY
Qty: 90 TABLET | Refills: 0 | Status: SHIPPED | OUTPATIENT
Start: 2024-05-02

## 2024-05-02 RX ORDER — OMEPRAZOLE 20 MG/1
CAPSULE, DELAYED RELEASE ORAL
Qty: 90 CAPSULE | Refills: 0 | OUTPATIENT
Start: 2024-05-02

## 2024-05-02 RX ORDER — AMLODIPINE BESYLATE 10 MG/1
10 TABLET ORAL DAILY
Qty: 90 TABLET | Refills: 0 | Status: SHIPPED | OUTPATIENT
Start: 2024-05-02

## 2024-05-02 RX ORDER — ALBUTEROL SULFATE 90 UG/1
2 AEROSOL, METERED RESPIRATORY (INHALATION) EVERY 6 HOURS PRN
Qty: 6.7 G | Refills: 5 | Status: SHIPPED | OUTPATIENT
Start: 2024-05-02

## 2024-05-12 DIAGNOSIS — J45.40 MODERATE PERSISTENT ASTHMA, UNCOMPLICATED: ICD-10-CM

## 2024-05-13 RX ORDER — FEXOFENADINE HYDROCHLORIDE 180 MG/1
180 TABLET, FILM COATED ORAL DAILY
Qty: 30 TABLET | Refills: 11 | OUTPATIENT
Start: 2024-05-13

## 2024-05-14 ENCOUNTER — OFFICE VISIT (OUTPATIENT)
Dept: PULMONOLOGY | Age: 44
End: 2024-05-14
Payer: MEDICARE

## 2024-05-14 VITALS
BODY MASS INDEX: 44.24 KG/M2 | SYSTOLIC BLOOD PRESSURE: 142 MMHG | OXYGEN SATURATION: 96 % | HEIGHT: 70 IN | DIASTOLIC BLOOD PRESSURE: 76 MMHG | HEART RATE: 84 BPM | TEMPERATURE: 97.6 F | RESPIRATION RATE: 18 BRPM | WEIGHT: 309 LBS

## 2024-05-14 DIAGNOSIS — E66.9 OBESITY (BMI 30-39.9): Primary | ICD-10-CM

## 2024-05-14 DIAGNOSIS — J45.40 MODERATE PERSISTENT ASTHMA, UNCOMPLICATED: ICD-10-CM

## 2024-05-14 DIAGNOSIS — J30.89 OTHER ALLERGIC RHINITIS: ICD-10-CM

## 2024-05-14 DIAGNOSIS — J45.40 MODERATE PERSISTENT ASTHMA WITHOUT COMPLICATION: ICD-10-CM

## 2024-05-14 PROCEDURE — G8427 DOCREV CUR MEDS BY ELIG CLIN: HCPCS | Performed by: INTERNAL MEDICINE

## 2024-05-14 PROCEDURE — 3077F SYST BP >= 140 MM HG: CPT | Performed by: INTERNAL MEDICINE

## 2024-05-14 PROCEDURE — G8417 CALC BMI ABV UP PARAM F/U: HCPCS | Performed by: INTERNAL MEDICINE

## 2024-05-14 PROCEDURE — 1036F TOBACCO NON-USER: CPT | Performed by: INTERNAL MEDICINE

## 2024-05-14 PROCEDURE — 99214 OFFICE O/P EST MOD 30 MIN: CPT | Performed by: INTERNAL MEDICINE

## 2024-05-14 PROCEDURE — 3078F DIAST BP <80 MM HG: CPT | Performed by: INTERNAL MEDICINE

## 2024-05-14 RX ORDER — FEXOFENADINE HCL 180 MG/1
180 TABLET ORAL DAILY
Qty: 30 TABLET | Refills: 11 | Status: SHIPPED | OUTPATIENT
Start: 2024-05-14

## 2024-05-14 RX ORDER — ALBUTEROL SULFATE 90 UG/1
2 AEROSOL, METERED RESPIRATORY (INHALATION) EVERY 6 HOURS PRN
Qty: 6.7 G | Refills: 11 | Status: SHIPPED | OUTPATIENT
Start: 2024-05-14

## 2024-05-14 RX ORDER — IPRATROPIUM BROMIDE AND ALBUTEROL SULFATE 2.5; .5 MG/3ML; MG/3ML
1 SOLUTION RESPIRATORY (INHALATION) EVERY 6 HOURS PRN
Qty: 360 ML | Refills: 11 | Status: SHIPPED | OUTPATIENT
Start: 2024-05-14

## 2024-05-14 RX ORDER — BUDESONIDE AND FORMOTEROL FUMARATE DIHYDRATE 160; 4.5 UG/1; UG/1
2 AEROSOL RESPIRATORY (INHALATION) 2 TIMES DAILY
Qty: 10.2 G | Refills: 11 | Status: SHIPPED | OUTPATIENT
Start: 2024-05-14

## 2024-05-14 ASSESSMENT — ASTHMA QUESTIONNAIRES
QUESTION_3 LAST FOUR WEEKS HOW OFTEN DID YOUR ASTHMA SYMPTOMS (WHEEZING, COUGHING, SHORTNESS OF BREATH, CHEST TIGHTNESS OR PAIN) WAKE YOU UP AT NIGHT OR EARLIER THAN USUAL IN THE MORNING: ONCE OR TWICE
ACT_TOTALSCORE: 20
QUESTION_2 LAST FOUR WEEKS HOW OFTEN HAVE YOU HAD SHORTNESS OF BREATH: ONCE OR TWICE A WEEK
QUESTION_4 LAST FOUR WEEKS HOW OFTEN HAVE YOU USED YOUR RESCUE INHALER OR NEBULIZER MEDICATION (SUCH AS ALBUTEROL): ONCE A WEEK OR LESS
QUESTION_1 LAST FOUR WEEKS HOW MUCH OF THE TIME DID YOUR ASTHMA KEEP YOU FROM GETTING AS MUCH DONE AT WORK, SCHOOL OR AT HOME: A LITTLE OF THE TIME

## 2024-05-14 NOTE — PROGRESS NOTES
REASON FOR CONSULTATION/CC:    Chief Complaint   Patient presents with    Asthma        Consult at request of   Pretty Reyes APRN - CNP     PCP: Pretty Reyes APRN - CNP    HISTORY OF PRESENT ILLNESS: Joe Valderrama is a 43 y.o. year old male with a history of asthma and JULITA who presents :      History  Patient has a history of moderate asthma that was treated with Nucala in the past.  This is stopped secondary to insurance.  Nucala did significantly improve his asthma and allergic rhinitis symptoms.  Status post gastric bypass surgery.  After this, asthma symptoms improved without Nucala.      Interval history            Asthma  ACt 20   Symbicort   using bid.   Albuterol.  Rare usage prn.   Duoneb's  as need      allergic rhinitis  Controlled.     Obesity  Working on weight lifting / weight loss.       JULITA  Seeing sleep.                  5/14/2024    11:15 AM 11/13/2023     7:04 AM 5/1/2023     7:29 AM 12/16/2022     7:04 AM 9/1/2022     9:10 AM 3/1/2022     7:14 AM 8/30/2021     7:32 AM   ASTHMA CONTROL TEST   In the past 4 weeks, how much of the time did your asthma keep you from getting as much done at work, school or at home? 4 3 4 5 5 5 4   During the past 4 weeks, how often have you had shortness of breath? 4 4 4 4 5 1 4   During the past 4 weeks, how often did your asthma symptoms (wheezing, coughing, shortness of breath, chest tightness or pain) wake you up at night or earlier than usual in the morning? 4 4 3 5 4 5 4   During the past 4 weeks, how often have you used your rescue inhaler or nebulizer medication (such as albuterol)? 4 4 4 4 4 4 4   How would you rate your asthma control during the past 4 weeks? 4 3 4 4 5 5 4   Asthma Control Test Total Score 20 18 19 22 23 20 20          Objective:   PHYSICAL EXAM:  Blood pressure (!) 142/76, pulse 84, temperature 97.6 °F (36.4 °C), resp. rate 18, height 1.778 m (5' 10\"), weight (!) 140.2 kg (309 lb), SpO2 96 %.'  Gen: No distress.    ENT:

## 2024-06-21 SDOH — ECONOMIC STABILITY: FOOD INSECURITY: WITHIN THE PAST 12 MONTHS, YOU WORRIED THAT YOUR FOOD WOULD RUN OUT BEFORE YOU GOT MONEY TO BUY MORE.: NEVER TRUE

## 2024-06-21 SDOH — ECONOMIC STABILITY: FOOD INSECURITY: WITHIN THE PAST 12 MONTHS, THE FOOD YOU BOUGHT JUST DIDN'T LAST AND YOU DIDN'T HAVE MONEY TO GET MORE.: NEVER TRUE

## 2024-06-21 SDOH — ECONOMIC STABILITY: INCOME INSECURITY: HOW HARD IS IT FOR YOU TO PAY FOR THE VERY BASICS LIKE FOOD, HOUSING, MEDICAL CARE, AND HEATING?: SOMEWHAT HARD

## 2024-06-21 SDOH — ECONOMIC STABILITY: HOUSING INSECURITY
IN THE LAST 12 MONTHS, WAS THERE A TIME WHEN YOU DID NOT HAVE A STEADY PLACE TO SLEEP OR SLEPT IN A SHELTER (INCLUDING NOW)?: NO

## 2024-06-21 SDOH — ECONOMIC STABILITY: TRANSPORTATION INSECURITY
IN THE PAST 12 MONTHS, HAS LACK OF TRANSPORTATION KEPT YOU FROM MEETINGS, WORK, OR FROM GETTING THINGS NEEDED FOR DAILY LIVING?: NO

## 2024-06-24 ENCOUNTER — OFFICE VISIT (OUTPATIENT)
Dept: FAMILY MEDICINE CLINIC | Age: 44
End: 2024-06-24
Payer: MEDICARE

## 2024-06-24 VITALS
HEIGHT: 70 IN | OXYGEN SATURATION: 97 % | BODY MASS INDEX: 43.38 KG/M2 | HEART RATE: 75 BPM | SYSTOLIC BLOOD PRESSURE: 136 MMHG | WEIGHT: 303 LBS | DIASTOLIC BLOOD PRESSURE: 80 MMHG

## 2024-06-24 DIAGNOSIS — Z79.4 TYPE 2 DIABETES MELLITUS WITH DIABETIC NEUROPATHY, WITH LONG-TERM CURRENT USE OF INSULIN (HCC): Primary | ICD-10-CM

## 2024-06-24 DIAGNOSIS — K21.9 GASTROESOPHAGEAL REFLUX DISEASE, UNSPECIFIED WHETHER ESOPHAGITIS PRESENT: ICD-10-CM

## 2024-06-24 DIAGNOSIS — E66.01 MORBID OBESITY WITH BMI OF 40.0-44.9, ADULT (HCC): ICD-10-CM

## 2024-06-24 DIAGNOSIS — Z79.4 TYPE 2 DIABETES MELLITUS WITH DIABETIC NEUROPATHY, WITH LONG-TERM CURRENT USE OF INSULIN (HCC): ICD-10-CM

## 2024-06-24 DIAGNOSIS — E11.40 TYPE 2 DIABETES MELLITUS WITH DIABETIC NEUROPATHY, WITH LONG-TERM CURRENT USE OF INSULIN (HCC): Primary | ICD-10-CM

## 2024-06-24 DIAGNOSIS — I10 ESSENTIAL HYPERTENSION: ICD-10-CM

## 2024-06-24 DIAGNOSIS — E78.2 MIXED HYPERLIPIDEMIA: ICD-10-CM

## 2024-06-24 DIAGNOSIS — E11.40 TYPE 2 DIABETES MELLITUS WITH DIABETIC NEUROPATHY, WITH LONG-TERM CURRENT USE OF INSULIN (HCC): ICD-10-CM

## 2024-06-24 LAB
ALBUMIN SERPL-MCNC: 4.7 G/DL (ref 3.4–5)
ALBUMIN/GLOB SERPL: 2 {RATIO} (ref 1.1–2.2)
ALP SERPL-CCNC: 106 U/L (ref 40–129)
ALT SERPL-CCNC: 49 U/L (ref 10–40)
ANION GAP SERPL CALCULATED.3IONS-SCNC: 10 MMOL/L (ref 3–16)
AST SERPL-CCNC: 28 U/L (ref 15–37)
BILIRUB SERPL-MCNC: 0.4 MG/DL (ref 0–1)
BUN SERPL-MCNC: 18 MG/DL (ref 7–20)
CALCIUM SERPL-MCNC: 9.6 MG/DL (ref 8.3–10.6)
CHLORIDE SERPL-SCNC: 104 MMOL/L (ref 99–110)
CHOLEST SERPL-MCNC: 105 MG/DL (ref 0–199)
CO2 SERPL-SCNC: 29 MMOL/L (ref 21–32)
CREAT SERPL-MCNC: 0.8 MG/DL (ref 0.9–1.3)
CREAT UR-MCNC: 156 MG/DL (ref 39–259)
GFR SERPLBLD CREATININE-BSD FMLA CKD-EPI: >90 ML/MIN/{1.73_M2}
GLUCOSE SERPL-MCNC: 133 MG/DL (ref 70–99)
HBA1C MFR BLD: 6.5 %
HDLC SERPL-MCNC: 52 MG/DL (ref 40–60)
LDL CHOLESTEROL: 38 MG/DL
MICROALBUMIN UR DL<=1MG/L-MCNC: <1.2 MG/DL
MICROALBUMIN/CREAT UR: NORMAL MG/G (ref 0–30)
POTASSIUM SERPL-SCNC: 4.2 MMOL/L (ref 3.5–5.1)
PROT SERPL-MCNC: 7 G/DL (ref 6.4–8.2)
SODIUM SERPL-SCNC: 143 MMOL/L (ref 136–145)
TRIGL SERPL-MCNC: 75 MG/DL (ref 0–150)
VLDLC SERPL CALC-MCNC: 15 MG/DL

## 2024-06-24 PROCEDURE — 3044F HG A1C LEVEL LT 7.0%: CPT | Performed by: NURSE PRACTITIONER

## 2024-06-24 PROCEDURE — 3079F DIAST BP 80-89 MM HG: CPT | Performed by: NURSE PRACTITIONER

## 2024-06-24 PROCEDURE — 99214 OFFICE O/P EST MOD 30 MIN: CPT | Performed by: NURSE PRACTITIONER

## 2024-06-24 PROCEDURE — 2022F DILAT RTA XM EVC RTNOPTHY: CPT | Performed by: NURSE PRACTITIONER

## 2024-06-24 PROCEDURE — G8417 CALC BMI ABV UP PARAM F/U: HCPCS | Performed by: NURSE PRACTITIONER

## 2024-06-24 PROCEDURE — G2211 COMPLEX E/M VISIT ADD ON: HCPCS | Performed by: NURSE PRACTITIONER

## 2024-06-24 PROCEDURE — 3075F SYST BP GE 130 - 139MM HG: CPT | Performed by: NURSE PRACTITIONER

## 2024-06-24 PROCEDURE — G8427 DOCREV CUR MEDS BY ELIG CLIN: HCPCS | Performed by: NURSE PRACTITIONER

## 2024-06-24 PROCEDURE — 83036 HEMOGLOBIN GLYCOSYLATED A1C: CPT | Performed by: NURSE PRACTITIONER

## 2024-06-24 PROCEDURE — 1036F TOBACCO NON-USER: CPT | Performed by: NURSE PRACTITIONER

## 2024-06-24 ASSESSMENT — ENCOUNTER SYMPTOMS
VOMITING: 0
NAUSEA: 0
SHORTNESS OF BREATH: 0
CONSTIPATION: 0
DIARRHEA: 0
CHEST TIGHTNESS: 0

## 2024-06-24 NOTE — PROGRESS NOTES
6/24/2024    This is a 43 y.o. male   Chief Complaint   Patient presents with    Diabetes     F/u DM check     Hypertension     F/u BP check   .    HPI   Diabetes Mellitus Type 2: Current symptoms/problems include neuropathy.    Home blood sugar records: has not been testing   Taking metformin 500 mg BID  Any episodes of hypoglycemia? no  Eye exam current (within one year): yes- 3/2024  Tobacco history: He  reports that he has never smoked. He has never been exposed to tobacco smoke. He has never used smokeless tobacco.     Patient follows with podiatrist at [facility] for routine foot care every 2 months. Has diabetic shoes.     Reports that gabapentin is helping better to control neuropathy.     Hypertension:  Home blood pressure monitoring: No.  He is adherent to a low sodium diet. Patient denies chest pain, shortness of breath, headache, lightheadedness, blurred vision, peripheral edema and palpitations.  Antihypertensive medication side effects: no medication side effects noted.  Use of agents associated with hypertension: none.   He is currently taking metoprolol tartrate 25 mg BID and lisinopril 40 mg daily and amlodipine 10 mg daily.     Hyperlipidemia:  No new myalgias or GI upset on atorvastatin (Lipitor).    Obesity:  Patient is following with bariatric specialist with [facility]. S/p clarisa-en-y gastric bypass surgery 2/23/21. Highest weight was 393 lbs. Lowest weight was 260 lbs, so has increased weight again.   He is exercising on his bike daily for 60 minutes 5 days per week. He has a row machine that he is using 3 times per week for 30 minutes.   He reports that he had f/u with Dr. Cortes 3/7/24 and had blood work completed but those results are not currently available to me.     Lab Results   Component Value Date    LABA1C 6.0 03/20/2024    LABA1C 6.2 11/13/2023    LABA1C 6.1 07/10/2023     Lab Results   Component Value Date    CREATININE 0.8 (L) 03/20/2024     Lab Results   Component

## 2024-06-25 DIAGNOSIS — E78.2 MIXED HYPERLIPIDEMIA: ICD-10-CM

## 2024-06-25 RX ORDER — ATORVASTATIN CALCIUM 20 MG/1
20 TABLET, FILM COATED ORAL NIGHTLY
Qty: 90 TABLET | Refills: 1 | Status: SHIPPED | OUTPATIENT
Start: 2024-06-25

## 2024-07-01 DIAGNOSIS — J45.40 MODERATE PERSISTENT ASTHMA, UNCOMPLICATED: ICD-10-CM

## 2024-07-01 RX ORDER — FEXOFENADINE HYDROCHLORIDE 180 MG/1
180 TABLET, FILM COATED ORAL DAILY
Qty: 30 TABLET | Refills: 11 | Status: SHIPPED | OUTPATIENT
Start: 2024-07-01

## 2024-07-16 DIAGNOSIS — J45.40 MODERATE PERSISTENT ASTHMA WITHOUT COMPLICATION: ICD-10-CM

## 2024-07-16 RX ORDER — ALBUTEROL SULFATE 90 UG/1
2 AEROSOL, METERED RESPIRATORY (INHALATION) EVERY 6 HOURS PRN
Qty: 8.5 G | Refills: 11 | Status: SHIPPED | OUTPATIENT
Start: 2024-07-16

## 2024-07-29 DIAGNOSIS — I10 ESSENTIAL HYPERTENSION: ICD-10-CM

## 2024-07-29 DIAGNOSIS — E11.40 TYPE 2 DIABETES MELLITUS WITH DIABETIC NEUROPATHY, WITH LONG-TERM CURRENT USE OF INSULIN (HCC): ICD-10-CM

## 2024-07-29 DIAGNOSIS — K21.9 GASTROESOPHAGEAL REFLUX DISEASE, UNSPECIFIED WHETHER ESOPHAGITIS PRESENT: ICD-10-CM

## 2024-07-29 DIAGNOSIS — Z79.4 TYPE 2 DIABETES MELLITUS WITH DIABETIC NEUROPATHY, WITH LONG-TERM CURRENT USE OF INSULIN (HCC): ICD-10-CM

## 2024-07-30 RX ORDER — OMEPRAZOLE 20 MG/1
CAPSULE, DELAYED RELEASE ORAL
Qty: 90 CAPSULE | Refills: 1 | Status: SHIPPED | OUTPATIENT
Start: 2024-07-30

## 2024-07-30 RX ORDER — GABAPENTIN 400 MG/1
400 CAPSULE ORAL 3 TIMES DAILY
Qty: 270 CAPSULE | Refills: 0 | Status: SHIPPED | OUTPATIENT
Start: 2024-07-30 | End: 2024-10-28

## 2024-07-30 RX ORDER — AMLODIPINE BESYLATE 10 MG/1
10 TABLET ORAL DAILY
Qty: 90 TABLET | Refills: 1 | Status: SHIPPED | OUTPATIENT
Start: 2024-07-30

## 2024-07-30 RX ORDER — LISINOPRIL 40 MG/1
40 TABLET ORAL DAILY
Qty: 90 TABLET | Refills: 1 | Status: SHIPPED | OUTPATIENT
Start: 2024-07-30

## 2024-09-25 ENCOUNTER — TELEPHONE (OUTPATIENT)
Dept: PULMONOLOGY | Age: 44
End: 2024-09-25

## 2024-09-25 ENCOUNTER — OFFICE VISIT (OUTPATIENT)
Dept: FAMILY MEDICINE CLINIC | Age: 44
End: 2024-09-25
Payer: MEDICARE

## 2024-09-25 VITALS
SYSTOLIC BLOOD PRESSURE: 130 MMHG | HEART RATE: 82 BPM | BODY MASS INDEX: 37.25 KG/M2 | DIASTOLIC BLOOD PRESSURE: 82 MMHG | HEIGHT: 75 IN | WEIGHT: 299.6 LBS | TEMPERATURE: 98.4 F | OXYGEN SATURATION: 98 % | RESPIRATION RATE: 20 BRPM

## 2024-09-25 DIAGNOSIS — E11.40 TYPE 2 DIABETES MELLITUS WITH DIABETIC NEUROPATHY, WITHOUT LONG-TERM CURRENT USE OF INSULIN (HCC): Primary | ICD-10-CM

## 2024-09-25 DIAGNOSIS — Z23 NEED FOR INFLUENZA VACCINATION: ICD-10-CM

## 2024-09-25 DIAGNOSIS — E78.2 MIXED HYPERLIPIDEMIA: ICD-10-CM

## 2024-09-25 DIAGNOSIS — E11.40 TYPE 2 DIABETES MELLITUS WITH DIABETIC NEUROPATHY, WITHOUT LONG-TERM CURRENT USE OF INSULIN (HCC): ICD-10-CM

## 2024-09-25 DIAGNOSIS — E66.01 MORBID OBESITY WITH BMI OF 40.0-44.9, ADULT: ICD-10-CM

## 2024-09-25 DIAGNOSIS — I10 ESSENTIAL HYPERTENSION: ICD-10-CM

## 2024-09-25 LAB
ALBUMIN SERPL-MCNC: 4.8 G/DL (ref 3.4–5)
ALBUMIN/GLOB SERPL: 2.3 {RATIO} (ref 1.1–2.2)
ALP SERPL-CCNC: 84 U/L (ref 40–129)
ALT SERPL-CCNC: 48 U/L (ref 10–40)
ANION GAP SERPL CALCULATED.3IONS-SCNC: 12 MMOL/L (ref 3–16)
AST SERPL-CCNC: 31 U/L (ref 15–37)
BILIRUB SERPL-MCNC: 0.4 MG/DL (ref 0–1)
BUN SERPL-MCNC: 14 MG/DL (ref 7–20)
CALCIUM SERPL-MCNC: 9.8 MG/DL (ref 8.3–10.6)
CHLORIDE SERPL-SCNC: 101 MMOL/L (ref 99–110)
CO2 SERPL-SCNC: 29 MMOL/L (ref 21–32)
CREAT SERPL-MCNC: 0.9 MG/DL (ref 0.9–1.3)
GFR SERPLBLD CREATININE-BSD FMLA CKD-EPI: >90 ML/MIN/{1.73_M2}
GLUCOSE SERPL-MCNC: 115 MG/DL (ref 70–99)
HBA1C MFR BLD: 6.1 %
POTASSIUM SERPL-SCNC: 4 MMOL/L (ref 3.5–5.1)
PROT SERPL-MCNC: 6.9 G/DL (ref 6.4–8.2)
SODIUM SERPL-SCNC: 142 MMOL/L (ref 136–145)

## 2024-09-25 PROCEDURE — 3044F HG A1C LEVEL LT 7.0%: CPT | Performed by: NURSE PRACTITIONER

## 2024-09-25 PROCEDURE — 99214 OFFICE O/P EST MOD 30 MIN: CPT | Performed by: NURSE PRACTITIONER

## 2024-09-25 PROCEDURE — 1036F TOBACCO NON-USER: CPT | Performed by: NURSE PRACTITIONER

## 2024-09-25 PROCEDURE — 90661 CCIIV3 VAC ABX FR 0.5 ML IM: CPT | Performed by: NURSE PRACTITIONER

## 2024-09-25 PROCEDURE — G8417 CALC BMI ABV UP PARAM F/U: HCPCS | Performed by: NURSE PRACTITIONER

## 2024-09-25 PROCEDURE — G0008 ADMIN INFLUENZA VIRUS VAC: HCPCS | Performed by: NURSE PRACTITIONER

## 2024-09-25 PROCEDURE — 3075F SYST BP GE 130 - 139MM HG: CPT | Performed by: NURSE PRACTITIONER

## 2024-09-25 PROCEDURE — 83036 HEMOGLOBIN GLYCOSYLATED A1C: CPT | Performed by: NURSE PRACTITIONER

## 2024-09-25 PROCEDURE — G8427 DOCREV CUR MEDS BY ELIG CLIN: HCPCS | Performed by: NURSE PRACTITIONER

## 2024-09-25 PROCEDURE — 2022F DILAT RTA XM EVC RTNOPTHY: CPT | Performed by: NURSE PRACTITIONER

## 2024-09-25 PROCEDURE — 3079F DIAST BP 80-89 MM HG: CPT | Performed by: NURSE PRACTITIONER

## 2024-09-25 ASSESSMENT — ENCOUNTER SYMPTOMS
VOMITING: 0
DIARRHEA: 0
CHEST TIGHTNESS: 0
NAUSEA: 0
SHORTNESS OF BREATH: 0
CONSTIPATION: 0

## 2024-09-25 NOTE — TELEPHONE ENCOUNTER
Patient stopped into the office stating that he never got the recalled machine so he hasn't been using his PAP for years. He asks if we can send an order for a new machine to [facility] for him. All notes indicate that he's seeing Atilio for JULITA but I scrolled back for years and all OV are Odell.

## 2024-10-20 DIAGNOSIS — J45.40 MODERATE PERSISTENT ASTHMA WITHOUT COMPLICATION: ICD-10-CM

## 2024-10-21 RX ORDER — BUDESONIDE AND FORMOTEROL FUMARATE DIHYDRATE 160; 4.5 UG/1; UG/1
AEROSOL RESPIRATORY (INHALATION)
Qty: 10.2 G | Refills: 5 | Status: SHIPPED | OUTPATIENT
Start: 2024-10-21

## 2024-10-23 ENCOUNTER — HOSPITAL ENCOUNTER (EMERGENCY)
Age: 44
Discharge: HOME | End: 2024-10-23
Payer: OTHER MISCELLANEOUS

## 2024-10-23 ENCOUNTER — APPOINTMENT (OUTPATIENT)
Dept: GENERAL RADIOLOGY | Age: 44
End: 2024-10-23
Payer: OTHER MISCELLANEOUS

## 2024-10-23 VITALS
HEART RATE: 84 BPM | HEIGHT: 74 IN | BODY MASS INDEX: 38.42 KG/M2 | TEMPERATURE: 97.9 F | OXYGEN SATURATION: 97 % | SYSTOLIC BLOOD PRESSURE: 142 MMHG | DIASTOLIC BLOOD PRESSURE: 74 MMHG | RESPIRATION RATE: 16 BRPM | WEIGHT: 299.38 LBS

## 2024-10-23 DIAGNOSIS — Z79.4 TYPE 2 DIABETES MELLITUS WITH DIABETIC NEUROPATHY, WITH LONG-TERM CURRENT USE OF INSULIN (HCC): ICD-10-CM
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
## 2024-10-23 DIAGNOSIS — S39.012A LUMBAR STRAIN, INITIAL ENCOUNTER: ICD-10-CM

## 2024-10-23 DIAGNOSIS — S80.01XA CONTUSION OF RIGHT KNEE, INITIAL ENCOUNTER: ICD-10-CM

## 2024-10-23 DIAGNOSIS — E11.40 TYPE 2 DIABETES MELLITUS WITH DIABETIC NEUROPATHY, WITH LONG-TERM CURRENT USE OF INSULIN (HCC): ICD-10-CM

## 2024-10-23 DIAGNOSIS — M71.21 BAKER'S CYST OF KNEE, RIGHT: ICD-10-CM

## 2024-10-23 PROCEDURE — 96372 THER/PROPH/DIAG INJ SC/IM: CPT

## 2024-10-23 PROCEDURE — 73560 X-RAY EXAM OF KNEE 1 OR 2: CPT

## 2024-10-23 PROCEDURE — 6360000002 HC RX W HCPCS: Performed by: PHYSICIAN ASSISTANT

## 2024-10-23 PROCEDURE — 6370000000 HC RX 637 (ALT 250 FOR IP): Performed by: PHYSICIAN ASSISTANT

## 2024-10-23 PROCEDURE — 99284 EMERGENCY DEPT VISIT MOD MDM: CPT

## 2024-10-23 RX ORDER — KETOROLAC TROMETHAMINE 30 MG/ML
30 INJECTION, SOLUTION INTRAMUSCULAR; INTRAVENOUS ONCE
Status: COMPLETED | OUTPATIENT
Start: 2024-10-23 | End: 2024-10-23

## 2024-10-23 RX ORDER — METHOCARBAMOL 500 MG/1
750 TABLET, FILM COATED ORAL ONCE
Status: COMPLETED | OUTPATIENT
Start: 2024-10-23 | End: 2024-10-23

## 2024-10-23 RX ORDER — MELOXICAM 7.5 MG/1
7.5 TABLET ORAL 2 TIMES DAILY PRN
Qty: 30 TABLET | Refills: 0 | Status: SHIPPED | OUTPATIENT
Start: 2024-10-23 | End: 2024-11-06

## 2024-10-23 RX ORDER — METHOCARBAMOL 500 MG/1
500 TABLET, FILM COATED ORAL EVERY 8 HOURS PRN
Qty: 12 TABLET | Refills: 0 | Status: SHIPPED | OUTPATIENT
Start: 2024-10-23

## 2024-10-23 RX ADMIN — METHOCARBAMOL 750 MG: 500 TABLET ORAL at 12:07

## 2024-10-23 RX ADMIN — KETOROLAC TROMETHAMINE 30 MG: 30 INJECTION, SOLUTION INTRAMUSCULAR at 12:07

## 2024-10-23 ASSESSMENT — PAIN DESCRIPTION - ORIENTATION
ORIENTATION: RIGHT
ORIENTATION: RIGHT

## 2024-10-23 ASSESSMENT — PAIN - FUNCTIONAL ASSESSMENT: PAIN_FUNCTIONAL_ASSESSMENT: 0-10

## 2024-10-23 ASSESSMENT — PAIN DESCRIPTION - LOCATION
LOCATION: KNEE
LOCATION: KNEE

## 2024-10-23 ASSESSMENT — PAIN SCALES - GENERAL
PAINLEVEL_OUTOF10: 6

## 2024-10-23 ASSESSMENT — PAIN DESCRIPTION - DESCRIPTORS: DESCRIPTORS: DISCOMFORT

## 2024-10-23 ASSESSMENT — PAIN DESCRIPTION - PAIN TYPE: TYPE: ACUTE PAIN
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
## 2024-10-23 NOTE — DISCHARGE INSTRUCTIONS
Home in stable patient to ice areas that hurt 15 or 20 minutes a few times daily for the next few days, do moderate rest with gentle range of motion stretches pretty frequently, use medications as discussed, Ace wrap during the day on the right knee, and monitor for gradual improvement with time.  Call orthopedics and arrange close outpatient follow-up and further care and treatment concerning the right knee injury with Baker's cyst.  Also consider with your family medical provider in about 3 to 5 days for recheck and further care and treatment.  Return to the ER for any emergency worsening or concern.

## 2024-10-24 DIAGNOSIS — Z79.4 TYPE 2 DIABETES MELLITUS WITH DIABETIC NEUROPATHY, WITH LONG-TERM CURRENT USE OF INSULIN (HCC): ICD-10-CM

## 2024-10-24 DIAGNOSIS — E11.40 TYPE 2 DIABETES MELLITUS WITH DIABETIC NEUROPATHY, WITH LONG-TERM CURRENT USE OF INSULIN (HCC): ICD-10-CM

## 2024-10-24 RX ORDER — GABAPENTIN 400 MG/1
400 CAPSULE ORAL 3 TIMES DAILY
Qty: 270 CAPSULE | Refills: 0 | OUTPATIENT
Start: 2024-10-24 | End: 2025-01-22

## 2024-10-24 RX ORDER — GABAPENTIN 400 MG/1
CAPSULE ORAL
Qty: 270 CAPSULE | Refills: 0 | Status: SHIPPED | OUTPATIENT
Start: 2024-10-24 | End: 2025-01-22

## 2024-11-19 ENCOUNTER — OFFICE VISIT (OUTPATIENT)
Dept: PULMONOLOGY | Age: 44
End: 2024-11-19
Payer: MEDICARE

## 2024-11-19 VITALS
WEIGHT: 302.4 LBS | SYSTOLIC BLOOD PRESSURE: 130 MMHG | RESPIRATION RATE: 18 BRPM | DIASTOLIC BLOOD PRESSURE: 80 MMHG | BODY MASS INDEX: 38.81 KG/M2 | OXYGEN SATURATION: 96 % | TEMPERATURE: 98.2 F | HEIGHT: 74 IN | HEART RATE: 88 BPM

## 2024-11-19 DIAGNOSIS — J45.50 SEVERE PERSISTENT ASTHMA WITHOUT COMPLICATION: ICD-10-CM

## 2024-11-19 DIAGNOSIS — J45.40 MODERATE PERSISTENT ASTHMA WITHOUT COMPLICATION: ICD-10-CM

## 2024-11-19 DIAGNOSIS — G47.33 OSA (OBSTRUCTIVE SLEEP APNEA): ICD-10-CM

## 2024-11-19 DIAGNOSIS — E66.9 OBESITY (BMI 30-39.9): Primary | ICD-10-CM

## 2024-11-19 PROCEDURE — 99214 OFFICE O/P EST MOD 30 MIN: CPT | Performed by: INTERNAL MEDICINE

## 2024-11-19 PROCEDURE — 3075F SYST BP GE 130 - 139MM HG: CPT | Performed by: INTERNAL MEDICINE

## 2024-11-19 PROCEDURE — 3079F DIAST BP 80-89 MM HG: CPT | Performed by: INTERNAL MEDICINE

## 2024-11-19 PROCEDURE — G8427 DOCREV CUR MEDS BY ELIG CLIN: HCPCS | Performed by: INTERNAL MEDICINE

## 2024-11-19 PROCEDURE — G8417 CALC BMI ABV UP PARAM F/U: HCPCS | Performed by: INTERNAL MEDICINE

## 2024-11-19 PROCEDURE — 1036F TOBACCO NON-USER: CPT | Performed by: INTERNAL MEDICINE

## 2024-11-19 PROCEDURE — G8484 FLU IMMUNIZE NO ADMIN: HCPCS | Performed by: INTERNAL MEDICINE

## 2024-11-19 NOTE — ASSESSMENT & PLAN NOTE
Happy with Resmed S10.   Nasal cushion.     Discussed water / cleaning.  Tank clean but with calcium build up secondary to filtered water.

## 2024-11-19 NOTE — PROGRESS NOTES
REASON FOR CONSULTATION/CC:    Chief Complaint   Patient presents with    Follow-up        Consult at request of   Pretty Reyes APRN - CNP     PCP: Pretty Reyes APRN - CNP    HISTORY OF PRESENT ILLNESS: Joe Valderrama is a 44 y.o. year old male with a history of asthma and JULITA who presents :      History  Patient has a history of moderate asthma that was treated with Nucala in the past.  This is stopped secondary to insurance.  Nucala did significantly improve his asthma and allergic rhinitis symptoms.  Status post gastric bypass surgery.  After this, asthma symptoms improved without Nucala.      Interval history              Asthma   Symbicort   using bid.   Albuterol.  Rare usage prn.   Duoneb's  as need      JULITA  Has new device.    Resmed S10  Nasal Cushion      allergic rhinitis  Controlled.                              5/14/2024    11:15 AM 11/13/2023     7:04 AM 5/1/2023     7:29 AM 12/16/2022     7:04 AM 9/1/2022     9:10 AM 3/1/2022     7:14 AM 8/30/2021     7:32 AM   ASTHMA CONTROL TEST   In the past 4 weeks, how much of the time did your asthma keep you from getting as much done at work, school or at home? 4 3 4 5 5 5 4   During the past 4 weeks, how often have you had shortness of breath? 4 4 4 4 5 1 4   During the past 4 weeks, how often did your asthma symptoms (wheezing, coughing, shortness of breath, chest tightness or pain) wake you up at night or earlier than usual in the morning? 4 4 3 5 4 5 4   During the past 4 weeks, how often have you used your rescue inhaler or nebulizer medication (such as albuterol)? 4 4 4 4 4 4 4   How would you rate your asthma control during the past 4 weeks? 4 3 4 4 5 5 4   Asthma Control Test Total Score 20 18 19 22 23 20 20          Objective:   PHYSICAL EXAM:  Blood pressure 130/80, pulse 88, temperature 98.2 °F (36.8 °C), temperature source Infrared, resp. rate 18, height 1.88 m (6' 2.02\"), weight (!) 137.2 kg (302 lb 6.4 oz), SpO2 96%.'  Gen: No

## 2025-01-02 ENCOUNTER — APPOINTMENT (OUTPATIENT)
Dept: GENERAL RADIOLOGY | Age: 45
End: 2025-01-02
Payer: COMMERCIAL

## 2025-01-02 ENCOUNTER — HOSPITAL ENCOUNTER (EMERGENCY)
Age: 45
Discharge: HOME | End: 2025-01-03
Attending: EMERGENCY MEDICINE
Payer: COMMERCIAL

## 2025-01-02 ENCOUNTER — APPOINTMENT (OUTPATIENT)
Dept: CT IMAGING | Age: 45
End: 2025-01-02
Payer: COMMERCIAL

## 2025-01-02 DIAGNOSIS — J20.9 ACUTE BRONCHITIS, UNSPECIFIED ORGANISM: Primary | ICD-10-CM

## 2025-01-02 DIAGNOSIS — R04.2 COUGH WITH HEMOPTYSIS: ICD-10-CM

## 2025-01-02 LAB
ALBUMIN SERPL-MCNC: 4.8 G/DL (ref 3.4–5)
ALBUMIN/GLOB SERPL: 2 {RATIO} (ref 1.1–2.2)
ALP SERPL-CCNC: 91 U/L (ref 40–129)
ALT SERPL-CCNC: 49 U/L (ref 10–40)
ANION GAP SERPL CALCULATED.3IONS-SCNC: 11 MMOL/L (ref 3–16)
AST SERPL-CCNC: 31 U/L (ref 15–37)
BASOPHILS # BLD: 0 K/UL (ref 0–0.2)
BASOPHILS NFR BLD: 0.4 %
BILIRUB SERPL-MCNC: 0.3 MG/DL (ref 0–1)
BUN SERPL-MCNC: 18 MG/DL (ref 7–20)
CALCIUM SERPL-MCNC: 9.2 MG/DL (ref 8.3–10.6)
CHLORIDE SERPL-SCNC: 99 MMOL/L (ref 99–110)
CO2 SERPL-SCNC: 27 MMOL/L (ref 21–32)
CREAT SERPL-MCNC: 1 MG/DL (ref 0.9–1.3)
DEPRECATED RDW RBC AUTO: 16.5 % (ref 12.4–15.4)
EOSINOPHIL # BLD: 0.1 K/UL (ref 0–0.6)
EOSINOPHIL NFR BLD: 0.6 %
FLUAV RNA UPPER RESP QL NAA+PROBE: NEGATIVE
FLUBV AG NPH QL: NEGATIVE
GFR SERPLBLD CREATININE-BSD FMLA CKD-EPI: >90 ML/MIN/{1.73_M2}
GLUCOSE SERPL-MCNC: 178 MG/DL (ref 70–99)
HCT VFR BLD AUTO: 39.1 % (ref 40.5–52.5)
HGB BLD-MCNC: 12.4 G/DL (ref 13.5–17.5)
IMM GRANULOCYTES # BLD: 0 K/UL (ref 0–0.2)
IMM GRANULOCYTES NFR BLD: 0.1 %
LACTATE BLDV-SCNC: 1.3 MMOL/L (ref 0.4–1.9)
LYMPHOCYTES # BLD: 0.5 K/UL (ref 1–5.1)
LYMPHOCYTES NFR BLD: 6 %
MAGNESIUM SERPL-MCNC: 2 MG/DL (ref 1.8–2.4)
MCH RBC QN AUTO: 23.6 PG (ref 26–34)
MCHC RBC AUTO-ENTMCNC: 31.7 G/DL (ref 32–36.4)
MCV RBC AUTO: 74.3 FL (ref 80–100)
MONOCYTES # BLD: 0.5 K/UL (ref 0–1.3)
MONOCYTES NFR BLD: 5.9 %
NEUTROPHILS # BLD: 7 K/UL (ref 1.7–7.7)
NEUTROPHILS NFR BLD: 87 %
PLATELET # BLD AUTO: 304 K/UL (ref 135–450)
PMV BLD AUTO: 9.9 FL (ref 5–10.5)
POTASSIUM SERPL-SCNC: 3.5 MMOL/L (ref 3.5–5.1)
PROT SERPL-MCNC: 7.2 G/DL (ref 6.4–8.2)
RBC # BLD AUTO: 5.26 M/UL (ref 4.2–5.9)
SARS-COV-2 RDRP RESP QL NAA+PROBE: NOT DETECTED
SODIUM SERPL-SCNC: 137 MMOL/L (ref 136–145)
WBC # BLD AUTO: 8 K/UL (ref 4–11)

## 2025-01-02 PROCEDURE — 85025 COMPLETE CBC W/AUTO DIFF WBC: CPT

## 2025-01-02 PROCEDURE — 87635 SARS-COV-2 COVID-19 AMP PRB: CPT

## 2025-01-02 PROCEDURE — 83605 ASSAY OF LACTIC ACID: CPT

## 2025-01-02 PROCEDURE — 96360 HYDRATION IV INFUSION INIT: CPT

## 2025-01-02 PROCEDURE — 71260 CT THORAX DX C+: CPT

## 2025-01-02 PROCEDURE — 83735 ASSAY OF MAGNESIUM: CPT

## 2025-01-02 PROCEDURE — 36415 COLL VENOUS BLD VENIPUNCTURE: CPT

## 2025-01-02 PROCEDURE — 6370000000 HC RX 637 (ALT 250 FOR IP): Performed by: EMERGENCY MEDICINE

## 2025-01-02 PROCEDURE — 87804 INFLUENZA ASSAY W/OPTIC: CPT

## 2025-01-02 PROCEDURE — 2580000003 HC RX 258: Performed by: EMERGENCY MEDICINE

## 2025-01-02 PROCEDURE — 87040 BLOOD CULTURE FOR BACTERIA: CPT

## 2025-01-02 PROCEDURE — 99285 EMERGENCY DEPT VISIT HI MDM: CPT

## 2025-01-02 PROCEDURE — 80053 COMPREHEN METABOLIC PANEL: CPT

## 2025-01-02 PROCEDURE — 6360000004 HC RX CONTRAST MEDICATION: Performed by: EMERGENCY MEDICINE

## 2025-01-02 PROCEDURE — 71046 X-RAY EXAM CHEST 2 VIEWS: CPT

## 2025-01-02 RX ORDER — IPRATROPIUM BROMIDE AND ALBUTEROL SULFATE 2.5; .5 MG/3ML; MG/3ML
1 SOLUTION RESPIRATORY (INHALATION) ONCE
Status: COMPLETED | OUTPATIENT
Start: 2025-01-02 | End: 2025-01-02

## 2025-01-02 RX ORDER — IOPAMIDOL 755 MG/ML
100 INJECTION, SOLUTION INTRAVASCULAR
Status: COMPLETED | OUTPATIENT
Start: 2025-01-02 | End: 2025-01-02

## 2025-01-02 RX ORDER — FEXOFENADINE HCL 180 MG/1
180 TABLET ORAL DAILY
COMMUNITY

## 2025-01-02 RX ORDER — ACETAMINOPHEN 500 MG
1000 TABLET ORAL ONCE
Status: COMPLETED | OUTPATIENT
Start: 2025-01-02 | End: 2025-01-02

## 2025-01-02 RX ORDER — 0.9 % SODIUM CHLORIDE 0.9 %
1000 INTRAVENOUS SOLUTION INTRAVENOUS ONCE
Status: COMPLETED | OUTPATIENT
Start: 2025-01-02 | End: 2025-01-02

## 2025-01-02 RX ADMIN — SODIUM CHLORIDE 1000 ML: 9 INJECTION, SOLUTION INTRAVENOUS at 22:45

## 2025-01-02 RX ADMIN — IPRATROPIUM BROMIDE AND ALBUTEROL SULFATE 1 DOSE: 2.5; .5 SOLUTION RESPIRATORY (INHALATION) at 21:31

## 2025-01-02 RX ADMIN — ACETAMINOPHEN 1000 MG: 500 TABLET ORAL at 21:15

## 2025-01-02 RX ADMIN — IOPAMIDOL 100 ML: 755 INJECTION, SOLUTION INTRAVENOUS at 22:54

## 2025-01-02 ASSESSMENT — LIFESTYLE VARIABLES
HOW OFTEN DO YOU HAVE A DRINK CONTAINING ALCOHOL: NEVER
HOW MANY STANDARD DRINKS CONTAINING ALCOHOL DO YOU HAVE ON A TYPICAL DAY: PATIENT DOES NOT DRINK

## 2025-01-02 ASSESSMENT — PAIN - FUNCTIONAL ASSESSMENT: PAIN_FUNCTIONAL_ASSESSMENT: NONE - DENIES PAIN

## 2025-01-03 VITALS
SYSTOLIC BLOOD PRESSURE: 131 MMHG | HEART RATE: 93 BPM | WEIGHT: 315 LBS | DIASTOLIC BLOOD PRESSURE: 78 MMHG | RESPIRATION RATE: 16 BRPM | HEIGHT: 75 IN | TEMPERATURE: 99.7 F | OXYGEN SATURATION: 96 % | BODY MASS INDEX: 39.17 KG/M2

## 2025-01-03 PROCEDURE — 6370000000 HC RX 637 (ALT 250 FOR IP): Performed by: EMERGENCY MEDICINE

## 2025-01-03 RX ORDER — BENZONATATE 200 MG/1
200 CAPSULE ORAL 3 TIMES DAILY PRN
Qty: 20 CAPSULE | Refills: 0 | Status: SHIPPED | OUTPATIENT
Start: 2025-01-03 | End: 2025-01-10

## 2025-01-03 RX ORDER — DOXYCYCLINE HYCLATE 100 MG
100 TABLET ORAL 2 TIMES DAILY
Qty: 14 TABLET | Refills: 0 | Status: SHIPPED | OUTPATIENT
Start: 2025-01-03 | End: 2025-01-10

## 2025-01-03 RX ORDER — IBUPROFEN 800 MG/1
800 TABLET, FILM COATED ORAL ONCE
Status: COMPLETED | OUTPATIENT
Start: 2025-01-03 | End: 2025-01-03

## 2025-01-03 RX ORDER — PREDNISONE 20 MG/1
60 TABLET ORAL DAILY
Qty: 12 TABLET | Refills: 0 | Status: SHIPPED | OUTPATIENT
Start: 2025-01-03 | End: 2025-01-07

## 2025-01-03 RX ORDER — PREDNISONE 20 MG/1
60 TABLET ORAL ONCE
Status: COMPLETED | OUTPATIENT
Start: 2025-01-03 | End: 2025-01-03

## 2025-01-03 RX ORDER — DOXYCYCLINE HYCLATE 100 MG
100 TABLET ORAL ONCE
Status: COMPLETED | OUTPATIENT
Start: 2025-01-03 | End: 2025-01-03

## 2025-01-03 RX ADMIN — DOXYCYCLINE HYCLATE 100 MG: 100 TABLET, COATED ORAL at 00:31

## 2025-01-03 RX ADMIN — PREDNISONE 60 MG: 20 TABLET ORAL at 00:31

## 2025-01-03 RX ADMIN — IBUPROFEN 800 MG: 800 TABLET, FILM COATED ORAL at 00:59

## 2025-01-03 NOTE — ED NOTES
Discharge instructions reviewed with patient and significant other.  All questions answered to their satisfaction.  Both verbalize understanding.  Patient's IV is removed, catheter intact, pressure dressing applied, no noted bleeding or drainage.  Patient ambulates from ED using cane, gait is steady, all belongings in hand, in no apparent distress at this time.

## 2025-01-03 NOTE — ED NOTES
Patient back to ED bed 5 via wheelchair from xray.  He is in no apparent distress.  Will continue to monitor.    Dr. Holt at bedside.

## 2025-01-03 NOTE — ED NOTES
Blood culture #2 obtained from straight stick to patient's RAC.  Entire arm cleaned with chlorhexidine wipe, site untouched prior to venous puncture.  Patient's information is confirmed, specimens labelled, taken to lab for analysis.

## 2025-01-03 NOTE — ED PROVIDER NOTES
[facility]      CHIEF COMPLAINT  Hemoptysis (Patient to ED w/ c/o spitting up blood after coughing.  Patient reports he started coughing this morning, was blood tinged but tonight had a coughing fit and \"spit up all blood.\")       HISTORY OF PRESENT ILLNESS  Joe Valderrama is a 44 y.o. male  who presents to the ED complaining of mild cyst.  Patient states that he started with a cough and just generalized malaise and fatigue earlier.  He he states that he was coughing quite hard earlier in the sputum turned from clear to bloody.  He is feeling short of breath and use his Symbicort earlier this morning but has not uses albuterol at all today.  He states that he has a history of asthma and when his breathing gets like this typically he gets steroids and Bactrim and that helps.  He is not on blood thinners currently.  He felt just very cold with chills earlier when at the store which is unusual for him as he states that he usually never feels cold.  He denies any vomiting or diarrhea.    No other complaints, modifying factors or associated symptoms.     I have reviewed the following from the nursing documentation.    Past Medical History:   Diagnosis Date    Asthma     Chronic back pain     Depression     Full dentures     GERD (gastroesophageal reflux disease)     Hyperlipidemia     Hypertension     Neuropathy     Obesity     Osteoarthritis     Other allergic rhinitis 10/27/2020    Peripheral vascular disease (HCC)     Restless legs syndrome     Severe persistent asthma without complication 07/06/2020    Sleep apnea     Type 2 diabetes mellitus without complication (HCC)     Type II or unspecified type diabetes mellitus without mention of complication, not stated as uncontrolled     Urinary incontinence      Past Surgical History:   Procedure Laterality Date    BACK SURGERY  2013    four surgeries; rods placed    FRACTURE SURGERY Right     arm- two    FRACTURE SURGERY Right     hand- three  DEEPAK    Social Connections     Social Connections and Isolation: 0   Intimate Partner Violence: Unknown (1/21/2024)    [facility]    Interpersonal Safety     Feel physically or emotionally unsafe where currently live: Not on file     Harm by anyone: Not on file     Emotionally Harmed: Not on file   Housing Stability: Unknown (6/21/2024)    Housing Stability Vital Sign     Unable to Pay for Housing in the Last Year: Not on file     Number of Places Lived in the Last Year: Not on file     Unstable Housing in the Last Year: No     Current Facility-Administered Medications   Medication Dose Route Frequency Provider Last Rate Last Admin    ipratropium 0.5 mg-albuterol 2.5 mg (DUONEB) nebulizer solution 1 Dose  1 Dose Inhalation Once Siomara Holt MD         Current Outpatient Medications   Medication Sig Dispense Refill    fexofenadine (ALLEGRA ALLERGY) 180 MG tablet Take 1 tablet by mouth daily      gabapentin (NEURONTIN) 400 MG capsule TAKE ONE CAPSULE THREE TIMES DAILY 270 capsule 0    budesonide-formoterol (SYMBICORT) 160-4.5 MCG/ACT AERO inhale 2 puffs into the lungs twice A DAY FOR LONG TERM CONTROL OF ASTHMA, RINSE MOUTH OUT AFTER USE Usually takes once daily unless he's wheezing 10.2 g 5    NONFORMULARY Take 1 caplet by mouth daily Testosterone boost otc      amLODIPine (NORVASC) 10 MG tablet TAKE ONE TABLET EVERY DAY 90 tablet 1    lisinopril (PRINIVIL;ZESTRIL) 40 MG tablet TAKE ONE TABLET BY MOUTH EVERY DAY 90 tablet 1    metoprolol tartrate (LOPRESSOR) 25 MG tablet TAKE ONE TABLET TWICE DAILY 180 tablet 1    albuterol sulfate HFA (PROVENTIL;VENTOLIN;PROAIR) 108 (90 Base) MCG/ACT inhaler Inhale 2 puffs into the lungs every 6 hours as needed for Wheezing 8.5 g 11    atorvastatin (LIPITOR) 20 MG tablet Take 1 tablet by mouth nightly 90 tablet 1    montelukast (SINGULAIR) 10 MG tablet TAKE ONE TABLET AT BEDTIME 90 tablet 0

## 2025-01-03 NOTE — ED NOTES
Patient reports an improvement in symptoms but still coughs with deep breathing.  Expiratory wheezes no longer heard on assessment.  Dr. Rutherford updated.

## 2025-01-03 NOTE — ED NOTES
Patient to xray via wheelchair by doyle Esquivel.  He ambulates in room to wheelchair with steady gait, in no apparent distress at this time.

## 2025-01-03 NOTE — ED NOTES
Patient to CT via wheelchair by doyle Esquivel.  Patient is in no apparent distress at this time.  Will continue to monitor.

## 2025-01-03 NOTE — ED NOTES
Patient returns to ED bed 5 via wheelchair from CT.  Patient ambulates from wheelchair to bed with no difficulties, gait is steady.  He is in no apparent distress at this time.  Will continue to monitor.

## 2025-01-03 NOTE — ED NOTES
Patient's oral temp is 99.7.  He reports he does not have any tylenol or motrin at home, will not be able to treat fever tonight.  Dr. Rutherford updated.  New order received.

## 2025-01-03 NOTE — ED TRIAGE NOTES
Patient to ED bed 5 via wheelchair for c/o coughing up blood.  Patient reports he started coughing and noticed it was blood tinged this morning.  He states tonight he had a coughing fit and \"spit up all blood.\"  Patient arrives with oral temp of 103.1.   He states he was not aware he had a fever.  Patient denies chest pain currently but states if he coughs hard he has chest pain.  He denies any increase in SOB, n/v/d.

## 2025-01-03 NOTE — ED PROVIDER NOTES
9:06 PM: I discussed the history, physical examination, laboratory and imaging studies, and treatment plan with Dr. Holt. Joe Valderrama was signed out to me in stable condition. Please see Dr. Holt's documentation for details of their history, physical, and laboratory studies.  Upon re-examination, a summary of Joe Valderrama's history, physical examination, and studies are as follows:  patient presented with 1 day h/o fever, chills, cough with pink sputum production, chest pain and shortness of breath. He has h/o asthma. No vomiting or diarrhea. No sinus congestion or epistaxis. Exam shows patient in no distress. Heart tachycardic, remains 115-125 despite fever reduction to 100.8 after tylenol.  Lungs essentially clear. No wheezing. No rales. .       Results for orders placed or performed during the hospital encounter of 01/02/25   COVID-19, Rapid    Specimen: Nasopharyngeal Swab   Result Value Ref Range    SARS-CoV-2, NAAT Not Detected Not Detected   Rapid influenza A/B antigens    Specimen: Nasopharyngeal   Result Value Ref Range    Rapid Influenza A Ag Negative Negative    Rapid Influenza B Ag Negative Negative   CBC with Auto Differential   Result Value Ref Range    WBC 8.0 4.0 - 11.0 K/uL    RBC 5.26 4.20 - 5.90 M/uL    Hemoglobin 12.4 (L) 13.5 - 17.5 g/dL    Hematocrit 39.1 (L) 40.5 - 52.5 %    MCV 74.3 (L) 80.0 - 100.0 fL    MCH 23.6 (L) 26.0 - 34.0 pg    MCHC 31.7 (L) 32.0 - 36.4 g/dL    RDW 16.5 (H) 12.4 - 15.4 %    Platelets 304 135 - 450 K/uL    MPV 9.9 5.0 - 10.5 fL    Neutrophils % 87.0 %    Immature Granulocytes % 0.1 %    Lymphocytes % 6.0 %    Monocytes % 5.9 %    Eosinophils % 0.6 %    Basophils % 0.4 %    Neutrophils Absolute 7.0 1.7 - 7.7 K/uL    Immature Granulocytes # 0.0 0.0 - 0.2 K/uL    Lymphocytes Absolute 0.5 (L) 1.0 - 5.1 K/uL    Monocytes Absolute 0.5 0.0 - 1.3 K/uL    Eosinophils Absolute 0.1 0.0 - 0.6 K/uL    Basophils Absolute 0.0 0.0 - 0.2 K/uL   CMP w/ Reflex to MG   Result

## 2025-01-04 LAB
BACTERIA BLD CULT ORG #2: NORMAL
BACTERIA BLD CULT: NORMAL

## 2025-01-07 LAB
BACTERIA BLD CULT ORG #2: NORMAL
BACTERIA BLD CULT: NORMAL

## 2025-01-09 DIAGNOSIS — I10 ESSENTIAL HYPERTENSION: ICD-10-CM

## 2025-01-09 DIAGNOSIS — E11.40 TYPE 2 DIABETES MELLITUS WITH DIABETIC NEUROPATHY, WITH LONG-TERM CURRENT USE OF INSULIN (HCC): ICD-10-CM

## 2025-01-09 DIAGNOSIS — E78.2 MIXED HYPERLIPIDEMIA: ICD-10-CM

## 2025-01-09 DIAGNOSIS — K21.9 GASTROESOPHAGEAL REFLUX DISEASE, UNSPECIFIED WHETHER ESOPHAGITIS PRESENT: ICD-10-CM

## 2025-01-09 DIAGNOSIS — Z79.4 TYPE 2 DIABETES MELLITUS WITH DIABETIC NEUROPATHY, WITH LONG-TERM CURRENT USE OF INSULIN (HCC): ICD-10-CM

## 2025-01-09 RX ORDER — AMLODIPINE BESYLATE 10 MG/1
10 TABLET ORAL DAILY
Qty: 90 TABLET | Refills: 0 | Status: SHIPPED | OUTPATIENT
Start: 2025-01-09

## 2025-01-09 RX ORDER — METOPROLOL TARTRATE 25 MG/1
TABLET, FILM COATED ORAL
Qty: 180 TABLET | Refills: 0 | Status: SHIPPED | OUTPATIENT
Start: 2025-01-09

## 2025-01-09 RX ORDER — ATORVASTATIN CALCIUM 20 MG/1
20 TABLET, FILM COATED ORAL NIGHTLY
Qty: 90 TABLET | Refills: 0 | Status: SHIPPED | OUTPATIENT
Start: 2025-01-09

## 2025-01-09 RX ORDER — LISINOPRIL 40 MG/1
40 TABLET ORAL DAILY
Qty: 90 TABLET | Refills: 0 | Status: SHIPPED | OUTPATIENT
Start: 2025-01-09

## 2025-01-22 DIAGNOSIS — J45.40 MODERATE PERSISTENT ASTHMA WITHOUT COMPLICATION: ICD-10-CM

## 2025-01-22 DIAGNOSIS — E11.40 TYPE 2 DIABETES MELLITUS WITH DIABETIC NEUROPATHY, WITH LONG-TERM CURRENT USE OF INSULIN (HCC): ICD-10-CM

## 2025-01-22 DIAGNOSIS — Z79.4 TYPE 2 DIABETES MELLITUS WITH DIABETIC NEUROPATHY, WITH LONG-TERM CURRENT USE OF INSULIN (HCC): ICD-10-CM

## 2025-01-23 RX ORDER — GABAPENTIN 400 MG/1
CAPSULE ORAL
Qty: 270 CAPSULE | Refills: 0 | Status: SHIPPED | OUTPATIENT
Start: 2025-01-23 | End: 2025-04-23

## 2025-01-23 RX ORDER — MONTELUKAST SODIUM 10 MG/1
TABLET ORAL
Qty: 90 TABLET | Refills: 0 | Status: SHIPPED | OUTPATIENT
Start: 2025-01-23

## 2025-04-09 DIAGNOSIS — Z79.4 TYPE 2 DIABETES MELLITUS WITH DIABETIC NEUROPATHY, WITH LONG-TERM CURRENT USE OF INSULIN (HCC): ICD-10-CM

## 2025-04-09 DIAGNOSIS — E11.40 TYPE 2 DIABETES MELLITUS WITH DIABETIC NEUROPATHY, WITH LONG-TERM CURRENT USE OF INSULIN (HCC): ICD-10-CM

## 2025-04-09 DIAGNOSIS — E78.2 MIXED HYPERLIPIDEMIA: ICD-10-CM

## 2025-04-09 DIAGNOSIS — I10 ESSENTIAL HYPERTENSION: ICD-10-CM

## 2025-04-09 DIAGNOSIS — J45.40 MODERATE PERSISTENT ASTHMA WITHOUT COMPLICATION: ICD-10-CM

## 2025-04-09 DIAGNOSIS — K21.9 GASTROESOPHAGEAL REFLUX DISEASE, UNSPECIFIED WHETHER ESOPHAGITIS PRESENT: ICD-10-CM

## 2025-04-10 SDOH — ECONOMIC STABILITY: FOOD INSECURITY: WITHIN THE PAST 12 MONTHS, THE FOOD YOU BOUGHT JUST DIDN'T LAST AND YOU DIDN'T HAVE MONEY TO GET MORE.: NEVER TRUE

## 2025-04-10 SDOH — ECONOMIC STABILITY: TRANSPORTATION INSECURITY
IN THE PAST 12 MONTHS, HAS THE LACK OF TRANSPORTATION KEPT YOU FROM MEDICAL APPOINTMENTS OR FROM GETTING MEDICATIONS?: NO

## 2025-04-10 SDOH — ECONOMIC STABILITY: INCOME INSECURITY: IN THE LAST 12 MONTHS, WAS THERE A TIME WHEN YOU WERE NOT ABLE TO PAY THE MORTGAGE OR RENT ON TIME?: NO

## 2025-04-10 SDOH — ECONOMIC STABILITY: FOOD INSECURITY: WITHIN THE PAST 12 MONTHS, YOU WORRIED THAT YOUR FOOD WOULD RUN OUT BEFORE YOU GOT MONEY TO BUY MORE.: NEVER TRUE

## 2025-04-10 NOTE — TELEPHONE ENCOUNTER
Patient is due for f/u on chronic conditions. Please help him schedule his appt so medications can be refilled.

## 2025-04-11 ENCOUNTER — OFFICE VISIT (OUTPATIENT)
Dept: FAMILY MEDICINE CLINIC | Age: 45
End: 2025-04-11

## 2025-04-11 VITALS
HEIGHT: 75 IN | HEART RATE: 84 BPM | WEIGHT: 295 LBS | RESPIRATION RATE: 16 BRPM | SYSTOLIC BLOOD PRESSURE: 132 MMHG | OXYGEN SATURATION: 98 % | BODY MASS INDEX: 36.68 KG/M2 | DIASTOLIC BLOOD PRESSURE: 86 MMHG

## 2025-04-11 DIAGNOSIS — E11.40 TYPE 2 DIABETES MELLITUS WITH DIABETIC NEUROPATHY, WITH LONG-TERM CURRENT USE OF INSULIN (HCC): ICD-10-CM

## 2025-04-11 DIAGNOSIS — J45.40 MODERATE PERSISTENT ASTHMA WITHOUT COMPLICATION: ICD-10-CM

## 2025-04-11 DIAGNOSIS — I10 ESSENTIAL HYPERTENSION: ICD-10-CM

## 2025-04-11 DIAGNOSIS — E78.2 MIXED HYPERLIPIDEMIA: ICD-10-CM

## 2025-04-11 DIAGNOSIS — E66.812 OBESITY, CLASS II, BMI 35-39.9: ICD-10-CM

## 2025-04-11 DIAGNOSIS — Z79.4 TYPE 2 DIABETES MELLITUS WITH DIABETIC NEUROPATHY, WITH LONG-TERM CURRENT USE OF INSULIN (HCC): ICD-10-CM

## 2025-04-11 DIAGNOSIS — K21.9 GASTROESOPHAGEAL REFLUX DISEASE, UNSPECIFIED WHETHER ESOPHAGITIS PRESENT: ICD-10-CM

## 2025-04-11 RX ORDER — LISINOPRIL 40 MG/1
40 TABLET ORAL DAILY
Qty: 90 TABLET | Refills: 1 | Status: SHIPPED | OUTPATIENT
Start: 2025-04-11

## 2025-04-11 RX ORDER — MONTELUKAST SODIUM 10 MG/1
TABLET ORAL
Qty: 90 TABLET | Refills: 1 | Status: SHIPPED | OUTPATIENT
Start: 2025-04-11

## 2025-04-11 RX ORDER — METOPROLOL TARTRATE 25 MG/1
25 TABLET, FILM COATED ORAL 2 TIMES DAILY
Qty: 180 TABLET | Refills: 0 | OUTPATIENT
Start: 2025-04-11

## 2025-04-11 RX ORDER — AMLODIPINE BESYLATE 10 MG/1
10 TABLET ORAL DAILY
Qty: 90 TABLET | Refills: 0 | OUTPATIENT
Start: 2025-04-11

## 2025-04-11 RX ORDER — OMEPRAZOLE 20 MG/1
CAPSULE, DELAYED RELEASE ORAL
Qty: 90 CAPSULE | Refills: 0 | OUTPATIENT
Start: 2025-04-11

## 2025-04-11 RX ORDER — MONTELUKAST SODIUM 10 MG/1
10 TABLET ORAL NIGHTLY
Qty: 90 TABLET | Refills: 0 | OUTPATIENT
Start: 2025-04-11

## 2025-04-11 RX ORDER — OMEPRAZOLE 20 MG/1
CAPSULE, DELAYED RELEASE ORAL
Qty: 90 CAPSULE | Refills: 1 | Status: SHIPPED | OUTPATIENT
Start: 2025-04-11

## 2025-04-11 RX ORDER — ATORVASTATIN CALCIUM 20 MG/1
20 TABLET, FILM COATED ORAL NIGHTLY
Qty: 90 TABLET | Refills: 1 | Status: SHIPPED | OUTPATIENT
Start: 2025-04-11

## 2025-04-11 RX ORDER — METOPROLOL TARTRATE 25 MG/1
25 TABLET, FILM COATED ORAL 2 TIMES DAILY
Qty: 180 TABLET | Refills: 1 | Status: SHIPPED | OUTPATIENT
Start: 2025-04-11

## 2025-04-11 RX ORDER — GABAPENTIN 400 MG/1
400 CAPSULE ORAL 3 TIMES DAILY
Qty: 270 CAPSULE | Refills: 0 | Status: SHIPPED | OUTPATIENT
Start: 2025-04-11 | End: 2025-07-10

## 2025-04-11 RX ORDER — LISINOPRIL 40 MG/1
40 TABLET ORAL DAILY
Qty: 90 TABLET | Refills: 0 | OUTPATIENT
Start: 2025-04-11

## 2025-04-11 RX ORDER — DULOXETIN HYDROCHLORIDE 60 MG/1
60 CAPSULE, DELAYED RELEASE ORAL DAILY
Qty: 90 CAPSULE | Refills: 1 | Status: SHIPPED | OUTPATIENT
Start: 2025-04-11

## 2025-04-11 RX ORDER — GABAPENTIN 400 MG/1
400 CAPSULE ORAL 3 TIMES DAILY
Qty: 270 CAPSULE | Refills: 0 | OUTPATIENT
Start: 2025-04-11

## 2025-04-11 RX ORDER — ATORVASTATIN CALCIUM 20 MG/1
TABLET, FILM COATED ORAL
Qty: 90 TABLET | Refills: 0 | OUTPATIENT
Start: 2025-04-11

## 2025-04-11 RX ORDER — AMLODIPINE BESYLATE 10 MG/1
10 TABLET ORAL DAILY
Qty: 90 TABLET | Refills: 1 | Status: SHIPPED | OUTPATIENT
Start: 2025-04-11

## 2025-04-11 ASSESSMENT — ENCOUNTER SYMPTOMS
VOMITING: 0
DIARRHEA: 0
SHORTNESS OF BREATH: 0
COUGH: 1
CHEST TIGHTNESS: 0
CONSTIPATION: 0
NAUSEA: 0

## 2025-04-11 NOTE — PROGRESS NOTES
breath sounds.   Musculoskeletal:      Cervical back: Neck supple.      Right lower leg: No edema.      Left lower leg: No edema.   Feet:      Right foot:      Protective Sensation: 10 sites tested.  7 sites sensed.      Skin integrity: Dry skin present.      Left foot:      Protective Sensation: 10 sites tested.  10 sites sensed.      Skin integrity: Dry skin present.   Skin:     General: Skin is warm and dry.   Neurological:      Mental Status: He is alert and oriented to person, place, and time.   Psychiatric:         Behavior: Behavior normal.         Thought Content: Thought content normal.         Judgment: Judgment normal.         Assessmentand Plan  Joe was seen today for diabetes.    Diagnoses and all orders for this visit:    Type 2 diabetes mellitus with diabetic neuropathy, without long-term current use of insulin ([facility])  HgA1c- 6.4% on 2/26/25  Stable   On metformin 500 mg BID  Advised to check fasting BS   Discussed importance of low carb diet, aerobic exercise and need for weight loss.    Continues to follow with podiatrist Dr. Perez Vasques for his diabetic neuropathy. He has diabetic shoes that he will wear.   Neuropathy pain has improved with gabapentin use.     Mixed hyperlipidemia  Lipids controlled 2/205  Continue atorvastatin 20 mg daily   Discussed importance of low fat/cholesterol diet, aerobic exercise and need for weight loss.    Essential hypertension  Controlled   Continue metoprolol tartrate 25 mg BID, lisinopril to 40 mg daily, and amlodipine 10 mg daily.   Discussed importance of low salt diet, aerobic exercise and need for weight loss.    Obesity  S/p gastric bypass surgery 2/2021.   Discussed importance of diet, aerobic exercise and hydration with water.  Advised to continue following with bariatrics specialist    GERD  Stable. Continue omeprazole 20 mg daily  Continue f/u with GI Dr. Prince     Chronic pain  Currently on gabapentin 400 mg TID. Following with pain specialist Dr. Cole

## 2025-07-08 ENCOUNTER — OFFICE VISIT (OUTPATIENT)
Dept: PULMONOLOGY | Age: 45
End: 2025-07-08
Payer: COMMERCIAL

## 2025-07-08 VITALS
DIASTOLIC BLOOD PRESSURE: 80 MMHG | RESPIRATION RATE: 16 BRPM | OXYGEN SATURATION: 96 % | SYSTOLIC BLOOD PRESSURE: 112 MMHG | WEIGHT: 295.6 LBS | HEIGHT: 75 IN | HEART RATE: 73 BPM | BODY MASS INDEX: 36.75 KG/M2 | TEMPERATURE: 98.4 F

## 2025-07-08 DIAGNOSIS — J45.40 MODERATE PERSISTENT ASTHMA WITHOUT COMPLICATION: ICD-10-CM

## 2025-07-08 DIAGNOSIS — J30.89 OTHER ALLERGIC RHINITIS: ICD-10-CM

## 2025-07-08 DIAGNOSIS — G47.33 OSA (OBSTRUCTIVE SLEEP APNEA): Primary | ICD-10-CM

## 2025-07-08 DIAGNOSIS — J45.50 SEVERE PERSISTENT ASTHMA WITHOUT COMPLICATION (HCC): ICD-10-CM

## 2025-07-08 PROCEDURE — 99214 OFFICE O/P EST MOD 30 MIN: CPT | Performed by: INTERNAL MEDICINE

## 2025-07-08 PROCEDURE — G8417 CALC BMI ABV UP PARAM F/U: HCPCS | Performed by: INTERNAL MEDICINE

## 2025-07-08 PROCEDURE — G8427 DOCREV CUR MEDS BY ELIG CLIN: HCPCS | Performed by: INTERNAL MEDICINE

## 2025-07-08 PROCEDURE — 3074F SYST BP LT 130 MM HG: CPT | Performed by: INTERNAL MEDICINE

## 2025-07-08 PROCEDURE — 1036F TOBACCO NON-USER: CPT | Performed by: INTERNAL MEDICINE

## 2025-07-08 PROCEDURE — 3079F DIAST BP 80-89 MM HG: CPT | Performed by: INTERNAL MEDICINE

## 2025-07-08 RX ORDER — MONTELUKAST SODIUM 10 MG/1
TABLET ORAL
Qty: 90 TABLET | Refills: 1 | Status: SHIPPED | OUTPATIENT
Start: 2025-07-08

## 2025-07-08 RX ORDER — ALBUTEROL SULFATE 90 UG/1
2 INHALANT RESPIRATORY (INHALATION) EVERY 6 HOURS PRN
Qty: 8.5 G | Refills: 11 | Status: SHIPPED | OUTPATIENT
Start: 2025-07-08

## 2025-07-08 RX ORDER — IPRATROPIUM BROMIDE AND ALBUTEROL SULFATE 2.5; .5 MG/3ML; MG/3ML
1 SOLUTION RESPIRATORY (INHALATION) EVERY 6 HOURS PRN
Qty: 360 ML | Refills: 11 | Status: SHIPPED | OUTPATIENT
Start: 2025-07-08

## 2025-07-08 RX ORDER — BUDESONIDE AND FORMOTEROL FUMARATE DIHYDRATE 160; 4.5 UG/1; UG/1
2 AEROSOL RESPIRATORY (INHALATION) 2 TIMES DAILY
Qty: 10.2 G | Refills: 11 | Status: SHIPPED | OUTPATIENT
Start: 2025-07-08

## 2025-07-08 NOTE — PROGRESS NOTES
160-4.5 MCG/ACT AERO    ipratropium 0.5 mg-albuterol 2.5 mg (DUONEB) 0.5-2.5 (3) MG/3ML SOLN nebulizer solution    montelukast (SINGULAIR) 10 MG tablet    JULITA (obstructive sleep apnea) - Primary    Moderate persistent asthma without complication    Relevant Medications    albuterol sulfate HFA (PROVENTIL;VENTOLIN;PROAIR) 108 (90 Base) MCG/ACT inhaler    budesonide-formoterol (SYMBICORT) 160-4.5 MCG/ACT AERO    ipratropium 0.5 mg-albuterol 2.5 mg (DUONEB) 0.5-2.5 (3) MG/3ML SOLN nebulizer solution    montelukast (SINGULAIR) 10 MG tablet       Assessment & Plan  1. Asthma.  Asthma is well-controlled with the current medication regimen. Refills for albuterol, Symbicort, DuoNebs, and Singulair have been provided. Continue current medications and monitor for any changes in symptoms.    2. Allergic rhinitis.  Allergic rhinitis is currently well-managed. Reports a slight increase in eye discharge, attributed to normal allergies. No changes in treatment are necessary at this time.    3. Sleep apnea.  Using a ResMed S1 with a nasal cushion and has not had any problems with the CPAP machine or sleep quality. Continue using the CPAP machine as directed and consult the sleep doctor as needed. If refills for the nasal cushion are needed, inform the clinic.    Follow-up  Follow up in 1 year.

## 2025-07-11 ENCOUNTER — OFFICE VISIT (OUTPATIENT)
Dept: FAMILY MEDICINE CLINIC | Age: 45
End: 2025-07-11

## 2025-07-11 VITALS
SYSTOLIC BLOOD PRESSURE: 126 MMHG | TEMPERATURE: 98.5 F | OXYGEN SATURATION: 98 % | DIASTOLIC BLOOD PRESSURE: 80 MMHG | RESPIRATION RATE: 18 BRPM | WEIGHT: 290.4 LBS | HEIGHT: 75 IN | BODY MASS INDEX: 36.11 KG/M2 | HEART RATE: 88 BPM

## 2025-07-11 DIAGNOSIS — E78.2 MIXED HYPERLIPIDEMIA: ICD-10-CM

## 2025-07-11 DIAGNOSIS — J45.40 MODERATE PERSISTENT ASTHMA WITHOUT COMPLICATION: ICD-10-CM

## 2025-07-11 DIAGNOSIS — Z79.4 TYPE 2 DIABETES MELLITUS WITH DIABETIC NEUROPATHY, WITH LONG-TERM CURRENT USE OF INSULIN (HCC): ICD-10-CM

## 2025-07-11 DIAGNOSIS — K21.9 GASTROESOPHAGEAL REFLUX DISEASE, UNSPECIFIED WHETHER ESOPHAGITIS PRESENT: ICD-10-CM

## 2025-07-11 DIAGNOSIS — I10 ESSENTIAL HYPERTENSION: ICD-10-CM

## 2025-07-11 DIAGNOSIS — E11.40 TYPE 2 DIABETES MELLITUS WITH DIABETIC NEUROPATHY, WITH LONG-TERM CURRENT USE OF INSULIN (HCC): ICD-10-CM

## 2025-07-11 DIAGNOSIS — E66.812 OBESITY, CLASS II, BMI 35-39.9: ICD-10-CM

## 2025-07-11 DIAGNOSIS — E11.40 TYPE 2 DIABETES MELLITUS WITH DIABETIC NEUROPATHY, WITH LONG-TERM CURRENT USE OF INSULIN (HCC): Primary | ICD-10-CM

## 2025-07-11 DIAGNOSIS — Z79.4 TYPE 2 DIABETES MELLITUS WITH DIABETIC NEUROPATHY, WITH LONG-TERM CURRENT USE OF INSULIN (HCC): Primary | ICD-10-CM

## 2025-07-11 PROBLEM — E11.621 DIABETIC ULCER OF TOE OF RIGHT FOOT ASSOCIATED WITH TYPE 2 DIABETES MELLITUS (HCC): Status: RESOLVED | Noted: 2019-03-19 | Resolved: 2025-07-11

## 2025-07-11 PROBLEM — E11.621 DIABETIC ULCER OF TOE (HCC): Status: RESOLVED | Noted: 2019-03-20 | Resolved: 2025-07-11

## 2025-07-11 PROBLEM — L97.509 DIABETIC ULCER OF TOE (HCC): Status: RESOLVED | Noted: 2019-03-20 | Resolved: 2025-07-11

## 2025-07-11 PROBLEM — L97.519 DIABETIC ULCER OF TOE OF RIGHT FOOT ASSOCIATED WITH TYPE 2 DIABETES MELLITUS (HCC): Status: RESOLVED | Noted: 2019-03-19 | Resolved: 2025-07-11

## 2025-07-11 LAB — HBA1C MFR BLD: 6 %

## 2025-07-11 ASSESSMENT — ENCOUNTER SYMPTOMS
CHEST TIGHTNESS: 0
CONSTIPATION: 0
SHORTNESS OF BREATH: 0
VOMITING: 0
DIARRHEA: 0
NAUSEA: 0

## 2025-07-11 NOTE — PROGRESS NOTES
7/11/2025    This is a 44 y.o. male   Chief Complaint   Patient presents with    Diabetes     Sugar has been good.    .    HPI   Diabetes Mellitus Type 2: Current symptoms/problems include neuropathy.    Home blood sugar records: 78-89  Taking metformin 500 mg BID  Any episodes of hypoglycemia? no  Eye exam current (within one year): yes- 3/2025  Tobacco history: He  reports that he has never smoked. He has never been exposed to tobacco smoke. He has never used smokeless tobacco.     Patient follows with podiatrist at [facility] for routine foot care every 2 months. Has diabetic shoes.     Reports that gabapentin is helping better to control neuropathy.     Hypertension:  Home blood pressure monitoring: No.  He is adherent to a low sodium diet. Patient denies chest pain, shortness of breath, headache, lightheadedness, blurred vision, peripheral edema and palpitations.  Antihypertensive medication side effects: no medication side effects noted.  Use of agents associated with hypertension: none.   He is currently taking metoprolol tartrate 25 mg BID and lisinopril 40 mg daily and amlodipine 10 mg daily.     Hyperlipidemia:  No new myalgias or GI upset on atorvastatin (Lipitor).    Obesity:  Patient is following with bariatric specialist with [facility]. S/p clarisa-en-y gastric bypass surgery 2/23/21. Highest weight was 393 lbs. Lowest weight was 260 lbs, so has increased weight again.   He is exercising on his bike daily for 60 minutes 5 days per week. He has a row machine that he is using 3 times per week for 30 minutes.   He reports that he had f/u with Dr. Cortes 2/2025.    Lab Results   Component Value Date    LABA1C 6.4 (H) 02/26/2025    LABA1C 6.1 09/25/2024    LABA1C 6.5 06/24/2024     Lab Results   Component Value Date    CREATININE 0.89 02/26/2025     Lab Results   Component Value Date    ALT 55 02/26/2025    AST 30 02/26/2025     Lab Results   Component Value Date    CHOL 121 02/26/2025    TRIG

## 2025-07-12 PROBLEM — L60.0 INGROWING NAIL: Status: RESOLVED | Noted: 2019-04-03 | Resolved: 2025-07-12

## 2025-07-12 PROBLEM — L89.309 PRESSURE ULCER OF BUTTOCK: Status: RESOLVED | Noted: 2018-01-18 | Resolved: 2025-07-12

## 2025-07-12 PROBLEM — S92.354A CLOSED NONDISPLACED FRACTURE OF FIFTH METATARSAL BONE OF RIGHT FOOT: Status: RESOLVED | Noted: 2017-01-09 | Resolved: 2025-07-12

## 2025-07-12 LAB
ALBUMIN SERPL-MCNC: 4.7 G/DL (ref 3.4–5)
ALBUMIN/GLOB SERPL: 2 {RATIO} (ref 1.1–2.2)
ALP SERPL-CCNC: 104 U/L (ref 40–129)
ALT SERPL-CCNC: 80 U/L (ref 10–40)
ANION GAP SERPL CALCULATED.3IONS-SCNC: 11 MMOL/L (ref 3–16)
AST SERPL-CCNC: 45 U/L (ref 15–37)
BILIRUB SERPL-MCNC: 0.5 MG/DL (ref 0–1)
BUN SERPL-MCNC: 14 MG/DL (ref 7–20)
CALCIUM SERPL-MCNC: 9.8 MG/DL (ref 8.3–10.6)
CHLORIDE SERPL-SCNC: 99 MMOL/L (ref 99–110)
CO2 SERPL-SCNC: 28 MMOL/L (ref 21–32)
CREAT SERPL-MCNC: 0.9 MG/DL (ref 0.9–1.3)
CREAT UR-MCNC: 145 MG/DL (ref 39–259)
GFR SERPLBLD CREATININE-BSD FMLA CKD-EPI: >90 ML/MIN/{1.73_M2}
GLUCOSE SERPL-MCNC: 113 MG/DL (ref 70–99)
MICROALBUMIN UR DL<=1MG/L-MCNC: <1.2 MG/DL
MICROALBUMIN/CREAT UR: NORMAL MG/G (ref 0–30)
POTASSIUM SERPL-SCNC: 4.5 MMOL/L (ref 3.5–5.1)
PROT SERPL-MCNC: 7 G/DL (ref 6.4–8.2)
SODIUM SERPL-SCNC: 138 MMOL/L (ref 136–145)

## 2025-07-12 ASSESSMENT — ENCOUNTER SYMPTOMS: COUGH: 0

## 2025-07-16 DIAGNOSIS — E11.40 TYPE 2 DIABETES MELLITUS WITH DIABETIC NEUROPATHY, WITH LONG-TERM CURRENT USE OF INSULIN (HCC): ICD-10-CM

## 2025-07-16 DIAGNOSIS — Z79.4 TYPE 2 DIABETES MELLITUS WITH DIABETIC NEUROPATHY, WITH LONG-TERM CURRENT USE OF INSULIN (HCC): ICD-10-CM

## 2025-07-17 RX ORDER — GABAPENTIN 400 MG/1
400 CAPSULE ORAL 3 TIMES DAILY
Qty: 270 CAPSULE | Refills: 0 | Status: SHIPPED | OUTPATIENT
Start: 2025-07-17 | End: 2025-10-15